# Patient Record
Sex: MALE | Race: BLACK OR AFRICAN AMERICAN
[De-identification: names, ages, dates, MRNs, and addresses within clinical notes are randomized per-mention and may not be internally consistent; named-entity substitution may affect disease eponyms.]

---

## 2018-10-15 ENCOUNTER — HOSPITAL ENCOUNTER (OUTPATIENT)
Dept: HOSPITAL 61 - CCL | Age: 53
Discharge: HOME | End: 2018-10-15
Attending: INTERNAL MEDICINE
Payer: MEDICARE

## 2018-10-15 VITALS — WEIGHT: 174 LBS | HEIGHT: 73 IN | BODY MASS INDEX: 23.06 KG/M2

## 2018-10-15 VITALS — DIASTOLIC BLOOD PRESSURE: 71 MMHG | SYSTOLIC BLOOD PRESSURE: 125 MMHG

## 2018-10-15 VITALS — DIASTOLIC BLOOD PRESSURE: 85 MMHG | SYSTOLIC BLOOD PRESSURE: 142 MMHG

## 2018-10-15 VITALS — SYSTOLIC BLOOD PRESSURE: 123 MMHG | DIASTOLIC BLOOD PRESSURE: 70 MMHG

## 2018-10-15 VITALS — DIASTOLIC BLOOD PRESSURE: 87 MMHG | SYSTOLIC BLOOD PRESSURE: 140 MMHG

## 2018-10-15 VITALS — DIASTOLIC BLOOD PRESSURE: 90 MMHG | SYSTOLIC BLOOD PRESSURE: 131 MMHG

## 2018-10-15 DIAGNOSIS — Z98.890: ICD-10-CM

## 2018-10-15 DIAGNOSIS — E78.5: ICD-10-CM

## 2018-10-15 DIAGNOSIS — Z79.82: ICD-10-CM

## 2018-10-15 DIAGNOSIS — Z87.891: ICD-10-CM

## 2018-10-15 DIAGNOSIS — N18.6: ICD-10-CM

## 2018-10-15 DIAGNOSIS — Z99.2: ICD-10-CM

## 2018-10-15 DIAGNOSIS — I50.22: ICD-10-CM

## 2018-10-15 DIAGNOSIS — E11.22: ICD-10-CM

## 2018-10-15 DIAGNOSIS — I42.9: ICD-10-CM

## 2018-10-15 DIAGNOSIS — Z79.84: ICD-10-CM

## 2018-10-15 DIAGNOSIS — I13.2: ICD-10-CM

## 2018-10-15 DIAGNOSIS — Z79.899: ICD-10-CM

## 2018-10-15 DIAGNOSIS — Z45.02: Primary | ICD-10-CM

## 2018-10-15 LAB
ANION GAP SERPL CALC-SCNC: 13 MMOL/L (ref 6–14)
BUN SERPL-MCNC: 54 MG/DL (ref 8–26)
CALCIUM SERPL-MCNC: 8.2 MG/DL (ref 8.5–10.1)
CHLORIDE SERPL-SCNC: 96 MMOL/L (ref 98–107)
CO2 SERPL-SCNC: 27 MMOL/L (ref 21–32)
CREAT SERPL-MCNC: 10.7 MG/DL (ref 0.7–1.3)
ERYTHROCYTE [DISTWIDTH] IN BLOOD BY AUTOMATED COUNT: 14.2 % (ref 11.5–14.5)
GFR SERPLBLD BASED ON 1.73 SQ M-ARVRAT: 6.2 ML/MIN
GLUCOSE SERPL-MCNC: 432 MG/DL (ref 70–99)
HCT VFR BLD CALC: 38.8 % (ref 39–53)
HGB BLD-MCNC: 12.9 G/DL (ref 13–17.5)
MCH RBC QN AUTO: 29 PG (ref 25–35)
MCHC RBC AUTO-ENTMCNC: 33 G/DL (ref 31–37)
MCV RBC AUTO: 87 FL (ref 79–100)
PLATELET # BLD AUTO: 348 X10^3/UL (ref 140–400)
POTASSIUM SERPL-SCNC: 3.2 MMOL/L (ref 3.5–5.1)
PROTHROMBIN TIME: 12.8 SEC (ref 11.7–14)
RBC # BLD AUTO: 4.47 X10^6/UL (ref 4.3–5.7)
SODIUM SERPL-SCNC: 136 MMOL/L (ref 136–145)
WBC # BLD AUTO: 12.2 X10^3/UL (ref 4–11)

## 2018-10-15 PROCEDURE — 80048 BASIC METABOLIC PNL TOTAL CA: CPT

## 2018-10-15 PROCEDURE — 33263 RMVL & RPLCMT DFB GEN 2 LEAD: CPT

## 2018-10-15 PROCEDURE — 33264 RMVL & RPLCMT DFB GEN MLT LD: CPT

## 2018-10-15 PROCEDURE — C1721 AICD, DUAL CHAMBER: HCPCS

## 2018-10-15 PROCEDURE — 99153 MOD SED SAME PHYS/QHP EA: CPT

## 2018-10-15 PROCEDURE — 85610 PROTHROMBIN TIME: CPT

## 2018-10-15 PROCEDURE — 85027 COMPLETE CBC AUTOMATED: CPT

## 2018-10-15 PROCEDURE — 99152 MOD SED SAME PHYS/QHP 5/>YRS: CPT

## 2018-10-15 PROCEDURE — 36415 COLL VENOUS BLD VENIPUNCTURE: CPT

## 2018-10-15 NOTE — CARD
MR#: [id number]

Account#: [id number]

Accession#: [id number]

Date of Study: 10/15/2018

Ordering Physician: ALL COLLINS, 

Referring Physician: ALL COLLINS, 

Tech: 





--------------- APPROVED REPORT --------------





HISTORY

The Patient is a 53 year-old male with a history of cardiomyopathy s/p ICD. 



PROCEDURES

moderate sedation: 66 minutes



INDICATIONS

End of life of ICD battery



CONSCIOUS SEDATION AGENTS

See medication admin record



IMPLANTED DEVICES

30 mL of 2% lidocaine was infiltrated into the skin and subcutaneous tissues for local anesthesia.  A
n incision was made over the left infraclavicular fossa and using blunt dissection and cautery the pr
evious pocket was opened. Due to significant scar tissue, the previous incision was not reopened and 
a new subclavicular incision was made.  



Subsequently, due to calcification/fibrosis, the previous ICD site/wires were difficult to free and t
herefore the procedure was more complex. After the previous leads were adequately mobilized, they wer
e checked for integrity. 



Finally, the previous ICD battery was removed and a new DF1 ICD generator ([employer] - [place]  2357,
 SN [id number]) was connected to the previous leads: Med 4076 - [id number] (atrial)

Med 6947 - [id number] (ventricular)



Thresholds/impedances and sensitivities were acceptable and unchanged from prior. 



The device was then placed in the pocket and the incision was closed in 3 layers. 



No acute complications were noted. 



CONCLUSION

Successful pacemaker ICD generator change for end of life of device. 



Signed by : All Collins, 

Electronically Approved : 10/15/2018 12:32:07

## 2018-10-19 ENCOUNTER — HOSPITAL ENCOUNTER (INPATIENT)
Dept: HOSPITAL 61 - ER | Age: 53
LOS: 6 days | Discharge: NURSING, REHAB OR LONG-TERM CARE | DRG: 871 | End: 2018-10-25
Attending: INTERNAL MEDICINE | Admitting: INTERNAL MEDICINE
Payer: MEDICARE

## 2018-10-19 VITALS — DIASTOLIC BLOOD PRESSURE: 72 MMHG | SYSTOLIC BLOOD PRESSURE: 103 MMHG

## 2018-10-19 VITALS — DIASTOLIC BLOOD PRESSURE: 65 MMHG | SYSTOLIC BLOOD PRESSURE: 93 MMHG

## 2018-10-19 VITALS — WEIGHT: 187 LBS | HEIGHT: 74 IN | BODY MASS INDEX: 24 KG/M2

## 2018-10-19 VITALS — DIASTOLIC BLOOD PRESSURE: 70 MMHG | SYSTOLIC BLOOD PRESSURE: 96 MMHG

## 2018-10-19 VITALS — DIASTOLIC BLOOD PRESSURE: 64 MMHG | SYSTOLIC BLOOD PRESSURE: 94 MMHG

## 2018-10-19 VITALS — SYSTOLIC BLOOD PRESSURE: 86 MMHG | DIASTOLIC BLOOD PRESSURE: 60 MMHG

## 2018-10-19 DIAGNOSIS — D63.8: ICD-10-CM

## 2018-10-19 DIAGNOSIS — M19.90: ICD-10-CM

## 2018-10-19 DIAGNOSIS — A41.9: Primary | ICD-10-CM

## 2018-10-19 DIAGNOSIS — E11.65: ICD-10-CM

## 2018-10-19 DIAGNOSIS — E11.43: ICD-10-CM

## 2018-10-19 DIAGNOSIS — R65.21: ICD-10-CM

## 2018-10-19 DIAGNOSIS — E11.69: ICD-10-CM

## 2018-10-19 DIAGNOSIS — K59.00: ICD-10-CM

## 2018-10-19 DIAGNOSIS — N18.6: ICD-10-CM

## 2018-10-19 DIAGNOSIS — E87.1: ICD-10-CM

## 2018-10-19 DIAGNOSIS — Z79.4: ICD-10-CM

## 2018-10-19 DIAGNOSIS — E11.649: ICD-10-CM

## 2018-10-19 DIAGNOSIS — E78.5: ICD-10-CM

## 2018-10-19 DIAGNOSIS — M86.9: ICD-10-CM

## 2018-10-19 DIAGNOSIS — N25.81: ICD-10-CM

## 2018-10-19 DIAGNOSIS — I44.7: ICD-10-CM

## 2018-10-19 DIAGNOSIS — K65.9: ICD-10-CM

## 2018-10-19 DIAGNOSIS — K21.9: ICD-10-CM

## 2018-10-19 DIAGNOSIS — E78.00: ICD-10-CM

## 2018-10-19 DIAGNOSIS — Z95.810: ICD-10-CM

## 2018-10-19 DIAGNOSIS — E11.22: ICD-10-CM

## 2018-10-19 DIAGNOSIS — Z79.899: ICD-10-CM

## 2018-10-19 DIAGNOSIS — L97.529: ICD-10-CM

## 2018-10-19 DIAGNOSIS — Z99.2: ICD-10-CM

## 2018-10-19 DIAGNOSIS — I13.2: ICD-10-CM

## 2018-10-19 DIAGNOSIS — Z87.891: ICD-10-CM

## 2018-10-19 DIAGNOSIS — E87.6: ICD-10-CM

## 2018-10-19 DIAGNOSIS — Z83.3: ICD-10-CM

## 2018-10-19 DIAGNOSIS — Z82.49: ICD-10-CM

## 2018-10-19 DIAGNOSIS — K31.84: ICD-10-CM

## 2018-10-19 DIAGNOSIS — E11.621: ICD-10-CM

## 2018-10-19 DIAGNOSIS — K52.9: ICD-10-CM

## 2018-10-19 DIAGNOSIS — E43: ICD-10-CM

## 2018-10-19 DIAGNOSIS — J18.9: ICD-10-CM

## 2018-10-19 DIAGNOSIS — I50.22: ICD-10-CM

## 2018-10-19 LAB
% LYMPHS: 6 % (ref 24–48)
% MONOS: 4 % (ref 0–10)
% SEGS: 90 % (ref 35–66)
ALBUMIN SERPL-MCNC: 1.7 G/DL (ref 3.4–5)
ALP SERPL-CCNC: 102 U/L (ref 46–116)
ALT SERPL-CCNC: 11 U/L (ref 16–63)
ANION GAP SERPL CALC-SCNC: 14 MMOL/L (ref 6–14)
AST SERPL-CCNC: 33 U/L (ref 15–37)
BASOPHILS # BLD AUTO: 0 X10^3/UL (ref 0–0.2)
BASOPHILS NFR BLD: 0 % (ref 0–3)
BILIRUB DIRECT SERPL-MCNC: 0.2 MG/DL (ref 0–0.2)
BILIRUB SERPL-MCNC: 0.5 MG/DL (ref 0.2–1)
BUN SERPL-MCNC: 71 MG/DL (ref 8–26)
CALCIUM SERPL-MCNC: 7.9 MG/DL (ref 8.5–10.1)
CHLORIDE SERPL-SCNC: 88 MMOL/L (ref 98–107)
CO2 SERPL-SCNC: 27 MMOL/L (ref 21–32)
CREAT SERPL-MCNC: 13.3 MG/DL (ref 0.7–1.3)
EOSINOPHIL NFR BLD: 0 % (ref 0–3)
EOSINOPHIL NFR BLD: 0 X10^3/UL (ref 0–0.7)
ERYTHROCYTE [DISTWIDTH] IN BLOOD BY AUTOMATED COUNT: 13.4 % (ref 11.5–14.5)
GFR SERPLBLD BASED ON 1.73 SQ M-ARVRAT: 4.8 ML/MIN
GLUCOSE SERPL-MCNC: 241 MG/DL (ref 70–99)
HCT VFR BLD CALC: 34.2 % (ref 39–53)
HGB BLD-MCNC: 11.6 G/DL (ref 13–17.5)
LIPASE: 77 U/L (ref 73–393)
LYMPHOCYTES # BLD: 0.4 X10^3/UL (ref 1–4.8)
LYMPHOCYTES NFR BLD AUTO: 2 % (ref 24–48)
MCH RBC QN AUTO: 29 PG (ref 25–35)
MCHC RBC AUTO-ENTMCNC: 34 G/DL (ref 31–37)
MCV RBC AUTO: 86 FL (ref 79–100)
MONO #: 1.3 X10^3/UL (ref 0–1.1)
MONOCYTES NFR BLD: 7 % (ref 0–9)
NEUT #: 17.2 X10^3UL (ref 1.8–7.7)
NEUTROPHILS NFR BLD AUTO: 91 % (ref 31–73)
PLATELET # BLD AUTO: 325 X10^3/UL (ref 140–400)
PLATELET # BLD EST: ADEQUATE 10*3/UL
POTASSIUM SERPL-SCNC: 3.6 MMOL/L (ref 3.5–5.1)
PROT SERPL-MCNC: 6 G/DL (ref 6.4–8.2)
RBC # BLD AUTO: 3.97 X10^6/UL (ref 4.3–5.7)
SODIUM SERPL-SCNC: 129 MMOL/L (ref 136–145)
WBC # BLD AUTO: 18.9 X10^3/UL (ref 4–11)

## 2018-10-19 PROCEDURE — 93306 TTE W/DOPPLER COMPLETE: CPT

## 2018-10-19 PROCEDURE — 87075 CULTR BACTERIA EXCEPT BLOOD: CPT

## 2018-10-19 PROCEDURE — 83735 ASSAY OF MAGNESIUM: CPT

## 2018-10-19 PROCEDURE — 71045 X-RAY EXAM CHEST 1 VIEW: CPT

## 2018-10-19 PROCEDURE — 87641 MR-STAPH DNA AMP PROBE: CPT

## 2018-10-19 PROCEDURE — 36415 COLL VENOUS BLD VENIPUNCTURE: CPT

## 2018-10-19 PROCEDURE — 36569 INSJ PICC 5 YR+ W/O IMAGING: CPT

## 2018-10-19 PROCEDURE — 82533 TOTAL CORTISOL: CPT

## 2018-10-19 PROCEDURE — 96367 TX/PROPH/DG ADDL SEQ IV INF: CPT

## 2018-10-19 PROCEDURE — A9503 TC99M MEDRONATE: HCPCS

## 2018-10-19 PROCEDURE — 85007 BL SMEAR W/DIFF WBC COUNT: CPT

## 2018-10-19 PROCEDURE — 78315 BONE IMAGING 3 PHASE: CPT

## 2018-10-19 PROCEDURE — 83690 ASSAY OF LIPASE: CPT

## 2018-10-19 PROCEDURE — 82962 GLUCOSE BLOOD TEST: CPT

## 2018-10-19 PROCEDURE — 93926 LOWER EXTREMITY STUDY: CPT

## 2018-10-19 PROCEDURE — 96374 THER/PROPH/DIAG INJ IV PUSH: CPT

## 2018-10-19 PROCEDURE — 84484 ASSAY OF TROPONIN QUANT: CPT

## 2018-10-19 PROCEDURE — 87040 BLOOD CULTURE FOR BACTERIA: CPT

## 2018-10-19 PROCEDURE — 96375 TX/PRO/DX INJ NEW DRUG ADDON: CPT

## 2018-10-19 PROCEDURE — 87071 CULTURE AEROBIC QUANT OTHER: CPT

## 2018-10-19 PROCEDURE — P9041 ALBUMIN (HUMAN),5%, 50ML: HCPCS

## 2018-10-19 PROCEDURE — 73630 X-RAY EXAM OF FOOT: CPT

## 2018-10-19 PROCEDURE — 83605 ASSAY OF LACTIC ACID: CPT

## 2018-10-19 PROCEDURE — 80069 RENAL FUNCTION PANEL: CPT

## 2018-10-19 PROCEDURE — C9113 INJ PANTOPRAZOLE SODIUM, VIA: HCPCS

## 2018-10-19 PROCEDURE — 80076 HEPATIC FUNCTION PANEL: CPT

## 2018-10-19 PROCEDURE — 80048 BASIC METABOLIC PNL TOTAL CA: CPT

## 2018-10-19 PROCEDURE — 83880 ASSAY OF NATRIURETIC PEPTIDE: CPT

## 2018-10-19 PROCEDURE — 85025 COMPLETE CBC W/AUTO DIFF WBC: CPT

## 2018-10-19 PROCEDURE — 87324 CLOSTRIDIUM AG IA: CPT

## 2018-10-19 PROCEDURE — 84443 ASSAY THYROID STIM HORMONE: CPT

## 2018-10-19 PROCEDURE — 74176 CT ABD & PELVIS W/O CONTRAST: CPT

## 2018-10-19 PROCEDURE — 80202 ASSAY OF VANCOMYCIN: CPT

## 2018-10-19 PROCEDURE — 89050 BODY FLUID CELL COUNT: CPT

## 2018-10-19 PROCEDURE — 96365 THER/PROPH/DIAG IV INF INIT: CPT

## 2018-10-19 PROCEDURE — 93005 ELECTROCARDIOGRAM TRACING: CPT

## 2018-10-19 PROCEDURE — 83036 HEMOGLOBIN GLYCOSYLATED A1C: CPT

## 2018-10-19 RX ADMIN — HYDROMORPHONE HYDROCHLORIDE PRN MG: 2 INJECTION INTRAMUSCULAR; INTRAVENOUS; SUBCUTANEOUS at 16:13

## 2018-10-19 RX ADMIN — ONDANSETRON PRN MG: 2 INJECTION INTRAMUSCULAR; INTRAVENOUS at 20:34

## 2018-10-19 RX ADMIN — DOCUSATE SODIUM SCH MG: 100 CAPSULE, LIQUID FILLED ORAL at 21:00

## 2018-10-19 RX ADMIN — PIPERACILLIN SODIUM AND TAZOBACTAM SODIUM SCH MLS/HR: 2; .25 INJECTION, POWDER, LYOPHILIZED, FOR SOLUTION INTRAVENOUS at 21:41

## 2018-10-19 RX ADMIN — VANCOMYCIN HYDROCHLORIDE PRN EACH: 1 INJECTION, POWDER, LYOPHILIZED, FOR SOLUTION INTRAVENOUS at 18:06

## 2018-10-19 RX ADMIN — HYDROMORPHONE HYDROCHLORIDE PRN MG: 2 INJECTION INTRAMUSCULAR; INTRAVENOUS; SUBCUTANEOUS at 16:50

## 2018-10-19 RX ADMIN — HEPARIN SODIUM SCH UNIT: 5000 INJECTION, SOLUTION INTRAVENOUS; SUBCUTANEOUS at 21:42

## 2018-10-19 RX ADMIN — VANCOMYCIN HYDROCHLORIDE PRN EACH: 1 INJECTION, POWDER, LYOPHILIZED, FOR SOLUTION INTRAVENOUS at 18:13

## 2018-10-19 RX ADMIN — SENNOSIDES AND DOCUSATE SODIUM SCH TAB: 8.6; 5 TABLET ORAL at 21:00

## 2018-10-19 NOTE — RAD
Examination: CT of the abdomen pelvis with contrast

 

HISTORY: History of nausea, vomiting, abdominal pain

 

COMPARISON: 11/4/2016

 

TECHNIQUE: Axial CT images of the abdomen pelvis were performed without 

contrast. Coronal and sagittal reformats are performed

 

Exposure: One or more of the following individualized dose reduction 

techniques were utilized for this examination:  1. Automated exposure 

control  2. Adjustment of the mA and/or kV according to patient size  3. 

Use of iterative reconstruction technique

 

FINDINGS:

 

 

Partially visualized moderate size consolidation identified in the left 

lingula of the lung probably pneumonia.

 

Partially visualized moderate cardiomegaly.

 

No evidence of free air identified in the abdomen.

 

The evaluation of the solid organs is limited due to lack of IV contrast. 

The evaluation of bowel is limited due to lack of oral contrast.

 

The visualized noncontrasted liver, spleen, adrenals grossly appears 

unremarkable. The gallbladder is mildly distended.

 

Stomach is mildly distended. The evaluation of the pancreas is limited due

to mild motion artifact.

 

The small bowel is nondilated.

 

Feces and gas noted in the colon.

 

Minimal perihepatic fluid identified.

 

Few sigmoid colon diverticulosis. Urinary bladder is mildly distended.

 

Small amount of free fluid identified in the pelvis.

 

Atrophic appearing bilateral kidneys.

 

Mild degenerative changes lumbar spine. A tubing is identified in the 

lower pelvis region likely peritoneal dialysis catheter tubing.

 

 

 

 

 

IMPRESSION:

 

 

1. Partially visualized moderate consolidation left lingula of the lung 

probably pneumonia. Recommend CT chest for further evaluation.

 

2. Minimal perihepatic fluid.

 

3. Atrophic appearing bilateral kidneys.

 

 

 

Electronically signed by: Rohan Strickland MD (10/19/2018 4:40 PM) [id number]

## 2018-10-19 NOTE — PDOC2
JOSE PEREZ APRN 10/19/18 1651:


CARDIAC CONSULT


DATE OF CONSULT


Date of Consult


DATE: 10/19/18 


TIME: 16:27





REASON FOR CONSULT


Reason for Consult:


fever after generator change





REFERRING PHYSICIAN


Referring Physician:


Lilian





SOURCE


Source:  Chart review, Patient





HISTORY OF PRESENT ILLNESS


HISTORY OF PRESENT ILLNESS


This is a 54 yo male admitted for complains of persistent nausea and vomiting.  

Pt had his AICD generator change on 10/15/2018.  Since then his incision has 

been intact without erythema or significant swelling.  The incision does not 

have any oozing and the steristrips are dry without and discolored residual 

drain.  Denies any pain around the device. No irritation and is not hot to 

touch.  His n/v started Monday night.  He has not had any BM since Sunday.  He 

has been vomiting about 20 times a day but despite that he has been drinking 

enough and eating solid food till Wednesday when he switch to broth.  He has 

been taking his medications as well and doing his peritoneal dialysis.  He has 

some abd pain but no significant.  Denies any redness or oozing around the PD 

cath site and his last dialysis was last night and denies any discoloration or 

changes in the consistency of his post dialysis peritoneal fluid. He denies any 

fever or chills but upon admission he has been noted with low grade fever and 

tachycardic with low BP but at the same time he is currently still nauseated 

and retching and awaiting zofran.





PAST MEDICAL HISTORY


Cardiovascular:  CHF (cardiomyopathy), HTN, Hyperlipidemia


Heme/Onc:  Anemia NOS


Musculoskeletal:  Osteoarthritis


ENT:  Allergic Rhinitis


Renal/:  Chronic renal failure (ESRD with PD)


Endocrine:  Diabetes (2)


Dermatology:  Other (acquired perforating leg dermatosis)





PAST SURGICAL HISTORY


Past Surgical History:  Pacemaker (AICD with recent generator change 10/15/2018)

, Other (PD cath placement to abd. )





FAMILY HISTORY


Family History


noncontributory





SOCIAL HISTORY


Smoke:  No


ALCOHOL:  none


Drugs:  None


Lives:  with Family





CURRENT MEDICATIONS


CURRENT MEDICATIONS





Current Medications








 Medications


  (Trade)  Dose


 Ordered  Sig/Ivonne


 Route


 PRN Reason  Start Time


 Stop Time Status Last Admin


Dose Admin


 


 Ondansetron HCl


  (Zofran)  4 mg  1X  ONCE


 IV


   10/19/18 16:00


 10/19/18 16:01 DC 10/19/18 16:12





 


 Hydromorphone HCl


  (Dilaudid)  0.5 mg  PRN Q30MIN  PRN


 IV


 SEVERE PAIN  10/19/18 16:15


    10/19/18 16:13














ALLERGIES


ALLERGIES:  


Coded Allergies:  


     No Known Drug Allergies (Unverified , 6/22/15)





ROS


Review of System


14 point ROS evaluated with pertinent positives noted per HPI





PHYSICAL EXAM


General:  Alert, Oriented X3, Cooperative, mild distress (retching currently)


HEENT:  Atraumatic, Mucous membr. moist/pink


Lungs:  Clear to auscultation, Normal air movement


Heart:  Regular rate (tachy), Other (3/6 systolic murmur to LLS border)


Abdomen:  Soft, Other (with mild diffuse tenderness, PD cath in place with 

dressing)


Extremities:  No cyanosis, No edema


Skin:  Other (chornic leg closed lesions; left chest incision site is open to 

air, no swelling or erythema or tenderness with palpation. No drain and 

incision is well approximated with dry steri strips. No discoloration. )


Neuro:  Normal speech, Sensation intact


Psych/Mental Status:  Mental status NL, Other


MUSCULOSKELETAL:  Osteoarthritic changes both hands





VITALS


VITALS





Vital Signs








  Date Time  Temp Pulse Resp B/P (MAP) Pulse Ox O2 Delivery O2 Flow Rate FiO2


 


10/19/18 16:13   20   Room Air  


 


10/19/18 15:20 100.1 113  98/73 (81) 100   





 100.1       











LABS


Lab:





Laboratory Tests








Test


 10/19/18


15:45


 


White Blood Count


 18.9 x10^3/uL


(4.0-11.0)


 


Red Blood Count


 3.97 x10^6/uL


(4.30-5.70)


 


Hemoglobin


 11.6 g/dL


(13.0-17.5)


 


Hematocrit


 34.2 %


(39.0-53.0)


 


Mean Corpuscular Volume 86 fL () 


 


Mean Corpuscular Hemoglobin 29 pg (25-35) 


 


Mean Corpuscular Hemoglobin


Concent 34 g/dL


(31-37)


 


Red Cell Distribution Width


 13.4 %


(11.5-14.5)


 


Platelet Count


 325 x10^3/uL


(140-400)


 


Neutrophils (%) (Auto) 91 % (31-73) 


 


Lymphocytes (%) (Auto) 2 % (24-48) 


 


Monocytes (%) (Auto) 7 % (0-9) 


 


Eosinophils (%) (Auto) 0 % (0-3) 


 


Basophils (%) (Auto) 0 % (0-3) 


 


Neutrophils # (Auto)


 17.2 x10^3uL


(1.8-7.7)


 


Lymphocytes # (Auto)


 0.4 x10^3/uL


(1.0-4.8)


 


Monocytes # (Auto)


 1.3 x10^3/uL


(0.0-1.1)


 


Eosinophils # (Auto)


 0.0 x10^3/uL


(0.0-0.7)


 


Basophils # (Auto)


 0.0 x10^3/uL


(0.0-0.2)


 


Sodium Level


 129 mmol/L


(136-145)


 


Potassium Level


 3.6 mmol/L


(3.5-5.1)


 


Chloride Level


 88 mmol/L


()


 


Carbon Dioxide Level


 27 mmol/L


(21-32)


 


Anion Gap 14 (6-14) 


 


Blood Urea Nitrogen


 71 mg/dL


(8-26)


 


Creatinine


 13.3 mg/dL


(0.7-1.3)


 


Estimated GFR


(Cockcroft-Gault) 4.8 





 


Glucose Level


 241 mg/dL


(70-99)


 


Lactic Acid Level


 1.3 mmol/L


(0.4-2.0)


 


Calcium Level


 7.9 mg/dL


(8.5-10.1)


 


Total Bilirubin


 0.5 mg/dL


(0.2-1.0)


 


Direct Bilirubin


 0.2 mg/dL


(0.0-0.2)


 


Aspartate Amino Transf


(AST/SGOT) 33 U/L (15-37) 





 


Alanine Aminotransferase


(ALT/SGPT) 11 U/L (16-63) 





 


Alkaline Phosphatase


 102 U/L


()


 


Troponin I Quantitative


 0.340 ng/mL


(0.000-0.055)


 


Total Protein


 6.0 g/dL


(6.4-8.2)


 


Albumin


 1.7 g/dL


(3.4-5.0)


 


Lipase


 77 U/L


()











ECHOCARDIOGRAM


ECHOCARDIOGRAM





<Conclusion>


Left ventricle systolic function is severely impaired.


The Ejection Fraction is 10-15%.


There is mild concentric left ventricular hypertrophy.


There is global hypokinesis of the left ventricle.


There is a pacemaker lead in the right ventricle.


The left atrium is mildly dilated.


Doppler and Color Flow revealed trace aortic regurgitation.


Doppler and Color Flow revealed mild mitral regurgitation.


Doppler and Color Flow revealed mild tricuspid regurgitation.


The PA pressure was estimated at 50 mmHg.


The IVC is dilated and collapses <50% with inspiration.


There is no evidence of significant pericardial effusion.





DATE:     01/26/15 1607





ASSESSMENT/PLAN


ASSESSMENT/PLAN


1. Abd pain/nausea/vomiting/fever: Last BM Sunday. ?peritonitis, gastroparesis?


2. S/P AICD generator change: done  on 10/15/2018 ([employer]) stable. 


3. Chronic systolic CHF: compensated


4. ESRD: utilizes PD with last dialysis last night. 


5. HTN


6. DM2/HLP


7. Reactive sinus tach with chronic LBBB





Recommendations


1. CXR and note AICD region.  CT abd pending. Repeat EKG


2. Consult nephrology


3. Restart home cardiac meds once abdominal symptoms are better.





ALL COLLINS MD 10/19/18 1806:


CARDIAC CONSULT


ASSESSMENT/PLAN


ASSESSMENT/PLAN


Pt. seen and examined. Agree with above NP note. 


No clear cardiac source of issues. 


Supportive care. 


Will follow along peripherally.











JOSE PEREZ APRN Oct 19, 2018 16:51


ALL COLLINS MD Oct 19, 2018 18:06

## 2018-10-19 NOTE — EKG
[facility]

              [address] [place] [address]

Test Date:    2018-10-19               Test Time:    16:50:09

Pat Name:     BLAISE DOWNING            Department:   

Patient ID:   [id number]           Room:         106 1

Gender:       M                        Technician:   

:          [id number]               Requested By: JOSE PEREZ

Order Number: [id number]           Reading MD:   Ramirez Mcgraw MD

                                 Measurements

Intervals                              Axis          

Rate:         103                      P:            56

UT:           160                      QRS:          -37

QRSD:         140                      T:            91

QT:           374                                    

QTc:          492                                    

                           Interpretive Statements

SINUS TACHYCARDIA

LEFT ATRIAL ABNORMALITY

ABNORMAL LEFT AXIS DEVIATION

LEFT ANTERIOR FASCICULAR BLOCK

NON SPECIFIC INTRAVENTRICULAR BLOCK

QRS(T) CONTOUR ABNORMALITY

CONSISTENT WITH SEPTAL INFARCT

PROBABLY OLD

LVH

Electronically Signed On 10- 13:49:27 CDT by Ramirez Mcgraw MD

## 2018-10-19 NOTE — PHYS DOC
Past Medical History


Past Medical History:  CHF, Diabetes-Type II, High Cholesterol, Heart Disease, 

Renal Failure


Past Surgical History:  Pacemaker, Other


Additional Past Surgical Histo:  PERITONEAL DIALYSIS CATH PLACE LLQ


Alcohol Use:  None


Drug Use:  None





Adult General


Chief Complaint


Chief Complaint:  ABDOMINAL PAIN





HPI


HPI


53-year-old male presenting to the emergency department today with vomiting and 

unable to keep anything down since Monday. The patient had his battery for his 

pacemaker replaced on Monday by Dr. Mcgraw at our facility. The patient 

denies any fevers but does have abdominal pain is primary complaint is nausea 

and vomiting. He denies diaphoresis or chest pain.





Review of systems is negative for chest pain shortness of breath. He denies any 

new rashes. Positive for vomiting. All other review of systems is negative 

unless otherwise noted in history of present illness.





ED course: 53-year-old male presenting to the emergency department today. He 

has abdominal pain with low-grade temperature. Broad-spectrum IV antibiotics 

initiated early on. IV fluids initiated as well however the patient is a 

dialysis patient thus we used judicious management of IV fluids. I spoke with 

Liv the [facility] for cardiology and given the patient's recent battery 

replacement. EKG reviewed by myself shows paced rhythm with repolarization. 

Negative [place]. Repeat EKG shows similar findings without any acute 

evolving changes. Troponin is positive. White blood cell count is elevated. 

Concern for possible sepsis. Broad-spectrum antibiotics initiated. Chest x-ray 

shows pneumonia. We tried to obtain peritoneal fluid however unfortunately were 

unable to obtain any. They might have to obtain this later when he does 

perineal dialysis. Nurse practitioner Liv evaluated the patient in the 

emergency department. Dr. SAMUELS saw the patient in the emergency department as 

well. We will admit the patient to Dr. SAMUELS for further evaluation treatment and 

care. We will place the patient in the intensive care unit for now for close 

monitoring.








Impression: Concern for sepsis, elevated troponin, elevated white blood cell 

count, vomiting, pneumonia





Review of Systems


Review of Systems


SEE ABOVE.





Current Medications


Current Medications





Current Medications








 Medications


  (Trade)  Dose


 Ordered  Sig/Ivonne  Start Time


 Stop Time Status Last Admin


Dose Admin


 


 Acetaminophen


  (Tylenol)  650 mg  PRN Q6HRS  PRN  10/19/18 17:30


   UNV  





 


 Aspirin


  (Children'S


 Aspirin)  324 mg  1X  ONCE  10/19/18 17:15


 10/19/18 17:16 DC  





 


 Dextrose


  (Dextrose


 50%-Water Syringe)  12.5 gm  PRN Q15MIN  PRN  10/19/18 17:30


   UNV  





 


 Docusate Sodium


  (Colace)  100 mg  BID  10/19/18 21:00


   UNV  





 


 Heparin Sodium


  (Porcine)


  (Heparin Sodium)  5,000 unit  Q8HRS  10/19/18 22:00


   UNV  





 


 Hydromorphone HCl


  (Dilaudid)  2 mg  STK-MED ONCE  10/19/18 16:10


 10/19/18 16:11 DC  





 


 Insulin Human


 Lispro


  (HumaLOG)  0-9 UNITS  TIDWMEALS  10/20/18 08:00


   UNV  





 


 Magnesium


 Hydroxide


  (Milk Of


 Magnesia)  2,400 mg  PRN Q12HR  PRN  10/19/18 17:45


   UNV  





 


 Morphine Sulfate


  (Morphine


 Sulfate)  2 mg  PRN Q2HR  PRN  10/19/18 17:30


   UNV  





 


 Ondansetron HCl


  (Zofran)  4 mg  PRN Q6HRS  PRN  10/19/18 17:30


   UNV  





 


 Piperacillin Sod/


 Tazobactam Sod


  (Zosyn Per


 Pharmacy)  1 each  PRN DAILY  PRN  10/19/18 17:30


   UNV  





 


 Piperacillin Sod/


 Tazobactam Sod


 2.25 gm/Sodium


 Chloride  50 ml @ 


 100 mls/hr  1X  ONCE  10/19/18 16:00


 10/19/18 16:29 DC 10/19/18 16:49


100 MLS/HR


 


 Piperacillin Sod/


 Tazobactam Sod


 3.375 gm/Sodium


 Chloride  50 ml @ 


 100 mls/hr  Q6HRS  10/19/18 18:00


   UNV  





 


 Senna/Docusate


 Sodium


  (Senna Plus)  1 tab  BID  10/19/18 21:00


   UNV  





 


 Sodium Chloride  500 ml @ 


 500 mls/hr  1X  ONCE  10/19/18 17:15


 10/19/18 18:14   





 


 Tramadol HCl


  (Ultram)  50 mg  PRN Q6HRS  PRN  10/19/18 17:30


   UNV  





 


 Vancomycin HCl


  (Vanco Per


 Pharmacy)  1 each  PRN DAILY  PRN  10/19/18 17:30


   UNV  





 


 Vancomycin HCl


 1.75 gm/Sodium


 Chloride  500 ml @ 


 250 mls/hr  1X  ONCE  10/19/18 17:30


 10/19/18 19:29  10/19/18 17:25


250 MLS/HR











Allergies


Allergies





Allergies








Coded Allergies Type Severity Reaction Last Updated Verified


 


  No Known Drug Allergies    6/22/15 No











Physical Exam


Physical Exam


SEE ABOVE





Constitutional: Well developed, well nourished, 


HENT: Normocephalic, atraumatic, bilateral external ears normal, oropharynx 

moist, no oral exudates, nose normal. []


Eyes: PERRLA, EOMI, conjunctiva normal, no discharge. [] 


Neck: Normal range of motion, no tenderness, supple, no stridor.  


Cardiovascular:Heart rate regular rhythm, no murmur. On examination of the 

chest wall the patient has a surgical scar in place that is clean dry and 

intact. No erythema or drainage. Not warm to touch. 


Lungs & Thorax:  Bilateral breath sounds clear to auscultation 


Abdomen: Abdomen is soft and nontender palpation without rebound tenderness. No 

palpable masses. Nondistended.


Skin: Warm, dry, no erythema, no rash. [] 


Back: No tenderness, no CVA tenderness.  


Extremities: No tenderness, no cyanosis, no clubbing, ROM intact, no edema.  


Neurologic: Alert and oriented X 3, normal motor function, normal sensory 

function, no focal deficits noted. 


Psychologic: Affect normal, judgement normal, mood normal. []





Current Patient Data


Vital Signs





 Vital Signs








  Date Time  Temp Pulse Resp B/P (MAP) Pulse Ox O2 Delivery O2 Flow Rate FiO2


 


10/19/18 17:00  100 15 86/61 (69) 94 Room Air  


 


10/19/18 15:20 100.1       





 100.1       








Lab Values





 Laboratory Tests








Test


 10/19/18


15:45


 


White Blood Count


 18.9 x10^3/uL


(4.0-11.0)  H


 


Red Blood Count


 3.97 x10^6/uL


(4.30-5.70)  L


 


Hemoglobin


 11.6 g/dL


(13.0-17.5)  L


 


Hematocrit


 34.2 %


(39.0-53.0)  L


 


Mean Corpuscular Volume


 86 fL ()





 


Mean Corpuscular Hemoglobin 29 pg (25-35)  


 


Mean Corpuscular Hemoglobin


Concent 34 g/dL


(31-37)


 


Red Cell Distribution Width


 13.4 %


(11.5-14.5)


 


Platelet Count


 325 x10^3/uL


(140-400)


 


Neutrophils (%) (Auto) 91 % (31-73)  H


 


Lymphocytes (%) (Auto) 2 % (24-48)  L


 


Monocytes (%) (Auto) 7 % (0-9)  


 


Eosinophils (%) (Auto) 0 % (0-3)  


 


Basophils (%) (Auto) 0 % (0-3)  


 


Neutrophils # (Auto)


 17.2 x10^3uL


(1.8-7.7)  H


 


Lymphocytes # (Auto)


 0.4 x10^3/uL


(1.0-4.8)  L


 


Monocytes # (Auto)


 1.3 x10^3/uL


(0.0-1.1)  H


 


Eosinophils # (Auto)


 0.0 x10^3/uL


(0.0-0.7)


 


Basophils # (Auto)


 0.0 x10^3/uL


(0.0-0.2)


 


Segmented Neutrophils % 90 % (35-66)  H


 


Lymphocytes % 6 % (24-48)  L


 


Monocytes % 4 % (0-10)  


 


Platelet Estimate


 Adequate


(ADEQUATE)


 


Sodium Level


 129 mmol/L


(136-145)  L


 


Potassium Level


 3.6 mmol/L


(3.5-5.1)


 


Chloride Level


 88 mmol/L


()  L


 


Carbon Dioxide Level


 27 mmol/L


(21-32)


 


Anion Gap 14 (6-14)  


 


Blood Urea Nitrogen


 71 mg/dL


(8-26)  H


 


Creatinine


 13.3 mg/dL


(0.7-1.3)  H


 


Estimated GFR


(Cockcroft-Gault) 4.8  





 


Glucose Level


 241 mg/dL


(70-99)  H


 


Lactic Acid Level


 1.3 mmol/L


(0.4-2.0)


 


Calcium Level


 7.9 mg/dL


(8.5-10.1)  L


 


Total Bilirubin


 0.5 mg/dL


(0.2-1.0)


 


Direct Bilirubin


 0.2 mg/dL


(0.0-0.2)


 


Aspartate Amino Transferase


(AST) 33 U/L (15-37)





 


Alanine Aminotransferase (ALT)


 11 U/L (16-63)


L


 


Alkaline Phosphatase


 102 U/L


()


 


Troponin I Quantitative


 0.340 ng/mL


(0.000-0.055)


 


Total Protein


 6.0 g/dL


(6.4-8.2)  L


 


Albumin


 1.7 g/dL


(3.4-5.0)  L


 


Lipase


 77 U/L


()





 Laboratory Tests


10/19/18 15:45








 Laboratory Tests


10/19/18 15:45











EKG


EKG


[]





Radiology/Procedures


Radiology/Procedures


[]





Course & Med Decision Making


Course & Med Decision Making


Pertinent Labs and Imaging studies reviewed. (See chart for details)





[]





Dragon Disclaimer


Dragon Disclaimer


This electronic medical record was generated, in whole or in part, using a 

voice recognition dictation system.





Departure


Departure


Impression:  


 Primary Impression:  


 Abdominal pain


Disposition:  09 ADMITTED AS INPATIENT


Admitting Physician:  Angela Hodges


Condition:  STABLE


Referrals:  


ANKUR CRUZ MD (PCP)











MARCELO PALACIOS MD Oct 19, 2018 15:59

## 2018-10-19 NOTE — RAD
CHEST AP ONLY dated 10/19/2018 4:40 PM.

 

Comparison: 11/4/2016

 

Clinical Indication: recent pacer battery change.

 

Findings:

 

Single upright portable exam performed. Heart size mildly enlarged, 

stable. Dual lead left subclavian pacer/AICD in place, leads stable in 

position. There is been interval revision of the pacer box.

 

There is patchy airspace disease in the perihilar regions, left greater 

than right. No significant pleural effusion or pneumothorax.

 

Impression:

1. Patchy perihilar airspace disease, left greater than right. This could 

be related to asymmetric edema or pneumonia. Correlate clinically.

2. Interval revision of left pacer box. No evidence of pneumothorax.

 

Electronically signed by: Todd Norris MD (10/19/2018 4:56 PM) 

[id number]

## 2018-10-19 NOTE — PDOC1
History and Physical


Date of Admission


Date of Admission


10/19/18





Identification/Chief Complaint


Chief Complaint


abd pain, Nausea





Source


Source:  Chart review, Patient





History of Present Illness


History of Present Illness





HPI


HPI


53-year-old male presenting to the emergency department today with abd pain for 

5ds. Poor historian with nausea seen in ER. 





pt has known h/o CHF with EF 10%, has PPM/ICD, battery changed on MOnday with 

dr. Mcgraw.


He started to have diffuse abd pain from Monday, with nausea and mild vomiting 

with spitting out some saliva. also has mild cough wo sputum.


Has PD for 3years, works fine. 


no fever at home ,T 100 in ER. BP low as 80s in ER, but said BP usually is 

normal. 





no chest pain, or sob.


usually BM daily, last BM was sunday. still has flatus. 


ERP got little abd fluid from PD Cath for cx. 





CT abd : IMPRESSION: 


1. Partially visualized moderate consolidation left lingula of the lung 

probably pneumonia. Recommend CT chest for further evaluation.


 


2. Minimal perihepatic fluid.


 


3. Atrophic appearing bilateral kidneys.





Past Medical History


Cardiovascular:  CHF (cardiomyopathy), HTN, Hyperlipidemia


Pulmonary:  No pertinent hx


CENTRAL NERVOUS SYSTEM:  Other


GI:  GERD


Heme/Onc:  Anemia NOS


Hepatobiliary:  No pertinent hx


Psych:  No pertinent hx


Rheumatologic:  No pertinent hx


Infectious disease:  No pertinent hx


ENT:  Allergic Rhinitis


Renal/:  Chronic renal failure (ESRD with PD)


Endocrine:  Diabetes (2)


Dermatology:  Other (acquired perforating leg dermatosis)





Past Surgical History


Past Surgical History:  Pacemaker (AICD with recent generator change 10/15/2018)

, Other (PD cath placement to abd. )





Family History


Family History:  Diabetes, Hypertension





Social History


Smoke:  No


ALCOHOL:  none


Drugs:  None





Current Medications


Current Medications





Current Medications








 Medications


  (Trade)  Dose


 Ordered  Sig/Ivonne  Start Time


 Stop Time Status Last Admin


Dose Admin


 


 Aspirin


  ([facility]


 Aspirin)  324 mg  1X  ONCE  10/19/18 17:15


 10/19/18 17:16 DC  





 


 Hydromorphone HCl


  (Dilaudid)  2 mg  STK-MED ONCE  10/19/18 16:10


 10/19/18 16:11 DC  





 


 Ondansetron HCl


  (Zofran)  4 mg  1X  ONCE  10/19/18 16:00


 10/19/18 16:01 DC 10/19/18 16:12


4 MG


 


 Piperacillin Sod/


 Tazobactam Sod


  (Zosyn Per


 Pharmacy)  1 each  PRN DAILY  PRN  10/19/18 15:45


   UNV  





 


 Piperacillin Sod/


 Tazobactam Sod


 2.25 gm/Sodium


 Chloride  50 ml @ 


 100 mls/hr  1X  ONCE  10/19/18 16:00


 10/19/18 16:29 DC 10/19/18 16:49


100 MLS/HR


 


 Sodium Chloride  500 ml @ 


 500 mls/hr  1X  ONCE  10/19/18 17:15


 10/19/18 18:14   





 


 Vancomycin HCl


  (Vanco Per


 Pharmacy)  1 each  PRN DAILY  PRN  10/19/18 17:15


   UNV  





 


 Vancomycin HCl


 1.75 gm/Sodium


 Chloride  500 ml @ 


 250 mls/hr  1X  ONCE  10/19/18 17:30


 10/19/18 19:29  10/19/18 17:25


250 MLS/HR











Allergies


Allergies





Allergies








Coded Allergies Type Severity Reaction Last Updated Verified


 


  No Known Drug Allergies    6/22/15 No











ROS


Review of System


CONSTITUTIONAL:        No fever or chills


EYES:                          No recent changes


SKIN:               No rash or itching


CARDIOVASCULAR:     No chest pain, syncope, palpitations, or edema


RESPIRATORY:            No SOB or cough


GASTROINTESTINAL:    No nausea, vomiting or abdominal pain


NEUROLOGICAL:          No headaches or weakness


ENDOCRINE:               No cold or heat intolerance


GENITOURINARY:         No urgency or frequency of urination


MUSCULOSKELETAL:   No back pain or joint pain


LYMPHATICS:               No enlarged lymph nodes


PSYCHIATRIC:              No anxiety or depression





Physical Exam


Physical Exam


GEN.:    nausea. Alert and oriented.


HEENT:    Head is normocephalic, atraumatic


NECK:    Supple.  


LUNGS:    left lung mild crackles. 


HEART:    RRR, S1, S2 present.  Peripheral pulses intact


ABDOMEN:    Soft, nontender.  Positive bowel sounds.


EXTREMITIES:    Without any cyanosis.    


NEUROLOGIC:     Normal speech, normal tone


PSYCHIATRIC:    Normal affect, normal mood.


SKIN:   No ulcerations





Vitals


Vitals





Vital Signs








  Date Time  Temp Pulse Resp B/P (MAP) Pulse Ox O2 Delivery O2 Flow Rate FiO2


 


10/19/18 17:00  100 15 86/61 (69) 94 Room Air  


 


10/19/18 15:20 100.1       





 100.1       











Labs


Labs





Laboratory Tests








Test


 10/19/18


15:45


 


White Blood Count


 18.9 x10^3/uL


(4.0-11.0)


 


Red Blood Count


 3.97 x10^6/uL


(4.30-5.70)


 


Hemoglobin


 11.6 g/dL


(13.0-17.5)


 


Hematocrit


 34.2 %


(39.0-53.0)


 


Mean Corpuscular Volume 86 fL () 


 


Mean Corpuscular Hemoglobin 29 pg (25-35) 


 


Mean Corpuscular Hemoglobin


Concent 34 g/dL


(31-37)


 


Red Cell Distribution Width


 13.4 %


(11.5-14.5)


 


Platelet Count


 325 x10^3/uL


(140-400)


 


Neutrophils (%) (Auto) 91 % (31-73) 


 


Lymphocytes (%) (Auto) 2 % (24-48) 


 


Monocytes (%) (Auto) 7 % (0-9) 


 


Eosinophils (%) (Auto) 0 % (0-3) 


 


Basophils (%) (Auto) 0 % (0-3) 


 


Neutrophils # (Auto)


 17.2 x10^3uL


(1.8-7.7)


 


Lymphocytes # (Auto)


 0.4 x10^3/uL


(1.0-4.8)


 


Monocytes # (Auto)


 1.3 x10^3/uL


(0.0-1.1)


 


Eosinophils # (Auto)


 0.0 x10^3/uL


(0.0-0.7)


 


Basophils # (Auto)


 0.0 x10^3/uL


(0.0-0.2)


 


Segmented Neutrophils % 90 % (35-66) 


 


Lymphocytes % 6 % (24-48) 


 


Monocytes % 4 % (0-10) 


 


Platelet Estimate


 Adequate


(ADEQUATE)


 


Sodium Level


 129 mmol/L


(136-145)


 


Potassium Level


 3.6 mmol/L


(3.5-5.1)


 


Chloride Level


 88 mmol/L


()


 


Carbon Dioxide Level


 27 mmol/L


(21-32)


 


Anion Gap 14 (6-14) 


 


Blood Urea Nitrogen


 71 mg/dL


(8-26)


 


Creatinine


 13.3 mg/dL


(0.7-1.3)


 


Estimated GFR


(Cockcroft-Gault) 4.8 





 


Glucose Level


 241 mg/dL


(70-99)


 


Lactic Acid Level


 1.3 mmol/L


(0.4-2.0)


 


Calcium Level


 7.9 mg/dL


(8.5-10.1)


 


Total Bilirubin


 0.5 mg/dL


(0.2-1.0)


 


Direct Bilirubin


 0.2 mg/dL


(0.0-0.2)


 


Aspartate Amino Transf


(AST/SGOT) 33 U/L (15-37) 





 


Alanine Aminotransferase


(ALT/SGPT) 11 U/L (16-63) 





 


Alkaline Phosphatase


 102 U/L


()


 


Troponin I Quantitative


 0.340 ng/mL


(0.000-0.055)


 


Total Protein


 6.0 g/dL


(6.4-8.2)


 


Albumin


 1.7 g/dL


(3.4-5.0)


 


Lipase


 77 U/L


()








Laboratory Tests








Test


 10/19/18


15:45


 


White Blood Count


 18.9 x10^3/uL


(4.0-11.0)


 


Red Blood Count


 3.97 x10^6/uL


(4.30-5.70)


 


Hemoglobin


 11.6 g/dL


(13.0-17.5)


 


Hematocrit


 34.2 %


(39.0-53.0)


 


Mean Corpuscular Volume 86 fL () 


 


Mean Corpuscular Hemoglobin 29 pg (25-35) 


 


Mean Corpuscular Hemoglobin


Concent 34 g/dL


(31-37)


 


Red Cell Distribution Width


 13.4 %


(11.5-14.5)


 


Platelet Count


 325 x10^3/uL


(140-400)


 


Neutrophils (%) (Auto) 91 % (31-73) 


 


Lymphocytes (%) (Auto) 2 % (24-48) 


 


Monocytes (%) (Auto) 7 % (0-9) 


 


Eosinophils (%) (Auto) 0 % (0-3) 


 


Basophils (%) (Auto) 0 % (0-3) 


 


Neutrophils # (Auto)


 17.2 x10^3uL


(1.8-7.7)


 


Lymphocytes # (Auto)


 0.4 x10^3/uL


(1.0-4.8)


 


Monocytes # (Auto)


 1.3 x10^3/uL


(0.0-1.1)


 


Eosinophils # (Auto)


 0.0 x10^3/uL


(0.0-0.7)


 


Basophils # (Auto)


 0.0 x10^3/uL


(0.0-0.2)


 


Segmented Neutrophils % 90 % (35-66) 


 


Lymphocytes % 6 % (24-48) 


 


Monocytes % 4 % (0-10) 


 


Platelet Estimate


 Adequate


(ADEQUATE)


 


Sodium Level


 129 mmol/L


(136-145)


 


Potassium Level


 3.6 mmol/L


(3.5-5.1)


 


Chloride Level


 88 mmol/L


()


 


Carbon Dioxide Level


 27 mmol/L


(21-32)


 


Anion Gap 14 (6-14) 


 


Blood Urea Nitrogen


 71 mg/dL


(8-26)


 


Creatinine


 13.3 mg/dL


(0.7-1.3)


 


Estimated GFR


(Cockcroft-Gault) 4.8 





 


Glucose Level


 241 mg/dL


(70-99)


 


Lactic Acid Level


 1.3 mmol/L


(0.4-2.0)


 


Calcium Level


 7.9 mg/dL


(8.5-10.1)


 


Total Bilirubin


 0.5 mg/dL


(0.2-1.0)


 


Direct Bilirubin


 0.2 mg/dL


(0.0-0.2)


 


Aspartate Amino Transf


(AST/SGOT) 33 U/L (15-37) 





 


Alanine Aminotransferase


(ALT/SGPT) 11 U/L (16-63) 





 


Alkaline Phosphatase


 102 U/L


()


 


Troponin I Quantitative


 0.340 ng/mL


(0.000-0.055)


 


Total Protein


 6.0 g/dL


(6.4-8.2)


 


Albumin


 1.7 g/dL


(3.4-5.0)


 


Lipase


 77 U/L


()











VTE Prophylaxis Ordered


VTE Prophylaxis Devices:  Yes


VTE Pharmacological Prophylaxi:  Yes





Assessment/Plan


Assessment/Plan














  Abd pain/nausea/vomiting: peritonitis with PD? gastroparesis


fever, possible sepsis


chronic systolic CHF EF 10%


PPM/ICD generator changed on 10/15/18


ESRD on PD


htn


dm2


HLD


htn, NOW hypotension with sepsis


chronic LBBB, tachycardia sinus


hyponatremia


elevated trop, 2/2 sepsis likely


severe malnutrition





plan:


card, renal, ID, gi consult


little PD liquid sent for cx, fu bcx


add vanco, zosyn for now


NS 75cc/h, got 1l NS in ER


hold home meds


npo


ssi


if not better, consider GES


dvt, gi ppx


labs tmr











STEPH PERRY MD Oct 19, 2018 17:38

## 2018-10-20 VITALS — SYSTOLIC BLOOD PRESSURE: 114 MMHG | DIASTOLIC BLOOD PRESSURE: 74 MMHG

## 2018-10-20 VITALS — SYSTOLIC BLOOD PRESSURE: 76 MMHG | DIASTOLIC BLOOD PRESSURE: 57 MMHG

## 2018-10-20 VITALS — DIASTOLIC BLOOD PRESSURE: 67 MMHG | SYSTOLIC BLOOD PRESSURE: 90 MMHG

## 2018-10-20 VITALS — SYSTOLIC BLOOD PRESSURE: 107 MMHG | DIASTOLIC BLOOD PRESSURE: 73 MMHG

## 2018-10-20 VITALS — DIASTOLIC BLOOD PRESSURE: 68 MMHG | SYSTOLIC BLOOD PRESSURE: 95 MMHG

## 2018-10-20 VITALS — DIASTOLIC BLOOD PRESSURE: 53 MMHG | SYSTOLIC BLOOD PRESSURE: 79 MMHG

## 2018-10-20 VITALS — SYSTOLIC BLOOD PRESSURE: 94 MMHG | DIASTOLIC BLOOD PRESSURE: 64 MMHG

## 2018-10-20 VITALS — DIASTOLIC BLOOD PRESSURE: 80 MMHG | SYSTOLIC BLOOD PRESSURE: 101 MMHG

## 2018-10-20 VITALS — SYSTOLIC BLOOD PRESSURE: 110 MMHG | DIASTOLIC BLOOD PRESSURE: 74 MMHG

## 2018-10-20 VITALS — DIASTOLIC BLOOD PRESSURE: 63 MMHG | SYSTOLIC BLOOD PRESSURE: 101 MMHG

## 2018-10-20 VITALS — DIASTOLIC BLOOD PRESSURE: 75 MMHG | SYSTOLIC BLOOD PRESSURE: 92 MMHG

## 2018-10-20 VITALS — DIASTOLIC BLOOD PRESSURE: 63 MMHG | SYSTOLIC BLOOD PRESSURE: 104 MMHG

## 2018-10-20 VITALS — SYSTOLIC BLOOD PRESSURE: 103 MMHG | DIASTOLIC BLOOD PRESSURE: 69 MMHG

## 2018-10-20 VITALS — DIASTOLIC BLOOD PRESSURE: 65 MMHG | SYSTOLIC BLOOD PRESSURE: 86 MMHG

## 2018-10-20 VITALS — SYSTOLIC BLOOD PRESSURE: 86 MMHG | DIASTOLIC BLOOD PRESSURE: 63 MMHG

## 2018-10-20 VITALS — DIASTOLIC BLOOD PRESSURE: 75 MMHG | SYSTOLIC BLOOD PRESSURE: 102 MMHG

## 2018-10-20 VITALS — SYSTOLIC BLOOD PRESSURE: 85 MMHG | DIASTOLIC BLOOD PRESSURE: 58 MMHG

## 2018-10-20 VITALS — SYSTOLIC BLOOD PRESSURE: 88 MMHG | DIASTOLIC BLOOD PRESSURE: 74 MMHG

## 2018-10-20 VITALS — DIASTOLIC BLOOD PRESSURE: 52 MMHG | SYSTOLIC BLOOD PRESSURE: 80 MMHG

## 2018-10-20 VITALS — SYSTOLIC BLOOD PRESSURE: 109 MMHG | DIASTOLIC BLOOD PRESSURE: 68 MMHG

## 2018-10-20 VITALS — SYSTOLIC BLOOD PRESSURE: 85 MMHG | DIASTOLIC BLOOD PRESSURE: 71 MMHG

## 2018-10-20 VITALS — DIASTOLIC BLOOD PRESSURE: 70 MMHG | SYSTOLIC BLOOD PRESSURE: 100 MMHG

## 2018-10-20 VITALS — DIASTOLIC BLOOD PRESSURE: 68 MMHG | SYSTOLIC BLOOD PRESSURE: 109 MMHG

## 2018-10-20 VITALS — SYSTOLIC BLOOD PRESSURE: 104 MMHG | DIASTOLIC BLOOD PRESSURE: 79 MMHG

## 2018-10-20 LAB
ANION GAP SERPL CALC-SCNC: 15 MMOL/L (ref 6–14)
BASOPHILS # BLD AUTO: 0.1 X10^3/UL (ref 0–0.2)
BASOPHILS NFR BLD: 1 % (ref 0–3)
BF MON %: 96 %
BF PMN %: 4 %
BF RBC COUNT: 100 /CMM
BF SOURCE: (no result)
BF WBC COUNT: 133 /CMM
BUN SERPL-MCNC: 72 MG/DL (ref 8–26)
CALCIUM SERPL-MCNC: 7.8 MG/DL (ref 8.5–10.1)
CHLORIDE SERPL-SCNC: 94 MMOL/L (ref 98–107)
CLARITY SPEC: CLEAR
CO2 SERPL-SCNC: 25 MMOL/L (ref 21–32)
COLOR FLD: YELLOW
CREAT SERPL-MCNC: 12.7 MG/DL (ref 0.7–1.3)
EOSINOPHIL NFR BLD: 0 % (ref 0–3)
EOSINOPHIL NFR BLD: 0 X10^3/UL (ref 0–0.7)
ERYTHROCYTE [DISTWIDTH] IN BLOOD BY AUTOMATED COUNT: 13.3 % (ref 11.5–14.5)
GFR SERPLBLD BASED ON 1.73 SQ M-ARVRAT: 5.1 ML/MIN
GLUCOSE SERPL-MCNC: 39 MG/DL (ref 70–99)
HCT VFR BLD CALC: 34.7 % (ref 39–53)
HGB BLD-MCNC: 11.5 G/DL (ref 13–17.5)
LYMPHOCYTES # BLD: 0.3 X10^3/UL (ref 1–4.8)
LYMPHOCYTES NFR BLD AUTO: 1 % (ref 24–48)
MCH RBC QN AUTO: 29 PG (ref 25–35)
MCHC RBC AUTO-ENTMCNC: 33 G/DL (ref 31–37)
MCV RBC AUTO: 86 FL (ref 79–100)
MONO #: 0.9 X10^3/UL (ref 0–1.1)
MONOCYTES NFR BLD: 4 % (ref 0–9)
NEUT #: 20.4 X10^3UL (ref 1.8–7.7)
NEUTROPHILS NFR BLD AUTO: 94 % (ref 31–73)
PLATELET # BLD AUTO: 322 X10^3/UL (ref 140–400)
POTASSIUM SERPL-SCNC: 2.9 MMOL/L (ref 3.5–5.1)
RBC # BLD AUTO: 4.02 X10^6/UL (ref 4.3–5.7)
SODIUM SERPL-SCNC: 134 MMOL/L (ref 136–145)
WBC # BLD AUTO: 21.6 X10^3/UL (ref 4–11)

## 2018-10-20 RX ADMIN — PIPERACILLIN SODIUM AND TAZOBACTAM SODIUM SCH MLS/HR: 2; .25 INJECTION, POWDER, LYOPHILIZED, FOR SOLUTION INTRAVENOUS at 06:00

## 2018-10-20 RX ADMIN — Medication PRN MLS/HR: at 23:07

## 2018-10-20 RX ADMIN — PROMETHAZINE HYDROCHLORIDE PRN MG: 12.5 TABLET ORAL at 21:43

## 2018-10-20 RX ADMIN — BACLOFEN PRN MG: 10 TABLET ORAL at 23:40

## 2018-10-20 RX ADMIN — SENNOSIDES AND DOCUSATE SODIUM SCH TAB: 8.6; 5 TABLET ORAL at 08:17

## 2018-10-20 RX ADMIN — ASPIRIN 81 MG SCH MG: 81 TABLET ORAL at 09:13

## 2018-10-20 RX ADMIN — CARVEDILOL SCH MG: 12.5 TABLET, FILM COATED ORAL at 17:00

## 2018-10-20 RX ADMIN — HEPARIN SODIUM SCH UNIT: 5000 INJECTION, SOLUTION INTRAVENOUS; SUBCUTANEOUS at 14:27

## 2018-10-20 RX ADMIN — DOCUSATE SODIUM SCH MG: 100 CAPSULE, LIQUID FILLED ORAL at 08:17

## 2018-10-20 RX ADMIN — BACLOFEN PRN MG: 10 TABLET ORAL at 11:39

## 2018-10-20 RX ADMIN — INSULIN LISPRO SCH UNITS: 100 INJECTION, SOLUTION INTRAVENOUS; SUBCUTANEOUS at 07:42

## 2018-10-20 RX ADMIN — Medication SCH TAB: at 09:12

## 2018-10-20 RX ADMIN — CALCIUM ACETATE SCH MG: 667 CAPSULE ORAL at 17:32

## 2018-10-20 RX ADMIN — ONDANSETRON PRN MG: 2 INJECTION INTRAMUSCULAR; INTRAVENOUS at 12:55

## 2018-10-20 RX ADMIN — PANTOPRAZOLE SODIUM SCH MG: 40 INJECTION, POWDER, FOR SOLUTION INTRAVENOUS at 08:17

## 2018-10-20 RX ADMIN — INSULIN LISPRO SCH UNITS: 100 INJECTION, SOLUTION INTRAVENOUS; SUBCUTANEOUS at 12:01

## 2018-10-20 RX ADMIN — CHOLECALCIFEROL CAP 125 MCG (5000 UNIT) SCH UNIT: 125 CAP at 09:12

## 2018-10-20 RX ADMIN — CALCIUM ACETATE SCH MG: 667 CAPSULE ORAL at 11:47

## 2018-10-20 RX ADMIN — PIPERACILLIN SODIUM AND TAZOBACTAM SODIUM SCH MLS/HR: 2; .25 INJECTION, POWDER, LYOPHILIZED, FOR SOLUTION INTRAVENOUS at 21:53

## 2018-10-20 RX ADMIN — HEPARIN SODIUM SCH UNIT: 5000 INJECTION, SOLUTION INTRAVENOUS; SUBCUTANEOUS at 21:53

## 2018-10-20 RX ADMIN — DOCUSATE SODIUM SCH MG: 100 CAPSULE, LIQUID FILLED ORAL at 21:24

## 2018-10-20 RX ADMIN — HEPARIN SODIUM SCH UNIT: 5000 INJECTION, SOLUTION INTRAVENOUS; SUBCUTANEOUS at 06:34

## 2018-10-20 RX ADMIN — VANCOMYCIN HYDROCHLORIDE PRN EACH: 1 INJECTION, POWDER, LYOPHILIZED, FOR SOLUTION INTRAVENOUS at 09:56

## 2018-10-20 RX ADMIN — INSULIN LISPRO SCH UNITS: 100 INJECTION, SOLUTION INTRAVENOUS; SUBCUTANEOUS at 17:00

## 2018-10-20 RX ADMIN — CALCIUM ACETATE SCH MG: 667 CAPSULE ORAL at 09:12

## 2018-10-20 RX ADMIN — PROMETHAZINE HYDROCHLORIDE PRN MG: 12.5 TABLET ORAL at 01:29

## 2018-10-20 RX ADMIN — POTASSIUM CHLORIDE SCH MEQ: 1500 TABLET, EXTENDED RELEASE ORAL at 08:17

## 2018-10-20 RX ADMIN — ONDANSETRON PRN MG: 2 INJECTION INTRAMUSCULAR; INTRAVENOUS at 19:28

## 2018-10-20 RX ADMIN — INSULIN GLARGINE SCH UNITS: 100 INJECTION, SOLUTION SUBCUTANEOUS at 21:43

## 2018-10-20 RX ADMIN — POTASSIUM CHLORIDE SCH MEQ: 1500 TABLET, EXTENDED RELEASE ORAL at 09:13

## 2018-10-20 RX ADMIN — DEXTROSE MONOHYDRATE PRN GM: 25 INJECTION, SOLUTION INTRAVENOUS at 05:51

## 2018-10-20 RX ADMIN — DEXTROSE MONOHYDRATE PRN GM: 25 INJECTION, SOLUTION INTRAVENOUS at 17:32

## 2018-10-20 RX ADMIN — PIPERACILLIN SODIUM AND TAZOBACTAM SODIUM SCH MLS/HR: 2; .25 INJECTION, POWDER, LYOPHILIZED, FOR SOLUTION INTRAVENOUS at 14:19

## 2018-10-20 RX ADMIN — SENNOSIDES AND DOCUSATE SODIUM SCH TAB: 8.6; 5 TABLET ORAL at 21:24

## 2018-10-20 RX ADMIN — CARVEDILOL SCH MG: 12.5 TABLET, FILM COATED ORAL at 09:00

## 2018-10-20 NOTE — RAD
FOOT LEFT 3V (AP, oblique, lateral)

 

INDICATION:  foot wound,, concern for osteomyelitis COMPARISON:  None.

 

FINDINGS:  

 

No acute fracture or malalignment. Erosive changes of the first distal 

phalanx. Calcaneal enthesophyte. Mild multifocal arthrosis. Vascular 

calcifications. No radiopaque foreign body.

 

IMPRESSION:  

Erosive changes of the first distal phalanx concerning for osteomyelitis.

 

Electronically signed by: Angel Jones MD (10/20/2018 12:13 PM) 

[facility]

## 2018-10-20 NOTE — PDOC2
GI CONSULT


Reason For Consult:


Nausea





HPI:


HPI:


Calderon Rebolledo is a 53 years old male patient with past medical history of 

hypertension,hyperlipidemia,diabetes mellitus complicated by left foot ulcer, 

congestive heart failure with low ejection fraction s/p AICD placement and end 

stage renal disease on peritoneal dialysis. He is currently admitted to the 

hospital after presented with fever and abdominal pain. He had imaging with 

computed tomography that showed consolidation of the left lingula concerning 

for pneumonia. GI consulted for nausea. Patient denies associated reflux 

symptoms, dysphagia or odynophagia. He denies overt GI bleeding. he denies any 

other acute symptoms.





FH:


Family History:  No pertinent hx





Social History:


Smoke:  No


ALCOHOL:  none


Drugs:  None





ROS:





GEN: Denies fevers, chills, sweats


HEENT: Denies blurred vision, sore throat


CV: Denies chest pain


RESP: Denies shortness of air, cough


GI: Per HPI


: Denies hematuria, dysuria


ENDO: Denies weight changes


NEURO: Denies confusion, dizziness


MSK: Denies weakness, joint pain/swelling


SKIN: Denies jaundice, pruritus





Vitals:


Vitals:





 Vital Signs








  Date Time  Temp Pulse Resp B/P (MAP) Pulse Ox O2 Delivery O2 Flow Rate FiO2


 


10/20/18 18:02  115  86/65 (72) 96 Room Air  


 


10/20/18 16:06 100.2       





 100.2       


 


10/20/18 08:00   25     











Labs:


Labs:





Laboratory Tests








Test


 10/20/18


04:00 10/20/18


05:49 10/20/18


05:58 10/20/18


07:04


 


White Blood Count


 21.6 x10^3/uL


(4.0-11.0) 


 


 





 


Red Blood Count


 4.02 x10^6/uL


(4.30-5.70) 


 


 





 


Hemoglobin


 11.5 g/dL


(13.0-17.5) 


 


 





 


Hematocrit


 34.7 %


(39.0-53.0) 


 


 





 


Mean Corpuscular Volume 86 fL ()    


 


Mean Corpuscular Hemoglobin 29 pg (25-35)    


 


Mean Corpuscular Hemoglobin


Concent 33 g/dL


(31-37) 


 


 





 


Red Cell Distribution Width


 13.3 %


(11.5-14.5) 


 


 





 


Platelet Count


 322 x10^3/uL


(140-400) 


 


 





 


Neutrophils (%) (Auto) 94 % (31-73)    


 


Lymphocytes (%) (Auto) 1 % (24-48)    


 


Monocytes (%) (Auto) 4 % (0-9)    


 


Eosinophils (%) (Auto) 0 % (0-3)    


 


Basophils (%) (Auto) 1 % (0-3)    


 


Neutrophils # (Auto)


 20.4 x10^3uL


(1.8-7.7) 


 


 





 


Lymphocytes # (Auto)


 0.3 x10^3/uL


(1.0-4.8) 


 


 





 


Monocytes # (Auto)


 0.9 x10^3/uL


(0.0-1.1) 


 


 





 


Eosinophils # (Auto)


 0.0 x10^3/uL


(0.0-0.7) 


 


 





 


Basophils # (Auto)


 0.1 x10^3/uL


(0.0-0.2) 


 


 





 


Sodium Level


 134 mmol/L


(136-145) 


 


 





 


Potassium Level


 2.9 mmol/L


(3.5-5.1) 


 


 





 


Chloride Level


 94 mmol/L


() 


 


 





 


Carbon Dioxide Level


 25 mmol/L


(21-32) 


 


 





 


Anion Gap 15 (6-14)    


 


Blood Urea Nitrogen


 72 mg/dL


(8-26) 


 


 





 


Creatinine


 12.7 mg/dL


(0.7-1.3) 


 


 





 


Estimated GFR


(Cockcroft-Gault) 5.1 


 


 


 





 


Glucose Level


 39 mg/dL


(70-99) 


 


 





 


Calcium Level


 7.8 mg/dL


(8.5-10.1) 


 


 





 


Glucose (Fingerstick)


 


 27 mg/dL


(70-99) 76 mg/dL


(70-99) 68 mg/dL


(70-99)


 


Test


 10/20/18


07:55 10/20/18


11:34 10/20/18


17:25 10/20/18


18:23


 


Body Fluid Source


 Perit


dialysate 


 


 





 


Body Fluid Color Yellow    


 


Body Fluid Clarity Clear    


 


Body Fluid Nucleated Cells 133 /cmm    


 


Body Fluid Mononuclear WBCs


(%) 96 % 


 


 


 





 


Body Fluid Polymorphonuclear


Cells 4 % 


 


 


 





 


Body Fluid Total RBCs Counted 100 /cmm    


 


Glucose (Fingerstick)


 


 176 mg/dL


(70-99) 56 mg/dL


(70-99) 129 mg/dL


(70-99)











Allergies:


Coded Allergies:  


     No Known Drug Allergies (Unverified , 6/22/15)





Medications:





Current Medications








 Medications


  (Trade)  Dose


 Ordered  Sig/Ivonne


 Route


 PRN Reason  Start Time


 Stop Time Status Last Admin


Dose Admin


 


 Insulin Human


 Lispro


  (HumaLOG)  0-9 UNITS  TIDWMEALS


 SQ


   10/20/18 08:00


    10/20/18 12:01





 


 Heparin Sodium


  (Porcine)


  (Heparin Sodium)  5,000 unit  Q8HRS


 SQ


   10/19/18 22:00


    10/20/18 14:27





 


 Senna/Docusate


 Sodium


  (Senna Plus)  1 tab  BID


 PO


   10/19/18 21:00


    10/20/18 08:17





 


 Docusate Sodium


  (Colace)  100 mg  BID


 PO


   10/19/18 21:00


    10/20/18 08:17





 


 Pantoprazole


 Sodium


  (PROTONIX VIAL


 for IV PUSH)  40 mg  DAILYAC


 IVP


   10/20/18 07:30


    10/20/18 08:17





 


 Piperacillin Sod/


 Tazobactam Sod


 2.25 gm/Sodium


 Chloride  50 ml @ 


 100 mls/hr  Q8HRS


 IV


   10/19/18 22:00


    10/20/18 14:19





 


 Promethazine HCl


  (Phenergan)  12.5 mg  PRN Q4HRS  PRN


 PO


 NAUSEA/VOMITING  10/20/18 01:15


    10/20/18 01:29





 


 Potassium Chloride


  (Klor-Con)  40 meq  Q2H


 PO


   10/20/18 07:00


 10/20/18 09:01 DC 10/20/18 09:13





 


 Aspirin


  (Children'S


 Aspirin)  81 mg  DAILYWBKFT


 PO


   10/20/18 09:00


    10/20/18 09:13





 


 Calcium Acetate


  (Phoslo)  667 mg  TIDWMEALS


 PO


   10/20/18 09:00


    10/20/18 17:32





 


 Vitamin D


  (Vitamin D3)  5,000 unit  DAILY


 PO


   10/20/18 09:00


    10/20/18 09:12





 


 Vitamin B Complex/


 Vitamin C


  (Alison-Aime)  1 tab  DAILY


 PO


   10/20/18 09:00


    10/20/18 09:12





 


 Baclofen


  (Lioresal)  10 mg  PRN TID  PRN


 PO


 for Hiccups  10/20/18 11:30


    10/20/18 11:39














PE:





GEN: NAD


HEENT: Atraumatic, PERRLA


LUNGS: CTAB


HEART: RRR, no murmurs


ABD: NABS, S/ND/NT, no masses. PD catheter in situ. No evidence of infection 

around the PD catheter site.


EXTREMITY: No edema


SKIN: No rashes, no jaundice


NEURO/PSYCH: A & O 3





A/P:


A/P:


Calderon Rebolledo is a 53 years old male patient with past medical history of 

hypertension, hyperlipidemia, diabetes mellitus complicated by left foot ulcer, 

congestive heart failure with low ejection fraction s/p AICD placement and end 

stage renal disease on peritoneal dialysis. He is currently admitted to the 

hospital after presented with fever and abdominal pain. He also reports to have 

diarrhea. He is also complaining of nausea. Abdominal exam was benign with no 

area of tenderness. Labs notable for leucocytosis. Imaging with computed 

tomography that showed consolidation of the left lingula concerning for 

pneumonia.  





1- Pneumonia.


2- ? Sepsis


2- ESRD on PD.


2- Nausea.


4- Normocytic anemia.








Recommendations


- F/u with blood cultures.


- Stool studies for C.diff.


- Antiemetics: Phenergan and zofran. 


- Continue with iv antibiotics as per primary team.


- Protonix 40mg IV BID.


- EGD as out patient to rule out luminal process.


- GI available for any Q's.





Thank you for involving us in the care of this interesting patient.











MAR ORTEZ MD Oct 20, 2018 19:53

## 2018-10-20 NOTE — PDOC
Infectious Disease Note


Vital Sign


Vital Signs





Vital Signs








  Date Time  Temp Pulse Resp B/P (MAP) Pulse Ox O2 Delivery O2 Flow Rate FiO2


 


10/20/18 13:04  108  95/68 (77) 96 Room Air  


 


10/20/18 08:00 97.5  25     





 97.5       











Labs


Lab





Laboratory Tests








Test


 10/19/18


15:45 10/20/18


04:00 10/20/18


05:49 10/20/18


05:58


 


White Blood Count


 18.9 x10^3/uL


(4.0-11.0) 21.6 x10^3/uL


(4.0-11.0) 


 





 


Red Blood Count


 3.97 x10^6/uL


(4.30-5.70) 4.02 x10^6/uL


(4.30-5.70) 


 





 


Hemoglobin


 11.6 g/dL


(13.0-17.5) 11.5 g/dL


(13.0-17.5) 


 





 


Hematocrit


 34.2 %


(39.0-53.0) 34.7 %


(39.0-53.0) 


 





 


Mean Corpuscular Volume 86 fL ()  86 fL ()   


 


Mean Corpuscular Hemoglobin 29 pg (25-35)  29 pg (25-35)   


 


Mean Corpuscular Hemoglobin


Concent 34 g/dL


(31-37) 33 g/dL


(31-37) 


 





 


Red Cell Distribution Width


 13.4 %


(11.5-14.5) 13.3 %


(11.5-14.5) 


 





 


Platelet Count


 325 x10^3/uL


(140-400) 322 x10^3/uL


(140-400) 


 





 


Neutrophils (%) (Auto) 91 % (31-73)  94 % (31-73)   


 


Lymphocytes (%) (Auto) 2 % (24-48)  1 % (24-48)   


 


Monocytes (%) (Auto) 7 % (0-9)  4 % (0-9)   


 


Eosinophils (%) (Auto) 0 % (0-3)  0 % (0-3)   


 


Basophils (%) (Auto) 0 % (0-3)  1 % (0-3)   


 


Neutrophils # (Auto)


 17.2 x10^3uL


(1.8-7.7) 20.4 x10^3uL


(1.8-7.7) 


 





 


Lymphocytes # (Auto)


 0.4 x10^3/uL


(1.0-4.8) 0.3 x10^3/uL


(1.0-4.8) 


 





 


Monocytes # (Auto)


 1.3 x10^3/uL


(0.0-1.1) 0.9 x10^3/uL


(0.0-1.1) 


 





 


Eosinophils # (Auto)


 0.0 x10^3/uL


(0.0-0.7) 0.0 x10^3/uL


(0.0-0.7) 


 





 


Basophils # (Auto)


 0.0 x10^3/uL


(0.0-0.2) 0.1 x10^3/uL


(0.0-0.2) 


 





 


Segmented Neutrophils % 90 % (35-66)    


 


Lymphocytes % 6 % (24-48)    


 


Monocytes % 4 % (0-10)    


 


Platelet Estimate


 Adequate


(ADEQUATE) 


 


 





 


Sodium Level


 129 mmol/L


(136-145) 134 mmol/L


(136-145) 


 





 


Potassium Level


 3.6 mmol/L


(3.5-5.1) 2.9 mmol/L


(3.5-5.1) 


 





 


Chloride Level


 88 mmol/L


() 94 mmol/L


() 


 





 


Carbon Dioxide Level


 27 mmol/L


(21-32) 25 mmol/L


(21-32) 


 





 


Anion Gap 14 (6-14)  15 (6-14)   


 


Blood Urea Nitrogen


 71 mg/dL


(8-26) 72 mg/dL


(8-26) 


 





 


Creatinine


 13.3 mg/dL


(0.7-1.3) 12.7 mg/dL


(0.7-1.3) 


 





 


Estimated GFR


(Cockcroft-Gault) 4.8 


 5.1 


 


 





 


Glucose Level


 241 mg/dL


(70-99) 39 mg/dL


(70-99) 


 





 


Lactic Acid Level


 1.3 mmol/L


(0.4-2.0) 


 


 





 


Calcium Level


 7.9 mg/dL


(8.5-10.1) 7.8 mg/dL


(8.5-10.1) 


 





 


Total Bilirubin


 0.5 mg/dL


(0.2-1.0) 


 


 





 


Direct Bilirubin


 0.2 mg/dL


(0.0-0.2) 


 


 





 


Aspartate Amino Transf


(AST/SGOT) 33 U/L (15-37) 


 


 


 





 


Alanine Aminotransferase


(ALT/SGPT) 11 U/L (16-63) 


 


 


 





 


Alkaline Phosphatase


 102 U/L


() 


 


 





 


Troponin I Quantitative


 0.340 ng/mL


(0.000-0.055) 


 


 





 


Total Protein


 6.0 g/dL


(6.4-8.2) 


 


 





 


Albumin


 1.7 g/dL


(3.4-5.0) 


 


 





 


Lipase


 77 U/L


() 


 


 





 


Glucose (Fingerstick)


 


 


 27 mg/dL


(70-99) 76 mg/dL


(70-99)


 


Test


 10/20/18


07:04 10/20/18


07:55 10/20/18


11:34 





 


Glucose (Fingerstick)


 68 mg/dL


(70-99) 


 176 mg/dL


(70-99) 





 


Body Fluid Source


 


 Perit


dialysate 


 





 


Body Fluid Color  Yellow   


 


Body Fluid Clarity  Clear   


 


Body Fluid Nucleated Cells  133 /cmm   


 


Body Fluid Mononuclear WBCs


(%) 


 96 % 


 


 





 


Body Fluid Polymorphonuclear


Cells 


 4 % 


 


 





 


Body Fluid Total RBCs Counted  100 /cmm   








x-ray left foot


  


Erosive changes of the first distal phalanx concerning for osteomyelitis.





Objective


Assessment


Sepsis POA


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 


Fever


Leukocytosis


Abdominal pain


   -Dialysate clear, . culture in process 


Left lingula consolidation 


Diarrhea 


ESRD on peritoneal dialysis


   - catheter changed 2015  


s/p AICD generator change on 10/15/18.


Chronic systolic CHF





Plan


Plan of Care


Culture of left foot ulcer


Continue vanc and Zosyn, renal dosing


Local wound 


Monitor labs/temp


Check stool for c. diff 


Consult vacular





Thank you 





Patient seen, examined, I agree with above.  Assessment and plan was formulated 

with ARNP.











BAN VERONICA Oct 20, 2018 14:17


LEONIDES HARDWICK MD Oct 20, 2018 14:58

## 2018-10-20 NOTE — PDOC2
CONSULT


Date of Consult


Date of Consult


DATE: 10/20/18 


TIME: 11:13





Reason for Consult


Reason for Consult:


ESRD on peritoneal dialysis





Referring Physician


Referring Physician:


Dr. Noble





Identification/Chief Complaint


Chief Complaint


Nausea vomiting abdominal pain





Source


Source:  Chart review, Patient





History of Present Illness


Reason for Visit:


ED course: 53-year-old -American male who is followed by Dr. Mcghee. He 

does patient on dialysis at home. He Presented to the emergency department 

yesterday with complaints of abdominal pain with low-grade temperature. He has 

had significant nausea vomiting and now has had frequent stools with ongoing 

diarrhea. He was noted to be hypotensive and admitted to the hospital. Broad-

spectrum IV antibiotics were initiated early on in the ER before we could 

obtain dialysis fluid culture. IV fluids initiated as well due to his 

hypotension and suspicion for possible sepsis.  Chest x-ray done in the ER was 

felt to show pneumonia. Patient was admitted to the ICU due to hypotension. She 

currently complains of hiccups. He was noted to be hyponatremic and IV fluids 

were given and the seem to have corrected his sodium currently.





PD fluid was attempted to be obtained yesterday's however PD catheter did not 

work. Dialysis nurse was however able to obtain a morning. Gen. surgery will 

need to be consulted if this continues to be the problem. This morning is 

complaining of hiccups





Past Medical History


Cardiovascular:  CHF (cardiomyopathy), HTN, Hyperlipidemia


CENTRAL NERVOUS SYSTEM:  Other


GI:  GERD


Heme/Onc:  Anemia NOS


Musculoskeletal:  Osteoarthritis


ENT:  Allergic Rhinitis


Renal/:  Chronic renal failure (ESRD with PD)


Endocrine:  Diabetes (2)


Dermatology:  Other (acquired perforating leg dermatosis)





Past Surgical History


Past Surgical History:  Pacemaker (AICD with recent generator change 10/15/2018)

, Other (PD cath placement to abd. )





Family History


Family History:  Diabetes, Hypertension





Social History


No


ALCOHOL:  none


Drugs:  None


Lives:  with Family


Domestic Violence:  Neg





Current Problem List


Problem List


Problems


Medical Problems:


(1) Abdominal pain


Status: Acute  











Current Medications


Current Medications





Current Medications


Sodium Chloride 250 ml @  250 mls/hr 1X  ONCE IV ;  Start 10/19/18 at 15:45;  

Stop 10/19/18 at 16:44;  Status Cancel


Piperacillin Sod/ Tazobactam Sod (Zosyn Per Pharmacy) 1 each PRN DAILY  PRN MC 

SEE COMMENTS;  Start 10/19/18 at 15:45;  Status UNV


Piperacillin Sod/ Tazobactam Sod 2.25 gm/Sodium Chloride 50 ml @  100 mls/hr 1X

  ONCE IV  Last administered on 10/19/18at 16:49;  Start 10/19/18 at 16:00;  

Stop 10/19/18 at 16:29;  Status DC


Ondansetron HCl (Zofran) 4 mg 1X  ONCE IV  Last administered on 10/19/18at 16:12

;  Start 10/19/18 at 16:00;  Stop 10/19/18 at 16:01;  Status DC


Hydromorphone HCl (Dilaudid) 0.5 mg PRN Q30MIN  PRN IV SEVERE PAIN Last 

administered on 10/19/18at 16:50;  Start 10/19/18 at 16:15


Hydromorphone HCl (Dilaudid) 2 mg STK-MED ONCE .ROUTE ;  Start 10/19/18 at 16:10

;  Stop 10/19/18 at 16:11;  Status DC


Sodium Chloride 500 ml @  500 mls/hr 1X  ONCE IV  Last administered on 10/19/

18at 16:49;  Start 10/19/18 at 16:30;  Stop 10/19/18 at 17:29;  Status DC


Aspirin (Children'S Aspirin) 324 mg 1X  ONCE PO  Last administered on 10/19/

18at 17:52;  Start 10/19/18 at 17:15;  Stop 10/19/18 at 17:16;  Status DC


Vancomycin HCl (Vanco Per Pharmacy) 1 each PRN DAILY  PRN MC SEE COMMENTS;  

Start 10/19/18 at 17:15;  Status UNV


Sodium Chloride 500 ml @  500 mls/hr 1X  ONCE IV  Last administered on 10/19/

18at 17:51;  Start 10/19/18 at 17:15;  Stop 10/19/18 at 18:14;  Status DC


Vancomycin HCl 1.75 gm/Sodium Chloride 500 ml @  250 mls/hr 1X  ONCE IV  Last 

administered on 10/19/18at 17:25;  Start 10/19/18 at 17:30;  Stop 10/19/18 at 19

:29;  Status DC


Acetaminophen (Tylenol) 650 mg PRN Q6HRS  PRN PO FEVER;  Start 10/19/18 at 17:30


Ondansetron HCl (Zofran) 4 mg PRN Q6HRS  PRN IV NAUSEA/VOMITING Last 

administered on 10/19/18at 20:34;  Start 10/19/18 at 17:30


Morphine Sulfate (Morphine Sulfate) 2 mg PRN Q2HR  PRN IV MILD-MODERATE PAIN;  

Start 10/19/18 at 17:30


Tramadol HCl (Ultram) 50 mg PRN Q6HRS  PRN PO MILD TO MODERATE PAIN;  Start 10/

19/18 at 17:30


Docusate Sodium (Colace) 100 mg PRN DAILY  PRN PO CONSTIPATION 1ST CHOICE;  

Start 10/19/18 at 17:30


Piperacillin Sod/ Tazobactam Sod 3.375 gm/Sodium Chloride 50 ml @  100 mls/hr 

Q6HRS IV ;  Start 10/19/18 at 18:00;  Status UNV


Piperacillin Sod/ Tazobactam Sod (Zosyn Per Pharmacy) 1 each PRN DAILY  PRN MC 

SEE COMMENTS;  Start 10/19/18 at 17:30;  Status UNV


Vancomycin HCl (Vanco Per Pharmacy) 1 each PRN DAILY  PRN MC SEE COMMENTS Last 

administered on 10/20/18at 09:56;  Start 10/19/18 at 17:30


Insulin Human Lispro (HumaLOG) 0-9 UNITS TIDWMEALS SQ ;  Start 10/20/18 at 08:00


Dextrose (Dextrose 50%-Water Syringe) 12.5 gm PRN Q15MIN  PRN IV SEE COMMENTS 

Last administered on 10/20/18at 05:51;  Start 10/19/18 at 17:30


Heparin Sodium (Porcine) (Heparin Sodium) 5,000 unit Q8HRS SQ  Last 

administered on 10/20/18at 06:34;  Start 10/19/18 at 22:00


Senna/Docusate Sodium (Senna Plus) 1 tab BID PO  Last administered on 10/20/

18at 08:17;  Start 10/19/18 at 21:00


Docusate Sodium (Colace) 100 mg BID PO  Last administered on 10/20/18at 08:17;  

Start 10/19/18 at 21:00


Magnesium Hydroxide (Milk Of Magnesia) 2,400 mg PRN Q12HR  PRN PO CONSTIPATION 

2ND CHOICE;  Start 10/19/18 at 17:45


Sodium Chloride 1,000 ml @  75 mls/hr 1X  ONCE IV ;  Start 10/19/18 at 17:45;  

Stop 10/19/18 at 19:47;  Status DC


Pantoprazole Sodium (PROTONIX VIAL for IV PUSH) 40 mg DAILYAC IVP  Last 

administered on 10/20/18at 08:17;  Start 10/20/18 at 07:30


Piperacillin Sod/ Tazobactam Sod 2.25 gm/Sodium Chloride 50 ml @  100 mls/hr 

Q6HRS IV ;  Start 10/19/18 at 18:00;  Stop 10/19/18 at 18:07;  Status DC


Vancomycin HCl (Vancomycin Random Level) 1 each 1X  ONCE MC ;  Start 10/21/18 

at 17:00;  Stop 10/21/18 at 17:01


Piperacillin Sod/ Tazobactam Sod 2.25 gm/Sodium Chloride 50 ml @  100 mls/hr 

Q8HRS IV  Last administered on 10/20/18at 06:00;  Start 10/19/18 at 22:00


Promethazine HCl (Phenergan) 12.5 mg PRN Q4HRS  PRN PO NAUSEA/VOMITING Last 

administered on 10/20/18at 01:29;  Start 10/20/18 at 01:15


Potassium Chloride (Klor-Con) 40 meq Q2H PO  Last administered on 10/20/18at 09:

13;  Start 10/20/18 at 07:00;  Stop 10/20/18 at 09:01;  Status DC


Aspirin (Children'S Aspirin) 81 mg DAILYWBKFT PO  Last administered on 10/20/

18at 09:13;  Start 10/20/18 at 09:00


Carvedilol (Coreg) 12.5 mg BIDWMEALS PO ;  Start 10/20/18 at 09:00


Calcium Acetate (Phoslo) 667 mg TIDWMEALS PO  Last administered on 10/20/18at 09

:12;  Start 10/20/18 at 09:00


Vitamin D (Vitamin D3) 5,000 unit DAILY PO  Last administered on 10/20/18at 09:

12;  Start 10/20/18 at 09:00


Vitamin B Complex/ Vitamin C (Alison-Aime) 1 tab DAILY PO  Last administered on 10

/20/18at 09:12;  Start 10/20/18 at 09:00


Insulin Glargine (Lantus) 15 units QHS SQ ;  Start 10/20/18 at 21:00





Active Scripts


Active


Reported


Metolazone 5 Mg Tablet 5 Mg PO DAILY


Vitamin D3 (Cholecalciferol (Vitamin D3)) 5,000 Unit Tablet 1 Tab PO DAILY


Calcium Acetate 667 Mg Tablet 667 Mg PO TIDWMEALS


Renal Caps Softgel (Folic Acid/Vitamin B Comp W-C) 1 Mg Capsule 1 Cap PO DAILY


Tresiba Flextouch U-100 (Insulin Degludec) 100 Unit/1 Ml Insuln.pen 100 Unit SQ 


Carvedilol 12.5 Mg Tablet 1 Tab PO BIDWMEALS


Furosemide 40 Mg Tablet 1 Tab PO DAILY


Aspirin 81 Mg Tab.chew 81 Mg PO DAILYWBKFT





Allergies


Allergies:  


Coded Allergies:  


     No Known Drug Allergies (Unverified , 6/22/15)





ROS


Review of System


   GEN:      + Fevers      no Chills   + generalized weakness and fatigue


   EYES:      no Visual Complaints


   ENT:      no EN Drainage      no Hearing deficiets


   CVS:      no Orthopnea      no CP


   RESP:      no SOB      no CHAU


   GI:      +  Nausea      +  Vomiting


   :      no Dysuria      no Urgency


   HEME:      no easy bruising      no Palp Ly Nodes


   NEURO      no Focal Weakness   no Sz


   PSYCH:   no Suicidal Ideation   no Depression


   SKIN:      no Rashes


   ENDO:      no Polyuria or Polydipsia   no Hot/Cold Intolerance


   MU SK:      occ  Arthraigia      min Myalgia





Physical Exam


Physical Exam





General Appearance:       Awake      Alert Oriented x  3   In  no  Distress


Eyes:       VIsion Unchanged Conjunctiva Normal


EN:       No EN Drainage  Mucous Memb.   dryish 


Neck:         no  JVD   min  JVP  Supple   no  Thyromegaly


CVS:       S1 S2   soft   Murmur  No Gallop  No Rub   no Edema


Resp:        no  Rales   no  Rhonchi   no  Acc. Muscle use


GI:       BAS +ve    NO Bruit   Non Tender   Non Distended


:        no  CVA tenderness;    no  Suprapubic Tenderness


SKIN:        no visible  Rashes  Breast Exam deferred. Left foot is in a 

bandage and this was not opened


Mu.Sk:       Adequate ROM    no  Muscle Atrophy


Heme:       Unable to palpate Obvious LAD  no palp   Splenomegaly


NEURO:       Good Strength and Tone   Cranial Nerves II - XII grossly intact


Psych:        not overtly   Depressed    no  Active hallucination


Vital Signs





Vital Signs








  Date Time  Temp Pulse Resp B/P (MAP) Pulse Ox O2 Delivery O2 Flow Rate FiO2


 


10/20/18 11:06  102  110/74 (86) 100 Room Air  


 


10/20/18 08:00 97.5  25     





 97.5       








Assessment & Plan


ESRD: PD catheter did not work last night. This appears to be working now. PD 

fluid Cultures have been sent to the lab. Once blood pressures are better we 

may resume paratoneal dialysis. based on current fluids and electrolytes he 

does not appear to require emergent dialysis.





Possible peritonitis: PD fluid have been sent for assessment. We will await 

assessment of the same.





Severe hypoalbuminemia. Presumably associated with peritoneal dialysis.





hyponatremia: Appears to be asymptomatic currently and partially corrected.





Hypokalemia: Replaced per protocol





hiccups : Baclofen as ordered. He may also need Thorazine





Anemia of CK D: Suspect hemoglobin is slightly on the high side due to severe 

volume depletion. This is expected to drop with volume repletion, At which time 

Epogen   may be started. Transfuse  with next HD as needed.





HypoTN:   Hold Current BP meds gentle IV fluids may be required to volume 

replete. May need pressors if felt to be septic





Bone & Mineral: Follow phosphorus levels and alter binder regimen as needed.





Discussed Plan of Care and prognosis etc. at length with family.





Labs


Labs





Laboratory Tests








Test


 10/19/18


15:45 10/20/18


04:00 10/20/18


05:49 10/20/18


05:58


 


White Blood Count


 18.9 x10^3/uL


(4.0-11.0) 21.6 x10^3/uL


(4.0-11.0) 


 





 


Red Blood Count


 3.97 x10^6/uL


(4.30-5.70) 4.02 x10^6/uL


(4.30-5.70) 


 





 


Hemoglobin


 11.6 g/dL


(13.0-17.5) 11.5 g/dL


(13.0-17.5) 


 





 


Hematocrit


 34.2 %


(39.0-53.0) 34.7 %


(39.0-53.0) 


 





 


Mean Corpuscular Volume 86 fL ()  86 fL ()   


 


Mean Corpuscular Hemoglobin 29 pg (25-35)  29 pg (25-35)   


 


Mean Corpuscular Hemoglobin


Concent 34 g/dL


(31-37) 33 g/dL


(31-37) 


 





 


Red Cell Distribution Width


 13.4 %


(11.5-14.5) 13.3 %


(11.5-14.5) 


 





 


Platelet Count


 325 x10^3/uL


(140-400) 322 x10^3/uL


(140-400) 


 





 


Neutrophils (%) (Auto) 91 % (31-73)  94 % (31-73)   


 


Lymphocytes (%) (Auto) 2 % (24-48)  1 % (24-48)   


 


Monocytes (%) (Auto) 7 % (0-9)  4 % (0-9)   


 


Eosinophils (%) (Auto) 0 % (0-3)  0 % (0-3)   


 


Basophils (%) (Auto) 0 % (0-3)  1 % (0-3)   


 


Neutrophils # (Auto)


 17.2 x10^3uL


(1.8-7.7) 20.4 x10^3uL


(1.8-7.7) 


 





 


Lymphocytes # (Auto)


 0.4 x10^3/uL


(1.0-4.8) 0.3 x10^3/uL


(1.0-4.8) 


 





 


Monocytes # (Auto)


 1.3 x10^3/uL


(0.0-1.1) 0.9 x10^3/uL


(0.0-1.1) 


 





 


Eosinophils # (Auto)


 0.0 x10^3/uL


(0.0-0.7) 0.0 x10^3/uL


(0.0-0.7) 


 





 


Basophils # (Auto)


 0.0 x10^3/uL


(0.0-0.2) 0.1 x10^3/uL


(0.0-0.2) 


 





 


Segmented Neutrophils % 90 % (35-66)    


 


Lymphocytes % 6 % (24-48)    


 


Monocytes % 4 % (0-10)    


 


Platelet Estimate


 Adequate


(ADEQUATE) 


 


 





 


Sodium Level


 129 mmol/L


(136-145) 134 mmol/L


(136-145) 


 





 


Potassium Level


 3.6 mmol/L


(3.5-5.1) 2.9 mmol/L


(3.5-5.1) 


 





 


Chloride Level


 88 mmol/L


() 94 mmol/L


() 


 





 


Carbon Dioxide Level


 27 mmol/L


(21-32) 25 mmol/L


(21-32) 


 





 


Anion Gap 14 (6-14)  15 (6-14)   


 


Blood Urea Nitrogen


 71 mg/dL


(8-26) 72 mg/dL


(8-26) 


 





 


Creatinine


 13.3 mg/dL


(0.7-1.3) 12.7 mg/dL


(0.7-1.3) 


 





 


Estimated GFR


(Cockcroft-Gault) 4.8 


 5.1 


 


 





 


Glucose Level


 241 mg/dL


(70-99) 39 mg/dL


(70-99) 


 





 


Lactic Acid Level


 1.3 mmol/L


(0.4-2.0) 


 


 





 


Calcium Level


 7.9 mg/dL


(8.5-10.1) 7.8 mg/dL


(8.5-10.1) 


 





 


Total Bilirubin


 0.5 mg/dL


(0.2-1.0) 


 


 





 


Direct Bilirubin


 0.2 mg/dL


(0.0-0.2) 


 


 





 


Aspartate Amino Transf


(AST/SGOT) 33 U/L (15-37) 


 


 


 





 


Alanine Aminotransferase


(ALT/SGPT) 11 U/L (16-63) 


 


 


 





 


Alkaline Phosphatase


 102 U/L


() 


 


 





 


Troponin I Quantitative


 0.340 ng/mL


(0.000-0.055) 


 


 





 


Total Protein


 6.0 g/dL


(6.4-8.2) 


 


 





 


Albumin


 1.7 g/dL


(3.4-5.0) 


 


 





 


Lipase


 77 U/L


() 


 


 





 


Glucose (Fingerstick)


 


 


 27 mg/dL


(70-99) 76 mg/dL


(70-99)


 


Test


 10/20/18


07:04 


 


 





 


Glucose (Fingerstick)


 68 mg/dL


(70-99) 


 


 











Laboratory Tests








Test


 10/19/18


15:45 10/20/18


04:00 10/20/18


05:49 10/20/18


05:58


 


White Blood Count


 18.9 x10^3/uL


(4.0-11.0) 21.6 x10^3/uL


(4.0-11.0) 


 





 


Red Blood Count


 3.97 x10^6/uL


(4.30-5.70) 4.02 x10^6/uL


(4.30-5.70) 


 





 


Hemoglobin


 11.6 g/dL


(13.0-17.5) 11.5 g/dL


(13.0-17.5) 


 





 


Hematocrit


 34.2 %


(39.0-53.0) 34.7 %


(39.0-53.0) 


 





 


Mean Corpuscular Volume 86 fL ()  86 fL ()   


 


Mean Corpuscular Hemoglobin 29 pg (25-35)  29 pg (25-35)   


 


Mean Corpuscular Hemoglobin


Concent 34 g/dL


(31-37) 33 g/dL


(31-37) 


 





 


Red Cell Distribution Width


 13.4 %


(11.5-14.5) 13.3 %


(11.5-14.5) 


 





 


Platelet Count


 325 x10^3/uL


(140-400) 322 x10^3/uL


(140-400) 


 





 


Neutrophils (%) (Auto) 91 % (31-73)  94 % (31-73)   


 


Lymphocytes (%) (Auto) 2 % (24-48)  1 % (24-48)   


 


Monocytes (%) (Auto) 7 % (0-9)  4 % (0-9)   


 


Eosinophils (%) (Auto) 0 % (0-3)  0 % (0-3)   


 


Basophils (%) (Auto) 0 % (0-3)  1 % (0-3)   


 


Neutrophils # (Auto)


 17.2 x10^3uL


(1.8-7.7) 20.4 x10^3uL


(1.8-7.7) 


 





 


Lymphocytes # (Auto)


 0.4 x10^3/uL


(1.0-4.8) 0.3 x10^3/uL


(1.0-4.8) 


 





 


Monocytes # (Auto)


 1.3 x10^3/uL


(0.0-1.1) 0.9 x10^3/uL


(0.0-1.1) 


 





 


Eosinophils # (Auto)


 0.0 x10^3/uL


(0.0-0.7) 0.0 x10^3/uL


(0.0-0.7) 


 





 


Basophils # (Auto)


 0.0 x10^3/uL


(0.0-0.2) 0.1 x10^3/uL


(0.0-0.2) 


 





 


Segmented Neutrophils % 90 % (35-66)    


 


Lymphocytes % 6 % (24-48)    


 


Monocytes % 4 % (0-10)    


 


Platelet Estimate


 Adequate


(ADEQUATE) 


 


 





 


Sodium Level


 129 mmol/L


(136-145) 134 mmol/L


(136-145) 


 





 


Potassium Level


 3.6 mmol/L


(3.5-5.1) 2.9 mmol/L


(3.5-5.1) 


 





 


Chloride Level


 88 mmol/L


() 94 mmol/L


() 


 





 


Carbon Dioxide Level


 27 mmol/L


(21-32) 25 mmol/L


(21-32) 


 





 


Anion Gap 14 (6-14)  15 (6-14)   


 


Blood Urea Nitrogen


 71 mg/dL


(8-26) 72 mg/dL


(8-26) 


 





 


Creatinine


 13.3 mg/dL


(0.7-1.3) 12.7 mg/dL


(0.7-1.3) 


 





 


Estimated GFR


(Cockcroft-Gault) 4.8 


 5.1 


 


 





 


Glucose Level


 241 mg/dL


(70-99) 39 mg/dL


(70-99) 


 





 


Lactic Acid Level


 1.3 mmol/L


(0.4-2.0) 


 


 





 


Calcium Level


 7.9 mg/dL


(8.5-10.1) 7.8 mg/dL


(8.5-10.1) 


 





 


Total Bilirubin


 0.5 mg/dL


(0.2-1.0) 


 


 





 


Direct Bilirubin


 0.2 mg/dL


(0.0-0.2) 


 


 





 


Aspartate Amino Transf


(AST/SGOT) 33 U/L (15-37) 


 


 


 





 


Alanine Aminotransferase


(ALT/SGPT) 11 U/L (16-63) 


 


 


 





 


Alkaline Phosphatase


 102 U/L


() 


 


 





 


Troponin I Quantitative


 0.340 ng/mL


(0.000-0.055) 


 


 





 


Total Protein


 6.0 g/dL


(6.4-8.2) 


 


 





 


Albumin


 1.7 g/dL


(3.4-5.0) 


 


 





 


Lipase


 77 U/L


() 


 


 





 


Glucose (Fingerstick)


 


 


 27 mg/dL


(70-99) 76 mg/dL


(70-99)


 


Test


 10/20/18


07:04 


 


 





 


Glucose (Fingerstick)


 68 mg/dL


(70-99) 


 


 











Review


All relevant outside records, renal labs, imaging studies, telemetry/EKG's were 

reviewed.





Images


Images


1. Partially visualized moderate consolidation left lingula of the lung 


probably pneumonia. Recommend CT chest for further evaluation.


 


2. Minimal perihepatic fluid.


 


3. Atrophic appearing bilateral kidneys.








CXR:  Impression:


1. Patchy perihilar airspace disease, left greater than right. This could 


be related to asymmetric edema or pneumonia. Correlate clinically.


2. Interval revision of left pacer box. No evidence of pneumothorax.











AME HARDWICK MD Oct 20, 2018 11:18

## 2018-10-20 NOTE — PDOC
PROGRESS NOTES


Chief Complaint


Chief Complaint


Abdominal Pain


ESRD on PD


Hypoglycemia


Sepsis





History of Present Illness


History of Present Illness


53-year-old male presenting to the emergency department today with abd pain for 

5d. H/o CHF with EF 10%, has PPM/ICD, battery changed on 10/15/18 with dr. Mcgraw.


He started to have diffuse abd pain from Monday, with nausea and mild vomiting 

with spitting out some saliva. also has mild cough wo sputum.


Has [employer] for 3years, works fine. 


no fever at home ,T 100 in ER. BP low as 80s in ER, but said BP usually is 

normal. 





no chest pain, or sob.


usually BM daily, last BM was sunday. still has flatus. 


Abd fluid from PD Cath for culture.





Feeling ok today, hypoglycemic overnight. No longer with abdominal pain, wants 

to eat and is eating. C/o multiple hypoglycemic episodes of 49 regularly 

followed by 175 blood sugars, normally takes 30u Tresiba.


Has slight cough and still c/o severe constipation.





CT abd : IMPRESSION: 


1. Partially visualized moderate consolidation left lingula of the lung 

probably pneumonia. Recommend CT chest for further evaluation.


2. Minimal perihepatic fluid.


3. Atrophic appearing bilateral kidneys.





A/P:


Abd pain/nausea/vomiting - constipation, concern for gastroenteritis, 

peritonitis


Sepsis - with multiple possible sources including abdomen, lung - Last BM 

Sunday. ?peritonitis, gastroparesis?


S/P AICD generator change: done  on 10/15/2018 ([facility]) stable. - cardiology 

consulted


Chronic systolic CHF - compensated currently


ESRD - utilizes PD with last dialysis last night. Nephrology consulted


HTN - will monitor in ICU


DM2 - sliding scale, add back 15 u glargine


Sinus tach with chronic LBBB - likely 2/2 sepsis - cardiology consulted





Diet - renal


PPX - heparin


FUll Code


Cont ICU, possibly to downgrade later today





Vitals


Vitals





Vital Signs








  Date Time  Temp Pulse Resp B/P (MAP) Pulse Ox O2 Delivery O2 Flow Rate FiO2


 


10/20/18 06:00  89 18 107/73 (84) 97 Room Air  


 


10/20/18 04:00 99.3       





 99.3       











Physical Exam


General:  Alert, Oriented X3, Cooperative, No acute distress


Heart:  Regular rate (tachy), Other (3/6 systolic murmur to LLS border)


Abdomen:  Soft, Other (with mild diffuse tenderness, PD cath in place with 

dressing)


Extremities:  No cyanosis, No edema


Skin:  Other (chornic leg closed lesions; left chest incision site is open to 

air, no swelling or erythema or tenderness with palpation. No drain and 

incision is well approximated with dry steri strips. No discoloration. )





Labs


LABS





Laboratory Tests








Test


 10/19/18


15:45 10/20/18


04:00 10/20/18


05:49 10/20/18


05:58


 


White Blood Count


 18.9 x10^3/uL


(4.0-11.0) 21.6 x10^3/uL


(4.0-11.0) 


 





 


Red Blood Count


 3.97 x10^6/uL


(4.30-5.70) 4.02 x10^6/uL


(4.30-5.70) 


 





 


Hemoglobin


 11.6 g/dL


(13.0-17.5) 11.5 g/dL


(13.0-17.5) 


 





 


Hematocrit


 34.2 %


(39.0-53.0) 34.7 %


(39.0-53.0) 


 





 


Mean Corpuscular Volume 86 fL ()  86 fL ()   


 


Mean Corpuscular Hemoglobin 29 pg (25-35)  29 pg (25-35)   


 


Mean Corpuscular Hemoglobin


Concent 34 g/dL


(31-37) 33 g/dL


(31-37) 


 





 


Red Cell Distribution Width


 13.4 %


(11.5-14.5) 13.3 %


(11.5-14.5) 


 





 


Platelet Count


 325 x10^3/uL


(140-400) 322 x10^3/uL


(140-400) 


 





 


Neutrophils (%) (Auto) 91 % (31-73)  94 % (31-73)   


 


Lymphocytes (%) (Auto) 2 % (24-48)  1 % (24-48)   


 


Monocytes (%) (Auto) 7 % (0-9)  4 % (0-9)   


 


Eosinophils (%) (Auto) 0 % (0-3)  0 % (0-3)   


 


Basophils (%) (Auto) 0 % (0-3)  1 % (0-3)   


 


Neutrophils # (Auto)


 17.2 x10^3uL


(1.8-7.7) 20.4 x10^3uL


(1.8-7.7) 


 





 


Lymphocytes # (Auto)


 0.4 x10^3/uL


(1.0-4.8) 0.3 x10^3/uL


(1.0-4.8) 


 





 


Monocytes # (Auto)


 1.3 x10^3/uL


(0.0-1.1) 0.9 x10^3/uL


(0.0-1.1) 


 





 


Eosinophils # (Auto)


 0.0 x10^3/uL


(0.0-0.7) 0.0 x10^3/uL


(0.0-0.7) 


 





 


Basophils # (Auto)


 0.0 x10^3/uL


(0.0-0.2) 0.1 x10^3/uL


(0.0-0.2) 


 





 


Segmented Neutrophils % 90 % (35-66)    


 


Lymphocytes % 6 % (24-48)    


 


Monocytes % 4 % (0-10)    


 


Platelet Estimate


 Adequate


(ADEQUATE) 


 


 





 


Sodium Level


 129 mmol/L


(136-145) 134 mmol/L


(136-145) 


 





 


Potassium Level


 3.6 mmol/L


(3.5-5.1) 2.9 mmol/L


(3.5-5.1) 


 





 


Chloride Level


 88 mmol/L


() 94 mmol/L


() 


 





 


Carbon Dioxide Level


 27 mmol/L


(21-32) 25 mmol/L


(21-32) 


 





 


Anion Gap 14 (6-14)  15 (6-14)   


 


Blood Urea Nitrogen


 71 mg/dL


(8-26) 72 mg/dL


(8-26) 


 





 


Creatinine


 13.3 mg/dL


(0.7-1.3) 12.7 mg/dL


(0.7-1.3) 


 





 


Estimated GFR


(Cockcroft-Gault) 4.8 


 5.1 


 


 





 


Glucose Level


 241 mg/dL


(70-99) 39 mg/dL


(70-99) 


 





 


Lactic Acid Level


 1.3 mmol/L


(0.4-2.0) 


 


 





 


Calcium Level


 7.9 mg/dL


(8.5-10.1) 7.8 mg/dL


(8.5-10.1) 


 





 


Total Bilirubin


 0.5 mg/dL


(0.2-1.0) 


 


 





 


Direct Bilirubin


 0.2 mg/dL


(0.0-0.2) 


 


 





 


Aspartate Amino Transf


(AST/SGOT) 33 U/L (15-37) 


 


 


 





 


Alanine Aminotransferase


(ALT/SGPT) 11 U/L (16-63) 


 


 


 





 


Alkaline Phosphatase


 102 U/L


() 


 


 





 


Troponin I Quantitative


 0.340 ng/mL


(0.000-0.055) 


 


 





 


Total Protein


 6.0 g/dL


(6.4-8.2) 


 


 





 


Albumin


 1.7 g/dL


(3.4-5.0) 


 


 





 


Lipase


 77 U/L


() 


 


 





 


Glucose (Fingerstick)


 


 


 27 mg/dL


(70-99) 76 mg/dL


(70-99)











Assessment and Plan


Assessmemt and Plan


Problems


Medical Problems:


(1) Abdominal pain


Status: Acute  











Comment


Review of Relevant


I have reviewed the following items sean (where applicable) has been applied.


Labs





Laboratory Tests








Test


 10/19/18


15:45 10/20/18


04:00 10/20/18


05:49 10/20/18


05:58


 


White Blood Count


 18.9 x10^3/uL


(4.0-11.0) 21.6 x10^3/uL


(4.0-11.0) 


 





 


Red Blood Count


 3.97 x10^6/uL


(4.30-5.70) 4.02 x10^6/uL


(4.30-5.70) 


 





 


Hemoglobin


 11.6 g/dL


(13.0-17.5) 11.5 g/dL


(13.0-17.5) 


 





 


Hematocrit


 34.2 %


(39.0-53.0) 34.7 %


(39.0-53.0) 


 





 


Mean Corpuscular Volume 86 fL ()  86 fL ()   


 


Mean Corpuscular Hemoglobin 29 pg (25-35)  29 pg (25-35)   


 


Mean Corpuscular Hemoglobin


Concent 34 g/dL


(31-37) 33 g/dL


(31-37) 


 





 


Red Cell Distribution Width


 13.4 %


(11.5-14.5) 13.3 %


(11.5-14.5) 


 





 


Platelet Count


 325 x10^3/uL


(140-400) 322 x10^3/uL


(140-400) 


 





 


Neutrophils (%) (Auto) 91 % (31-73)  94 % (31-73)   


 


Lymphocytes (%) (Auto) 2 % (24-48)  1 % (24-48)   


 


Monocytes (%) (Auto) 7 % (0-9)  4 % (0-9)   


 


Eosinophils (%) (Auto) 0 % (0-3)  0 % (0-3)   


 


Basophils (%) (Auto) 0 % (0-3)  1 % (0-3)   


 


Neutrophils # (Auto)


 17.2 x10^3uL


(1.8-7.7) 20.4 x10^3uL


(1.8-7.7) 


 





 


Lymphocytes # (Auto)


 0.4 x10^3/uL


(1.0-4.8) 0.3 x10^3/uL


(1.0-4.8) 


 





 


Monocytes # (Auto)


 1.3 x10^3/uL


(0.0-1.1) 0.9 x10^3/uL


(0.0-1.1) 


 





 


Eosinophils # (Auto)


 0.0 x10^3/uL


(0.0-0.7) 0.0 x10^3/uL


(0.0-0.7) 


 





 


Basophils # (Auto)


 0.0 x10^3/uL


(0.0-0.2) 0.1 x10^3/uL


(0.0-0.2) 


 





 


Segmented Neutrophils % 90 % (35-66)    


 


Lymphocytes % 6 % (24-48)    


 


Monocytes % 4 % (0-10)    


 


Platelet Estimate


 Adequate


(ADEQUATE) 


 


 





 


Sodium Level


 129 mmol/L


(136-145) 134 mmol/L


(136-145) 


 





 


Potassium Level


 3.6 mmol/L


(3.5-5.1) 2.9 mmol/L


(3.5-5.1) 


 





 


Chloride Level


 88 mmol/L


() 94 mmol/L


() 


 





 


Carbon Dioxide Level


 27 mmol/L


(21-32) 25 mmol/L


(21-32) 


 





 


Anion Gap 14 (6-14)  15 (6-14)   


 


Blood Urea Nitrogen


 71 mg/dL


(8-26) 72 mg/dL


(8-26) 


 





 


Creatinine


 13.3 mg/dL


(0.7-1.3) 12.7 mg/dL


(0.7-1.3) 


 





 


Estimated GFR


(Cockcroft-Gault) 4.8 


 5.1 


 


 





 


Glucose Level


 241 mg/dL


(70-99) 39 mg/dL


(70-99) 


 





 


Lactic Acid Level


 1.3 mmol/L


(0.4-2.0) 


 


 





 


Calcium Level


 7.9 mg/dL


(8.5-10.1) 7.8 mg/dL


(8.5-10.1) 


 





 


Total Bilirubin


 0.5 mg/dL


(0.2-1.0) 


 


 





 


Direct Bilirubin


 0.2 mg/dL


(0.0-0.2) 


 


 





 


Aspartate Amino Transf


(AST/SGOT) 33 U/L (15-37) 


 


 


 





 


Alanine Aminotransferase


(ALT/SGPT) 11 U/L (16-63) 


 


 


 





 


Alkaline Phosphatase


 102 U/L


() 


 


 





 


Troponin I Quantitative


 0.340 ng/mL


(0.000-0.055) 


 


 





 


Total Protein


 6.0 g/dL


(6.4-8.2) 


 


 





 


Albumin


 1.7 g/dL


(3.4-5.0) 


 


 





 


Lipase


 77 U/L


() 


 


 





 


Glucose (Fingerstick)


 


 


 27 mg/dL


(70-99) 76 mg/dL


(70-99)








Laboratory Tests








Test


 10/19/18


15:45 10/20/18


04:00 10/20/18


05:49 10/20/18


05:58


 


White Blood Count


 18.9 x10^3/uL


(4.0-11.0) 21.6 x10^3/uL


(4.0-11.0) 


 





 


Red Blood Count


 3.97 x10^6/uL


(4.30-5.70) 4.02 x10^6/uL


(4.30-5.70) 


 





 


Hemoglobin


 11.6 g/dL


(13.0-17.5) 11.5 g/dL


(13.0-17.5) 


 





 


Hematocrit


 34.2 %


(39.0-53.0) 34.7 %


(39.0-53.0) 


 





 


Mean Corpuscular Volume 86 fL ()  86 fL ()   


 


Mean Corpuscular Hemoglobin 29 pg (25-35)  29 pg (25-35)   


 


Mean Corpuscular Hemoglobin


Concent 34 g/dL


(31-37) 33 g/dL


(31-37) 


 





 


Red Cell Distribution Width


 13.4 %


(11.5-14.5) 13.3 %


(11.5-14.5) 


 





 


Platelet Count


 325 x10^3/uL


(140-400) 322 x10^3/uL


(140-400) 


 





 


Neutrophils (%) (Auto) 91 % (31-73)  94 % (31-73)   


 


Lymphocytes (%) (Auto) 2 % (24-48)  1 % (24-48)   


 


Monocytes (%) (Auto) 7 % (0-9)  4 % (0-9)   


 


Eosinophils (%) (Auto) 0 % (0-3)  0 % (0-3)   


 


Basophils (%) (Auto) 0 % (0-3)  1 % (0-3)   


 


Neutrophils # (Auto)


 17.2 x10^3uL


(1.8-7.7) 20.4 x10^3uL


(1.8-7.7) 


 





 


Lymphocytes # (Auto)


 0.4 x10^3/uL


(1.0-4.8) 0.3 x10^3/uL


(1.0-4.8) 


 





 


Monocytes # (Auto)


 1.3 x10^3/uL


(0.0-1.1) 0.9 x10^3/uL


(0.0-1.1) 


 





 


Eosinophils # (Auto)


 0.0 x10^3/uL


(0.0-0.7) 0.0 x10^3/uL


(0.0-0.7) 


 





 


Basophils # (Auto)


 0.0 x10^3/uL


(0.0-0.2) 0.1 x10^3/uL


(0.0-0.2) 


 





 


Segmented Neutrophils % 90 % (35-66)    


 


Lymphocytes % 6 % (24-48)    


 


Monocytes % 4 % (0-10)    


 


Platelet Estimate


 Adequate


(ADEQUATE) 


 


 





 


Sodium Level


 129 mmol/L


(136-145) 134 mmol/L


(136-145) 


 





 


Potassium Level


 3.6 mmol/L


(3.5-5.1) 2.9 mmol/L


(3.5-5.1) 


 





 


Chloride Level


 88 mmol/L


() 94 mmol/L


() 


 





 


Carbon Dioxide Level


 27 mmol/L


(21-32) 25 mmol/L


(21-32) 


 





 


Anion Gap 14 (6-14)  15 (6-14)   


 


Blood Urea Nitrogen


 71 mg/dL


(8-26) 72 mg/dL


(8-26) 


 





 


Creatinine


 13.3 mg/dL


(0.7-1.3) 12.7 mg/dL


(0.7-1.3) 


 





 


Estimated GFR


(Cockcroft-Gault) 4.8 


 5.1 


 


 





 


Glucose Level


 241 mg/dL


(70-99) 39 mg/dL


(70-99) 


 





 


Lactic Acid Level


 1.3 mmol/L


(0.4-2.0) 


 


 





 


Calcium Level


 7.9 mg/dL


(8.5-10.1) 7.8 mg/dL


(8.5-10.1) 


 





 


Total Bilirubin


 0.5 mg/dL


(0.2-1.0) 


 


 





 


Direct Bilirubin


 0.2 mg/dL


(0.0-0.2) 


 


 





 


Aspartate Amino Transf


(AST/SGOT) 33 U/L (15-37) 


 


 


 





 


Alanine Aminotransferase


(ALT/SGPT) 11 U/L (16-63) 


 


 


 





 


Alkaline Phosphatase


 102 U/L


() 


 


 





 


Troponin I Quantitative


 0.340 ng/mL


(0.000-0.055) 


 


 





 


Total Protein


 6.0 g/dL


(6.4-8.2) 


 


 





 


Albumin


 1.7 g/dL


(3.4-5.0) 


 


 





 


Lipase


 77 U/L


() 


 


 





 


Glucose (Fingerstick)


 


 


 27 mg/dL


(70-99) 76 mg/dL


(70-99)








Medications





Current Medications


Sodium Chloride 250 ml @  250 mls/hr 1X  ONCE IV ;  Start 10/19/18 at 15:45;  

Stop 10/19/18 at 16:44;  Status Cancel


Piperacillin Sod/ Tazobactam Sod (Zosyn Per Pharmacy) 1 each PRN DAILY  PRN MC 

SEE COMMENTS;  Start 10/19/18 at 15:45;  Status UNV


Piperacillin Sod/ Tazobactam Sod 2.25 gm/Sodium Chloride 50 ml @  100 mls/hr 1X

  ONCE IV  Last administered on 10/19/18at 16:49;  Start 10/19/18 at 16:00;  

Stop 10/19/18 at 16:29;  Status DC


Ondansetron HCl (Zofran) 4 mg 1X  ONCE IV  Last administered on 10/19/18at 16:12

;  Start 10/19/18 at 16:00;  Stop 10/19/18 at 16:01;  Status DC


Hydromorphone HCl (Dilaudid) 0.5 mg PRN Q30MIN  PRN IV SEVERE PAIN Last 

administered on 10/19/18at 16:50;  Start 10/19/18 at 16:15


Hydromorphone HCl (Dilaudid) 2 mg STK-MED ONCE .ROUTE ;  Start 10/19/18 at 16:10

;  Stop 10/19/18 at 16:11;  Status DC


Sodium Chloride 500 ml @  500 mls/hr 1X  ONCE IV  Last administered on 10/19/

18at 16:49;  Start 10/19/18 at 16:30;  Stop 10/19/18 at 17:29;  Status DC


Aspirin (Children'S Aspirin) 324 mg 1X  ONCE PO  Last administered on 10/19/

18at 17:52;  Start 10/19/18 at 17:15;  Stop 10/19/18 at 17:16;  Status DC


Vancomycin HCl (Vanco Per Pharmacy) 1 each PRN DAILY  PRN MC SEE COMMENTS;  

Start 10/19/18 at 17:15;  Status UNV


Sodium Chloride 500 ml @  500 mls/hr 1X  ONCE IV  Last administered on 10/19/

18at 17:51;  Start 10/19/18 at 17:15;  Stop 10/19/18 at 18:14;  Status DC


Vancomycin HCl 1.75 gm/Sodium Chloride 500 ml @  250 mls/hr 1X  ONCE IV  Last 

administered on 10/19/18at 17:25;  Start 10/19/18 at 17:30;  Stop 10/19/18 at 19

:29;  Status DC


Acetaminophen (Tylenol) 650 mg PRN Q6HRS  PRN PO FEVER;  Start 10/19/18 at 17:30


Ondansetron HCl (Zofran) 4 mg PRN Q6HRS  PRN IV NAUSEA/VOMITING Last 

administered on 10/19/18at 20:34;  Start 10/19/18 at 17:30


Morphine Sulfate (Morphine Sulfate) 2 mg PRN Q2HR  PRN IV MODERATE TO SEVERE 

PAIN;  Start 10/19/18 at 17:30


Tramadol HCl (Ultram) 50 mg PRN Q6HRS  PRN PO MILD TO MODERATE PAIN;  Start 10/

19/18 at 17:30


Docusate Sodium (Colace) 100 mg PRN DAILY  PRN PO CONSTIPATION;  Start 10/19/18 

at 17:30


Piperacillin Sod/ Tazobactam Sod 3.375 gm/Sodium Chloride 50 ml @  100 mls/hr 

Q6HRS IV ;  Start 10/19/18 at 18:00;  Status UNV


Piperacillin Sod/ Tazobactam Sod (Zosyn Per Pharmacy) 1 each PRN DAILY  PRN MC 

SEE COMMENTS;  Start 10/19/18 at 17:30;  Status UNV


Vancomycin HCl (Vanco Per Pharmacy) 1 each PRN DAILY  PRN MC SEE COMMENTS Last 

administered on 10/19/18at 18:13;  Start 10/19/18 at 17:30


Insulin Human Lispro (HumaLOG) 0-9 UNITS TIDWMEALS SQ ;  Start 10/20/18 at 08:00


Dextrose (Dextrose 50%-Water Syringe) 12.5 gm PRN Q15MIN  PRN IV SEE COMMENTS 

Last administered on 10/20/18at 05:51;  Start 10/19/18 at 17:30


Heparin Sodium (Porcine) (Heparin Sodium) 5,000 unit Q8HRS SQ  Last 

administered on 10/20/18at 06:34;  Start 10/19/18 at 22:00


Senna/Docusate Sodium (Senna Plus) 1 tab BID PO ;  Start 10/19/18 at 21:00


Docusate Sodium (Colace) 100 mg BID PO ;  Start 10/19/18 at 21:00


Magnesium Hydroxide (Milk Of Magnesia) 2,400 mg PRN Q12HR  PRN PO CONSTIPATION;

  Start 10/19/18 at 17:45


Sodium Chloride 1,000 ml @  75 mls/hr 1X  ONCE IV ;  Start 10/19/18 at 17:45;  

Stop 10/19/18 at 19:47;  Status DC


Pantoprazole Sodium (PROTONIX VIAL for IV PUSH) 40 mg DAILYAC IVP ;  Start 10/20

/18 at 07:30


Piperacillin Sod/ Tazobactam Sod 2.25 gm/Sodium Chloride 50 ml @  100 mls/hr 

Q6HRS IV ;  Start 10/19/18 at 18:00;  Stop 10/19/18 at 18:07;  Status DC


Vancomycin HCl (Vancomycin Random Level) 1 each 1X  ONCE MC ;  Start 10/21/18 

at 17:00;  Stop 10/21/18 at 17:01


Piperacillin Sod/ Tazobactam Sod 2.25 gm/Sodium Chloride 50 ml @  100 mls/hr 

Q8HRS IV  Last administered on 10/20/18at 06:00;  Start 10/19/18 at 22:00


Promethazine HCl (Phenergan) 12.5 mg PRN Q4HRS  PRN PO NAUSEA/VOMITING Last 

administered on 10/20/18at 01:29;  Start 10/20/18 at 01:15


Potassium Chloride (Klor-Con) 40 meq Q2H PO ;  Start 10/20/18 at 07:00;  Stop 10

/20/18 at 09:01





Active Scripts


Active


Reported


Metolazone 5 Mg Tablet 5 Mg PO DAILY


Vitamin D3 (Cholecalciferol (Vitamin D3)) 5,000 Unit Tablet 1 Tab PO DAILY


Calcium Acetate 667 Mg Tablet 667 Mg PO TIDWMEALS


Renal Caps Softgel (Folic Acid/Vitamin B Comp W-C) 1 Mg Capsule 1 Cap PO DAILY


Tresiba Flextouch U-100 (Insulin Degludec) 100 Unit/1 Ml Insuln.pen 100 Unit SQ 


Carvedilol 12.5 Mg Tablet 1 Tab PO DAILY


Furosemide 40 Mg Tablet 1 Tab PO DAILY


Aspirin 81 Mg Tab.chew 81 Mg PO DAILYWBKFT


Vitals/I & O





Vital Sign - Last 24 Hours








 10/19/18 10/19/18 10/19/18 10/19/18





 15:20 16:00 16:13 16:30


 


Temp 100.1   





 100.1   


 


Pulse 113 108  105


 


Resp 24 28 20 28


 


B/P (MAP) 98/73 (81)   92/66 (75)


 


Pulse Ox 100 95  94


 


O2 Delivery Room Air Room Air Room Air Room Air


 


    





    





 10/19/18 10/19/18 10/19/18 10/19/18





 16:43 16:50 17:00 17:20


 


Pulse   100 


 


Resp 18 21 15 


 


B/P (MAP)   86/61 (69) 


 


Pulse Ox   94 


 


O2 Delivery   Room Air Room Air





 10/19/18 10/19/18 10/19/18 10/19/18





 18:00 19:00 20:00 20:00


 


Temp 99.1  98.0 





 99.1  98.0 


 


Pulse 99 86 94 


 


Resp 24 19 20 


 


B/P (MAP) 86/60 (69)  94/64 (74) 


 


Pulse Ox 97 98 99 


 


O2 Delivery Room Air Room Air Room Air Room Air


 


    





    





 10/19/18 10/19/18 10/19/18 10/19/18





 21:00 22:00 23:00 23:59


 


Pulse 94 98 108 


 


Resp 20 21 25 


 


B/P (MAP) 93/65 (74) 96/70 (79) 103/72 (82) 


 


Pulse Ox 100 95 99 


 


O2 Delivery Room Air Room Air Room Air Room Air





 10/20/18 10/20/18 10/20/18 10/20/18





 00:00 01:00 02:00 03:00


 


Temp 99.2   





 99.2   


 


Pulse 122 110 105 97


 


Resp 19 23 14 18


 


B/P (MAP) 109/68 (82) 103/69 (80) 101/63 (76) 85/58 (67)


 


Pulse Ox 96 97 96 97


 


O2 Delivery Room Air Room Air Room Air Room Air


 


    





    





 10/20/18 10/20/18 10/20/18 10/20/18





 04:00 04:00 05:00 06:00


 


Temp 99.3   





 99.3   


 


Pulse 90  85 89


 


Resp 18  21 18


 


B/P (MAP) 104/63 (77)  109/68 (82) 107/73 (84)


 


Pulse Ox 93  98 97


 


O2 Delivery Room Air Room Air Room Air Room Air














Intake and Output   


 


 10/19/18 10/19/18 10/20/18





 15:00 23:00 07:00


 


Intake Total  550 ml 0 ml


 


Output Total   100 ml


 


Balance  550 ml -100 ml

















SHERI HANNAH MD Oct 20, 2018 07:13

## 2018-10-21 VITALS — DIASTOLIC BLOOD PRESSURE: 55 MMHG | SYSTOLIC BLOOD PRESSURE: 83 MMHG

## 2018-10-21 VITALS — SYSTOLIC BLOOD PRESSURE: 81 MMHG | DIASTOLIC BLOOD PRESSURE: 57 MMHG

## 2018-10-21 VITALS — DIASTOLIC BLOOD PRESSURE: 68 MMHG | SYSTOLIC BLOOD PRESSURE: 96 MMHG

## 2018-10-21 VITALS — DIASTOLIC BLOOD PRESSURE: 51 MMHG | SYSTOLIC BLOOD PRESSURE: 72 MMHG

## 2018-10-21 VITALS — SYSTOLIC BLOOD PRESSURE: 101 MMHG | DIASTOLIC BLOOD PRESSURE: 77 MMHG

## 2018-10-21 VITALS — DIASTOLIC BLOOD PRESSURE: 61 MMHG | SYSTOLIC BLOOD PRESSURE: 77 MMHG

## 2018-10-21 VITALS — DIASTOLIC BLOOD PRESSURE: 55 MMHG | SYSTOLIC BLOOD PRESSURE: 81 MMHG

## 2018-10-21 VITALS — SYSTOLIC BLOOD PRESSURE: 111 MMHG | DIASTOLIC BLOOD PRESSURE: 73 MMHG

## 2018-10-21 VITALS — SYSTOLIC BLOOD PRESSURE: 98 MMHG | DIASTOLIC BLOOD PRESSURE: 69 MMHG

## 2018-10-21 VITALS — SYSTOLIC BLOOD PRESSURE: 100 MMHG | DIASTOLIC BLOOD PRESSURE: 64 MMHG

## 2018-10-21 VITALS — DIASTOLIC BLOOD PRESSURE: 54 MMHG | SYSTOLIC BLOOD PRESSURE: 83 MMHG

## 2018-10-21 VITALS — DIASTOLIC BLOOD PRESSURE: 73 MMHG | SYSTOLIC BLOOD PRESSURE: 92 MMHG

## 2018-10-21 VITALS — DIASTOLIC BLOOD PRESSURE: 61 MMHG | SYSTOLIC BLOOD PRESSURE: 87 MMHG

## 2018-10-21 VITALS — DIASTOLIC BLOOD PRESSURE: 67 MMHG | SYSTOLIC BLOOD PRESSURE: 97 MMHG

## 2018-10-21 VITALS — DIASTOLIC BLOOD PRESSURE: 60 MMHG | SYSTOLIC BLOOD PRESSURE: 96 MMHG

## 2018-10-21 VITALS — DIASTOLIC BLOOD PRESSURE: 62 MMHG | SYSTOLIC BLOOD PRESSURE: 86 MMHG

## 2018-10-21 VITALS — SYSTOLIC BLOOD PRESSURE: 112 MMHG | DIASTOLIC BLOOD PRESSURE: 63 MMHG

## 2018-10-21 VITALS — DIASTOLIC BLOOD PRESSURE: 62 MMHG | SYSTOLIC BLOOD PRESSURE: 80 MMHG

## 2018-10-21 VITALS — DIASTOLIC BLOOD PRESSURE: 66 MMHG | SYSTOLIC BLOOD PRESSURE: 98 MMHG

## 2018-10-21 VITALS — SYSTOLIC BLOOD PRESSURE: 94 MMHG | DIASTOLIC BLOOD PRESSURE: 65 MMHG

## 2018-10-21 VITALS — DIASTOLIC BLOOD PRESSURE: 78 MMHG | SYSTOLIC BLOOD PRESSURE: 112 MMHG

## 2018-10-21 VITALS — DIASTOLIC BLOOD PRESSURE: 57 MMHG | SYSTOLIC BLOOD PRESSURE: 74 MMHG

## 2018-10-21 VITALS — SYSTOLIC BLOOD PRESSURE: 97 MMHG | DIASTOLIC BLOOD PRESSURE: 61 MMHG

## 2018-10-21 VITALS — SYSTOLIC BLOOD PRESSURE: 92 MMHG | DIASTOLIC BLOOD PRESSURE: 67 MMHG

## 2018-10-21 VITALS — DIASTOLIC BLOOD PRESSURE: 67 MMHG | SYSTOLIC BLOOD PRESSURE: 99 MMHG

## 2018-10-21 LAB
ANION GAP SERPL CALC-SCNC: 14 MMOL/L (ref 6–14)
BASOPHILS # BLD AUTO: 0.1 X10^3/UL (ref 0–0.2)
BASOPHILS NFR BLD: 0 % (ref 0–3)
BUN SERPL-MCNC: 68 MG/DL (ref 8–26)
CALCIUM SERPL-MCNC: 7.8 MG/DL (ref 8.5–10.1)
CHLORIDE SERPL-SCNC: 91 MMOL/L (ref 98–107)
CO2 SERPL-SCNC: 25 MMOL/L (ref 21–32)
CREAT SERPL-MCNC: 12.2 MG/DL (ref 0.7–1.3)
EOSINOPHIL NFR BLD: 0.1 X10^3/UL (ref 0–0.7)
EOSINOPHIL NFR BLD: 1 % (ref 0–3)
ERYTHROCYTE [DISTWIDTH] IN BLOOD BY AUTOMATED COUNT: 13.8 % (ref 11.5–14.5)
GFR SERPLBLD BASED ON 1.73 SQ M-ARVRAT: 5.3 ML/MIN
GLUCOSE SERPL-MCNC: 89 MG/DL (ref 70–99)
HCT VFR BLD CALC: 37.3 % (ref 39–53)
HGB BLD-MCNC: 12.4 G/DL (ref 13–17.5)
LYMPHOCYTES # BLD: 0.4 X10^3/UL (ref 1–4.8)
LYMPHOCYTES NFR BLD AUTO: 2 % (ref 24–48)
MCH RBC QN AUTO: 29 PG (ref 25–35)
MCHC RBC AUTO-ENTMCNC: 33 G/DL (ref 31–37)
MCV RBC AUTO: 86 FL (ref 79–100)
MONO #: 1 X10^3/UL (ref 0–1.1)
MONOCYTES NFR BLD: 5 % (ref 0–9)
NEUT #: 19.5 X10^3UL (ref 1.8–7.7)
NEUTROPHILS NFR BLD AUTO: 93 % (ref 31–73)
PLATELET # BLD AUTO: 362 X10^3/UL (ref 140–400)
POTASSIUM SERPL-SCNC: 3.4 MMOL/L (ref 3.5–5.1)
RBC # BLD AUTO: 4.33 X10^6/UL (ref 4.3–5.7)
SODIUM SERPL-SCNC: 130 MMOL/L (ref 136–145)
WBC # BLD AUTO: 21 X10^3/UL (ref 4–11)

## 2018-10-21 RX ADMIN — Medication SCH TAB: at 08:54

## 2018-10-21 RX ADMIN — BACITRACIN SCH MLS/HR: 5000 INJECTION, POWDER, FOR SOLUTION INTRAMUSCULAR at 12:13

## 2018-10-21 RX ADMIN — CALCIUM ACETATE SCH MG: 667 CAPSULE ORAL at 17:06

## 2018-10-21 RX ADMIN — ACETAMINOPHEN PRN MG: 325 TABLET, FILM COATED ORAL at 17:14

## 2018-10-21 RX ADMIN — PIPERACILLIN SODIUM AND TAZOBACTAM SODIUM SCH MLS/HR: 2; .25 INJECTION, POWDER, LYOPHILIZED, FOR SOLUTION INTRAVENOUS at 06:08

## 2018-10-21 RX ADMIN — METOCLOPRAMIDE PRN MG: 5 INJECTION, SOLUTION INTRAMUSCULAR; INTRAVENOUS at 19:25

## 2018-10-21 RX ADMIN — CHOLECALCIFEROL CAP 125 MCG (5000 UNIT) SCH UNIT: 125 CAP at 08:54

## 2018-10-21 RX ADMIN — VANCOMYCIN HYDROCHLORIDE SCH MG: 500 INJECTION, POWDER, LYOPHILIZED, FOR SOLUTION INTRAVENOUS at 14:32

## 2018-10-21 RX ADMIN — CARVEDILOL SCH MG: 12.5 TABLET, FILM COATED ORAL at 14:32

## 2018-10-21 RX ADMIN — BACLOFEN PRN MG: 10 TABLET ORAL at 08:54

## 2018-10-21 RX ADMIN — SENNOSIDES AND DOCUSATE SODIUM SCH TAB: 8.6; 5 TABLET ORAL at 21:00

## 2018-10-21 RX ADMIN — INSULIN LISPRO SCH UNITS: 100 INJECTION, SOLUTION INTRAVENOUS; SUBCUTANEOUS at 09:00

## 2018-10-21 RX ADMIN — NYSTATIN SCH ML: 100000 SUSPENSION ORAL at 17:06

## 2018-10-21 RX ADMIN — ONDANSETRON PRN MG: 2 INJECTION INTRAMUSCULAR; INTRAVENOUS at 17:17

## 2018-10-21 RX ADMIN — SILVER SULFADIAZINE SCH APP: 10 CREAM TOPICAL at 11:30

## 2018-10-21 RX ADMIN — PANTOPRAZOLE SODIUM SCH MG: 40 INJECTION, POWDER, FOR SOLUTION INTRAVENOUS at 08:54

## 2018-10-21 RX ADMIN — DOCUSATE SODIUM SCH MG: 100 CAPSULE, LIQUID FILLED ORAL at 21:00

## 2018-10-21 RX ADMIN — INSULIN LISPRO SCH UNITS: 100 INJECTION, SOLUTION INTRAVENOUS; SUBCUTANEOUS at 12:00

## 2018-10-21 RX ADMIN — CALCIUM ACETATE SCH MG: 667 CAPSULE ORAL at 12:00

## 2018-10-21 RX ADMIN — INSULIN GLARGINE SCH UNITS: 100 INJECTION, SOLUTION SUBCUTANEOUS at 21:00

## 2018-10-21 RX ADMIN — PIPERACILLIN SODIUM AND TAZOBACTAM SODIUM SCH MLS/HR: 2; .25 INJECTION, POWDER, LYOPHILIZED, FOR SOLUTION INTRAVENOUS at 13:48

## 2018-10-21 RX ADMIN — VANCOMYCIN HYDROCHLORIDE PRN EACH: 1 INJECTION, POWDER, LYOPHILIZED, FOR SOLUTION INTRAVENOUS at 18:49

## 2018-10-21 RX ADMIN — HEPARIN SODIUM SCH UNIT: 5000 INJECTION, SOLUTION INTRAVENOUS; SUBCUTANEOUS at 13:44

## 2018-10-21 RX ADMIN — VANCOMYCIN HYDROCHLORIDE PRN EACH: 1 INJECTION, POWDER, LYOPHILIZED, FOR SOLUTION INTRAVENOUS at 09:03

## 2018-10-21 RX ADMIN — DOCUSATE SODIUM SCH MG: 100 CAPSULE, LIQUID FILLED ORAL at 08:55

## 2018-10-21 RX ADMIN — HEPARIN SODIUM SCH UNIT: 5000 INJECTION, SOLUTION INTRAVENOUS; SUBCUTANEOUS at 06:09

## 2018-10-21 RX ADMIN — HEPARIN SODIUM SCH UNIT: 5000 INJECTION, SOLUTION INTRAVENOUS; SUBCUTANEOUS at 21:39

## 2018-10-21 RX ADMIN — NYSTATIN SCH ML: 100000 SUSPENSION ORAL at 13:41

## 2018-10-21 RX ADMIN — Medication PRN MLS/HR: at 18:37

## 2018-10-21 RX ADMIN — PIPERACILLIN SODIUM AND TAZOBACTAM SODIUM SCH MLS/HR: 2; .25 INJECTION, POWDER, LYOPHILIZED, FOR SOLUTION INTRAVENOUS at 21:35

## 2018-10-21 RX ADMIN — ACETAMINOPHEN PRN MG: 325 TABLET, FILM COATED ORAL at 09:11

## 2018-10-21 RX ADMIN — CARVEDILOL SCH MG: 12.5 TABLET, FILM COATED ORAL at 08:00

## 2018-10-21 RX ADMIN — NYSTATIN SCH ML: 100000 SUSPENSION ORAL at 21:00

## 2018-10-21 RX ADMIN — HYDROMORPHONE HYDROCHLORIDE PRN MG: 2 INJECTION INTRAMUSCULAR; INTRAVENOUS; SUBCUTANEOUS at 18:00

## 2018-10-21 RX ADMIN — VANCOMYCIN HYDROCHLORIDE SCH MG: 500 INJECTION, POWDER, LYOPHILIZED, FOR SOLUTION INTRAVENOUS at 21:00

## 2018-10-21 RX ADMIN — CALCIUM ACETATE SCH MG: 667 CAPSULE ORAL at 08:54

## 2018-10-21 RX ADMIN — ASPIRIN 81 MG SCH MG: 81 TABLET ORAL at 08:54

## 2018-10-21 RX ADMIN — INSULIN LISPRO SCH UNITS: 100 INJECTION, SOLUTION INTRAVENOUS; SUBCUTANEOUS at 17:08

## 2018-10-21 RX ADMIN — SENNOSIDES AND DOCUSATE SODIUM SCH TAB: 8.6; 5 TABLET ORAL at 08:54

## 2018-10-21 RX ADMIN — VANCOMYCIN HYDROCHLORIDE SCH MG: 500 INJECTION, POWDER, LYOPHILIZED, FOR SOLUTION INTRAVENOUS at 17:06

## 2018-10-21 NOTE — PDOC
PROGRESS NOTES


Chief Complaint


Chief Complaint


Abdominal Pain


ESRD on PD


Hypoglycemia


Sepsis





History of Present Illness


History of Present Illness


53-year-old male presenting to the emergency department today with abd pain for 

5d. H/o CHF with EF 10%, has PPM/ICD, battery changed on 10/15/18 with dr. Mcgraw.


He started to have diffuse abd pain from Monday, with nausea and mild vomiting 

with spitting out some saliva. also has mild cough wo sputum.


Has PD for 3years, works fine. no fever at home ,T 100 in ER. BP low as 80s in 

ER, but said BP usually is normal. 


C/o multiple hypoglycemic episodes of 49 regularly followed by 175 blood sugars

, normally takes 30u Tresiba.





no chest pain, or sob.


usually BM daily, last BM was yesterday. still has flatus. 


Abd fluid from PD Cath for culture. Blood culture NGTD





Feeling ok today, hypoglycemic overnight to 63. Abdominal pain is stable. He 

still c/o nausea with some slight vomiting and states his hiccups improved with 

baclofen


Last temp was 100.2F last night.


Has slight cough and still c/o severe constipation. On PD now





CT abd : IMPRESSION: 


1. Partially visualized moderate consolidation left lingula of the lung 

probably pneumonia. Recommend CT chest for further evaluation.


2. Minimal perihepatic fluid.


3. Atrophic appearing bilateral kidneys.





A/P:


Abd pain/nausea/vomiting - constipation, concern for gastroenteritis, 

peritonitis. Await cultures


Hiccups/vomiting - will add reglan 5mg prn with baclofen prior to meals


Sepsis - with multiple possible sources including abdomen, lung - Last BM 

Sunday. ?peritonitis, gastroenteritis


S/P AICD generator change: done  on 10/15/2018 ([facility]) stable. - cardiology 

consulted


Chronic systolic CHF - compensated currently


ESRD - utilizes PD with last dialysis last night. Nephrology consulted


HTN - will monitor in ICU


DM2 - sliding scale, added back 15 u glargine


Sinus tach with chronic LBBB - likely 2/2 sepsis - cardiology consulted





Diet - renal


PPX - heparin


FUll Code


Cont ICU, possibly to downgrade later today





Vitals


Vitals





Vital Signs








  Date Time  Temp Pulse Resp B/P (MAP) Pulse Ox O2 Delivery O2 Flow Rate FiO2


 


10/21/18 06:00  114  99/67 (78) 97 Room Air  


 


10/21/18 04:00 98.7       





 98.7       


 


10/21/18 00:41   12     











Physical Exam


General:  Alert, Oriented X3, Cooperative, No acute distress


Heart:  Regular rate (tachy), Other (3/6 systolic murmur to LLS border)


Abdomen:  Soft, Other (with mild diffuse tenderness, PD cath in place with 

dressing)


Extremities:  No cyanosis, No edema


Skin:  Other (chornic leg closed lesions; left chest incision site is open to 

air, no swelling or erythema or tenderness with palpation. No drain and 

incision is well approximated with dry steri strips. No discoloration. )





Labs


LABS





Laboratory Tests








Test


 10/20/18


07:55 10/20/18


11:34 10/20/18


17:25 10/20/18


18:23


 


Body Fluid Source


 Perit


dialysate 


 


 





 


Body Fluid Color Yellow    


 


Body Fluid Clarity Clear    


 


Body Fluid Nucleated Cells 133 /cmm    


 


Body Fluid Mononuclear WBCs


(%) 96 % 


 


 


 





 


Body Fluid Polymorphonuclear


Cells 4 % 


 


 


 





 


Body Fluid Total RBCs Counted 100 /cmm    


 


Glucose (Fingerstick)


 


 176 mg/dL


(70-99) 56 mg/dL


(70-99) 129 mg/dL


(70-99)


 


Test


 10/20/18


21:29 


 


 





 


Glucose (Fingerstick)


 104 mg/dL


(70-99) 


 


 














Assessment and Plan


Assessmemt and Plan


Problems


Medical Problems:


(1) Abdominal pain


Status: Acute  











Comment


Review of Relevant


I have reviewed the following items sean (where applicable) has been applied.


Labs





Laboratory Tests








Test


 10/19/18


15:45 10/19/18


18:10 10/20/18


04:00 10/20/18


05:49


 


White Blood Count


 18.9 x10^3/uL


(4.0-11.0) 


 21.6 x10^3/uL


(4.0-11.0) 





 


Red Blood Count


 3.97 x10^6/uL


(4.30-5.70) 


 4.02 x10^6/uL


(4.30-5.70) 





 


Hemoglobin


 11.6 g/dL


(13.0-17.5) 


 11.5 g/dL


(13.0-17.5) 





 


Hematocrit


 34.2 %


(39.0-53.0) 


 34.7 %


(39.0-53.0) 





 


Mean Corpuscular Volume 86 fL ()   86 fL ()  


 


Mean Corpuscular Hemoglobin 29 pg (25-35)   29 pg (25-35)  


 


Mean Corpuscular Hemoglobin


Concent 34 g/dL


(31-37) 


 33 g/dL


(31-37) 





 


Red Cell Distribution Width


 13.4 %


(11.5-14.5) 


 13.3 %


(11.5-14.5) 





 


Platelet Count


 325 x10^3/uL


(140-400) 


 322 x10^3/uL


(140-400) 





 


Neutrophils (%) (Auto) 91 % (31-73)   94 % (31-73)  


 


Lymphocytes (%) (Auto) 2 % (24-48)   1 % (24-48)  


 


Monocytes (%) (Auto) 7 % (0-9)   4 % (0-9)  


 


Eosinophils (%) (Auto) 0 % (0-3)   0 % (0-3)  


 


Basophils (%) (Auto) 0 % (0-3)   1 % (0-3)  


 


Neutrophils # (Auto)


 17.2 x10^3uL


(1.8-7.7) 


 20.4 x10^3uL


(1.8-7.7) 





 


Lymphocytes # (Auto)


 0.4 x10^3/uL


(1.0-4.8) 


 0.3 x10^3/uL


(1.0-4.8) 





 


Monocytes # (Auto)


 1.3 x10^3/uL


(0.0-1.1) 


 0.9 x10^3/uL


(0.0-1.1) 





 


Eosinophils # (Auto)


 0.0 x10^3/uL


(0.0-0.7) 


 0.0 x10^3/uL


(0.0-0.7) 





 


Basophils # (Auto)


 0.0 x10^3/uL


(0.0-0.2) 


 0.1 x10^3/uL


(0.0-0.2) 





 


Segmented Neutrophils % 90 % (35-66)    


 


Lymphocytes % 6 % (24-48)    


 


Monocytes % 4 % (0-10)    


 


Platelet Estimate


 Adequate


(ADEQUATE) 


 


 





 


Sodium Level


 129 mmol/L


(136-145) 


 134 mmol/L


(136-145) 





 


Potassium Level


 3.6 mmol/L


(3.5-5.1) 


 2.9 mmol/L


(3.5-5.1) 





 


Chloride Level


 88 mmol/L


() 


 94 mmol/L


() 





 


Carbon Dioxide Level


 27 mmol/L


(21-32) 


 25 mmol/L


(21-32) 





 


Anion Gap 14 (6-14)   15 (6-14)  


 


Blood Urea Nitrogen


 71 mg/dL


(8-26) 


 72 mg/dL


(8-26) 





 


Creatinine


 13.3 mg/dL


(0.7-1.3) 


 12.7 mg/dL


(0.7-1.3) 





 


Estimated GFR


(Cockcroft-Gault) 4.8 


 


 5.1 


 





 


Glucose Level


 241 mg/dL


(70-99) 


 39 mg/dL


(70-99) 





 


Lactic Acid Level


 1.3 mmol/L


(0.4-2.0) 


 


 





 


Calcium Level


 7.9 mg/dL


(8.5-10.1) 


 7.8 mg/dL


(8.5-10.1) 





 


Total Bilirubin


 0.5 mg/dL


(0.2-1.0) 


 


 





 


Direct Bilirubin


 0.2 mg/dL


(0.0-0.2) 


 


 





 


Aspartate Amino Transf


(AST/SGOT) 33 U/L (15-37) 


 


 


 





 


Alanine Aminotransferase


(ALT/SGPT) 11 U/L (16-63) 


 


 


 





 


Alkaline Phosphatase


 102 U/L


() 


 


 





 


Troponin I Quantitative


 0.340 ng/mL


(0.000-0.055) 


 


 





 


Total Protein


 6.0 g/dL


(6.4-8.2) 


 


 





 


Albumin


 1.7 g/dL


(3.4-5.0) 


 


 





 


Lipase


 77 U/L


() 


 


 





 


Nasal Screen MRSA (PCR)


 


 Negative


(Negative) 


 





 


Glucose (Fingerstick)


 


 


 


 27 mg/dL


(70-99)


 


Test


 10/20/18


05:58 10/20/18


07:04 10/20/18


07:55 10/20/18


11:34


 


Glucose (Fingerstick)


 76 mg/dL


(70-99) 68 mg/dL


(70-99) 


 176 mg/dL


(70-99)


 


Body Fluid Source


 


 


 Perit


dialysate 





 


Body Fluid Color   Yellow  


 


Body Fluid Clarity   Clear  


 


Body Fluid Nucleated Cells   133 /cmm  


 


Body Fluid Mononuclear WBCs


(%) 


 


 96 % 


 





 


Body Fluid Polymorphonuclear


Cells 


 


 4 % 


 





 


Body Fluid Total RBCs Counted   100 /cmm  


 


Test


 10/20/18


17:25 10/20/18


18:23 10/20/18


21:29 





 


Glucose (Fingerstick)


 56 mg/dL


(70-99) 129 mg/dL


(70-99) 104 mg/dL


(70-99) 











Laboratory Tests








Test


 10/20/18


07:55 10/20/18


11:34 10/20/18


17:25 10/20/18


18:23


 


Body Fluid Source


 Perit


dialysate 


 


 





 


Body Fluid Color Yellow    


 


Body Fluid Clarity Clear    


 


Body Fluid Nucleated Cells 133 /cmm    


 


Body Fluid Mononuclear WBCs


(%) 96 % 


 


 


 





 


Body Fluid Polymorphonuclear


Cells 4 % 


 


 


 





 


Body Fluid Total RBCs Counted 100 /cmm    


 


Glucose (Fingerstick)


 


 176 mg/dL


(70-99) 56 mg/dL


(70-99) 129 mg/dL


(70-99)


 


Test


 10/20/18


21:29 


 


 





 


Glucose (Fingerstick)


 104 mg/dL


(70-99) 


 


 











Microbiology


10/19/18 Blood Culture - Preliminary, Resulted


           NO GROWTH AFTER 1 DAY


Medications





Current Medications


Sodium Chloride 250 ml @  250 mls/hr 1X  ONCE IV ;  Start 10/19/18 at 15:45;  

Stop 10/19/18 at 16:44;  Status Cancel


Piperacillin Sod/ Tazobactam Sod (Zosyn Per Pharmacy) 1 each PRN DAILY  PRN MC 

SEE COMMENTS;  Start 10/19/18 at 15:45;  Status UNV


Piperacillin Sod/ Tazobactam Sod 2.25 gm/Sodium Chloride 50 ml @  100 mls/hr 1X

  ONCE IV  Last administered on 10/19/18at 16:49;  Start 10/19/18 at 16:00;  

Stop 10/19/18 at 16:29;  Status DC


Ondansetron HCl (Zofran) 4 mg 1X  ONCE IV  Last administered on 10/19/18at 16:12

;  Start 10/19/18 at 16:00;  Stop 10/19/18 at 16:01;  Status DC


Hydromorphone HCl (Dilaudid) 0.5 mg PRN Q30MIN  PRN IV SEVERE PAIN Last 

administered on 10/19/18at 16:50;  Start 10/19/18 at 16:15


Hydromorphone HCl (Dilaudid) 2 mg STK-MED ONCE .ROUTE ;  Start 10/19/18 at 16:10

;  Stop 10/19/18 at 16:11;  Status DC


Sodium Chloride 500 ml @  500 mls/hr 1X  ONCE IV  Last administered on 10/19/

18at 16:49;  Start 10/19/18 at 16:30;  Stop 10/19/18 at 17:29;  Status DC


Aspirin (Children'S Aspirin) 324 mg 1X  ONCE PO  Last administered on 10/19/

18at 17:52;  Start 10/19/18 at 17:15;  Stop 10/19/18 at 17:16;  Status DC


Vancomycin HCl (Vanco Per Pharmacy) 1 each PRN DAILY  PRN MC SEE COMMENTS;  

Start 10/19/18 at 17:15;  Status UNV


Sodium Chloride 500 ml @  500 mls/hr 1X  ONCE IV  Last administered on 10/19/

18at 17:51;  Start 10/19/18 at 17:15;  Stop 10/19/18 at 18:14;  Status DC


Vancomycin HCl 1.75 gm/Sodium Chloride 500 ml @  250 mls/hr 1X  ONCE IV  Last 

administered on 10/19/18at 17:25;  Start 10/19/18 at 17:30;  Stop 10/19/18 at 19

:29;  Status DC


Acetaminophen (Tylenol) 650 mg PRN Q6HRS  PRN PO FEVER;  Start 10/19/18 at 17:30


Ondansetron HCl (Zofran) 4 mg PRN Q6HRS  PRN IV NAUSEA/VOMITING Last 

administered on 10/20/18at 19:28;  Start 10/19/18 at 17:30


Morphine Sulfate (Morphine Sulfate) 2 mg PRN Q2HR  PRN IV MILD-MODERATE PAIN;  

Start 10/19/18 at 17:30


Tramadol HCl (Ultram) 50 mg PRN Q6HRS  PRN PO MILD TO MODERATE PAIN Last 

administered on 10/20/18at 23:41;  Start 10/19/18 at 17:30


Docusate Sodium (Colace) 100 mg PRN DAILY  PRN PO CONSTIPATION 1ST CHOICE;  

Start 10/19/18 at 17:30


Piperacillin Sod/ Tazobactam Sod 3.375 gm/Sodium Chloride 50 ml @  100 mls/hr 

Q6HRS IV ;  Start 10/19/18 at 18:00;  Status UNV


Piperacillin Sod/ Tazobactam Sod (Zosyn Per Pharmacy) 1 each PRN DAILY  PRN MC 

SEE COMMENTS;  Start 10/19/18 at 17:30;  Status UNV


Vancomycin HCl (Vanco Per Pharmacy) 1 each PRN DAILY  PRN MC SEE COMMENTS Last 

administered on 10/20/18at 09:56;  Start 10/19/18 at 17:30


Insulin Human Lispro (HumaLOG) 0-9 UNITS TIDWMEALS SQ  Last administered on 10/

20/18at 12:01;  Start 10/20/18 at 08:00


Dextrose (Dextrose 50%-Water Syringe) 12.5 gm PRN Q15MIN  PRN IV SEE COMMENTS 

Last administered on 10/20/18at 17:32;  Start 10/19/18 at 17:30


Heparin Sodium (Porcine) (Heparin Sodium) 5,000 unit Q8HRS SQ  Last 

administered on 10/21/18at 06:09;  Start 10/19/18 at 22:00


Senna/Docusate Sodium (Senna Plus) 1 tab BID PO  Last administered on 10/20/

18at 21:24;  Start 10/19/18 at 21:00


Docusate Sodium (Colace) 100 mg BID PO  Last administered on 10/20/18at 21:24;  

Start 10/19/18 at 21:00


Magnesium Hydroxide (Milk Of Magnesia) 2,400 mg PRN Q12HR  PRN PO CONSTIPATION 

2ND CHOICE;  Start 10/19/18 at 17:45


Sodium Chloride 1,000 ml @  75 mls/hr 1X  ONCE IV ;  Start 10/19/18 at 17:45;  

Stop 10/19/18 at 19:47;  Status DC


Pantoprazole Sodium (PROTONIX VIAL for IV PUSH) 40 mg DAILYAC IVP  Last 

administered on 10/20/18at 08:17;  Start 10/20/18 at 07:30


Piperacillin Sod/ Tazobactam Sod 2.25 gm/Sodium Chloride 50 ml @  100 mls/hr 

Q6HRS IV ;  Start 10/19/18 at 18:00;  Stop 10/19/18 at 18:07;  Status DC


Vancomycin HCl (Vancomycin Random Level) 1 each 1X  ONCE MC ;  Start 10/21/18 

at 17:00;  Stop 10/21/18 at 17:01


Piperacillin Sod/ Tazobactam Sod 2.25 gm/Sodium Chloride 50 ml @  100 mls/hr 

Q8HRS IV  Last administered on 10/21/18at 06:08;  Start 10/19/18 at 22:00


Promethazine HCl (Phenergan) 12.5 mg PRN Q4HRS  PRN PO NAUSEA/VOMITING Last 

administered on 10/20/18at 21:43;  Start 10/20/18 at 01:15


Potassium Chloride (Klor-Con) 40 meq Q2H PO  Last administered on 10/20/18at 09:

13;  Start 10/20/18 at 07:00;  Stop 10/20/18 at 09:01;  Status DC


Aspirin (Children'S Aspirin) 81 mg DAILYWBKFT PO  Last administered on 10/20/

18at 09:13;  Start 10/20/18 at 09:00


Carvedilol (Coreg) 12.5 mg BIDWMEALS PO ;  Start 10/20/18 at 09:00


Calcium Acetate (Phoslo) 667 mg TIDWMEALS PO  Last administered on 10/20/18at 17

:32;  Start 10/20/18 at 09:00


Vitamin D (Vitamin D3) 5,000 unit DAILY PO  Last administered on 10/20/18at 09:

12;  Start 10/20/18 at 09:00


Vitamin B Complex/ Vitamin C (Alison-Aime) 1 tab DAILY PO  Last administered on 10

/20/18at 09:12;  Start 10/20/18 at 09:00


Insulin Glargine (Lantus) 15 units QHS SQ  Last administered on 10/20/18at 21:43

;  Start 10/20/18 at 21:00


Baclofen (Lioresal) 10 mg PRN TID  PRN PO for Hiccups Last administered on 10/20

/18at 23:40;  Start 10/20/18 at 11:30


Metoclopramide HCl (Reglan) 5 mg PRN BFRMEALHC  PRN PO ;  Start 10/20/18 at 12:

00;  Stop 10/20/18 at 12:00;  Status DC


Norepinephrine Bitartrate 250 ml @  1.875 mls/ hr CONT  PRN IV SEE I/O RECORD 

Last administered on 10/20/18at 23:07;  Start 10/20/18 at 21:00





Active Scripts


Active


Reported


Metolazone 5 Mg Tablet 5 Mg PO DAILY


Vitamin D3 (Cholecalciferol (Vitamin D3)) 5,000 Unit Tablet 1 Tab PO DAILY


Calcium Acetate 667 Mg Tablet 667 Mg PO TIDWMEALS


Renal Caps Softgel (Folic Acid/Vitamin B Comp W-C) 1 Mg Capsule 1 Cap PO DAILY


Tresiba Flextouch U-100 (Insulin Degludec) 100 Unit/1 Ml Insuln.pen 100 Unit SQ 


Carvedilol 12.5 Mg Tablet 1 Tab PO BIDWMEALS


Furosemide 40 Mg Tablet 1 Tab PO DAILY


Aspirin 81 Mg Tab.chew 81 Mg PO DAILYWBKFT


Vitals/I & O





Vital Sign - Last 24 Hours








 10/20/18 10/20/18 10/20/18 10/20/18





 07:21 08:00 08:00 09:00


 


Temp   97.5 





   97.5 


 


Pulse 94  103 101


 


Resp   25 


 


B/P (MAP) 90/67 (75)  104/79 (87) 88/74


 


Pulse Ox 92  100 


 


O2 Delivery Room Air Room Air Room Air 


 


    





    





 10/20/18 10/20/18 10/20/18 10/20/18





 09:23 10:04 11:06 11:47


 


Pulse 101 99 102 


 


B/P (MAP) 88/74 (79) 102/75 (84) 110/74 (86) 


 


Pulse Ox 95 100 100 100


 


O2 Delivery Room Air Room Air Room Air Room Air





 10/20/18 10/20/18 10/20/18 10/20/18





 12:34 12:37 13:04 14:00


 


Pulse  108 108 108


 


B/P (MAP)  101/80 (87) 95/68 (77) 85/71 (76)


 


Pulse Ox   96 96


 


O2 Delivery Room Air Room Air Room Air Room Air





 10/20/18 10/20/18 10/20/18 10/20/18





 15:00 16:02 16:06 17:00


 


Temp   100.2 





   100.2 


 


Pulse 106  101 111


 


B/P (MAP) 92/75 (81)  76/57 (63) 80/52


 


Pulse Ox 96  100 


 


O2 Delivery Room Air Room Air Room Air 


 


    





    





 10/20/18 10/20/18 10/20/18 10/20/18





 17:00 18:02 19:00 20:00


 


Temp    99.9





    99.9


 


Pulse 118 115 106 100


 


B/P (MAP) 80/52 (61) 86/65 (72) 100/70 (80) 79/53 (62)


 


Pulse Ox 100 96 95 95


 


O2 Delivery Room Air Room Air Room Air Room Air


 


    





    





 10/20/18 10/20/18 10/20/18 10/20/18





 20:00 21:00 22:00 23:00


 


Pulse  112 106 106


 


B/P (MAP)  86/63 (71) 94/64 (74) 114/74 (87)


 


Pulse Ox  94 94 93


 


O2 Delivery Room Air Room Air Room Air Room Air





 10/20/18 10/20/18 10/21/18 10/21/18





 23:41 23:59 00:00 00:41


 


Temp   99.8 





   99.8 


 


Pulse   105 


 


Resp 25   12


 


B/P (MAP)   86/62 (70) 


 


Pulse Ox 95  90 93


 


O2 Delivery Room Air Room Air Room Air Room Air


 


    





    





 10/21/18 10/21/18 10/21/18 10/21/18





 01:00 02:00 03:00 04:00


 


Pulse 106 98 114 


 


B/P (MAP) 97/61 (73) 96/68 (77) 83/54 (64) 


 


Pulse Ox 93 93 93 


 


O2 Delivery Room Air Room Air Room Air Room Air





 10/21/18 10/21/18 10/21/18 





 04:00 05:00 06:00 


 


Temp 98.7   





 98.7   


 


Pulse 111 109 114 


 


B/P (MAP) 87/61 (70) 101/77 (85) 99/67 (78) 


 


Pulse Ox 98 98 97 


 


O2 Delivery Room Air Room Air Room Air 














Intake and Output   


 


 10/20/18 10/20/18 10/21/18





 15:00 23:00 07:00


 


Intake Total  1650 ml 


 


Output Total   0 ml


 


Balance  1650 ml 0 ml

















SHERI HANNAH MD Oct 21, 2018 07:05

## 2018-10-21 NOTE — PDOC
Dialysis Progress Note


Dialysis Note


Dialysis Note


Patient was run on CCPD overnight. He tolerated it Well





He Improved. He Feels like He Could Use More IV Fluids. By Mouth Intake Is 

Improving.





 





 


General Appearance:          


Awake:          


Alert Oriented  x      3


Neck:    No JVD or JVP


Chest:    CTA Kelvin


Heart:    S1 S2, Sys Mumur


Abdomen -    Soft NTND


Extremities -    No Edema








ESRD TO CONTINUE CCPD TONIGHT. 





Left lower extremity osteomyelitis: Defer management to Dr. Osman MOSQUEDA and Dr. Arenas. I will start empiric nystatin for fungal prophylaxis. I do not expect 

this to be peritonitis in the setting of only 96 WBCs. We will however await 

culture. Current antibiotic regimen should cover for the same





Vitals


Vital Signs





Vital Signs








  Date Time  Temp Pulse Resp B/P (MAP) Pulse Ox O2 Delivery O2 Flow Rate FiO2


 


10/21/18 11:00  106 20 98/66 (77) 95 Room Air  


 


10/21/18 08:00 98.4       





 98.4       











Labs


Last Labs





Laboratory Tests








Test


 10/19/18


15:45 10/19/18


18:10 10/20/18


04:00 10/20/18


05:49


 


White Blood Count


 18.9 x10^3/uL


(4.0-11.0) 


 21.6 x10^3/uL


(4.0-11.0) 





 


Red Blood Count


 3.97 x10^6/uL


(4.30-5.70) 


 4.02 x10^6/uL


(4.30-5.70) 





 


Hemoglobin


 11.6 g/dL


(13.0-17.5) 


 11.5 g/dL


(13.0-17.5) 





 


Hematocrit


 34.2 %


(39.0-53.0) 


 34.7 %


(39.0-53.0) 





 


Mean Corpuscular Volume 86 fL ()   86 fL ()  


 


Mean Corpuscular Hemoglobin 29 pg (25-35)   29 pg (25-35)  


 


Mean Corpuscular Hemoglobin


Concent 34 g/dL


(31-37) 


 33 g/dL


(31-37) 





 


Red Cell Distribution Width


 13.4 %


(11.5-14.5) 


 13.3 %


(11.5-14.5) 





 


Platelet Count


 325 x10^3/uL


(140-400) 


 322 x10^3/uL


(140-400) 





 


Neutrophils (%) (Auto) 91 % (31-73)   94 % (31-73)  


 


Lymphocytes (%) (Auto) 2 % (24-48)   1 % (24-48)  


 


Monocytes (%) (Auto) 7 % (0-9)   4 % (0-9)  


 


Eosinophils (%) (Auto) 0 % (0-3)   0 % (0-3)  


 


Basophils (%) (Auto) 0 % (0-3)   1 % (0-3)  


 


Neutrophils # (Auto)


 17.2 x10^3uL


(1.8-7.7) 


 20.4 x10^3uL


(1.8-7.7) 





 


Lymphocytes # (Auto)


 0.4 x10^3/uL


(1.0-4.8) 


 0.3 x10^3/uL


(1.0-4.8) 





 


Monocytes # (Auto)


 1.3 x10^3/uL


(0.0-1.1) 


 0.9 x10^3/uL


(0.0-1.1) 





 


Eosinophils # (Auto)


 0.0 x10^3/uL


(0.0-0.7) 


 0.0 x10^3/uL


(0.0-0.7) 





 


Basophils # (Auto)


 0.0 x10^3/uL


(0.0-0.2) 


 0.1 x10^3/uL


(0.0-0.2) 





 


Segmented Neutrophils % 90 % (35-66)    


 


Lymphocytes % 6 % (24-48)    


 


Monocytes % 4 % (0-10)    


 


Platelet Estimate


 Adequate


(ADEQUATE) 


 


 





 


Sodium Level


 129 mmol/L


(136-145) 


 134 mmol/L


(136-145) 





 


Potassium Level


 3.6 mmol/L


(3.5-5.1) 


 2.9 mmol/L


(3.5-5.1) 





 


Chloride Level


 88 mmol/L


() 


 94 mmol/L


() 





 


Carbon Dioxide Level


 27 mmol/L


(21-32) 


 25 mmol/L


(21-32) 





 


Anion Gap 14 (6-14)   15 (6-14)  


 


Blood Urea Nitrogen


 71 mg/dL


(8-26) 


 72 mg/dL


(8-26) 





 


Creatinine


 13.3 mg/dL


(0.7-1.3) 


 12.7 mg/dL


(0.7-1.3) 





 


Estimated GFR


(Cockcroft-Gault) 4.8 


 


 5.1 


 





 


Glucose Level


 241 mg/dL


(70-99) 


 39 mg/dL


(70-99) 





 


Lactic Acid Level


 1.3 mmol/L


(0.4-2.0) 


 


 





 


Calcium Level


 7.9 mg/dL


(8.5-10.1) 


 7.8 mg/dL


(8.5-10.1) 





 


Total Bilirubin


 0.5 mg/dL


(0.2-1.0) 


 


 





 


Direct Bilirubin


 0.2 mg/dL


(0.0-0.2) 


 


 





 


Aspartate Amino Transf


(AST/SGOT) 33 U/L (15-37) 


 


 


 





 


Alanine Aminotransferase


(ALT/SGPT) 11 U/L (16-63) 


 


 


 





 


Alkaline Phosphatase


 102 U/L


() 


 


 





 


Troponin I Quantitative


 0.340 ng/mL


(0.000-0.055) 


 


 





 


Total Protein


 6.0 g/dL


(6.4-8.2) 


 


 





 


Albumin


 1.7 g/dL


(3.4-5.0) 


 


 





 


Lipase


 77 U/L


() 


 


 





 


Nasal Screen MRSA (PCR)


 


 Negative


(Negative) 


 





 


Glucose (Fingerstick)


 


 


 


 27 mg/dL


(70-99)


 


Test


 10/20/18


05:58 10/20/18


07:04 10/20/18


07:55 10/20/18


11:34


 


Glucose (Fingerstick)


 76 mg/dL


(70-99) 68 mg/dL


(70-99) 


 176 mg/dL


(70-99)


 


Body Fluid Source


 


 


 Perit


dialysate 





 


Body Fluid Color   Yellow  


 


Body Fluid Clarity   Clear  


 


Body Fluid Nucleated Cells   133 /cmm  


 


Body Fluid Mononuclear WBCs


(%) 


 


 96 % 


 





 


Body Fluid Polymorphonuclear


Cells 


 


 4 % 


 





 


Body Fluid Total RBCs Counted   100 /cmm  


 


Test


 10/20/18


17:25 10/20/18


18:23 10/20/18


21:29 10/21/18


08:50


 


Glucose (Fingerstick)


 56 mg/dL


(70-99) 129 mg/dL


(70-99) 104 mg/dL


(70-99) 





 


White Blood Count


 


 


 


 21.0 x10^3/uL


(4.0-11.0)


 


Red Blood Count


 


 


 


 4.33 x10^6/uL


(4.30-5.70)


 


Hemoglobin


 


 


 


 12.4 g/dL


(13.0-17.5)


 


Hematocrit


 


 


 


 37.3 %


(39.0-53.0)


 


Mean Corpuscular Volume    86 fL () 


 


Mean Corpuscular Hemoglobin    29 pg (25-35) 


 


Mean Corpuscular Hemoglobin


Concent 


 


 


 33 g/dL


(31-37)


 


Red Cell Distribution Width


 


 


 


 13.8 %


(11.5-14.5)


 


Platelet Count


 


 


 


 362 x10^3/uL


(140-400)


 


Neutrophils (%) (Auto)    93 % (31-73) 


 


Lymphocytes (%) (Auto)    2 % (24-48) 


 


Monocytes (%) (Auto)    5 % (0-9) 


 


Eosinophils (%) (Auto)    1 % (0-3) 


 


Basophils (%) (Auto)    0 % (0-3) 


 


Neutrophils # (Auto)


 


 


 


 19.5 x10^3uL


(1.8-7.7)


 


Lymphocytes # (Auto)


 


 


 


 0.4 x10^3/uL


(1.0-4.8)


 


Monocytes # (Auto)


 


 


 


 1.0 x10^3/uL


(0.0-1.1)


 


Eosinophils # (Auto)


 


 


 


 0.1 x10^3/uL


(0.0-0.7)


 


Basophils # (Auto)


 


 


 


 0.1 x10^3/uL


(0.0-0.2)


 


Sodium Level


 


 


 


 130 mmol/L


(136-145)


 


Potassium Level


 


 


 


 3.4 mmol/L


(3.5-5.1)


 


Chloride Level


 


 


 


 91 mmol/L


()


 


Carbon Dioxide Level


 


 


 


 25 mmol/L


(21-32)


 


Anion Gap    14 (6-14) 


 


Blood Urea Nitrogen


 


 


 


 68 mg/dL


(8-26)


 


Creatinine


 


 


 


 12.2 mg/dL


(0.7-1.3)


 


Estimated GFR


(Cockcroft-Gault) 


 


 


 5.3 





 


Glucose Level


 


 


 


 89 mg/dL


(70-99)


 


Calcium Level


 


 


 


 7.8 mg/dL


(8.5-10.1)








Laboratory Tests








Test


 10/20/18


17:25 10/20/18


18:23 10/20/18


21:29 10/21/18


08:50


 


Glucose (Fingerstick)


 56 mg/dL


(70-99) 129 mg/dL


(70-99) 104 mg/dL


(70-99) 





 


White Blood Count


 


 


 


 21.0 x10^3/uL


(4.0-11.0)


 


Red Blood Count


 


 


 


 4.33 x10^6/uL


(4.30-5.70)


 


Hemoglobin


 


 


 


 12.4 g/dL


(13.0-17.5)


 


Hematocrit


 


 


 


 37.3 %


(39.0-53.0)


 


Mean Corpuscular Volume    86 fL () 


 


Mean Corpuscular Hemoglobin    29 pg (25-35) 


 


Mean Corpuscular Hemoglobin


Concent 


 


 


 33 g/dL


(31-37)


 


Red Cell Distribution Width


 


 


 


 13.8 %


(11.5-14.5)


 


Platelet Count


 


 


 


 362 x10^3/uL


(140-400)


 


Neutrophils (%) (Auto)    93 % (31-73) 


 


Lymphocytes (%) (Auto)    2 % (24-48) 


 


Monocytes (%) (Auto)    5 % (0-9) 


 


Eosinophils (%) (Auto)    1 % (0-3) 


 


Basophils (%) (Auto)    0 % (0-3) 


 


Neutrophils # (Auto)


 


 


 


 19.5 x10^3uL


(1.8-7.7)


 


Lymphocytes # (Auto)


 


 


 


 0.4 x10^3/uL


(1.0-4.8)


 


Monocytes # (Auto)


 


 


 


 1.0 x10^3/uL


(0.0-1.1)


 


Eosinophils # (Auto)


 


 


 


 0.1 x10^3/uL


(0.0-0.7)


 


Basophils # (Auto)


 


 


 


 0.1 x10^3/uL


(0.0-0.2)


 


Sodium Level


 


 


 


 130 mmol/L


(136-145)


 


Potassium Level


 


 


 


 3.4 mmol/L


(3.5-5.1)


 


Chloride Level


 


 


 


 91 mmol/L


()


 


Carbon Dioxide Level


 


 


 


 25 mmol/L


(21-32)


 


Anion Gap    14 (6-14) 


 


Blood Urea Nitrogen


 


 


 


 68 mg/dL


(8-26)


 


Creatinine


 


 


 


 12.2 mg/dL


(0.7-1.3)


 


Estimated GFR


(Cockcroft-Gault) 


 


 


 5.3 





 


Glucose Level


 


 


 


 89 mg/dL


(70-99)


 


Calcium Level


 


 


 


 7.8 mg/dL


(8.5-10.1)











Assessment


Assessment


Problems


Medical Problems:


(1) Abdominal pain


Status: Acute  











Plan


Plan of Care


Problems


Medical Problems:


(1) Abdominal pain


Status: Acute  

















AME HARDWICK MD Oct 21, 2018 11:52

## 2018-10-21 NOTE — PDOC
Infectious Disease Note


Subjective


Subjective


Fever Tmax 100.2


cont to have loose bm





Vital Sign


Vital Signs





Vital Signs








  Date Time  Temp Pulse Resp B/P (MAP) Pulse Ox O2 Delivery O2 Flow Rate FiO2


 


10/21/18 07:00  101 23 97/67 (77) 91 Room Air  


 


10/21/18 04:00 98.7       





 98.7       











Physical Exam


PHYSICAL EXAM


GENERAL:  Propped up in bed, resting quietly 


HEENT:  Normal conjunctivae.  Oral cavity:  Pharynx pink and moist.


NECK:  Supple.


LUNGS:  Clear to auscultation.


HEART:  S1 and S2.  Left-sided chest AICD site unremarkable for infection.


ABDOMEN:  Mildly distended.  Bowel sounds active, soft, nontender.  Peritoneal


catheter without redness or drainage.


EXTREMITIES:  No gross edema or cyanosis.  Ulcer left foot. Distal pulses weak.


SKIN:  Warm without rash.


PIV





Labs


Lab





Laboratory Tests








Test


 10/20/18


11:34 10/20/18


17:25 10/20/18


18:23 10/20/18


21:29


 


Glucose (Fingerstick)


 176 mg/dL


(70-99) 56 mg/dL


(70-99) 129 mg/dL


(70-99) 104 mg/dL


(70-99)








Micro





Microbiology


10/19/18 Blood Culture - Preliminary, Resulted


           NO GROWTH AFTER 1 DAY





Objective


Assessment


Sepsis POA


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 


Fever


Leukocytosis


Abdominal pain


   -Dialysate clear, . culture in process 


Left lingula consolidation 


Diarrhea 


ESRD on peritoneal dialysis


   - catheter changed 2015  


s/p AICD generator change on 10/15/18.


Chronic systolic CHF





Plan


Plan of Care


F/U C diff pcr


Culture of left foot ulcer


Continue vanc and Zosyn, renal dosing


start pt on po vanco 125mg po bid empirically


Local wound 


Monitor labs/temp


f/u c/s


Consult vacular











BAN VERONICA Oct 21, 2018 08:36


LEONIDES HARDWICK MD Oct 21, 2018 13:09

## 2018-10-21 NOTE — RAD
DUPLEX LOWER EXT ARTERIAL LEFT

 

INDICATION:  LT FOOT ULCER

 

COMPARISON:  None.

 

TECHNIQUE: Real-time grayscale and color duplex Doppler Dopplex ultrasound

of the left lower extremity was obtained in order to evaluate the arterial

system.

 

FINDINGS:  

 

The left common femoral artery peak systolic velocity measures 67 cm/s 

with triphasic waveforms. The left profunda femoris artery peak systolic 

velocity measures 51 cm/s with triphasic waveforms. The left proximal 

superficial femoral artery peak systolic velocity measures 58 cm/s with 

triphasic waveforms. The left mid superficial femoral artery peak systolic

velocity measures 61 cm/s with triphasic waveforms. The distal left 

superficial femoral artery peak systolic velocity measures 64 cm/s with 

triphasic waveforms. The left popliteal artery peak systolic velocity 

measures 47 cm/s with triphasic waveforms. The proximal left posterior 

tibial artery peak systolic velocity measures 50 cm/s with triphasic 

waveforms. The distal left posterior tibial artery peak systolic velocity 

measures 44 cm/s with triphasic waveforms. The left peroneal artery could 

not be clearly identified. The left anterior tibial artery peak systolic 

velocity measures 39 cm/s with triphasic waveforms. The left dorsalis 

pedis artery peak systolic velocity measures 39 cm/s with triphasic 

waveforms. 

 

IMPRESSION:  

No evidence of focal high-grade stenosis or occlusion within the left 

lower extremity arterial system.

 

Electronically signed by: Angel Jones MD (10/21/2018 5:41 PM) 

[facility]

## 2018-10-21 NOTE — CONS
DATE OF CONSULTATION:  10/20/2018



REFERRING PHYSICIAN:  Ronny Noble MD



REASON FOR CONSULTATION:  Sepsis.



HISTORY OF PRESENT ILLNESS:  This patient is a 53-year-old -American male

with a past medical history of end-stage renal disease who has been on

peritoneal dialysis for about 3 years now.  The catheter was replaced in 2015

and since then, he has not had any issues.  On 10/15/2018, he underwent AICD

generator change.  Post-procedure, he says he was feeling fine.  However, later

that evening, he developed abdominal pain that gradually worsened over the next

few days associated with nausea, vomiting, constipation, and hiccups.  On

arrival to the ER, he had a low grade fever of 100.1.  Systolic blood pressure

in the 80s with elevated white blood cell count of 18,900.  An abdominal/pelvis

CT showed minimal perihepatic fluid; lingula consolidation of left lung; and no

evidence of free air.  The peritoneal dialysate fluid was clear yellow with WBCs

130.  Cultures are pending.  He was started on vancomycin and Zosyn.



On further exam, he was found to have an ulcer of left foot that started out as

a callus.  He had not noticed it open up until the nurse had removed his sock

earlier.  An x-ray revealed erosive changes of the first distal phalanx

concerning for osteomyelitis.  The patient denies numbness, tingling or pain. 

He says he is usually cold by nature.  He denies fevers prior to admission. 

Denies shortness of air, cough, chest discomfort, sinus/nasal congestion or sore

throat.



PAST MEDICAL HISTORY:  End-stage renal disease, on peritoneal dialysis.  Chronic

systolic congestive heart failure, hypertension, hyperlipidemia, diabetes

mellitus type 2, osteoarthritis, cardiomyopathy, acquired perforating leg

dermatosis.



PAST SURGICAL HISTORY:  PPM/AICD generator change on 10/15/2018.  Peritoneal

dialysis catheter revision in 06/2015.



SOCIAL HISTORY:  The patient is  and lives at home.



FAMILY HISTORY:  Positive for diabetes and hypertension.



ALLERGIES:  No known drug allergies.



MEDICATIONS:  Vancomycin, Zosyn.  Other medications are available and have been

reviewed on the MAR.



REVIEW OF SYSTEMS:  Per HPI, otherwise all other review of systems are negative.



PHYSICAL EXAMINATION:

GENERAL:  The patient is propped up in bed, alert, talking on his cell phone.

VITAL SIGNS:  Temperature is 97.5, T-max 100.1, blood pressure 95/68, heart rate

108, respiratory rate 25, pulse oximetry is 96% on room air.  BMI 21.

HEENT:  Normal conjunctivae.  Oral cavity:  Pharynx pink and moist.

NECK:  Supple.

LUNGS:  Clear to auscultation.

HEART:  S1 and S2.  Left-sided chest AICD site unremarkable for infection.

ABDOMEN:  Mildly distended.  Bowel sounds active, soft, nontender.  Peritoneal

catheter without redness or drainage.

EXTREMITIES:  No gross edema or cyanosis.  He has a piece of loose skin with

ulceration underneath about the left great toe.  Distal pulses weak.

SKIN:  Warm without rash.

NEUROLOGIC:  Alert and oriented times 3.



LABORATORY DATA:  Today's WBC 21.6, hemoglobin 11.5, platelet count 322,000. 

Sodium 134, potassium 2.9, creatinine 12.7, BUN 72, glucose 176.  Lactic acid

1.3, total bilirubin 0.5, AST 33, ALT 11.  Troponin 0.340, albumin 1.7, lipase

77.



Peritoneal dialysate and fluid analysis per HPI.



Blood cultures negative to date.



IMAGING DATA:  X-ray, left foot, per HPI.  Abdominal/pelvis CT per HPI.  Chest

x-ray shows patchy perihilar airspace disease, left greater than right.  No

evidence of pneumothorax.



IMPRESSION:

1.  Sepsis present on admission.

2.  Diabetic ulcer of left foot with osteomyelitis.

3.  Fever.

4.  Leukocytosis.

5.  Abdominal pain.

6.  Left lingula consolidation.

7.  Diarrhea.

8.  End-stage renal disease, on peritoneal dialysis.

9.  Status post automatic implantable cardioverter-defibrillator generator

change on 10/15/2018.

10.  Chronic systolic congestive heart failure.



PLAN:  Obtain a culture of left foot ulcer.  Continue the vancomycin and Zosyn

renal dosing.  We will consult vascular.  Continue local wound care.  Monitor

laboratory values and temperature.  We will also check a stool for C. diff.



Thank you, Dr. Noble, for asking us to participate in this patient's care.  Should

you have further questions or concerns, please call.  The patient seen and

examined and plan of care implemented by Dr. Heidi Hardwick.

 



______________________________

HEIDI HARDWICK MD



DR:  RAMIRO/leonor  JOB#:  [id number] / [id number]

DD:  10/20/2018 18:23  DT:  10/21/2018 02:55

## 2018-10-21 NOTE — CONS
DATE OF CONSULTATION:  10/21/2018



REASON FOR CONSULTATION:  Ulceration, left foot.



HISTORY OF PRESENT ILLNESS:  This is a 53-year-old male with longstanding

underlying diabetes, insulin-dependent.  He also has end-stage renal disease,

has been on peritoneal dialysis for 3 years.  He is admitted to the hospital

with sepsis.



He has been restarted on his peritoneal dialysis.



He has a significant underlying cardiac disease, has an AICD in place. 

Recently, he has noted some drainage from the dorsum of his left foot.  X-rays

were obtained and were concerning for possible underlying osteomyelitis. 

Vascular Surgery was consulted.



The patient does have a history of remote tobacco use, which he discontinued 20

years ago.  He has no history of ischemic rest pain, denies any claudication.



PHYSICAL EXAMINATION:  He is sitting in bed.  He is on peritoneal dialysis.  He

is in no acute distress.



He has absent radial pulses bilaterally.  I could appreciate only weakly

palpable brachial pulses.  He has excellent femoral pulses bilaterally.  Absent

popliteal pulses, absent pedal pulses.  There is some blister formation on the

dorsum of the left foot at the metatarsophalangeal joint.  There is a

superficial ulceration.  There is no exposed bone or joint space by visual

inspection or palpation.



DIAGNOSTIC DATA:  X-rays were reviewed.  There are some cortical irregularities

in the distal aspect of the proximal phalanx of the first toe.  This does not

appear to involve the metatarsophalangeal joint and it is not adjacent to where

the ulceration is.



IMPRESSION:

A 53-year-old diabetic male with end-stage renal disease and by physical

examination significant infrapopliteal arterial occlusive disease.  He has at

least some cellulitis and ulceration of the left foot.  There is a concern

whether there may be some underlying osteomyelitis, but there is no exposed bone

and no exposed joint space.



I have recommended arterial duplex imaging studies to more clearly characterize

his infrainguinal arterial occlusive disease.  Agree with current plans for

antibiotic therapy.  Topical therapy should consist of Silvadene to the wound,

cover with Xeroform gauze and a Kerlix wrap.



We will follow up after the imaging studies have been performed.



Thank you for this consultation.

 



______________________________

FREDY FALCON MD



DR:  LO/leonor  JOB#:  [id number] / [id number]

DD:  10/21/2018 11:02  DT:  10/21/2018 13:06

## 2018-10-21 NOTE — PDOC
SUBJECTIVE


Subjective


left foot ulcer





OBJECTIVE


Objective


left foot ulcer


Vital Signs





Vital Signs








  Date Time  Temp Pulse Resp B/P (MAP) Pulse Ox O2 Delivery O2 Flow Rate FiO2


 


10/21/18 10:00  114 31 100/64 (76) 95 Room Air  


 


10/21/18 09:00  108 26 92/67 (75) 94 Room Air  


 


10/21/18 08:00 98.4 102 18 94/65 (75) 94 Room Air  





 98.4       


 


10/21/18 08:00      Room Air  


 


10/21/18 07:00  101 23 97/67 (77) 91 Room Air  


 


10/21/18 06:00  114  99/67 (78) 97 Room Air  


 


10/21/18 05:00  109  101/77 (85) 98 Room Air  


 


10/21/18 04:00 98.7 111  87/61 (70) 98 Room Air  





 98.7       


 


10/21/18 04:00      Room Air  


 


10/21/18 03:00  114  83/54 (64) 93 Room Air  


 


10/21/18 02:00  98  96/68 (77) 93 Room Air  


 


10/21/18 01:00  106  97/61 (73) 93 Room Air  


 


10/21/18 00:41   12  93 Room Air  


 


10/21/18 00:00 99.8 105  86/62 (70) 90 Room Air  





 99.8       


 


10/20/18 23:59      Room Air  


 


10/20/18 23:41   25  95 Room Air  


 


10/20/18 23:00  106  114/74 (87) 93 Room Air  


 


10/20/18 22:00  106  94/64 (74) 94 Room Air  


 


10/20/18 21:00  112  86/63 (71) 94 Room Air  


 


10/20/18 20:00      Room Air  


 


10/20/18 20:00 99.9 100  79/53 (62) 95 Room Air  





 99.9       


 


10/20/18 19:00  106  100/70 (80) 95 Room Air  


 


10/20/18 18:02  115  86/65 (72) 96 Room Air  


 


10/20/18 17:00  118  80/52 (61) 100 Room Air  


 


10/20/18 17:00  111  80/52    


 


10/20/18 16:06 100.2 101  76/57 (63) 100 Room Air  





 100.2       


 


10/20/18 16:02      Room Air  


 


10/20/18 15:00  106  92/75 (81) 96 Room Air  


 


10/20/18 14:00  108  85/71 (76) 96 Room Air  


 


10/20/18 13:04  108  95/68 (77) 96 Room Air  


 


10/20/18 12:37  108  101/80 (87)  Room Air  


 


10/20/18 12:34      Room Air  


 


10/20/18 11:47     100 Room Air  


 


10/20/18 11:06  102  110/74 (86) 100 Room Air  








I & O











Intake and Output 


 


 10/21/18





 07:00


 


Intake Total 1650 ml


 


Output Total 0 ml


 


Balance 1650 ml


 


 


 


Intake Oral 1600 ml


 


IV Total 50 ml


 


Output Urine Total 0 ml


 


# Voids 3


 


# Bowel Movements 3











PHYSICAL EXAM


Physical Exam


2+ femoral pulses bilaterally


absent popliteal and pedal pulses bilaterally





ulcer, dorsum left foot





ASSESSMENT/PLAN


Assessment/Plan


diabetic foot ulcer


agree with ABX


will order art duplex





COMMENT


Lab





Laboratory Tests








Test


 10/20/18


11:34 10/20/18


17:25 10/20/18


18:23 10/20/18


21:29


 


Glucose (Fingerstick)


 176 mg/dL


(70-99) 56 mg/dL


(70-99) 129 mg/dL


(70-99) 104 mg/dL


(70-99)


 


Test


 10/21/18


08:50 


 


 





 


White Blood Count


 21.0 x10^3/uL


(4.0-11.0) 


 


 





 


Red Blood Count


 4.33 x10^6/uL


(4.30-5.70) 


 


 





 


Hemoglobin


 12.4 g/dL


(13.0-17.5) 


 


 





 


Hematocrit


 37.3 %


(39.0-53.0) 


 


 





 


Mean Corpuscular Volume 86 fL ()    


 


Mean Corpuscular Hemoglobin 29 pg (25-35)    


 


Mean Corpuscular Hemoglobin


Concent 33 g/dL


(31-37) 


 


 





 


Red Cell Distribution Width


 13.8 %


(11.5-14.5) 


 


 





 


Platelet Count


 362 x10^3/uL


(140-400) 


 


 





 


Neutrophils (%) (Auto) 93 % (31-73)    


 


Lymphocytes (%) (Auto) 2 % (24-48)    


 


Monocytes (%) (Auto) 5 % (0-9)    


 


Eosinophils (%) (Auto) 1 % (0-3)    


 


Basophils (%) (Auto) 0 % (0-3)    


 


Neutrophils # (Auto)


 19.5 x10^3uL


(1.8-7.7) 


 


 





 


Lymphocytes # (Auto)


 0.4 x10^3/uL


(1.0-4.8) 


 


 





 


Monocytes # (Auto)


 1.0 x10^3/uL


(0.0-1.1) 


 


 





 


Eosinophils # (Auto)


 0.1 x10^3/uL


(0.0-0.7) 


 


 





 


Basophils # (Auto)


 0.1 x10^3/uL


(0.0-0.2) 


 


 





 


Sodium Level


 130 mmol/L


(136-145) 


 


 





 


Potassium Level


 3.4 mmol/L


(3.5-5.1) 


 


 





 


Chloride Level


 91 mmol/L


() 


 


 





 


Carbon Dioxide Level


 25 mmol/L


(21-32) 


 


 





 


Anion Gap 14 (6-14)    


 


Blood Urea Nitrogen


 68 mg/dL


(8-26) 


 


 





 


Creatinine


 12.2 mg/dL


(0.7-1.3) 


 


 





 


Estimated GFR


(Cockcroft-Gault) 5.3 


 


 


 





 


Glucose Level


 89 mg/dL


(70-99) 


 


 





 


Calcium Level


 7.8 mg/dL


(8.5-10.1) 


 


 




















FREDY FALCON MD Oct 21, 2018 11:06

## 2018-10-22 VITALS — SYSTOLIC BLOOD PRESSURE: 100 MMHG | DIASTOLIC BLOOD PRESSURE: 73 MMHG

## 2018-10-22 VITALS — DIASTOLIC BLOOD PRESSURE: 66 MMHG | SYSTOLIC BLOOD PRESSURE: 102 MMHG

## 2018-10-22 VITALS — SYSTOLIC BLOOD PRESSURE: 106 MMHG | DIASTOLIC BLOOD PRESSURE: 69 MMHG

## 2018-10-22 VITALS — SYSTOLIC BLOOD PRESSURE: 100 MMHG | DIASTOLIC BLOOD PRESSURE: 66 MMHG

## 2018-10-22 VITALS — DIASTOLIC BLOOD PRESSURE: 71 MMHG | SYSTOLIC BLOOD PRESSURE: 101 MMHG

## 2018-10-22 VITALS — DIASTOLIC BLOOD PRESSURE: 73 MMHG | SYSTOLIC BLOOD PRESSURE: 98 MMHG

## 2018-10-22 VITALS — SYSTOLIC BLOOD PRESSURE: 85 MMHG | DIASTOLIC BLOOD PRESSURE: 67 MMHG

## 2018-10-22 VITALS — SYSTOLIC BLOOD PRESSURE: 106 MMHG | DIASTOLIC BLOOD PRESSURE: 66 MMHG

## 2018-10-22 VITALS — SYSTOLIC BLOOD PRESSURE: 88 MMHG | DIASTOLIC BLOOD PRESSURE: 61 MMHG

## 2018-10-22 VITALS — SYSTOLIC BLOOD PRESSURE: 87 MMHG | DIASTOLIC BLOOD PRESSURE: 66 MMHG

## 2018-10-22 VITALS — DIASTOLIC BLOOD PRESSURE: 57 MMHG | SYSTOLIC BLOOD PRESSURE: 75 MMHG

## 2018-10-22 VITALS — DIASTOLIC BLOOD PRESSURE: 67 MMHG | SYSTOLIC BLOOD PRESSURE: 97 MMHG

## 2018-10-22 VITALS — SYSTOLIC BLOOD PRESSURE: 95 MMHG | DIASTOLIC BLOOD PRESSURE: 68 MMHG

## 2018-10-22 VITALS — DIASTOLIC BLOOD PRESSURE: 65 MMHG | SYSTOLIC BLOOD PRESSURE: 102 MMHG

## 2018-10-22 VITALS — SYSTOLIC BLOOD PRESSURE: 108 MMHG | DIASTOLIC BLOOD PRESSURE: 69 MMHG

## 2018-10-22 VITALS — DIASTOLIC BLOOD PRESSURE: 64 MMHG | SYSTOLIC BLOOD PRESSURE: 101 MMHG

## 2018-10-22 VITALS — DIASTOLIC BLOOD PRESSURE: 73 MMHG | SYSTOLIC BLOOD PRESSURE: 111 MMHG

## 2018-10-22 VITALS — SYSTOLIC BLOOD PRESSURE: 100 MMHG | DIASTOLIC BLOOD PRESSURE: 70 MMHG

## 2018-10-22 VITALS — DIASTOLIC BLOOD PRESSURE: 69 MMHG | SYSTOLIC BLOOD PRESSURE: 95 MMHG

## 2018-10-22 VITALS — DIASTOLIC BLOOD PRESSURE: 60 MMHG | SYSTOLIC BLOOD PRESSURE: 84 MMHG

## 2018-10-22 VITALS — SYSTOLIC BLOOD PRESSURE: 92 MMHG | DIASTOLIC BLOOD PRESSURE: 60 MMHG

## 2018-10-22 LAB
ALBUMIN SERPL-MCNC: 1.1 G/DL (ref 3.4–5)
ANION GAP SERPL CALC-SCNC: 12 MMOL/L (ref 6–14)
BASOPHILS # BLD AUTO: 0 X10^3/UL (ref 0–0.2)
BASOPHILS NFR BLD: 0 % (ref 0–3)
BUN SERPL-MCNC: 63 MG/DL (ref 8–26)
CALCIUM SERPL-MCNC: 7.8 MG/DL (ref 8.5–10.1)
CHLORIDE SERPL-SCNC: 91 MMOL/L (ref 98–107)
CO2 SERPL-SCNC: 26 MMOL/L (ref 21–32)
CREAT SERPL-MCNC: 11.9 MG/DL (ref 0.7–1.3)
EOSINOPHIL NFR BLD: 0 % (ref 0–3)
EOSINOPHIL NFR BLD: 0 X10^3/UL (ref 0–0.7)
ERYTHROCYTE [DISTWIDTH] IN BLOOD BY AUTOMATED COUNT: 14.1 % (ref 11.5–14.5)
GFR SERPLBLD BASED ON 1.73 SQ M-ARVRAT: 5.5 ML/MIN
GLUCOSE SERPL-MCNC: 363 MG/DL (ref 70–99)
HCT VFR BLD CALC: 38.8 % (ref 39–53)
HGB BLD-MCNC: 12.7 G/DL (ref 13–17.5)
LYMPHOCYTES # BLD: 0.3 X10^3/UL (ref 1–4.8)
LYMPHOCYTES NFR BLD AUTO: 1 % (ref 24–48)
MAGNESIUM SERPL-MCNC: 2 MG/DL (ref 1.8–2.4)
MCH RBC QN AUTO: 28 PG (ref 25–35)
MCHC RBC AUTO-ENTMCNC: 33 G/DL (ref 31–37)
MCV RBC AUTO: 87 FL (ref 79–100)
MONO #: 0.9 X10^3/UL (ref 0–1.1)
MONOCYTES NFR BLD: 5 % (ref 0–9)
NEUT #: 17.9 X10^3UL (ref 1.8–7.7)
NEUTROPHILS NFR BLD AUTO: 94 % (ref 31–73)
PHOSPHATE SERPL-MCNC: 5.9 MG/DL (ref 2.6–4.7)
PLATELET # BLD AUTO: 404 X10^3/UL (ref 140–400)
POTASSIUM SERPL-SCNC: 3.3 MMOL/L (ref 3.5–5.1)
RBC # BLD AUTO: 4.48 X10^6/UL (ref 4.3–5.7)
SODIUM SERPL-SCNC: 129 MMOL/L (ref 136–145)
WBC # BLD AUTO: 19.1 X10^3/UL (ref 4–11)

## 2018-10-22 RX ADMIN — PIPERACILLIN SODIUM AND TAZOBACTAM SODIUM SCH MLS/HR: 2; .25 INJECTION, POWDER, LYOPHILIZED, FOR SOLUTION INTRAVENOUS at 12:58

## 2018-10-22 RX ADMIN — Medication SCH TAB: at 08:41

## 2018-10-22 RX ADMIN — INSULIN LISPRO SCH UNITS: 100 INJECTION, SOLUTION INTRAVENOUS; SUBCUTANEOUS at 08:00

## 2018-10-22 RX ADMIN — VANCOMYCIN HYDROCHLORIDE PRN EACH: 1 INJECTION, POWDER, LYOPHILIZED, FOR SOLUTION INTRAVENOUS at 10:28

## 2018-10-22 RX ADMIN — CARVEDILOL SCH MG: 12.5 TABLET, FILM COATED ORAL at 08:00

## 2018-10-22 RX ADMIN — HEPARIN SODIUM SCH UNIT: 5000 INJECTION, SOLUTION INTRAVENOUS; SUBCUTANEOUS at 12:48

## 2018-10-22 RX ADMIN — PIPERACILLIN SODIUM AND TAZOBACTAM SODIUM SCH MLS/HR: 2; .25 INJECTION, POWDER, LYOPHILIZED, FOR SOLUTION INTRAVENOUS at 06:06

## 2018-10-22 RX ADMIN — NYSTATIN SCH ML: 100000 SUSPENSION ORAL at 12:49

## 2018-10-22 RX ADMIN — VANCOMYCIN HYDROCHLORIDE SCH MG: 500 INJECTION, POWDER, LYOPHILIZED, FOR SOLUTION INTRAVENOUS at 12:49

## 2018-10-22 RX ADMIN — INSULIN LISPRO SCH UNITS: 100 INJECTION, SOLUTION INTRAVENOUS; SUBCUTANEOUS at 12:47

## 2018-10-22 RX ADMIN — BACITRACIN SCH MLS/HR: 5000 INJECTION, POWDER, FOR SOLUTION INTRAMUSCULAR at 01:46

## 2018-10-22 RX ADMIN — HEPARIN SODIUM SCH UNIT: 5000 INJECTION, SOLUTION INTRAVENOUS; SUBCUTANEOUS at 21:13

## 2018-10-22 RX ADMIN — DOCUSATE SODIUM SCH MG: 100 CAPSULE, LIQUID FILLED ORAL at 08:42

## 2018-10-22 RX ADMIN — CHOLECALCIFEROL CAP 125 MCG (5000 UNIT) SCH UNIT: 125 CAP at 08:41

## 2018-10-22 RX ADMIN — PANTOPRAZOLE SODIUM SCH MG: 40 INJECTION, POWDER, FOR SOLUTION INTRAVENOUS at 08:41

## 2018-10-22 RX ADMIN — NYSTATIN SCH ML: 100000 SUSPENSION ORAL at 08:41

## 2018-10-22 RX ADMIN — PIPERACILLIN SODIUM AND TAZOBACTAM SODIUM SCH MLS/HR: 2; .25 INJECTION, POWDER, LYOPHILIZED, FOR SOLUTION INTRAVENOUS at 21:12

## 2018-10-22 RX ADMIN — LOPERAMIDE HYDROCHLORIDE PRN MG: 2 CAPSULE ORAL at 16:57

## 2018-10-22 RX ADMIN — METOCLOPRAMIDE PRN MG: 5 INJECTION, SOLUTION INTRAMUSCULAR; INTRAVENOUS at 01:46

## 2018-10-22 RX ADMIN — INSULIN LISPRO SCH UNITS: 100 INJECTION, SOLUTION INTRAVENOUS; SUBCUTANEOUS at 17:00

## 2018-10-22 RX ADMIN — NYSTATIN SCH ML: 100000 SUSPENSION ORAL at 17:13

## 2018-10-22 RX ADMIN — HEPARIN SODIUM SCH UNIT: 5000 INJECTION, SOLUTION INTRAVENOUS; SUBCUTANEOUS at 06:08

## 2018-10-22 RX ADMIN — SENNOSIDES AND DOCUSATE SODIUM SCH TAB: 8.6; 5 TABLET ORAL at 08:41

## 2018-10-22 RX ADMIN — CARVEDILOL SCH MG: 12.5 TABLET, FILM COATED ORAL at 17:00

## 2018-10-22 RX ADMIN — DOCUSATE SODIUM SCH MG: 100 CAPSULE, LIQUID FILLED ORAL at 20:36

## 2018-10-22 RX ADMIN — CALCIUM ACETATE SCH MG: 667 CAPSULE ORAL at 17:13

## 2018-10-22 RX ADMIN — BACITRACIN SCH MLS/HR: 5000 INJECTION, POWDER, FOR SOLUTION INTRAMUSCULAR at 14:25

## 2018-10-22 RX ADMIN — Medication SCH CAP: at 21:11

## 2018-10-22 RX ADMIN — SILVER SULFADIAZINE SCH APP: 10 CREAM TOPICAL at 08:41

## 2018-10-22 RX ADMIN — NYSTATIN SCH ML: 100000 SUSPENSION ORAL at 21:11

## 2018-10-22 RX ADMIN — VANCOMYCIN HYDROCHLORIDE SCH MG: 500 INJECTION, POWDER, LYOPHILIZED, FOR SOLUTION INTRAVENOUS at 17:13

## 2018-10-22 RX ADMIN — SENNOSIDES AND DOCUSATE SODIUM SCH TAB: 8.6; 5 TABLET ORAL at 20:36

## 2018-10-22 RX ADMIN — ASPIRIN 81 MG SCH MG: 81 TABLET ORAL at 08:41

## 2018-10-22 RX ADMIN — CALCIUM ACETATE SCH MG: 667 CAPSULE ORAL at 08:41

## 2018-10-22 RX ADMIN — CALCIUM ACETATE SCH MG: 667 CAPSULE ORAL at 12:49

## 2018-10-22 RX ADMIN — VANCOMYCIN HYDROCHLORIDE SCH MG: 500 INJECTION, POWDER, LYOPHILIZED, FOR SOLUTION INTRAVENOUS at 21:11

## 2018-10-22 RX ADMIN — VANCOMYCIN HYDROCHLORIDE SCH MG: 500 INJECTION, POWDER, LYOPHILIZED, FOR SOLUTION INTRAVENOUS at 08:41

## 2018-10-22 RX ADMIN — INSULIN GLARGINE SCH UNITS: 100 INJECTION, SOLUTION SUBCUTANEOUS at 21:12

## 2018-10-22 NOTE — PDOC
PROGRESS NOTES


Subjective


Subjective


Diabetic pt admitted with sepsis and active left foot wound. We are unsure how 

long the wound has been present because it was previously covered with 

calloused calcium deposit so it did not appear to be a wound to the pt. He 

reports pain worse on plantar surface of foot. Worse with walking. Pt is a  

and on his feet for long periods of times. Xray showing signs of osteomyelitis. 

Arterial duplex on left leg shows no significant arterial disease. He has 

elevated WBC, he is currently being treated with IV abx. 





He currently getting HD through peritoneal dialysis, he has a pacemeker and 

HTN. He is currently on subQ heparin daily.





Denies n/v


Denies fever, chills


+ for left foot pain


+ for wound


Denies numbness, tingling


+ bowel changes, soft


+ leg swelling





Objective


Objective





Vital Signs








  Date Time  Temp Pulse Resp B/P (MAP) Pulse Ox O2 Delivery O2 Flow Rate FiO2


 


10/22/18 15:00  91 16 92/60 (71)  Room Air  


 


10/22/18 13:00     96   


 


10/22/18 11:00 97.6       





 97.6       














Intake and Output 


 


 10/22/18





 07:00


 


Intake Total 2751.7 ml


 


Output Total 605 ml


 


Balance 2146.7 ml


 


 


 


Intake Oral 1100 ml


 


IV Total 1651.7 ml


 


Output Urine Total 0 ml


 


Stool Total 1 ml


 


Urine/Stool Mix 1 ml


 


Other 603 ml


 


# Bowel Movements 1











Physical Exam


Physical Exam


Awake, alert and oriented. In no acute distress.


Absent radial pulses bilaterally, weak brachial pulses.


Absent popliteal pulses, unable to palpate pedal pulses. Left leg with moderate 

swelling. 


Left foot with ulcer to medial MTP area. Ulcer with white sloughed tissue at 

base, no undermining. Unable to remove tissue with qtip. There is are two small 

blisters just distal and proximal to ulcer. There is no exposed bone or joint 

space by visual inspection or palpation.


Tenderness to palpation over left head of 1st MTP, plantar surface worse.





Assessment


Assessment


Problems


Medical Problems:


(1) Abdominal pain


Status: Acute  











Plan


Plan of Care


Pt with diabetic left foot ulcer, present for unknown period of time. Suspicion 

for underlying osteomyelitis. Unable to appreciate good arterial circulation 

via physical exam, however he has no significant arterial disease to his left 

leg via arterial duplex US. There is No exposed bone or no exposed joint space. 

The wound would likely benefit from debridement, however with sufficient 

circulation to wound and IV antibiotics it may improve without. There appears 

to be improvement in appearance of wound with antibiotics thus far. I Will 

discuss with supervising physician for further recommendations. In the meantime 

continue wound care with Silvadene to the wound, cover with Xeroform gauze and 

a Kerlix wrap. Continue antibiotics per ID.





Comment


Review of Relevant


I have reviewed the following items sean (where applicable) has been applied.


Labs





Laboratory Tests








Test


 10/20/18


17:25 10/20/18


18:23 10/20/18


21:29 10/21/18


08:50


 


Glucose (Fingerstick)


 56 mg/dL


(70-99) 129 mg/dL


(70-99) 104 mg/dL


(70-99) 





 


White Blood Count


 


 


 


 21.0 x10^3/uL


(4.0-11.0)


 


Red Blood Count


 


 


 


 4.33 x10^6/uL


(4.30-5.70)


 


Hemoglobin


 


 


 


 12.4 g/dL


(13.0-17.5)


 


Hematocrit


 


 


 


 37.3 %


(39.0-53.0)


 


Mean Corpuscular Volume    86 fL () 


 


Mean Corpuscular Hemoglobin    29 pg (25-35) 


 


Mean Corpuscular Hemoglobin


Concent 


 


 


 33 g/dL


(31-37)


 


Red Cell Distribution Width


 


 


 


 13.8 %


(11.5-14.5)


 


Platelet Count


 


 


 


 362 x10^3/uL


(140-400)


 


Neutrophils (%) (Auto)    93 % (31-73) 


 


Lymphocytes (%) (Auto)    2 % (24-48) 


 


Monocytes (%) (Auto)    5 % (0-9) 


 


Eosinophils (%) (Auto)    1 % (0-3) 


 


Basophils (%) (Auto)    0 % (0-3) 


 


Neutrophils # (Auto)


 


 


 


 19.5 x10^3uL


(1.8-7.7)


 


Lymphocytes # (Auto)


 


 


 


 0.4 x10^3/uL


(1.0-4.8)


 


Monocytes # (Auto)


 


 


 


 1.0 x10^3/uL


(0.0-1.1)


 


Eosinophils # (Auto)


 


 


 


 0.1 x10^3/uL


(0.0-0.7)


 


Basophils # (Auto)


 


 


 


 0.1 x10^3/uL


(0.0-0.2)


 


Sodium Level


 


 


 


 130 mmol/L


(136-145)


 


Potassium Level


 


 


 


 3.4 mmol/L


(3.5-5.1)


 


Chloride Level


 


 


 


 91 mmol/L


()


 


Carbon Dioxide Level


 


 


 


 25 mmol/L


(21-32)


 


Anion Gap    14 (6-14) 


 


Blood Urea Nitrogen


 


 


 


 68 mg/dL


(8-26)


 


Creatinine


 


 


 


 12.2 mg/dL


(0.7-1.3)


 


Estimated GFR


(Cockcroft-Gault) 


 


 


 5.3 





 


Glucose Level


 


 


 


 89 mg/dL


(70-99)


 


Calcium Level


 


 


 


 7.8 mg/dL


(8.5-10.1)


 


Test


 10/21/18


12:02 10/21/18


16:50 10/21/18


16:52 10/22/18


02:55


 


Glucose (Fingerstick)


 136 mg/dL


(70-99) 


 155 mg/dL


(70-99) 





 


Random Vancomycin Level  20.2 mcg/mL   


 


White Blood Count


 


 


 


 19.1 x10^3/uL


(4.0-11.0)


 


Red Blood Count


 


 


 


 4.48 x10^6/uL


(4.30-5.70)


 


Hemoglobin


 


 


 


 12.7 g/dL


(13.0-17.5)


 


Hematocrit


 


 


 


 38.8 %


(39.0-53.0)


 


Mean Corpuscular Volume    87 fL () 


 


Mean Corpuscular Hemoglobin    28 pg (25-35) 


 


Mean Corpuscular Hemoglobin


Concent 


 


 


 33 g/dL


(31-37)


 


Red Cell Distribution Width


 


 


 


 14.1 %


(11.5-14.5)


 


Platelet Count


 


 


 


 404 x10^3/uL


(140-400)


 


Neutrophils (%) (Auto)    94 % (31-73) 


 


Lymphocytes (%) (Auto)    1 % (24-48) 


 


Monocytes (%) (Auto)    5 % (0-9) 


 


Eosinophils (%) (Auto)    0 % (0-3) 


 


Basophils (%) (Auto)    0 % (0-3) 


 


Neutrophils # (Auto)


 


 


 


 17.9 x10^3uL


(1.8-7.7)


 


Lymphocytes # (Auto)


 


 


 


 0.3 x10^3/uL


(1.0-4.8)


 


Monocytes # (Auto)


 


 


 


 0.9 x10^3/uL


(0.0-1.1)


 


Eosinophils # (Auto)


 


 


 


 0.0 x10^3/uL


(0.0-0.7)


 


Basophils # (Auto)


 


 


 


 0.0 x10^3/uL


(0.0-0.2)


 


Sodium Level


 


 


 


 129 mmol/L


(136-145)


 


Potassium Level


 


 


 


 3.3 mmol/L


(3.5-5.1)


 


Chloride Level


 


 


 


 91 mmol/L


()


 


Carbon Dioxide Level


 


 


 


 26 mmol/L


(21-32)


 


Anion Gap    12 (6-14) 


 


Blood Urea Nitrogen


 


 


 


 63 mg/dL


(8-26)


 


Creatinine


 


 


 


 11.9 mg/dL


(0.7-1.3)


 


Estimated GFR


(Cockcroft-Gault) 


 


 


 5.5 





 


Glucose Level


 


 


 


 363 mg/dL


(70-99)


 


Calcium Level


 


 


 


 7.8 mg/dL


(8.5-10.1)


 


Phosphorus Level


 


 


 


 5.9 mg/dL


(2.6-4.7)


 


Magnesium Level


 


 


 


 2.0 mg/dL


(1.8-2.4)


 


Albumin


 


 


 


 1.1 g/dL


(3.4-5.0)


 


Test


 10/22/18


12:33 


 


 





 


Glucose (Fingerstick)


 261 mg/dL


(70-99) 


 


 











Laboratory Tests








Test


 10/21/18


16:50 10/21/18


16:52 10/22/18


02:55 10/22/18


12:33


 


Random Vancomycin Level 20.2 mcg/mL    


 


Glucose (Fingerstick)


 


 155 mg/dL


(70-99) 


 261 mg/dL


(70-99)


 


White Blood Count


 


 


 19.1 x10^3/uL


(4.0-11.0) 





 


Red Blood Count


 


 


 4.48 x10^6/uL


(4.30-5.70) 





 


Hemoglobin


 


 


 12.7 g/dL


(13.0-17.5) 





 


Hematocrit


 


 


 38.8 %


(39.0-53.0) 





 


Mean Corpuscular Volume   87 fL ()  


 


Mean Corpuscular Hemoglobin   28 pg (25-35)  


 


Mean Corpuscular Hemoglobin


Concent 


 


 33 g/dL


(31-37) 





 


Red Cell Distribution Width


 


 


 14.1 %


(11.5-14.5) 





 


Platelet Count


 


 


 404 x10^3/uL


(140-400) 





 


Neutrophils (%) (Auto)   94 % (31-73)  


 


Lymphocytes (%) (Auto)   1 % (24-48)  


 


Monocytes (%) (Auto)   5 % (0-9)  


 


Eosinophils (%) (Auto)   0 % (0-3)  


 


Basophils (%) (Auto)   0 % (0-3)  


 


Neutrophils # (Auto)


 


 


 17.9 x10^3uL


(1.8-7.7) 





 


Lymphocytes # (Auto)


 


 


 0.3 x10^3/uL


(1.0-4.8) 





 


Monocytes # (Auto)


 


 


 0.9 x10^3/uL


(0.0-1.1) 





 


Eosinophils # (Auto)


 


 


 0.0 x10^3/uL


(0.0-0.7) 





 


Basophils # (Auto)


 


 


 0.0 x10^3/uL


(0.0-0.2) 





 


Sodium Level


 


 


 129 mmol/L


(136-145) 





 


Potassium Level


 


 


 3.3 mmol/L


(3.5-5.1) 





 


Chloride Level


 


 


 91 mmol/L


() 





 


Carbon Dioxide Level


 


 


 26 mmol/L


(21-32) 





 


Anion Gap   12 (6-14)  


 


Blood Urea Nitrogen


 


 


 63 mg/dL


(8-26) 





 


Creatinine


 


 


 11.9 mg/dL


(0.7-1.3) 





 


Estimated GFR


(Cockcroft-Gault) 


 


 5.5 


 





 


Glucose Level


 


 


 363 mg/dL


(70-99) 





 


Calcium Level


 


 


 7.8 mg/dL


(8.5-10.1) 





 


Phosphorus Level


 


 


 5.9 mg/dL


(2.6-4.7) 





 


Magnesium Level


 


 


 2.0 mg/dL


(1.8-2.4) 





 


Albumin


 


 


 1.1 g/dL


(3.4-5.0) 











Microbiology


10/19/18 Blood Culture - Preliminary, Resulted


           NO GROWTH AFTER 3 DAYS


10/20/18 Anaerobic/Aerobic Culture, Resulted


           Pending


10/20/18 Anaerobic Culture Result 1 (ANTHONY), Resulted


           Pending


10/20/18 Aerobic Culture, Resulted


           Pending


10/20/18 Aerobic Culture Result 1 (ANTHONY), Resulted


           Pending


10/20/18 Gram Stain - Final, Resulted


           


10/20/18 Gram Stain Result 1 (ANTHONY) - Final, Resulted


           


10/20/18 Gram Stain Result 2 (ANTHONY) - Final, Resulted


           


10/21/18 Anaerobic/Aerobic Culture, Resulted


           Pending


10/21/18 Anaerobic Culture Result 1 (ANTHONY), Resulted


           Pending


10/21/18 Aerobic Culture, Resulted


           Pending


10/21/18 Aerobic Culture Result 1 (ANTHONY), Resulted


           Pending


10/21/18 Gram Stain - Final, Resulted


           


10/21/18 Gram Stain Result 1 (ANTHONY) - Final, Resulted


           


10/21/18 Gram Stain Result 2 (ANTHONY) - Final, Resulted


Medications





Current Medications


Sodium Chloride 250 ml @  250 mls/hr 1X  ONCE IV ;  Start 10/19/18 at 15:45;  

Stop 10/19/18 at 16:44;  Status Cancel


Piperacillin Sod/ Tazobactam Sod (Zosyn Per Pharmacy) 1 each PRN DAILY  PRN MC 

SEE COMMENTS;  Start 10/19/18 at 15:45;  Status UNV


Piperacillin Sod/ Tazobactam Sod 2.25 gm/Sodium Chloride 50 ml @  100 mls/hr 1X

  ONCE IV  Last administered on 10/19/18at 16:49;  Start 10/19/18 at 16:00;  

Stop 10/19/18 at 16:29;  Status DC


Ondansetron HCl (Zofran) 4 mg 1X  ONCE IV  Last administered on 10/19/18at 16:12

;  Start 10/19/18 at 16:00;  Stop 10/19/18 at 16:01;  Status DC


Hydromorphone HCl (Dilaudid) 0.5 mg PRN Q30MIN  PRN IV SEVERE PAIN Last 

administered on 10/21/18at 18:00;  Start 10/19/18 at 16:15;  Stop 10/21/18 at 18

:00;  Status DC


Hydromorphone HCl (Dilaudid) 2 mg STK-MED ONCE .ROUTE ;  Start 10/19/18 at 16:10

;  Stop 10/19/18 at 16:11;  Status DC


Sodium Chloride 500 ml @  500 mls/hr 1X  ONCE IV  Last administered on 10/19/

18at 16:49;  Start 10/19/18 at 16:30;  Stop 10/19/18 at 17:29;  Status DC


Aspirin (Children'S Aspirin) 324 mg 1X  ONCE PO  Last administered on 10/19/

18at 17:52;  Start 10/19/18 at 17:15;  Stop 10/19/18 at 17:16;  Status DC


Vancomycin HCl (Vanco Per Pharmacy) 1 each PRN DAILY  PRN MC SEE COMMENTS;  

Start 10/19/18 at 17:15;  Status UNV


Sodium Chloride 500 ml @  500 mls/hr 1X  ONCE IV  Last administered on 10/19/

18at 17:51;  Start 10/19/18 at 17:15;  Stop 10/19/18 at 18:14;  Status DC


Vancomycin HCl 1.75 gm/Sodium Chloride 500 ml @  250 mls/hr 1X  ONCE IV  Last 

administered on 10/19/18at 17:25;  Start 10/19/18 at 17:30;  Stop 10/19/18 at 19

:29;  Status DC


Acetaminophen (Tylenol) 650 mg PRN Q6HRS  PRN PO FEVER Last administered on 10/

21/18at 17:14;  Start 10/19/18 at 17:30


Ondansetron HCl (Zofran) 4 mg PRN Q6HRS  PRN IV NAUSEA/VOMITING, 1st IV CHOICE 

Last administered on 10/21/18at 17:17;  Start 10/19/18 at 17:30


Morphine Sulfate (Morphine Sulfate) 2 mg PRN Q2HR  PRN IV MILD-MODERATE PAIN;  

Start 10/19/18 at 17:30


Tramadol HCl (Ultram) 50 mg PRN Q6HRS  PRN PO MILD TO MODERATE PAIN Last 

administered on 10/21/18at 18:13;  Start 10/19/18 at 17:30


Docusate Sodium (Colace) 100 mg PRN DAILY  PRN PO CONSTIPATION 1ST CHOICE;  

Start 10/19/18 at 17:30


Piperacillin Sod/ Tazobactam Sod 3.375 gm/Sodium Chloride 50 ml @  100 mls/hr 

Q6HRS IV ;  Start 10/19/18 at 18:00;  Status UNV


Piperacillin Sod/ Tazobactam Sod (Zosyn Per Pharmacy) 1 each PRN DAILY  PRN MC 

SEE COMMENTS;  Start 10/19/18 at 17:30;  Status UNV


Vancomycin HCl (Vanco Per Pharmacy) 1 each PRN DAILY  PRN MC SEE COMMENTS Last 

administered on 10/22/18at 10:28;  Start 10/19/18 at 17:30


Insulin Human Lispro (HumaLOG) 0-9 UNITS TIDWMEALS SQ  Last administered on 10/

22/18at 12:47;  Start 10/20/18 at 08:00


Dextrose (Dextrose 50%-Water Syringe) 12.5 gm PRN Q15MIN  PRN IV SEE COMMENTS 

Last administered on 10/20/18at 17:32;  Start 10/19/18 at 17:30


Heparin Sodium (Porcine) (Heparin Sodium) 5,000 unit Q8HRS SQ  Last 

administered on 10/22/18at 12:48;  Start 10/19/18 at 22:00


Senna/Docusate Sodium (Senna Plus) 1 tab BID PO  Last administered on 10/21/

18at 08:54;  Start 10/19/18 at 21:00


Docusate Sodium (Colace) 100 mg BID PO  Last administered on 10/21/18at 08:55;  

Start 10/19/18 at 21:00


Magnesium Hydroxide (Milk Of Magnesia) 2,400 mg PRN Q12HR  PRN PO CONSTIPATION 

2ND CHOICE;  Start 10/19/18 at 17:45


Sodium Chloride 1,000 ml @  75 mls/hr 1X  ONCE IV ;  Start 10/19/18 at 17:45;  

Stop 10/19/18 at 19:47;  Status DC


Pantoprazole Sodium (PROTONIX VIAL for IV PUSH) 40 mg DAILYAC IVP  Last 

administered on 10/22/18at 08:41;  Start 10/20/18 at 07:30


Piperacillin Sod/ Tazobactam Sod 2.25 gm/Sodium Chloride 50 ml @  100 mls/hr 

Q6HRS IV ;  Start 10/19/18 at 18:00;  Stop 10/19/18 at 18:07;  Status DC


Vancomycin HCl (Vancomycin Random Level) 1 each 1X  ONCE MC  Last administered 

on 10/21/18at 17:00;  Start 10/21/18 at 17:00;  Stop 10/21/18 at 17:01;  Status 

DC


Piperacillin Sod/ Tazobactam Sod 2.25 gm/Sodium Chloride 50 ml @  100 mls/hr 

Q8HRS IV  Last administered on 10/22/18at 12:58;  Start 10/19/18 at 22:00


Promethazine HCl (Phenergan) 12.5 mg PRN Q4HRS  PRN PO NAUSEA/VOMITING Last 

administered on 10/20/18at 21:43;  Start 10/20/18 at 01:15;  Stop 10/22/18 at 11

:59;  Status DC


Potassium Chloride (Klor-Con) 40 meq Q2H PO  Last administered on 10/20/18at 09:

13;  Start 10/20/18 at 07:00;  Stop 10/20/18 at 09:01;  Status DC


Aspirin (Children'S Aspirin) 81 mg DAILYWBKFT PO  Last administered on 10/22/

18at 08:41;  Start 10/20/18 at 09:00


Carvedilol (Coreg) 12.5 mg BIDWMEALS PO ;  Start 10/20/18 at 09:00


Calcium Acetate (Phoslo) 667 mg TIDWMEALS PO  Last administered on 10/22/18at 12

:49;  Start 10/20/18 at 09:00


Vitamin D (Vitamin D3) 5,000 unit DAILY PO  Last administered on 10/22/18at 08:

41;  Start 10/20/18 at 09:00


Vitamin B Complex/ Vitamin C (Alison-Aime) 1 tab DAILY PO  Last administered on 10

/22/18at 08:41;  Start 10/20/18 at 09:00


Insulin Glargine (Lantus) 15 units QHS SQ  Last administered on 10/20/18at 21:43

;  Start 10/20/18 at 21:00


Baclofen (Lioresal) 10 mg PRN TID  PRN PO for Hiccups Last administered on 10/21

/18at 08:54;  Start 10/20/18 at 11:30


Metoclopramide HCl (Reglan) 5 mg PRN BFRMEALHC  PRN PO ;  Start 10/20/18 at 12:

00;  Stop 10/20/18 at 12:00;  Status DC


Norepinephrine Bitartrate 250 ml @  1.875 mls/ hr CONT  PRN IV SEE I/O RECORD 

Last administered on 10/21/18at 18:37;  Start 10/20/18 at 21:00


Metoclopramide HCl (Reglan) 5 mg PRN BFRMEALHC  PRN PO NAUSEA/VOMITING;  Start 

10/21/18 at 08:45


Silver Sulfadiazine (Silvadene) 1 justo DAILY TP  Last administered on 10/22/18at 

08:41;  Start 10/21/18 at 11:30


Magnesium Sulfate 50 ml @ 25 mls/hr PRN DAILY  PRN IV for Mag < 1.7 on am labs;

  Start 10/21/18 at 11:45


Sodium Chloride 1,000 ml @  75 mls/hr [id number] IV  Last administered on 10/22/

18at 01:46;  Start 10/21/18 at 11:45


Nystatin (Nystatin Oral Susp) 5 ml [id number]  Last administered on 10/22/

18at 12:49;  Start 10/21/18 at 13:00


Vancomycin HCl (Vancomycin Oral Solution) 125 mg [id number] PO  Last administered 

on 10/22/18at 12:49;  Start 10/21/18 at 14:00


Potassium Chloride (Klor-Con) 40 meq 1X  ONCE PO  Last administered on 10/21/

18at 13:42;  Start 10/21/18 at 14:00;  Stop 10/21/18 at 14:01;  Status DC


Potassium Chloride (Klor-Con) 40 meq 1X  ONCE PO ;  Start 10/21/18 at 21:00;  

Stop 10/21/18 at 21:01;  Status DC


Vancomycin HCl 500 mg/Sodium Chloride 100 ml @  100 mls/hr 1X  ONCE IV  Last 

administered on 10/21/18at 21:35;  Start 10/21/18 at 21:00;  Stop 10/21/18 at 21

:59;  Status DC


Metoclopramide HCl (Reglan Vial) 5 mg PRN Q6HRS  PRN IV NAUSEA/VOMITING, 2nd IV 

CHOICE Last administered on 10/22/18at 01:46;  Start 10/21/18 at 19:15


Prochlorperazine Edisylate (Compazine) 10 mg PRN Q6HRS  PRN IV NAUSEA/VOMITING, 

3rd IV CHOICE;  Start 10/21/18 at 19:15


Lactobacillus Rhamnosus (Culturelle) 1 cap BID PO ;  Start 10/22/18 at 21:00


Insulin Human Lispro (HumaLOG) 15 units 1X  ONCE SQ  Last administered on 10/22/

18at 10:30;  Start 10/22/18 at 10:15;  Stop 10/22/18 at 10:16;  Status DC


Vancomycin HCl (Vancomycin Random Level) 1 each 1X  ONCE MC ;  Start 10/23/18 

at 06:00;  Stop 10/23/18 at 06:01


Potassium Chloride (Klor-Con) 40 meq 1X  ONCE PO  Last administered on 10/22/

18at 12:49;  Start 10/22/18 at 12:30;  Stop 10/22/18 at 12:31;  Status DC


Promethazine HCl (Phenergan) 12.5 mg PRN Q6HRS  PRN PO NAUSEA/VOMITING;  Start 

10/22/18 at 11:45





Active Scripts


Active


Reported


Metolazone 5 Mg Tablet 5 Mg PO DAILY


Vitamin D3 (Cholecalciferol (Vitamin D3)) 5,000 Unit Tablet 1 Tab PO DAILY


Calcium Acetate 667 Mg Tablet 667 Mg PO TIDWMEALS


Renal Caps Softgel (Folic Acid/Vitamin B Comp W-C) 1 Mg Capsule 1 Cap PO DAILY


Tresiba Flextouch U-100 (Insulin Degludec) 100 Unit/1 Ml Insuln.pen 100 Unit SQ 


Carvedilol 12.5 Mg Tablet 1 Tab PO BIDWMEALS


Furosemide 40 Mg Tablet 1 Tab PO DAILY


Aspirin 81 Mg Tab.chew 81 Mg PO DAILYWBKFT


Vitals/I & O





Vital Sign - Last 24 Hours








 10/21/18 10/21/18 10/21/18 10/21/18





 17:00 18:00 18:00 18:13


 


Pulse 97  98 


 


Resp 22 22 27 24


 


B/P (MAP) 98/69 (79)  77/61 (66) 


 


O2 Delivery Room Air Room Air Room Air Room Air





 10/21/18 10/21/18 10/21/18 10/21/18





 18:30 18:47 19:23 20:00


 


Temp    98.0





    98.0


 


Pulse 112 118  90


 


Resp 27  28 17


 


B/P (MAP) 80/62 (68) 92/73 (79)  112/63 (79)


 


Pulse Ox    98


 


O2 Delivery Room Air  Room Air Room Air


 


    





    





 10/21/18 10/21/18 10/21/18 10/22/18





 21:00 22:00 23:00 00:00


 


Temp    97.9





    97.9


 


Pulse 74 75 74 79


 


Resp 13 22 18 17


 


B/P (MAP) 111/73 (86) 74/57 (63) 112/78 (89) 106/69 (81)


 


Pulse Ox    99


 


O2 Delivery Room Air Room Air Room Air Room Air


 


    





    





 10/22/18 10/22/18 10/22/18 10/22/18





 01:00 02:00 03:00 04:00


 


Temp    98.1





    98.1


 


Pulse 78 96 85 98


 


Resp 12 16 13 15


 


B/P (MAP) 111/73 (86) 100/70 (80) 108/69 (82) 95/69 (78)


 


Pulse Ox    99


 


O2 Delivery Room Air Room Air Room Air Room Air


 


    





    





 10/22/18 10/22/18 10/22/18 10/22/18





 05:00 06:00 07:00 08:00


 


Temp    98.0





    98.0


 


Pulse 97 85 87 101


 


Resp 18 13 15 18


 


B/P (MAP) 97/67 (77) 102/66 (78) 106/66 (79) 100/73 (82)


 


O2 Delivery Room Air Room Air Room Air Room Air


 


    





    





 10/22/18 10/22/18 10/22/18 10/22/18





 08:00 09:00 10:00 11:00


 


Temp    97.6





    97.6


 


Pulse  90 90 100


 


Resp  18 18 18


 


B/P (MAP) 95/67 100/66 (77) 95/68 (77) 101/71 (81)


 


Pulse Ox    96


 


O2 Delivery  Room Air Room Air Room Air


 


    





    





 10/22/18 10/22/18 10/22/18 10/22/18





 12:00 13:00 14:00 15:00


 


Pulse 100 100 87 91


 


Resp 16 16 16 16


 


B/P (MAP) 102/65 (77) 98/73 (81) 88/61 (70) 92/60 (71)


 


Pulse Ox  96  


 


O2 Delivery Room Air Room Air Room Air Room Air














Intake and Output   


 


 10/21/18 10/21/18 10/22/18





 15:00 23:00 07:00


 


Intake Total 480 ml 120 ml 2151.7 ml


 


Output Total 604 ml 1 ml 


 


Balance -124 ml 119 ml 2151.7 ml

















ELIU CORONA Oct 22, 2018 16:44

## 2018-10-22 NOTE — PDOC
Subjective:


Subjective:


Pt states he is feeling much better today. He denies abdominal pain, N/V today. 

He reports that his bowels are moving normally. He denies GI complaints at this 

time.





Objective:


Vital Signs:





 Vital Signs








  Date Time  Temp Pulse Resp B/P (MAP) Pulse Ox O2 Delivery O2 Flow Rate FiO2


 


10/22/18 13:00  100 16 98/73 (81) 96 Room Air  


 


10/22/18 11:00 97.6       





 97.6       








Labs:





Laboratory Tests








Test


 10/21/18


16:50 10/21/18


16:52 10/22/18


02:55 10/22/18


12:33


 


Random Vancomycin Level 20.2 mcg/mL    


 


Glucose (Fingerstick)  155 mg/dL   261 mg/dL 


 


White Blood Count   19.1 x10^3/uL  


 


Red Blood Count   4.48 x10^6/uL  


 


Hemoglobin   12.7 g/dL  


 


Hematocrit   38.8 %  


 


Mean Corpuscular Volume   87 fL  


 


Mean Corpuscular Hemoglobin   28 pg  


 


Mean Corpuscular Hemoglobin


Concent 


 


 33 g/dL 


 





 


Red Cell Distribution Width   14.1 %  


 


Platelet Count   404 x10^3/uL  


 


Neutrophils (%) (Auto)   94 %  


 


Lymphocytes (%) (Auto)   1 %  


 


Monocytes (%) (Auto)   5 %  


 


Eosinophils (%) (Auto)   0 %  


 


Basophils (%) (Auto)   0 %  


 


Neutrophils # (Auto)   17.9 x10^3uL  


 


Lymphocytes # (Auto)   0.3 x10^3/uL  


 


Monocytes # (Auto)   0.9 x10^3/uL  


 


Eosinophils # (Auto)   0.0 x10^3/uL  


 


Basophils # (Auto)   0.0 x10^3/uL  


 


Sodium Level   129 mmol/L  


 


Potassium Level   3.3 mmol/L  


 


Chloride Level   91 mmol/L  


 


Carbon Dioxide Level   26 mmol/L  


 


Anion Gap   12  


 


Blood Urea Nitrogen   63 mg/dL  


 


Creatinine   11.9 mg/dL  


 


Estimated GFR


(Cockcroft-Gault) 


 


 5.5 


 





 


Glucose Level   363 mg/dL  


 


Calcium Level   7.8 mg/dL  


 


Phosphorus Level   5.9 mg/dL  


 


Magnesium Level   2.0 mg/dL  


 


Albumin   1.1 g/dL  








Current Medications








 Medications


  (Trade)  Dose


 Ordered  Sig/Ivonne


 Route


 PRN Reason  Start Time


 Stop Time Status Last Admin


Dose Admin


 


 Sodium Chloride  250 ml @ 


 250 mls/hr  1X  ONCE


 IV


   10/19/18 15:45


 10/19/18 16:44 Cancel  





 


 Piperacillin Sod/


 Tazobactam Sod


  (Zosyn Per


 Pharmacy)  1 each  PRN DAILY  PRN


 [facility]


 SEE COMMENTS  10/19/18 15:45


   UNV  





 


 Piperacillin Sod/


 Tazobactam Sod


 2.25 gm/Sodium


 Chloride  50 ml @ 


 100 mls/hr  1X  ONCE


 IV


   10/19/18 16:00


 10/19/18 16:29 DC 10/19/18 16:49





 


 Ondansetron HCl


  (Zofran)  4 mg  1X  ONCE


 IV


   10/19/18 16:00


 10/19/18 16:01 DC 10/19/18 16:12





 


 Hydromorphone HCl


  (Dilaudid)  0.5 mg  PRN Q30MIN  PRN


 IV


 SEVERE PAIN  10/19/18 16:15


 10/21/18 18:00 DC 10/21/18 18:00





 


 Hydromorphone HCl


  (Dilaudid)  2 mg  STK-MED ONCE


 .ROUTE


   10/19/18 16:10


 10/19/18 16:11 DC  





 


 Sodium Chloride  500 ml @ 


 500 mls/hr  1X  ONCE


 IV


   10/19/18 16:30


 10/19/18 17:29 DC 10/19/18 16:49





 


 Aspirin


  ([facility]


 Aspirin)  324 mg  1X  ONCE


 PO


   10/19/18 17:15


 10/19/18 17:16 DC 10/19/18 17:52





 


 Vancomycin HCl


  (Vanco Per


 Pharmacy)  1 each  PRN DAILY  PRN


 [facility]


 SEE COMMENTS  10/19/18 17:15


   UNV  





 


 Sodium Chloride  500 ml @ 


 500 mls/hr  1X  ONCE


 IV


   10/19/18 17:15


 10/19/18 18:14 DC 10/19/18 17:51





 


 Vancomycin HCl


 1.75 gm/Sodium


 Chloride  500 ml @ 


 250 mls/hr  1X  ONCE


 IV


   10/19/18 17:30


 10/19/18 19:29 DC 10/19/18 17:25





 


 Acetaminophen


  (Tylenol)  650 mg  PRN Q6HRS  PRN


 PO


 FEVER  10/19/18 17:30


    10/21/18 17:14





 


 Ondansetron HCl


  (Zofran)  4 mg  PRN Q6HRS  PRN


 IV


 NAUSEA/VOMITING, 1st IV CHOICE  10/19/18 17:30


    10/21/18 17:17





 


 Morphine Sulfate


  (Morphine


 Sulfate)  2 mg  PRN Q2HR  PRN


 IV


 MILD-MODERATE PAIN  10/19/18 17:30


     





 


 Tramadol HCl


  (Ultram)  50 mg  PRN Q6HRS  PRN


 PO


 MILD TO MODERATE PAIN  10/19/18 17:30


    10/21/18 18:13





 


 Docusate Sodium


  (Colace)  100 mg  PRN DAILY  PRN


 PO


 CONSTIPATION 1ST CHOICE  10/19/18 17:30


     





 


 Piperacillin Sod/


 Tazobactam Sod


 3.375 gm/Sodium


 Chloride  50 ml @ 


 100 mls/hr  Q6HRS


 IV


   10/19/18 18:00


   UNV  





 


 Piperacillin Sod/


 Tazobactam Sod


  (Zosyn Per


 Pharmacy)  1 each  PRN DAILY  PRN


 [facility]


 SEE COMMENTS  10/19/18 17:30


   UNV  





 


 Vancomycin HCl


  (Vanco Per


 Pharmacy)  1 each  PRN DAILY  PRN


 [facility]


 SEE COMMENTS  10/19/18 17:30


    10/22/18 10:28





 


 Insulin Human


 Lispro


  (HumaLOG)  0-9 UNITS  TIDWMEALS


 SQ


   10/20/18 08:00


    10/22/18 12:47





 


 Dextrose


  (Dextrose


 50%-Water Syringe)  12.5 gm  PRN Q15MIN  PRN


 IV


 SEE COMMENTS  10/19/18 17:30


    10/20/18 17:32





 


 Heparin Sodium


  (Porcine)


  (Heparin Sodium)  5,000 unit  Q8HRS


 SQ


   10/19/18 22:00


    10/22/18 12:48





 


 Senna/Docusate


 Sodium


  (Senna Plus)  1 tab  BID


 PO


   10/19/18 21:00


    10/21/18 08:54





 


 Docusate Sodium


  (Colace)  100 mg  BID


 PO


   10/19/18 21:00


    10/21/18 08:55





 


 Magnesium


 Hydroxide


  (Milk Of


 Magnesia)  2,400 mg  PRN Q12HR  PRN


 PO


 CONSTIPATION 2ND CHOICE  10/19/18 17:45


     





 


 Sodium Chloride  1,000 ml @ 


 75 mls/hr  1X  ONCE


 IV


   10/19/18 17:45


 10/19/18 19:47 DC  





 


 Pantoprazole


 Sodium


  (PROTONIX VIAL


 for IV PUSH)  40 mg  DAILYAC


 IVP


   10/20/18 07:30


    10/22/18 08:41





 


 Piperacillin Sod/


 Tazobactam Sod


 2.25 gm/Sodium


 Chloride  50 ml @ 


 100 mls/hr  Q6HRS


 IV


   10/19/18 18:00


 10/19/18 18:07 DC  





 


 Vancomycin HCl


  (Vancomycin


 Random Level)  1 each  1X  ONCE


 MC


   10/21/18 17:00


 10/21/18 17:01 DC 10/21/18 17:00





 


 Piperacillin Sod/


 Tazobactam Sod


 2.25 gm/Sodium


 Chloride  50 ml @ 


 100 mls/hr  Q8HRS


 IV


   10/19/18 22:00


    10/22/18 12:58





 


 Promethazine HCl


  (Phenergan)  12.5 mg  PRN Q4HRS  PRN


 PO


 NAUSEA/VOMITING  10/20/18 01:15


 10/22/18 11:59 DC 10/20/18 21:43





 


 Potassium Chloride


  (Klor-Con)  40 meq  Q2H


 PO


   10/20/18 07:00


 10/20/18 09:01 DC 10/20/18 09:13





 


 Aspirin


  (Children[facility]S


 Aspirin)  81 mg  [id number]


 PO


   10/20/18 09:00


    10/22/18 08:41





 


 Carvedilol


  (Coreg)  12.5 mg  BIDWMEALS


 PO


   10/20/18 09:00


     





 


 Calcium Acetate


  (Phoslo)  667 mg  TIDWMEALS


 PO


   10/20/18 09:00


    10/22/18 12:49





 


 Vitamin D


  (Vitamin D3)  5,000 unit  DAILY


 PO


   10/20/18 09:00


    10/22/18 08:41





 


 Vitamin B Complex/


 Vitamin C


  (Alison-Aime)  1 tab  DAILY


 PO


   10/20/18 09:00


    10/22/18 08:41





 


 Insulin Glargine


  (Lantus)  15 units  QHS


 SQ


   10/20/18 21:00


    10/20/18 21:43





 


 Baclofen


  (Lioresal)  10 mg  PRN TID  PRN


 PO


 for Hiccups  10/20/18 11:30


    10/21/18 08:54





 


 Metoclopramide HCl


  (Reglan)  5 mg  PRN [id number]  PRN


 PO


   10/20/18 12:00


 10/20/18 12:00 DC  





 


 Norepinephrine


 Bitartrate  250 ml @ 


 1.875 mls/


 hr  CONT  PRN


 IV


 SEE I/O RECORD  10/20/18 21:00


    10/21/18 18:37





 


 Metoclopramide HCl


  (Reglan)  5 mg  PRN [id number]  PRN


 PO


 NAUSEA/VOMITING  10/21/18 08:45


     





 


 Silver


 Sulfadiazine


  (Silvadene)  1 justo  DAILY


 TP


   10/21/18 11:30


    10/22/18 08:41





 


 Magnesium Sulfate  50 ml @ 25


 mls/hr  PRN DAILY  PRN


 IV


 for Mag < 1.7 on am labs  10/21/18 11:45


     





 


 Sodium Chloride  1,000 ml @ 


 75 mls/hr  [id number]


 IV


   10/21/18 11:45


    10/22/18 01:46





 


 Nystatin


  (Nystatin Oral


 Susp)  5 ml  [id number]


 SWSW


   10/21/18 13:00


    10/22/18 12:49





 


 Vancomycin HCl


  (Vancomycin Oral


 Solution)  125 mg  [id number]


 PO


   10/21/18 14:00


    10/22/18 12:49





 


 Potassium Chloride


  (Klor-Con)  40 meq  1X  ONCE


 PO


   10/21/18 14:00


 10/21/18 14:01 DC 10/21/18 13:42





 


 Potassium Chloride


  (Klor-Con)  40 meq  1X  ONCE


 PO


   10/21/18 21:00


 10/21/18 21:01 DC  





 


 Vancomycin HCl


 500 mg/Sodium


 Chloride  100 ml @ 


 100 mls/hr  1X  ONCE


 IV


   10/21/18 21:00


 10/21/18 21:59 DC 10/21/18 21:35





 


 Metoclopramide HCl


  (Reglan Vial)  5 mg  PRN Q6HRS  PRN


 IV


 NAUSEA/VOMITING, 2nd IV CHOICE  10/21/18 19:15


    10/22/18 01:46





 


 Prochlorperazine


 Edisylate


  (Compazine)  10 mg  PRN Q6HRS  PRN


 IV


 NAUSEA/VOMITING, 3rd IV CHOICE  10/21/18 19:15


     





 


 Lactobacillus


 Rhamnosus


  (Culturelle)  1 cap  BID


 PO


   10/22/18 21:00


     





 


 Insulin Human


 Lispro


  (HumaLOG)  15 units  1X  ONCE


 SQ


   10/22/18 10:15


 10/22/18 10:16 DC 10/22/18 10:30





 


 Vancomycin HCl


  (Vancomycin


 Random Level)  1 each  1X  ONCE


 MC


   10/23/18 06:00


 10/23/18 06:01   





 


 Potassium Chloride


  (Klor-Con)  40 meq  1X  ONCE


 PO


   10/22/18 12:30


 10/22/18 12:31 DC 10/22/18 12:49





 


 Promethazine HCl


  (Phenergan)  12.5 mg  PRN Q6HRS  PRN


 PO


 NAUSEA/VOMITING  10/22/18 11:45


     











Imaging:


Lower extremity doppler  10/21/18


IMPRESSION:  


No evidence of focal high-grade stenosis or occlusion within the left 


lower extremity arterial system.





PE:





GEN: NAD


HEENT: Atraumatic, PERRLA


LUNGS: CTAB


HEART: RRR, no murmurs


ABD: NABS, S/ND/NT, no masses


EXTREMITY: No edema


SKIN: No rashes, no jaundice


NEURO/PSYCH: A & O 3





A/P:


1- Pneumonia.


2- ? Sepsis- negative blood cultures


2- ESRD on PD.


2- Nausea.


4- Normocytic anemia.








Recommendations


- Continue antiemetics: Phenergan and zofran. 


- Continue with iv antibiotics as per primary team.


- Protonix 40mg IV BID.


- EGD as out patient to rule out luminal process.


- GI available for any Q's.











ZA HUA Oct 22, 2018 13:52

## 2018-10-22 NOTE — PDOC
Renal-Progress Notes


Subjective Notes


Notes


NO COMPLAINTS





History of Present Illness


Hx of present illness


BETTER





Vitals


Vitals





Vital Signs








  Date Time  Temp Pulse Resp B/P (MAP) Pulse Ox O2 Delivery O2 Flow Rate FiO2


 


10/22/18 11:00 97.6 100 18 101/71 (81) 96 Room Air  





 97.6       








Weight


Weight [ ]





I.O.


Intake and Output











Intake and Output 


 


 10/22/18





 07:00


 


Intake Total 2751.7 ml


 


Output Total 605 ml


 


Balance 2146.7 ml


 


 


 


Intake Oral 1100 ml


 


IV Total 1651.7 ml


 


Output Urine Total 0 ml


 


Stool Total 1 ml


 


Urine/Stool Mix 1 ml


 


Other 603 ml


 


# Bowel Movements 1











Labs


Labs





Laboratory Tests








Test


 10/21/18


12:02 10/21/18


16:50 10/21/18


16:52 10/22/18


02:55


 


Glucose (Fingerstick)


 136 mg/dL


(70-99) 


 155 mg/dL


(70-99) 





 


Random Vancomycin Level  20.2 mcg/mL   


 


White Blood Count


 


 


 


 19.1 x10^3/uL


(4.0-11.0)


 


Red Blood Count


 


 


 


 4.48 x10^6/uL


(4.30-5.70)


 


Hemoglobin


 


 


 


 12.7 g/dL


(13.0-17.5)


 


Hematocrit


 


 


 


 38.8 %


(39.0-53.0)


 


Mean Corpuscular Volume    87 fL () 


 


Mean Corpuscular Hemoglobin    28 pg (25-35) 


 


Mean Corpuscular Hemoglobin


Concent 


 


 


 33 g/dL


(31-37)


 


Red Cell Distribution Width


 


 


 


 14.1 %


(11.5-14.5)


 


Platelet Count


 


 


 


 404 x10^3/uL


(140-400)


 


Neutrophils (%) (Auto)    94 % (31-73) 


 


Lymphocytes (%) (Auto)    1 % (24-48) 


 


Monocytes (%) (Auto)    5 % (0-9) 


 


Eosinophils (%) (Auto)    0 % (0-3) 


 


Basophils (%) (Auto)    0 % (0-3) 


 


Neutrophils # (Auto)


 


 


 


 17.9 x10^3uL


(1.8-7.7)


 


Lymphocytes # (Auto)


 


 


 


 0.3 x10^3/uL


(1.0-4.8)


 


Monocytes # (Auto)


 


 


 


 0.9 x10^3/uL


(0.0-1.1)


 


Eosinophils # (Auto)


 


 


 


 0.0 x10^3/uL


(0.0-0.7)


 


Basophils # (Auto)


 


 


 


 0.0 x10^3/uL


(0.0-0.2)


 


Sodium Level


 


 


 


 129 mmol/L


(136-145)


 


Potassium Level


 


 


 


 3.3 mmol/L


(3.5-5.1)


 


Chloride Level


 


 


 


 91 mmol/L


()


 


Carbon Dioxide Level


 


 


 


 26 mmol/L


(21-32)


 


Anion Gap    12 (6-14) 


 


Blood Urea Nitrogen


 


 


 


 63 mg/dL


(8-26)


 


Creatinine


 


 


 


 11.9 mg/dL


(0.7-1.3)


 


Estimated GFR


(Cockcroft-Gault) 


 


 


 5.5 





 


Glucose Level


 


 


 


 363 mg/dL


(70-99)


 


Calcium Level


 


 


 


 7.8 mg/dL


(8.5-10.1)


 


Phosphorus Level


 


 


 


 5.9 mg/dL


(2.6-4.7)


 


Magnesium Level


 


 


 


 2.0 mg/dL


(1.8-2.4)


 


Albumin


 


 


 


 1.1 g/dL


(3.4-5.0)











Micro


Micro





Microbiology


10/19/18 Blood Culture - Preliminary, Resulted


           NO GROWTH AFTER 2 DAYS


10/20/18 Anaerobic/Aerobic Culture, Resulted


           Pending


10/20/18 Anaerobic Culture Result 1 (ANTHONY), Resulted


           Pending


10/20/18 Aerobic Culture, Resulted


           Pending


10/20/18 Aerobic Culture Result 1 (ANTHONY), Resulted


           Pending


10/20/18 Gram Stain - Final, Resulted


           


10/20/18 Gram Stain Result 1 (ANTHONY) - Final, Resulted


           


10/20/18 Gram Stain Result 2 (ANTHONY) - Final, Resulted


           


10/21/18 Anaerobic/Aerobic Culture, Resulted


           Pending


10/21/18 Anaerobic Culture Result 1 (ANTHONY), Resulted


           Pending


10/21/18 Aerobic Culture, Resulted


           Pending


10/21/18 Aerobic Culture Result 1 (ANTHONY), Resulted


           Pending


10/21/18 Gram Stain - Final, Resulted


           


10/21/18 Gram Stain Result 1 (ANTHONY) - Final, Resulted


           


10/21/18 Gram Stain Result 2 (ANTHONY) - Final, Resulted





Review of Systems


Constitutional:  yes: weakness, alert, oriented


Ears/Nose/Throat:  Yes: no symptom reported


Eyes:  Yes: no symptom reported


Pulmonary:  Yes no symptom reported


Cardiovascular:  Yes no symptom reported


Gastrointestional:  Yes: no symptom reported


Genitourinary:  Yes: no symptom reported


Musculoskeletal:  Yes: no symptom reported


Skin:  Yes no symptom reported


Psychiatric/Neurological:  Yes: no symptom reported


Endocrine:  Yes: no symptom reported





Physical Exam


General Appearance:  no apparent distress


Respiratory:  bilateral CTA


Heart:  S1S2


Abdomen:  soft, bowel sounds present


Genitourinary:  bladder flat


Extremities:  pulses present


Neurology:  alert, oriented


Musculoskeletal:  Osteoarthritis





Assessment


Assessment


IMP





ESRD


LEUCOCYTOSIS


ABD PAIN RESOLVED


PROB SEPSIS


LEFT FOOT ULCER


SECONDARY HYPERPARATHYROIDISM


HYPONATREMIA


HYPOKALEMIA


S/P AICD GENERATION CHANGE





PLAN





UNLIKELY PERITONITIS


REPLACE K


CONT CCPD


SUPPORTIVE CARE











CONSTANTIN GOLDSMITH MD Oct 22, 2018 11:43

## 2018-10-22 NOTE — PDOC
PROGRESS NOTES


Chief Complaint


Chief Complaint


Abdominal Pain


ESRD on PD


Hypoglycemia


Sepsis





History of Present Illness


History of Present Illness


53-year-old male presenting to the emergency department today with abd pain for 

5d. H/o CHF with EF 10%, has PPM/ICD, battery changed on 10/15/18 with dr. Mcgraw.


He started to have diffuse abd pain from Monday, with nausea and mild vomiting 

with spitting out some saliva. also has mild cough wo sputum.


Has PD for 3years, works fine. no fever at home ,T 100 in ER. BP low as 80s in 

ER, but said BP usually is normal. 


C/o multiple hypoglycemic episodes of 49 regularly followed by 175 blood sugars

, normally takes 30u Tresiba.





Overnight no events.


no chest pain, or sob.


usually BM daily, last BM was yesterday. still has flatus. 


Abd fluid from PD Cath for culture. Blood culture NGTD





Feeling ok today, blood sugar up today after 2 days of hypoglycemia. Abdominal 

pain is stable. He still c/o nausea with some slight vomiting and states his 

hiccups improved with baclofen and reglan. He states the only thing that helped 

his nausea was phenergan


Has slight cough and still c/o severe constipation.





CT abd : IMPRESSION: 


1. Partially visualized moderate consolidation left lingula of the lung 

probably pneumonia. Recommend CT chest for further evaluation.


2. Minimal perihepatic fluid.


3. Atrophic appearing bilateral kidneys.





A/P:


Abd pain/nausea/vomiting - constipation, concern for gastroenteritis, 

peritonitis. Await cultures


Hiccups/vomiting - will add reglan 5mg prn with baclofen prior to meals


Septic shock - still on pressors today. WBC 19K - with multiple possible 

sources including abdomen, lung, left foot - Last BM Sunday. ?peritonitis, 

gastroenteritis


S/P AICD generator change: done  on 10/15/2018 ([facility]) stable. - cardiology 

consulted


Chronic systolic CHF - compensated currently


ESRD - utilizes PD with last dialysis last night. Nephrology consulted


HTN - will monitor in ICU


DM2 - sliding scale, added back 15 u glargine


Sinus tach with chronic LBBB - likely 2/2 sepsis - cardiology consulted


Left foot wound - culture pending. Wound care to see





Diet - renal


PPX - heparin


FUll Code


Cont ICU, possibly to downgrade when off pressors





Vitals


Vitals





Vital Signs








  Date Time  Temp Pulse Resp B/P (MAP) Pulse Ox O2 Delivery O2 Flow Rate FiO2


 


10/22/18 06:00  85 13 102/66 (78)  Room Air  


 


10/22/18 04:00 98.1    99   





 98.1       











Physical Exam


Physical Exam


GENERAL:  Propped up in bed, resting quietly 


HEENT:  Normal conjunctivae.  Oral cavity:  Pharynx pink and moist.


NECK:  Supple.


LUNGS:  Clear to auscultation.


HEART:  S1 and S2.  Left-sided chest AICD site unremarkable for infection.


ABDOMEN:  Mildly distended.  Bowel sounds active, soft, nontender.  Peritoneal


catheter without redness or drainage.


EXTREMITIES:  No gross edema or cyanosis.  Ulcer left foot. Distal pulses weak.


SKIN:  Warm without rash.


PIV


General:  Alert, Oriented X3, Cooperative, No acute distress


Heart:  Regular rate (tachy), Other (3/6 systolic murmur to LLS border)


Abdomen:  Soft, Other (with mild diffuse tenderness, PD cath in place with 

dressing)


Extremities:  No cyanosis, No edema


Skin:  Other (chornic leg closed lesions; left chest incision site is open to 

air, no swelling or erythema or tenderness with palpation. No drain and 

incision is well approximated with dry steri strips. No discoloration. )





Labs


LABS





Laboratory Tests








Test


 10/21/18


08:50 10/21/18


12:02 10/21/18


16:50 10/21/18


16:52


 


White Blood Count


 21.0 x10^3/uL


(4.0-11.0) 


 


 





 


Red Blood Count


 4.33 x10^6/uL


(4.30-5.70) 


 


 





 


Hemoglobin


 12.4 g/dL


(13.0-17.5) 


 


 





 


Hematocrit


 37.3 %


(39.0-53.0) 


 


 





 


Mean Corpuscular Volume 86 fL ()    


 


Mean Corpuscular Hemoglobin 29 pg (25-35)    


 


Mean Corpuscular Hemoglobin


Concent 33 g/dL


(31-37) 


 


 





 


Red Cell Distribution Width


 13.8 %


(11.5-14.5) 


 


 





 


Platelet Count


 362 x10^3/uL


(140-400) 


 


 





 


Neutrophils (%) (Auto) 93 % (31-73)    


 


Lymphocytes (%) (Auto) 2 % (24-48)    


 


Monocytes (%) (Auto) 5 % (0-9)    


 


Eosinophils (%) (Auto) 1 % (0-3)    


 


Basophils (%) (Auto) 0 % (0-3)    


 


Neutrophils # (Auto)


 19.5 x10^3uL


(1.8-7.7) 


 


 





 


Lymphocytes # (Auto)


 0.4 x10^3/uL


(1.0-4.8) 


 


 





 


Monocytes # (Auto)


 1.0 x10^3/uL


(0.0-1.1) 


 


 





 


Eosinophils # (Auto)


 0.1 x10^3/uL


(0.0-0.7) 


 


 





 


Basophils # (Auto)


 0.1 x10^3/uL


(0.0-0.2) 


 


 





 


Sodium Level


 130 mmol/L


(136-145) 


 


 





 


Potassium Level


 3.4 mmol/L


(3.5-5.1) 


 


 





 


Chloride Level


 91 mmol/L


() 


 


 





 


Carbon Dioxide Level


 25 mmol/L


(21-32) 


 


 





 


Anion Gap 14 (6-14)    


 


Blood Urea Nitrogen


 68 mg/dL


(8-26) 


 


 





 


Creatinine


 12.2 mg/dL


(0.7-1.3) 


 


 





 


Estimated GFR


(Cockcroft-Gault) 5.3 


 


 


 





 


Glucose Level


 89 mg/dL


(70-99) 


 


 





 


Calcium Level


 7.8 mg/dL


(8.5-10.1) 


 


 





 


Glucose (Fingerstick)


 


 136 mg/dL


(70-99) 


 155 mg/dL


(70-99)


 


Random Vancomycin Level   20.2 mcg/mL  


 


Test


 10/22/18


02:55 


 


 





 


White Blood Count


 19.1 x10^3/uL


(4.0-11.0) 


 


 





 


Red Blood Count


 4.48 x10^6/uL


(4.30-5.70) 


 


 





 


Hemoglobin


 12.7 g/dL


(13.0-17.5) 


 


 





 


Hematocrit


 38.8 %


(39.0-53.0) 


 


 





 


Mean Corpuscular Volume 87 fL ()    


 


Mean Corpuscular Hemoglobin 28 pg (25-35)    


 


Mean Corpuscular Hemoglobin


Concent 33 g/dL


(31-37) 


 


 





 


Red Cell Distribution Width


 14.1 %


(11.5-14.5) 


 


 





 


Platelet Count


 404 x10^3/uL


(140-400) 


 


 





 


Neutrophils (%) (Auto) 94 % (31-73)    


 


Lymphocytes (%) (Auto) 1 % (24-48)    


 


Monocytes (%) (Auto) 5 % (0-9)    


 


Eosinophils (%) (Auto) 0 % (0-3)    


 


Basophils (%) (Auto) 0 % (0-3)    


 


Neutrophils # (Auto)


 17.9 x10^3uL


(1.8-7.7) 


 


 





 


Lymphocytes # (Auto)


 0.3 x10^3/uL


(1.0-4.8) 


 


 





 


Monocytes # (Auto)


 0.9 x10^3/uL


(0.0-1.1) 


 


 





 


Eosinophils # (Auto)


 0.0 x10^3/uL


(0.0-0.7) 


 


 





 


Basophils # (Auto)


 0.0 x10^3/uL


(0.0-0.2) 


 


 





 


Sodium Level


 129 mmol/L


(136-145) 


 


 





 


Potassium Level


 3.3 mmol/L


(3.5-5.1) 


 


 





 


Chloride Level


 91 mmol/L


() 


 


 





 


Carbon Dioxide Level


 26 mmol/L


(21-32) 


 


 





 


Anion Gap 12 (6-14)    


 


Blood Urea Nitrogen


 63 mg/dL


(8-26) 


 


 





 


Creatinine


 11.9 mg/dL


(0.7-1.3) 


 


 





 


Estimated GFR


(Cockcroft-Gault) 5.5 


 


 


 





 


Glucose Level


 363 mg/dL


(70-99) 


 


 





 


Calcium Level


 7.8 mg/dL


(8.5-10.1) 


 


 





 


Phosphorus Level


 5.9 mg/dL


(2.6-4.7) 


 


 





 


Magnesium Level


 2.0 mg/dL


(1.8-2.4) 


 


 





 


Albumin


 1.1 g/dL


(3.4-5.0) 


 


 














Assessment and Plan


Assessmemt and Plan


Problems


Medical Problems:


(1) Abdominal pain


Status: Acute  











Comment


Review of Relevant


I have reviewed the following items sean (where applicable) has been applied.


Labs





Laboratory Tests








Test


 10/20/18


07:55 10/20/18


11:34 10/20/18


17:25 10/20/18


18:23


 


Body Fluid Source


 Perit


dialysate 


 


 





 


Body Fluid Color Yellow    


 


Body Fluid Clarity Clear    


 


Body Fluid Nucleated Cells 133 /cmm    


 


Body Fluid Mononuclear WBCs


(%) 96 % 


 


 


 





 


Body Fluid Polymorphonuclear


Cells 4 % 


 


 


 





 


Body Fluid Total RBCs Counted 100 /cmm    


 


Glucose (Fingerstick)


 


 176 mg/dL


(70-99) 56 mg/dL


(70-99) 129 mg/dL


(70-99)


 


Test


 10/20/18


21:29 10/21/18


08:50 10/21/18


12:02 10/21/18


16:50


 


Glucose (Fingerstick)


 104 mg/dL


(70-99) 


 136 mg/dL


(70-99) 





 


White Blood Count


 


 21.0 x10^3/uL


(4.0-11.0) 


 





 


Red Blood Count


 


 4.33 x10^6/uL


(4.30-5.70) 


 





 


Hemoglobin


 


 12.4 g/dL


(13.0-17.5) 


 





 


Hematocrit


 


 37.3 %


(39.0-53.0) 


 





 


Mean Corpuscular Volume  86 fL ()   


 


Mean Corpuscular Hemoglobin  29 pg (25-35)   


 


Mean Corpuscular Hemoglobin


Concent 


 33 g/dL


(31-37) 


 





 


Red Cell Distribution Width


 


 13.8 %


(11.5-14.5) 


 





 


Platelet Count


 


 362 x10^3/uL


(140-400) 


 





 


Neutrophils (%) (Auto)  93 % (31-73)   


 


Lymphocytes (%) (Auto)  2 % (24-48)   


 


Monocytes (%) (Auto)  5 % (0-9)   


 


Eosinophils (%) (Auto)  1 % (0-3)   


 


Basophils (%) (Auto)  0 % (0-3)   


 


Neutrophils # (Auto)


 


 19.5 x10^3uL


(1.8-7.7) 


 





 


Lymphocytes # (Auto)


 


 0.4 x10^3/uL


(1.0-4.8) 


 





 


Monocytes # (Auto)


 


 1.0 x10^3/uL


(0.0-1.1) 


 





 


Eosinophils # (Auto)


 


 0.1 x10^3/uL


(0.0-0.7) 


 





 


Basophils # (Auto)


 


 0.1 x10^3/uL


(0.0-0.2) 


 





 


Sodium Level


 


 130 mmol/L


(136-145) 


 





 


Potassium Level


 


 3.4 mmol/L


(3.5-5.1) 


 





 


Chloride Level


 


 91 mmol/L


() 


 





 


Carbon Dioxide Level


 


 25 mmol/L


(21-32) 


 





 


Anion Gap  14 (6-14)   


 


Blood Urea Nitrogen


 


 68 mg/dL


(8-26) 


 





 


Creatinine


 


 12.2 mg/dL


(0.7-1.3) 


 





 


Estimated GFR


(Cockcroft-Gault) 


 5.3 


 


 





 


Glucose Level


 


 89 mg/dL


(70-99) 


 





 


Calcium Level


 


 7.8 mg/dL


(8.5-10.1) 


 





 


Random Vancomycin Level    20.2 mcg/mL 


 


Test


 10/21/18


16:52 10/22/18


02:55 


 





 


Glucose (Fingerstick)


 155 mg/dL


(70-99) 


 


 





 


White Blood Count


 


 19.1 x10^3/uL


(4.0-11.0) 


 





 


Red Blood Count


 


 4.48 x10^6/uL


(4.30-5.70) 


 





 


Hemoglobin


 


 12.7 g/dL


(13.0-17.5) 


 





 


Hematocrit


 


 38.8 %


(39.0-53.0) 


 





 


Mean Corpuscular Volume  87 fL ()   


 


Mean Corpuscular Hemoglobin  28 pg (25-35)   


 


Mean Corpuscular Hemoglobin


Concent 


 33 g/dL


(31-37) 


 





 


Red Cell Distribution Width


 


 14.1 %


(11.5-14.5) 


 





 


Platelet Count


 


 404 x10^3/uL


(140-400) 


 





 


Neutrophils (%) (Auto)  94 % (31-73)   


 


Lymphocytes (%) (Auto)  1 % (24-48)   


 


Monocytes (%) (Auto)  5 % (0-9)   


 


Eosinophils (%) (Auto)  0 % (0-3)   


 


Basophils (%) (Auto)  0 % (0-3)   


 


Neutrophils # (Auto)


 


 17.9 x10^3uL


(1.8-7.7) 


 





 


Lymphocytes # (Auto)


 


 0.3 x10^3/uL


(1.0-4.8) 


 





 


Monocytes # (Auto)


 


 0.9 x10^3/uL


(0.0-1.1) 


 





 


Eosinophils # (Auto)


 


 0.0 x10^3/uL


(0.0-0.7) 


 





 


Basophils # (Auto)


 


 0.0 x10^3/uL


(0.0-0.2) 


 





 


Sodium Level


 


 129 mmol/L


(136-145) 


 





 


Potassium Level


 


 3.3 mmol/L


(3.5-5.1) 


 





 


Chloride Level


 


 91 mmol/L


() 


 





 


Carbon Dioxide Level


 


 26 mmol/L


(21-32) 


 





 


Anion Gap  12 (6-14)   


 


Blood Urea Nitrogen


 


 63 mg/dL


(8-26) 


 





 


Creatinine


 


 11.9 mg/dL


(0.7-1.3) 


 





 


Estimated GFR


(Cockcroft-Gault) 


 5.5 


 


 





 


Glucose Level


 


 363 mg/dL


(70-99) 


 





 


Calcium Level


 


 7.8 mg/dL


(8.5-10.1) 


 





 


Phosphorus Level


 


 5.9 mg/dL


(2.6-4.7) 


 





 


Magnesium Level


 


 2.0 mg/dL


(1.8-2.4) 


 





 


Albumin


 


 1.1 g/dL


(3.4-5.0) 


 











Laboratory Tests








Test


 10/21/18


08:50 10/21/18


12:02 10/21/18


16:50 10/21/18


16:52


 


White Blood Count


 21.0 x10^3/uL


(4.0-11.0) 


 


 





 


Red Blood Count


 4.33 x10^6/uL


(4.30-5.70) 


 


 





 


Hemoglobin


 12.4 g/dL


(13.0-17.5) 


 


 





 


Hematocrit


 37.3 %


(39.0-53.0) 


 


 





 


Mean Corpuscular Volume 86 fL ()    


 


Mean Corpuscular Hemoglobin 29 pg (25-35)    


 


Mean Corpuscular Hemoglobin


Concent 33 g/dL


(31-37) 


 


 





 


Red Cell Distribution Width


 13.8 %


(11.5-14.5) 


 


 





 


Platelet Count


 362 x10^3/uL


(140-400) 


 


 





 


Neutrophils (%) (Auto) 93 % (31-73)    


 


Lymphocytes (%) (Auto) 2 % (24-48)    


 


Monocytes (%) (Auto) 5 % (0-9)    


 


Eosinophils (%) (Auto) 1 % (0-3)    


 


Basophils (%) (Auto) 0 % (0-3)    


 


Neutrophils # (Auto)


 19.5 x10^3uL


(1.8-7.7) 


 


 





 


Lymphocytes # (Auto)


 0.4 x10^3/uL


(1.0-4.8) 


 


 





 


Monocytes # (Auto)


 1.0 x10^3/uL


(0.0-1.1) 


 


 





 


Eosinophils # (Auto)


 0.1 x10^3/uL


(0.0-0.7) 


 


 





 


Basophils # (Auto)


 0.1 x10^3/uL


(0.0-0.2) 


 


 





 


Sodium Level


 130 mmol/L


(136-145) 


 


 





 


Potassium Level


 3.4 mmol/L


(3.5-5.1) 


 


 





 


Chloride Level


 91 mmol/L


() 


 


 





 


Carbon Dioxide Level


 25 mmol/L


(21-32) 


 


 





 


Anion Gap 14 (6-14)    


 


Blood Urea Nitrogen


 68 mg/dL


(8-26) 


 


 





 


Creatinine


 12.2 mg/dL


(0.7-1.3) 


 


 





 


Estimated GFR


(Cockcroft-Gault) 5.3 


 


 


 





 


Glucose Level


 89 mg/dL


(70-99) 


 


 





 


Calcium Level


 7.8 mg/dL


(8.5-10.1) 


 


 





 


Glucose (Fingerstick)


 


 136 mg/dL


(70-99) 


 155 mg/dL


(70-99)


 


Random Vancomycin Level   20.2 mcg/mL  


 


Test


 10/22/18


02:55 


 


 





 


White Blood Count


 19.1 x10^3/uL


(4.0-11.0) 


 


 





 


Red Blood Count


 4.48 x10^6/uL


(4.30-5.70) 


 


 





 


Hemoglobin


 12.7 g/dL


(13.0-17.5) 


 


 





 


Hematocrit


 38.8 %


(39.0-53.0) 


 


 





 


Mean Corpuscular Volume 87 fL ()    


 


Mean Corpuscular Hemoglobin 28 pg (25-35)    


 


Mean Corpuscular Hemoglobin


Concent 33 g/dL


(31-37) 


 


 





 


Red Cell Distribution Width


 14.1 %


(11.5-14.5) 


 


 





 


Platelet Count


 404 x10^3/uL


(140-400) 


 


 





 


Neutrophils (%) (Auto) 94 % (31-73)    


 


Lymphocytes (%) (Auto) 1 % (24-48)    


 


Monocytes (%) (Auto) 5 % (0-9)    


 


Eosinophils (%) (Auto) 0 % (0-3)    


 


Basophils (%) (Auto) 0 % (0-3)    


 


Neutrophils # (Auto)


 17.9 x10^3uL


(1.8-7.7) 


 


 





 


Lymphocytes # (Auto)


 0.3 x10^3/uL


(1.0-4.8) 


 


 





 


Monocytes # (Auto)


 0.9 x10^3/uL


(0.0-1.1) 


 


 





 


Eosinophils # (Auto)


 0.0 x10^3/uL


(0.0-0.7) 


 


 





 


Basophils # (Auto)


 0.0 x10^3/uL


(0.0-0.2) 


 


 





 


Sodium Level


 129 mmol/L


(136-145) 


 


 





 


Potassium Level


 3.3 mmol/L


(3.5-5.1) 


 


 





 


Chloride Level


 91 mmol/L


() 


 


 





 


Carbon Dioxide Level


 26 mmol/L


(21-32) 


 


 





 


Anion Gap 12 (6-14)    


 


Blood Urea Nitrogen


 63 mg/dL


(8-26) 


 


 





 


Creatinine


 11.9 mg/dL


(0.7-1.3) 


 


 





 


Estimated GFR


(Cockcroft-Gault) 5.5 


 


 


 





 


Glucose Level


 363 mg/dL


(70-99) 


 


 





 


Calcium Level


 7.8 mg/dL


(8.5-10.1) 


 


 





 


Phosphorus Level


 5.9 mg/dL


(2.6-4.7) 


 


 





 


Magnesium Level


 2.0 mg/dL


(1.8-2.4) 


 


 





 


Albumin


 1.1 g/dL


(3.4-5.0) 


 


 











Microbiology


10/19/18 Blood Culture - Preliminary, Resulted


           NO GROWTH AFTER 2 DAYS


10/20/18 Anaerobic/Aerobic Culture, Resulted


           Pending


10/20/18 Anaerobic Culture Result 1 (ANTHONY), Resulted


           Pending


10/20/18 Aerobic Culture, Resulted


           Pending


10/20/18 Aerobic Culture Result 1 (ANTHONY), Resulted


           Pending


10/20/18 Gram Stain - Final, Resulted


           


10/20/18 Gram Stain Result 1 (ANTHONY) - Final, Resulted


           


10/20/18 Gram Stain Result 2 (ANTHONY) - Final, Resulted


           


10/21/18 Anaerobic/Aerobic Culture, Resulted


           Pending


10/21/18 Anaerobic Culture Result 1 (ANTHONY), Resulted


           Pending


10/21/18 Aerobic Culture, Resulted


           Pending


10/21/18 Aerobic Culture Result 1 (ANTHONY), Resulted


           Pending


10/21/18 Gram Stain - Final, Resulted


           


10/21/18 Gram Stain Result 1 (ANTHONY) - Final, Resulted


           


10/21/18 Gram Stain Result 2 (ANTHONY) - Final, Resulted


Medications





Current Medications


Sodium Chloride 250 ml @  250 mls/hr 1X  ONCE IV ;  Start 10/19/18 at 15:45;  

Stop 10/19/18 at 16:44;  Status Cancel


Piperacillin Sod/ Tazobactam Sod (Zosyn Per Pharmacy) 1 each PRN DAILY  PRN MC 

SEE COMMENTS;  Start 10/19/18 at 15:45;  Status UNV


Piperacillin Sod/ Tazobactam Sod 2.25 gm/Sodium Chloride 50 ml @  100 mls/hr 1X

  ONCE IV  Last administered on 10/19/18at 16:49;  Start 10/19/18 at 16:00;  

Stop 10/19/18 at 16:29;  Status DC


Ondansetron HCl (Zofran) 4 mg 1X  ONCE IV  Last administered on 10/19/18at 16:12

;  Start 10/19/18 at 16:00;  Stop 10/19/18 at 16:01;  Status DC


Hydromorphone HCl (Dilaudid) 0.5 mg PRN Q30MIN  PRN IV SEVERE PAIN Last 

administered on 10/21/18at 18:00;  Start 10/19/18 at 16:15;  Stop 10/21/18 at 18

:00;  Status DC


Hydromorphone HCl (Dilaudid) 2 mg STK-MED ONCE .ROUTE ;  Start 10/19/18 at 16:10

;  Stop 10/19/18 at 16:11;  Status DC


Sodium Chloride 500 ml @  500 mls/hr 1X  ONCE IV  Last administered on 10/19/

18at 16:49;  Start 10/19/18 at 16:30;  Stop 10/19/18 at 17:29;  Status DC


Aspirin (Children'S Aspirin) 324 mg 1X  ONCE PO  Last administered on 10/19/

18at 17:52;  Start 10/19/18 at 17:15;  Stop 10/19/18 at 17:16;  Status DC


Vancomycin HCl (Vanco Per Pharmacy) 1 each PRN DAILY  PRN MC SEE COMMENTS;  

Start 10/19/18 at 17:15;  Status UNV


Sodium Chloride 500 ml @  500 mls/hr 1X  ONCE IV  Last administered on 10/19/

18at 17:51;  Start 10/19/18 at 17:15;  Stop 10/19/18 at 18:14;  Status DC


Vancomycin HCl 1.75 gm/Sodium Chloride 500 ml @  250 mls/hr 1X  ONCE IV  Last 

administered on 10/19/18at 17:25;  Start 10/19/18 at 17:30;  Stop 10/19/18 at 19

:29;  Status DC


Acetaminophen (Tylenol) 650 mg PRN Q6HRS  PRN PO FEVER Last administered on 10/

21/18at 17:14;  Start 10/19/18 at 17:30


Ondansetron HCl (Zofran) 4 mg PRN Q6HRS  PRN IV NAUSEA/VOMITING, 1st IV CHOICE 

Last administered on 10/21/18at 17:17;  Start 10/19/18 at 17:30


Morphine Sulfate (Morphine Sulfate) 2 mg PRN Q2HR  PRN IV MILD-MODERATE PAIN;  

Start 10/19/18 at 17:30


Tramadol HCl (Ultram) 50 mg PRN Q6HRS  PRN PO MILD TO MODERATE PAIN Last 

administered on 10/21/18at 18:13;  Start 10/19/18 at 17:30


Docusate Sodium (Colace) 100 mg PRN DAILY  PRN PO CONSTIPATION 1ST CHOICE;  

Start 10/19/18 at 17:30


Piperacillin Sod/ Tazobactam Sod 3.375 gm/Sodium Chloride 50 ml @  100 mls/hr 

Q6HRS IV ;  Start 10/19/18 at 18:00;  Status UNV


Piperacillin Sod/ Tazobactam Sod (Zosyn Per Pharmacy) 1 each PRN DAILY  PRN MC 

SEE COMMENTS;  Start 10/19/18 at 17:30;  Status UNV


Vancomycin HCl (Vanco Per Pharmacy) 1 each PRN DAILY  PRN MC SEE COMMENTS Last 

administered on 10/21/18at 18:49;  Start 10/19/18 at 17:30


Insulin Human Lispro (HumaLOG) 0-9 UNITS TIDWMEALS SQ  Last administered on 10/

21/18at 17:08;  Start 10/20/18 at 08:00


Dextrose (Dextrose 50%-Water Syringe) 12.5 gm PRN Q15MIN  PRN IV SEE COMMENTS 

Last administered on 10/20/18at 17:32;  Start 10/19/18 at 17:30


Heparin Sodium (Porcine) (Heparin Sodium) 5,000 unit Q8HRS SQ  Last 

administered on 10/22/18at 06:08;  Start 10/19/18 at 22:00


Senna/Docusate Sodium (Senna Plus) 1 tab BID PO  Last administered on 10/21/

18at 08:54;  Start 10/19/18 at 21:00


Docusate Sodium (Colace) 100 mg BID PO  Last administered on 10/21/18at 08:55;  

Start 10/19/18 at 21:00


Magnesium Hydroxide (Milk Of Magnesia) 2,400 mg PRN Q12HR  PRN PO CONSTIPATION 

2ND CHOICE;  Start 10/19/18 at 17:45


Sodium Chloride 1,000 ml @  75 mls/hr 1X  ONCE IV ;  Start 10/19/18 at 17:45;  

Stop 10/19/18 at 19:47;  Status DC


Pantoprazole Sodium (PROTONIX VIAL for IV PUSH) 40 mg DAILYAC IVP  Last 

administered on 10/21/18at 08:54;  Start 10/20/18 at 07:30


Piperacillin Sod/ Tazobactam Sod 2.25 gm/Sodium Chloride 50 ml @  100 mls/hr 

Q6HRS IV ;  Start 10/19/18 at 18:00;  Stop 10/19/18 at 18:07;  Status DC


Vancomycin HCl (Vancomycin Random Level) 1 each 1X  ONCE MC  Last administered 

on 10/21/18at 17:00;  Start 10/21/18 at 17:00;  Stop 10/21/18 at 17:01;  Status 

DC


Piperacillin Sod/ Tazobactam Sod 2.25 gm/Sodium Chloride 50 ml @  100 mls/hr 

Q8HRS IV  Last administered on 10/22/18at 06:06;  Start 10/19/18 at 22:00


Promethazine HCl (Phenergan) 12.5 mg PRN Q4HRS  PRN PO NAUSEA/VOMITING Last 

administered on 10/20/18at 21:43;  Start 10/20/18 at 01:15


Potassium Chloride (Klor-Con) 40 meq Q2H PO  Last administered on 10/20/18at 09:

13;  Start 10/20/18 at 07:00;  Stop 10/20/18 at 09:01;  Status DC


Aspirin (Children'S Aspirin) 81 mg DAILYWBKFT PO  Last administered on 10/21/

18at 08:54;  Start 10/20/18 at 09:00


Carvedilol (Coreg) 12.5 mg BIDWMEALS PO ;  Start 10/20/18 at 09:00


Calcium Acetate (Phoslo) 667 mg TIDWMEALS PO  Last administered on 10/21/18at 17

:06;  Start 10/20/18 at 09:00


Vitamin D (Vitamin D3) 5,000 unit DAILY PO  Last administered on 10/21/18at 08:

54;  Start 10/20/18 at 09:00


Vitamin B Complex/ Vitamin C (Alison-Aime) 1 tab DAILY PO  Last administered on 10

/21/18at 08:54;  Start 10/20/18 at 09:00


Insulin Glargine (Lantus) 15 units QHS SQ  Last administered on 10/20/18at 21:43

;  Start 10/20/18 at 21:00


Baclofen (Lioresal) 10 mg PRN TID  PRN PO for Hiccups Last administered on 10/21

/18at 08:54;  Start 10/20/18 at 11:30


Metoclopramide HCl (Reglan) 5 mg PRN BFRMEALHC  PRN PO ;  Start 10/20/18 at 12:

00;  Stop 10/20/18 at 12:00;  Status DC


Norepinephrine Bitartrate 250 ml @  1.875 mls/ hr CONT  PRN IV SEE I/O RECORD 

Last administered on 10/21/18at 18:37;  Start 10/20/18 at 21:00


Metoclopramide HCl (Reglan) 5 mg PRN BFRMEALHC  PRN PO NAUSEA/VOMITING;  Start 

10/21/18 at 08:45


Silver Sulfadiazine (Silvadene) 1 justo DAILY TP  Last administered on 10/21/18at 

11:30;  Start 10/21/18 at 11:30


Magnesium Sulfate 50 ml @ 25 mls/hr PRN DAILY  PRN IV for Mag < 1.7 on am labs;

  Start 10/21/18 at 11:45


Sodium Chloride 1,000 ml @  75 mls/hr [id number] IV  Last administered on 10/22/

18at 01:46;  Start 10/21/18 at 11:45


Nystatin (Nystatin Oral Susp) 5 ml [id number]  Last administered on 10/21/

18at 17:06;  Start 10/21/18 at 13:00


Vancomycin HCl (Vancomycin Oral Solution) 125 mg [id number] PO  Last administered 

on 10/21/18at 17:06;  Start 10/21/18 at 14:00


Potassium Chloride (Klor-Con) 40 meq 1X  ONCE PO  Last administered on 10/21/

18at 13:42;  Start 10/21/18 at 14:00;  Stop 10/21/18 at 14:01;  Status DC


Potassium Chloride (Klor-Con) 40 meq 1X  ONCE PO ;  Start 10/21/18 at 21:00;  

Stop 10/21/18 at 21:01;  Status DC


Vancomycin HCl 500 mg/Sodium Chloride 100 ml @  100 mls/hr 1X  ONCE IV  Last 

administered on 10/21/18at 21:35;  Start 10/21/18 at 21:00;  Stop 10/21/18 at 21

:59;  Status DC


Metoclopramide HCl (Reglan Vial) 5 mg PRN Q6HRS  PRN IV NAUSEA/VOMITING, 2nd IV 

CHOICE Last administered on 10/22/18at 01:46;  Start 10/21/18 at 19:15


Prochlorperazine Edisylate (Compazine) 10 mg PRN Q6HRS  PRN IV NAUSEA/VOMITING, 

3rd IV CHOICE;  Start 10/21/18 at 19:15





Active Scripts


Active


Reported


Metolazone 5 Mg Tablet 5 Mg PO DAILY


Vitamin D3 (Cholecalciferol (Vitamin D3)) 5,000 Unit Tablet 1 Tab PO DAILY


Calcium Acetate 667 Mg Tablet 667 Mg PO TIDWMEALS


Renal Caps Softgel (Folic Acid/Vitamin B Comp W-C) 1 Mg Capsule 1 Cap PO DAILY


Tresiba Flextouch U-100 (Insulin Degludec) 100 Unit/1 Ml Insuln.pen 100 Unit SQ 


Carvedilol 12.5 Mg Tablet 1 Tab PO BIDWMEALS


Furosemide 40 Mg Tablet 1 Tab PO DAILY


Aspirin 81 Mg Tab.chew 81 Mg PO DAILYWBKFT


Vitals/I & O





Vital Sign - Last 24 Hours








 10/21/18 10/21/18 10/21/18 10/21/18





 08:00 08:00 09:00 10:00


 


Temp  98.4  





  98.4  


 


Pulse  102 108 114


 


Resp  18 26 31


 


B/P (MAP)  94/65 (75) 92/67 (75) 100/64 (76)


 


Pulse Ox  94 94 95


 


O2 Delivery Room Air Room Air Room Air Room Air


 


    





    





 10/21/18 10/21/18 10/21/18 10/21/18





 11:00 12:00 12:00 13:00


 


Temp   98.4 





   98.4 


 


Pulse 106  113 110


 


Resp 20  24 18


 


B/P (MAP) 98/66 (77)  96/60 (72) 72/51 (58)


 


Pulse Ox 95  100 92


 


O2 Delivery Room Air Room Air Room Air Room Air


 


    





    





 10/21/18 10/21/18 10/21/18 10/21/18





 14:00 14:32 15:00 16:00


 


Temp    97.5





    97.5


 


Pulse 87 87 84 89


 


Resp 16  23 13


 


B/P (MAP) 81/55 (64) 81/55 81/57 (65) 83/55 (64)


 


O2 Delivery Room Air  Room Air Room Air


 


    





    





 10/21/18 10/21/18 10/21/18 10/21/18





 16:00 17:00 18:00 18:00


 


Pulse  97  98


 


Resp  22 22 27


 


B/P (MAP)  98/69 (79)  77/61 (66)


 


O2 Delivery Room Air Room Air Room Air Room Air





 10/21/18 10/21/18 10/21/18 10/21/18





 18:13 18:30 18:47 19:23


 


Pulse  112 118 


 


Resp 24 27  28


 


B/P (MAP)  80/62 (68) 92/73 (79) 


 


O2 Delivery Room Air Room Air  Room Air





 10/21/18 10/21/18 10/21/18 10/21/18





 20:00 21:00 22:00 23:00


 


Temp 98.0   





 98.0   


 


Pulse 90 74 75 74


 


Resp 17 13 22 18


 


B/P (MAP) 112/63 (79) 111/73 (86) 74/57 (63) 112/78 (89)


 


Pulse Ox 98   


 


O2 Delivery Room Air Room Air Room Air Room Air


 


    





    





 10/22/18 10/22/18 10/22/18 10/22/18





 00:00 01:00 02:00 03:00


 


Temp 97.9   





 97.9   


 


Pulse 79 78 96 85


 


Resp 17 12 16 13


 


B/P (MAP) 106/69 (81) 111/73 (86) 100/70 (80) 108/69 (82)


 


Pulse Ox 99   


 


O2 Delivery Room Air Room Air Room Air Room Air


 


    





    





 10/22/18 10/22/18 10/22/18 





 04:00 05:00 06:00 


 


Temp 98.1   





 98.1   


 


Pulse 98 97 85 


 


Resp 15 18 13 


 


B/P (MAP) 95/69 (78) 97/67 (77) 102/66 (78) 


 


Pulse Ox 99   


 


O2 Delivery Room Air Room Air Room Air 














Intake and Output   


 


 10/21/18 10/21/18 10/22/18





 15:00 23:00 07:00


 


Intake Total 480 ml 120 ml 2101.7 ml


 


Output Total 604 ml 1 ml 


 


Balance -124 ml 119 ml 2101.7 ml

















SHERI HANNAH MD Oct 22, 2018 07:31

## 2018-10-22 NOTE — CARD
MR#: [id number]

Account#: [id number]

Accession#: [id number]

Date of Study: 10/22/2018

Ordering Physician: AME HARDWICK, 

Referring Physician: STEPH PERRY 

Tech: Sonia Severino RDCS





--------------- APPROVED REPORT --------------





EXAM: Two-dimensional and M-mode echocardiogram with Doppler and color Doppler.



Other Information 

Quality : Good



INDICATION

Congestive Heart Failure 



Surgery/Intervention

ICD/Pacemaker: Date: 2011



2D DIMENSIONS 

RVDd2.7 (2.9-3.5cm)Left Atrium(2D)4.6 (1.6-4.0cm)

IVSd1.3 (0.7-1.1cm)Aortic Root(2D)3.2 (2.0-3.7cm)

LVDd7.3 (3.9-5.9cm)LVOT Diameter2.2 (1.8-2.4cm)

PWd1.3 (0.7-1.1cm)LVDs6.8 (2.5-4.0cm)

FS (%) 7.0 %SV42.2 ml

LVEF(%)15.1 (>50%)



M-Mode DIMENSIONS 

IVSd1.43 (0.7-1.1cm)LVDd5.93 (4.0-5.6cm)

MV EPSS2.3 (<0.5cm)



Aortic Valve

AoV Peak Kirk.95.8cm/sAoV VTI13.9cm

AO Peak GR.3.7mmHgLVOT  VTI 9.22cm

AO Mean GR.2mmHgAVA   (VTI)2.60cm2

AI P 1/2 [id number]



Mitral Valve

MV E Ncxsnvju24.5cm/sMV DECEL [id number]

MV A Vkczrhtt17.4cm/sE/A  Ratio2.4



TDI

Lateral E' P. V3.52cm/sMedial E' P. V1.72cm/s

E/Lateral E'19.2E/Medial E'39.2



Tricuspid Valve

TR P. Hkmnoztt816ai/sRAP WAPCDCQI1diTc

TR Peak Gr.06xeBpQHNB17ztPu



 LEFT VENTRICLE 

The Left Ventricle is moderately dilated. There is mild concentric left ventricular hypertrophy. Left
 ventricle systolic function is severely impaired. The Ejection Fraction is 10-15%. There is severe g
lobal hypokinesis of the left ventricle. Transmitral Doppler flow pattern is Grade II-pseudonormal fi
lling dynamics.



 RIGHT VENTRICLE 

The right ventricle is normal size. The right ventricular systolic function is normal. There is a pac
emaker lead in the right ventricle.



 ATRIA 

The left atrium is mildly dilated. The right atrium is mildly dilated. A pacemaker is seen in the rig
ht atrium consistent with history. The interatrial septum is intact with no evidence for an atrial se
ptal defect or patent foramen ovale as noted on 2-D or Doppler imaging.



 AORTIC VALVE 

The aortic valve is calcified but opens well. Doppler and Color Flow revealed mild aortic regurgitati
on. There is no significant aortic valvular stenosis.



 MITRAL VALVE 

The mitral valve is normal in structure and function. There is no evidence of mitral valve prolapse. 
There is no mitral valve stenosis. Doppler and Color-flow revealed trace to mild mitral regurgitation
.



 TRICUSPID VALVE 

The tricuspid valve is normal in structure and function. Doppler and Color Flow revealed moderate tri
cuspid regurgitation. There is mild to moderate pulmonary hypertension. The PA pressure was estimated
 at 41 mmHg. There is no tricuspid valve stenosis.



 PULMONIC VALVE 

The pulmonary valve is normal in structure and function. Doppler and Color Flow revealed mild pulmoni
c valvular regurgitation. There is no pulmonic valvular stenosis.



 GREAT VESSELS 

The aortic root is normal in size. The ascending aorta is normal in size. The IVC is normal in size a
nd collapses >50% with inspiration.



 PERICARDIAL EFFUSION 

There is no evidence of significant pericardial effusion.



Critical Notification

Critical Value: No



<Conclusion>

Left ventricle systolic function is severely impaired.

The Ejection Fraction is 10-15%.

Pacemaker/ICD lead noted in the right atrium and ventricle.

Mild aortic regurgitation.

Trace to mild mitral regurgitation.

Moderate tricuspid regurgitation.

The PA pressure was estimated at 41 mmHg.

There is no evidence of significant pericardial effusion.



Signed by : Espinoza Malone, 

Electronically Approved : 10/22/2018 10:38:55

## 2018-10-22 NOTE — PDOC
Infectious Disease Note


Subjective


Subjective


pt is feeling better





ROS


ROS


cont to have n/v and diarrhea


no fever


no pain





Vital Sign


Vital Signs





Vital Signs








  Date Time  Temp Pulse Resp B/P (MAP) Pulse Ox O2 Delivery O2 Flow Rate FiO2


 


10/22/18 07:00  87 15 106/66 (79)  Room Air  


 


10/22/18 04:00 98.1    99   





 98.1       











Physical Exam


PHYSICAL EXAM


GENERAL:  Propped up in bed, resting quietly 


HEENT:  Normal conjunctivae.  Oral cavity:  Pharynx pink and moist.


NECK:  Supple.


LUNGS:  Clear to auscultation.


HEART:  S1 and S2.  Left-sided chest AICD site unremarkable for infection.


ABDOMEN:  Mildly distended.  Bowel sounds active, soft, nontender.  Peritoneal


catheter without redness or drainage.


EXTREMITIES:  No gross edema or cyanosis.  Ulcer left foot. Distal pulses weak.


SKIN:  Warm without rash.


PIV





Labs


Lab





Laboratory Tests








Test


 10/21/18


08:50 10/21/18


12:02 10/21/18


16:50 10/21/18


16:52


 


White Blood Count


 21.0 x10^3/uL


(4.0-11.0) 


 


 





 


Red Blood Count


 4.33 x10^6/uL


(4.30-5.70) 


 


 





 


Hemoglobin


 12.4 g/dL


(13.0-17.5) 


 


 





 


Hematocrit


 37.3 %


(39.0-53.0) 


 


 





 


Mean Corpuscular Volume 86 fL ()    


 


Mean Corpuscular Hemoglobin 29 pg (25-35)    


 


Mean Corpuscular Hemoglobin


Concent 33 g/dL


(31-37) 


 


 





 


Red Cell Distribution Width


 13.8 %


(11.5-14.5) 


 


 





 


Platelet Count


 362 x10^3/uL


(140-400) 


 


 





 


Neutrophils (%) (Auto) 93 % (31-73)    


 


Lymphocytes (%) (Auto) 2 % (24-48)    


 


Monocytes (%) (Auto) 5 % (0-9)    


 


Eosinophils (%) (Auto) 1 % (0-3)    


 


Basophils (%) (Auto) 0 % (0-3)    


 


Neutrophils # (Auto)


 19.5 x10^3uL


(1.8-7.7) 


 


 





 


Lymphocytes # (Auto)


 0.4 x10^3/uL


(1.0-4.8) 


 


 





 


Monocytes # (Auto)


 1.0 x10^3/uL


(0.0-1.1) 


 


 





 


Eosinophils # (Auto)


 0.1 x10^3/uL


(0.0-0.7) 


 


 





 


Basophils # (Auto)


 0.1 x10^3/uL


(0.0-0.2) 


 


 





 


Sodium Level


 130 mmol/L


(136-145) 


 


 





 


Potassium Level


 3.4 mmol/L


(3.5-5.1) 


 


 





 


Chloride Level


 91 mmol/L


() 


 


 





 


Carbon Dioxide Level


 25 mmol/L


(21-32) 


 


 





 


Anion Gap 14 (6-14)    


 


Blood Urea Nitrogen


 68 mg/dL


(8-26) 


 


 





 


Creatinine


 12.2 mg/dL


(0.7-1.3) 


 


 





 


Estimated GFR


(Cockcroft-Gault) 5.3 


 


 


 





 


Glucose Level


 89 mg/dL


(70-99) 


 


 





 


Calcium Level


 7.8 mg/dL


(8.5-10.1) 


 


 





 


Glucose (Fingerstick)


 


 136 mg/dL


(70-99) 


 155 mg/dL


(70-99)


 


Random Vancomycin Level   20.2 mcg/mL  


 


Test


 10/22/18


02:55 


 


 





 


White Blood Count


 19.1 x10^3/uL


(4.0-11.0) 


 


 





 


Red Blood Count


 4.48 x10^6/uL


(4.30-5.70) 


 


 





 


Hemoglobin


 12.7 g/dL


(13.0-17.5) 


 


 





 


Hematocrit


 38.8 %


(39.0-53.0) 


 


 





 


Mean Corpuscular Volume 87 fL ()    


 


Mean Corpuscular Hemoglobin 28 pg (25-35)    


 


Mean Corpuscular Hemoglobin


Concent 33 g/dL


(31-37) 


 


 





 


Red Cell Distribution Width


 14.1 %


(11.5-14.5) 


 


 





 


Platelet Count


 404 x10^3/uL


(140-400) 


 


 





 


Neutrophils (%) (Auto) 94 % (31-73)    


 


Lymphocytes (%) (Auto) 1 % (24-48)    


 


Monocytes (%) (Auto) 5 % (0-9)    


 


Eosinophils (%) (Auto) 0 % (0-3)    


 


Basophils (%) (Auto) 0 % (0-3)    


 


Neutrophils # (Auto)


 17.9 x10^3uL


(1.8-7.7) 


 


 





 


Lymphocytes # (Auto)


 0.3 x10^3/uL


(1.0-4.8) 


 


 





 


Monocytes # (Auto)


 0.9 x10^3/uL


(0.0-1.1) 


 


 





 


Eosinophils # (Auto)


 0.0 x10^3/uL


(0.0-0.7) 


 


 





 


Basophils # (Auto)


 0.0 x10^3/uL


(0.0-0.2) 


 


 





 


Sodium Level


 129 mmol/L


(136-145) 


 


 





 


Potassium Level


 3.3 mmol/L


(3.5-5.1) 


 


 





 


Chloride Level


 91 mmol/L


() 


 


 





 


Carbon Dioxide Level


 26 mmol/L


(21-32) 


 


 





 


Anion Gap 12 (6-14)    


 


Blood Urea Nitrogen


 63 mg/dL


(8-26) 


 


 





 


Creatinine


 11.9 mg/dL


(0.7-1.3) 


 


 





 


Estimated GFR


(Cockcroft-Gault) 5.5 


 


 


 





 


Glucose Level


 363 mg/dL


(70-99) 


 


 





 


Calcium Level


 7.8 mg/dL


(8.5-10.1) 


 


 





 


Phosphorus Level


 5.9 mg/dL


(2.6-4.7) 


 


 





 


Magnesium Level


 2.0 mg/dL


(1.8-2.4) 


 


 





 


Albumin


 1.1 g/dL


(3.4-5.0) 


 


 











Micro





Microbiology


10/19/18 Blood Culture - Preliminary, Resulted


           NO GROWTH AFTER 2 DAYS


10/20/18 Anaerobic/Aerobic Culture, Resulted


           Pending


10/20/18 Anaerobic Culture Result 1 (ANTHONY), Resulted


           Pending


10/20/18 Aerobic Culture, Resulted


           Pending


10/20/18 Aerobic Culture Result 1 (ANTHONY), Resulted


           Pending


10/20/18 Gram Stain - Final, Resulted


           


10/20/18 Gram Stain Result 1 (ANTHONY) - Final, Resulted


           


10/20/18 Gram Stain Result 2 (ANTHONY) - Final, Resulted


           


10/21/18 Anaerobic/Aerobic Culture, Resulted


           Pending


10/21/18 Anaerobic Culture Result 1 (ANTHONY), Resulted


           Pending


10/21/18 Aerobic Culture, Resulted


           Pending


10/21/18 Aerobic Culture Result 1 (ANTHONY), Resulted


           Pending


10/21/18 Gram Stain - Final, Resulted


           


10/21/18 Gram Stain Result 1 (ANTHONY) - Final, Resulted


           


10/21/18 Gram Stain Result 2 (ANTHONY) - Final, Resulted





Objective


Assessment


Sepsis POA


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 


Fever


Leukocytosis


Abdominal pain


   -Dialysate clear, . culture in process 


Left lingula consolidation 


Diarrhea 


ESRD on peritoneal dialysis


   - catheter changed 2015  


s/p AICD generator change on 10/15/18.


Chronic systolic CHF





Plan


Plan of Care


F/U C diff pcr


Culture of left foot ulcer


Continue vanc and Zosyn, renal dosing


 on po vanco 125mg po bid empirically


Local wound 


Monitor labs/temp


f/u c/s











MACHO HARDWICK MD Oct 22, 2018 08:06

## 2018-10-23 VITALS — DIASTOLIC BLOOD PRESSURE: 71 MMHG | SYSTOLIC BLOOD PRESSURE: 94 MMHG

## 2018-10-23 VITALS — SYSTOLIC BLOOD PRESSURE: 96 MMHG | DIASTOLIC BLOOD PRESSURE: 72 MMHG

## 2018-10-23 VITALS — DIASTOLIC BLOOD PRESSURE: 64 MMHG | SYSTOLIC BLOOD PRESSURE: 95 MMHG

## 2018-10-23 VITALS — DIASTOLIC BLOOD PRESSURE: 64 MMHG | SYSTOLIC BLOOD PRESSURE: 89 MMHG

## 2018-10-23 VITALS — SYSTOLIC BLOOD PRESSURE: 101 MMHG | DIASTOLIC BLOOD PRESSURE: 64 MMHG

## 2018-10-23 VITALS — SYSTOLIC BLOOD PRESSURE: 94 MMHG | DIASTOLIC BLOOD PRESSURE: 64 MMHG

## 2018-10-23 VITALS — DIASTOLIC BLOOD PRESSURE: 62 MMHG | SYSTOLIC BLOOD PRESSURE: 84 MMHG

## 2018-10-23 VITALS — SYSTOLIC BLOOD PRESSURE: 99 MMHG | DIASTOLIC BLOOD PRESSURE: 71 MMHG

## 2018-10-23 VITALS — DIASTOLIC BLOOD PRESSURE: 64 MMHG | SYSTOLIC BLOOD PRESSURE: 86 MMHG

## 2018-10-23 VITALS — SYSTOLIC BLOOD PRESSURE: 75 MMHG | DIASTOLIC BLOOD PRESSURE: 51 MMHG

## 2018-10-23 VITALS — DIASTOLIC BLOOD PRESSURE: 66 MMHG | SYSTOLIC BLOOD PRESSURE: 98 MMHG

## 2018-10-23 LAB
ALBUMIN SERPL-MCNC: 1.1 G/DL (ref 3.4–5)
ANION GAP SERPL CALC-SCNC: 13 MMOL/L (ref 6–14)
BASOPHILS # BLD AUTO: 0 X10^3/UL (ref 0–0.2)
BASOPHILS NFR BLD: 0 % (ref 0–3)
BUN SERPL-MCNC: 54 MG/DL (ref 8–26)
CALCIUM SERPL-MCNC: 7 MG/DL (ref 8.5–10.1)
CHLORIDE SERPL-SCNC: 92 MMOL/L (ref 98–107)
CO2 SERPL-SCNC: 23 MMOL/L (ref 21–32)
CREAT SERPL-MCNC: 10.5 MG/DL (ref 0.7–1.3)
EOSINOPHIL NFR BLD: 0.4 X10^3/UL (ref 0–0.7)
EOSINOPHIL NFR BLD: 5 % (ref 0–3)
ERYTHROCYTE [DISTWIDTH] IN BLOOD BY AUTOMATED COUNT: 13.9 % (ref 11.5–14.5)
GFR SERPLBLD BASED ON 1.73 SQ M-ARVRAT: 6.3 ML/MIN
GLUCOSE SERPL-MCNC: 490 MG/DL (ref 70–99)
HBA1C MFR BLD: 10 % (ref 4.8–5.6)
HCT VFR BLD CALC: 33.2 % (ref 39–53)
HGB BLD-MCNC: 10.9 G/DL (ref 13–17.5)
LYMPHOCYTES # BLD: 0.5 X10^3/UL (ref 1–4.8)
LYMPHOCYTES NFR BLD AUTO: 6 % (ref 24–48)
MAGNESIUM SERPL-MCNC: 1.8 MG/DL (ref 1.8–2.4)
MCH RBC QN AUTO: 28 PG (ref 25–35)
MCHC RBC AUTO-ENTMCNC: 33 G/DL (ref 31–37)
MCV RBC AUTO: 87 FL (ref 79–100)
MONO #: 0.7 X10^3/UL (ref 0–1.1)
MONOCYTES NFR BLD: 9 % (ref 0–9)
NEUT #: 6.2 X10^3UL (ref 1.8–7.7)
NEUTROPHILS NFR BLD AUTO: 80 % (ref 31–73)
PHOSPHATE SERPL-MCNC: 5.2 MG/DL (ref 2.6–4.7)
PLATELET # BLD AUTO: 342 X10^3/UL (ref 140–400)
POTASSIUM SERPL-SCNC: 3.7 MMOL/L (ref 3.5–5.1)
RBC # BLD AUTO: 3.82 X10^6/UL (ref 4.3–5.7)
SODIUM SERPL-SCNC: 128 MMOL/L (ref 136–145)
WBC # BLD AUTO: 7.7 X10^3/UL (ref 4–11)

## 2018-10-23 RX ADMIN — INSULIN LISPRO SCH UNITS: 100 INJECTION, SOLUTION INTRAVENOUS; SUBCUTANEOUS at 13:21

## 2018-10-23 RX ADMIN — NYSTATIN SCH ML: 100000 SUSPENSION ORAL at 09:45

## 2018-10-23 RX ADMIN — INSULIN LISPRO SCH UNITS: 100 INJECTION, SOLUTION INTRAVENOUS; SUBCUTANEOUS at 13:20

## 2018-10-23 RX ADMIN — CARVEDILOL SCH MG: 12.5 TABLET, FILM COATED ORAL at 17:55

## 2018-10-23 RX ADMIN — INSULIN LISPRO SCH UNITS: 100 INJECTION, SOLUTION INTRAVENOUS; SUBCUTANEOUS at 17:00

## 2018-10-23 RX ADMIN — BACITRACIN SCH MLS/HR: 5000 INJECTION, POWDER, FOR SOLUTION INTRAMUSCULAR at 03:45

## 2018-10-23 RX ADMIN — VANCOMYCIN HYDROCHLORIDE SCH MG: 500 INJECTION, POWDER, LYOPHILIZED, FOR SOLUTION INTRAVENOUS at 21:01

## 2018-10-23 RX ADMIN — ASPIRIN 81 MG SCH MG: 81 TABLET ORAL at 09:41

## 2018-10-23 RX ADMIN — Medication SCH TAB: at 09:42

## 2018-10-23 RX ADMIN — Medication SCH CAP: at 21:01

## 2018-10-23 RX ADMIN — PIPERACILLIN SODIUM AND TAZOBACTAM SODIUM SCH MLS/HR: 2; .25 INJECTION, POWDER, LYOPHILIZED, FOR SOLUTION INTRAVENOUS at 21:01

## 2018-10-23 RX ADMIN — INSULIN LISPRO SCH UNITS: 100 INJECTION, SOLUTION INTRAVENOUS; SUBCUTANEOUS at 08:00

## 2018-10-23 RX ADMIN — SILVER SULFADIAZINE SCH APP: 10 CREAM TOPICAL at 09:00

## 2018-10-23 RX ADMIN — HEPARIN SODIUM SCH UNIT: 5000 INJECTION, SOLUTION INTRAVENOUS; SUBCUTANEOUS at 21:04

## 2018-10-23 RX ADMIN — CALCIUM ACETATE SCH MG: 667 CAPSULE ORAL at 09:40

## 2018-10-23 RX ADMIN — NYSTATIN SCH ML: 100000 SUSPENSION ORAL at 17:54

## 2018-10-23 RX ADMIN — MUPIROCIN SCH APP: 20 OINTMENT TOPICAL at 10:05

## 2018-10-23 RX ADMIN — PIPERACILLIN SODIUM AND TAZOBACTAM SODIUM SCH MLS/HR: 2; .25 INJECTION, POWDER, LYOPHILIZED, FOR SOLUTION INTRAVENOUS at 06:22

## 2018-10-23 RX ADMIN — MUPIROCIN SCH APP: 20 OINTMENT TOPICAL at 21:07

## 2018-10-23 RX ADMIN — CALCIUM ACETATE SCH MG: 667 CAPSULE ORAL at 17:54

## 2018-10-23 RX ADMIN — CARVEDILOL SCH MG: 12.5 TABLET, FILM COATED ORAL at 09:44

## 2018-10-23 RX ADMIN — HEPARIN SODIUM SCH UNIT: 5000 INJECTION, SOLUTION INTRAVENOUS; SUBCUTANEOUS at 14:50

## 2018-10-23 RX ADMIN — INSULIN GLARGINE SCH UNITS: 100 INJECTION, SOLUTION SUBCUTANEOUS at 21:03

## 2018-10-23 RX ADMIN — VANCOMYCIN HYDROCHLORIDE SCH MG: 500 INJECTION, POWDER, LYOPHILIZED, FOR SOLUTION INTRAVENOUS at 17:55

## 2018-10-23 RX ADMIN — HEPARIN SODIUM SCH UNIT: 5000 INJECTION, SOLUTION INTRAVENOUS; SUBCUTANEOUS at 06:00

## 2018-10-23 RX ADMIN — DOCUSATE SODIUM SCH MG: 100 CAPSULE, LIQUID FILLED ORAL at 20:59

## 2018-10-23 RX ADMIN — VANCOMYCIN HYDROCHLORIDE PRN EACH: 1 INJECTION, POWDER, LYOPHILIZED, FOR SOLUTION INTRAVENOUS at 14:44

## 2018-10-23 RX ADMIN — CHOLECALCIFEROL CAP 125 MCG (5000 UNIT) SCH UNIT: 125 CAP at 09:41

## 2018-10-23 RX ADMIN — VANCOMYCIN HYDROCHLORIDE SCH MG: 500 INJECTION, POWDER, LYOPHILIZED, FOR SOLUTION INTRAVENOUS at 09:44

## 2018-10-23 RX ADMIN — CALCIUM ACETATE SCH MG: 667 CAPSULE ORAL at 13:16

## 2018-10-23 RX ADMIN — NYSTATIN SCH ML: 100000 SUSPENSION ORAL at 17:00

## 2018-10-23 RX ADMIN — NYSTATIN SCH ML: 100000 SUSPENSION ORAL at 21:00

## 2018-10-23 RX ADMIN — PANTOPRAZOLE SODIUM SCH MG: 40 INJECTION, POWDER, FOR SOLUTION INTRAVENOUS at 10:04

## 2018-10-23 RX ADMIN — LOPERAMIDE HYDROCHLORIDE PRN MG: 2 CAPSULE ORAL at 21:01

## 2018-10-23 RX ADMIN — PIPERACILLIN SODIUM AND TAZOBACTAM SODIUM SCH MLS/HR: 2; .25 INJECTION, POWDER, LYOPHILIZED, FOR SOLUTION INTRAVENOUS at 14:48

## 2018-10-23 RX ADMIN — SENNOSIDES AND DOCUSATE SODIUM SCH TAB: 8.6; 5 TABLET ORAL at 09:00

## 2018-10-23 RX ADMIN — Medication SCH CAP: at 09:40

## 2018-10-23 RX ADMIN — DOCUSATE SODIUM SCH MG: 100 CAPSULE, LIQUID FILLED ORAL at 09:00

## 2018-10-23 RX ADMIN — VANCOMYCIN HYDROCHLORIDE SCH MG: 500 INJECTION, POWDER, LYOPHILIZED, FOR SOLUTION INTRAVENOUS at 14:48

## 2018-10-23 RX ADMIN — NYSTATIN SCH ML: 100000 SUSPENSION ORAL at 14:48

## 2018-10-23 RX ADMIN — DEXTROSE MONOHYDRATE PRN GM: 25 INJECTION, SOLUTION INTRAVENOUS at 16:24

## 2018-10-23 RX ADMIN — SENNOSIDES AND DOCUSATE SODIUM SCH TAB: 8.6; 5 TABLET ORAL at 20:59

## 2018-10-23 NOTE — PDOC
Subjective:


Subjective:


Denies n/v and abd pain.


Loose stools - RN says was on stool softeners but checking C Diff.





Objective:


Objective:


On IV PPI.


Vital Signs:





 Vital Signs








  Date Time  Temp Pulse Resp B/P (MAP) Pulse Ox O2 Delivery O2 Flow Rate FiO2


 


10/23/18 11:45 98.1 101 22 94/64 (74)  Room Air  





 98.1       


 


10/23/18 06:55     95   








Labs:





Laboratory Tests








Test


 10/22/18


12:33 10/22/18


17:16 10/22/18


20:56 10/23/18


05:00


 


Glucose (Fingerstick) 261 mg/dL  108 mg/dL  196 mg/dL  


 


White Blood Count    7.7 x10^3/uL 


 


Red Blood Count    3.82 x10^6/uL 


 


Hemoglobin    10.9 g/dL 


 


Hematocrit    33.2 % 


 


Mean Corpuscular Volume    87 fL 


 


Mean Corpuscular Hemoglobin    28 pg 


 


Mean Corpuscular Hemoglobin


Concent 


 


 


 33 g/dL 





 


Red Cell Distribution Width    13.9 % 


 


Platelet Count    342 x10^3/uL 


 


Neutrophils (%) (Auto)    80 % 


 


Lymphocytes (%) (Auto)    6 % 


 


Monocytes (%) (Auto)    9 % 


 


Eosinophils (%) (Auto)    5 % 


 


Basophils (%) (Auto)    0 % 


 


Neutrophils # (Auto)    6.2 x10^3uL 


 


Lymphocytes # (Auto)    0.5 x10^3/uL 


 


Monocytes # (Auto)    0.7 x10^3/uL 


 


Eosinophils # (Auto)    0.4 x10^3/uL 


 


Basophils # (Auto)    0.0 x10^3/uL 


 


Sodium Level    128 mmol/L 


 


Potassium Level    3.7 mmol/L 


 


Chloride Level    92 mmol/L 


 


Carbon Dioxide Level    23 mmol/L 


 


Anion Gap    13 


 


Blood Urea Nitrogen    54 mg/dL 


 


Creatinine    10.5 mg/dL 


 


Estimated GFR


(Cockcroft-Gault) 


 


 


 6.3 





 


Glucose Level    490 mg/dL 


 


Calcium Level    7.0 mg/dL 


 


Phosphorus Level    5.2 mg/dL 


 


Magnesium Level    1.8 mg/dL 


 


NT-Pro-B-Type Natriuretic


Peptide 


 


 


 > 99050 pg/mL 





 


Albumin    1.1 g/dL 


 


Random Vancomycin Level    20.4 mcg/mL 


 


Test


 10/23/18


07:48 10/23/18


11:35 


 





 


Glucose (Fingerstick) 485 mg/dL  216 mg/dL   











PE:





GEN: NAD


LUNGS: CTAB


HEART: RRR


ABD: soft, non-tender


NEURO/PSYCH: A & O 3





A/P:


Left foot ulcer, ESRD on PD


Nausea - resolved


?loose stools





--


Improved from GI standpoint - will change to PO PPI.


Monitor stools, consider outpt EGD.











SUSAN BRINK Oct 23, 2018 12:05

## 2018-10-23 NOTE — PDOC
Infectious Disease Note


Subjective


Subjective


pt is feeling better





ROS


ROS


no n/v/d/sob





Vital Sign


Vital Signs





Vital Signs








  Date Time  Temp Pulse Resp B/P (MAP) Pulse Ox O2 Delivery O2 Flow Rate FiO2


 


10/23/18 06:55 97.5 102 16 94/71 (79) 95 Room Air  





 97.5       











Physical Exam


PHYSICAL EXAM


GENERAL:  Propped up in bed, resting quietly 


HEENT:  Normal conjunctivae.  Oral cavity:  Pharynx pink and moist.


NECK:  Supple.


LUNGS:  Clear to auscultation.


HEART:  S1 and S2.  Left-sided chest AICD site unremarkable for infection.


ABDOMEN:  Mildly distended.  Bowel sounds active, soft, nontender.  Peritoneal


catheter without redness or drainage.


EXTREMITIES:  No gross edema or cyanosis.  Ulcer left foot. Distal pulses weak.


SKIN:  Warm without rash.


PIV





Labs


Lab





Laboratory Tests








Test


 10/22/18


12:33 10/22/18


17:16 10/22/18


20:56 10/23/18


05:00


 


Glucose (Fingerstick)


 261 mg/dL


(70-99) 108 mg/dL


(70-99) 196 mg/dL


(70-99) 





 


White Blood Count


 


 


 


 7.7 x10^3/uL


(4.0-11.0)


 


Red Blood Count


 


 


 


 3.82 x10^6/uL


(4.30-5.70)


 


Hemoglobin


 


 


 


 10.9 g/dL


(13.0-17.5)


 


Hematocrit


 


 


 


 33.2 %


(39.0-53.0)


 


Mean Corpuscular Volume    87 fL () 


 


Mean Corpuscular Hemoglobin    28 pg (25-35) 


 


Mean Corpuscular Hemoglobin


Concent 


 


 


 33 g/dL


(31-37)


 


Red Cell Distribution Width


 


 


 


 13.9 %


(11.5-14.5)


 


Platelet Count


 


 


 


 342 x10^3/uL


(140-400)


 


Neutrophils (%) (Auto)    80 % (31-73) 


 


Lymphocytes (%) (Auto)    6 % (24-48) 


 


Monocytes (%) (Auto)    9 % (0-9) 


 


Eosinophils (%) (Auto)    5 % (0-3) 


 


Basophils (%) (Auto)    0 % (0-3) 


 


Neutrophils # (Auto)


 


 


 


 6.2 x10^3uL


(1.8-7.7)


 


Lymphocytes # (Auto)


 


 


 


 0.5 x10^3/uL


(1.0-4.8)


 


Monocytes # (Auto)


 


 


 


 0.7 x10^3/uL


(0.0-1.1)


 


Eosinophils # (Auto)


 


 


 


 0.4 x10^3/uL


(0.0-0.7)


 


Basophils # (Auto)


 


 


 


 0.0 x10^3/uL


(0.0-0.2)


 


Sodium Level


 


 


 


 128 mmol/L


(136-145)


 


Potassium Level


 


 


 


 3.7 mmol/L


(3.5-5.1)


 


Chloride Level


 


 


 


 92 mmol/L


()


 


Carbon Dioxide Level


 


 


 


 23 mmol/L


(21-32)


 


Anion Gap    13 (6-14) 


 


Blood Urea Nitrogen


 


 


 


 54 mg/dL


(8-26)


 


Creatinine


 


 


 


 10.5 mg/dL


(0.7-1.3)


 


Estimated GFR


(Cockcroft-Gault) 


 


 


 6.3 





 


Glucose Level


 


 


 


 490 mg/dL


(70-99)


 


Calcium Level


 


 


 


 7.0 mg/dL


(8.5-10.1)


 


Phosphorus Level


 


 


 


 5.2 mg/dL


(2.6-4.7)


 


Magnesium Level


 


 


 


 1.8 mg/dL


(1.8-2.4)


 


NT-Pro-B-Type Natriuretic


Peptide 


 


 


 > 66282 pg/mL


(0-124)


 


Albumin


 


 


 


 1.1 g/dL


(3.4-5.0)


 


Random Vancomycin Level    20.4 mcg/mL 


 


Test


 10/23/18


07:48 


 


 





 


Glucose (Fingerstick)


 485 mg/dL


(70-99) 


 


 











Micro





Microbiology


10/19/18 Blood Culture - Preliminary, Resulted


           NO GROWTH AFTER 2 DAYS


10/20/18 Anaerobic/Aerobic Culture, Resulted


           Pending


10/20/18 Anaerobic Culture Result 1 (ANTHONY), Resulted


           Pending


10/20/18 Aerobic Culture, Resulted


           Pending


10/20/18 Aerobic Culture Result 1 (ANTHONY), Resulted


           Pending


10/20/18 Gram Stain - Final, Resulted


           


10/20/18 Gram Stain Result 1 (ANTHONY) - Final, Resulted


           


10/20/18 Gram Stain Result 2 (ANTHONY) - Final, Resulted


           


10/21/18 Anaerobic/Aerobic Culture, Resulted


           Pending


10/21/18 Anaerobic Culture Result 1 (ANTHONY), Resulted


           Pending


10/21/18 Aerobic Culture, Resulted


           Pending


10/21/18 Aerobic Culture Result 1 (ANTHONY), Resulted


           Pending


10/21/18 Gram Stain - Final, Resulted


           


10/21/18 Gram Stain Result 1 (ANTHONY) - Final, Resulted


           


10/21/18 Gram Stain Result 2 (ANTHONY) - Final, Resulted





Objective


Assessment


Sepsis POA


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 


Fever


Leukocytosis


Abdominal pain


   -Dialysate clear, . culture in process 


Left lingula consolidation 


Diarrhea 


ESRD on peritoneal dialysis


   - catheter changed 2015  


s/p AICD generator change on 10/15/18.


Chronic systolic CHF





Plan


Plan of Care





Culture of left foot ulcer


Continue vanc and Zosyn, renal dosing


 on po vanco 125mg po bid empirically


Local wound 


Monitor labs/temp


f/u c/s











MACHO HARDWICK MD Oct 23, 2018 08:45

## 2018-10-23 NOTE — PDOC
PROGRESS NOTES


Chief Complaint


Chief Complaint


Abdominal Pain


ESRD on PD


NO peritonitis


AOCD


Hypoglycemia, resolved


Sepsis POA with no organ dysfcn


Severe CM With EF 10-% Indwelling defibrillator


LEft foot wound


DM 2 uncontrolled on insulin





History of Present Illness


History of Present Illness


He has no complaints


But RN called me for a blood sugar 400s


Home meds was not reconciled appropriately - wrong dose


Small bowel antibiotics per ID


Wound to have inspected along with vascular surgeon-wound change etc.


Hemoglobin A1c 10.6 in 2015


No peritonitis as chart reviewed





Plan


Resume 30 units daily at bedtime long acting


Start NovoLog 10units  3 times a day with meals


20 units subcutaneous lispro x 1 now for the blood sugar 400


Check hemoglobin A1c


Continue local wound care and IV antibiotics


Will DC once shifted to by mouth antibiotics and wound care plans have been 

finalized





Vitals


Vitals





Vital Signs








  Date Time  Temp Pulse Resp B/P (MAP) Pulse Ox O2 Delivery O2 Flow Rate FiO2


 


10/23/18 06:55 97.5 102 16 94/71 (79) 95 Room Air  





 97.5       











Physical Exam


Physical Exam


GENERAL:  Propped up in bed, resting quietly 


HEENT:  Normal conjunctivae.  Oral cavity:  Pharynx pink and moist.


NECK:  Supple.


LUNGS:  Clear to auscultation.


HEART:  S1 and S2.  Left-sided chest AICD site unremarkable for infection.


ABDOMEN:  Mildly distended.  Bowel sounds active, soft, nontender.  Peritoneal


catheter without redness or drainage.


EXTREMITIES:  No gross edema or cyanosis.  Ulcer left foot. Distal pulses weak.


SKIN:  Warm without rash.


PIV


General:  Alert, No acute distress


Heart:  Regular rate, Normal S1, Normal S2


Lungs:  Clear


Abdomen:  Soft, No tenderness, Other (PD catheter)


Extremities:  Other (No skin breakdown on the right foot, Left medial foot 

ulcer over region of medial left 1st MP, No fluctuance, callus over distal left 

1st toe (removed) with no underlying skin breakdown)


Skin:  Other (chornic leg closed lesions; left chest incision site is open to 

air, no swelling or erythema or tenderness with palpation. No drain and 

incision is well approximated with dry steri strips. No discoloration. )





Labs


LABS





Laboratory Tests








Test


 10/22/18


12:33 10/22/18


17:16 10/22/18


20:56 10/23/18


05:00


 


Glucose (Fingerstick)


 261 mg/dL


(70-99) 108 mg/dL


(70-99) 196 mg/dL


(70-99) 





 


White Blood Count


 


 


 


 7.7 x10^3/uL


(4.0-11.0)


 


Red Blood Count


 


 


 


 3.82 x10^6/uL


(4.30-5.70)


 


Hemoglobin


 


 


 


 10.9 g/dL


(13.0-17.5)


 


Hematocrit


 


 


 


 33.2 %


(39.0-53.0)


 


Mean Corpuscular Volume    87 fL () 


 


Mean Corpuscular Hemoglobin    28 pg (25-35) 


 


Mean Corpuscular Hemoglobin


Concent 


 


 


 33 g/dL


(31-37)


 


Red Cell Distribution Width


 


 


 


 13.9 %


(11.5-14.5)


 


Platelet Count


 


 


 


 342 x10^3/uL


(140-400)


 


Neutrophils (%) (Auto)    80 % (31-73) 


 


Lymphocytes (%) (Auto)    6 % (24-48) 


 


Monocytes (%) (Auto)    9 % (0-9) 


 


Eosinophils (%) (Auto)    5 % (0-3) 


 


Basophils (%) (Auto)    0 % (0-3) 


 


Neutrophils # (Auto)


 


 


 


 6.2 x10^3uL


(1.8-7.7)


 


Lymphocytes # (Auto)


 


 


 


 0.5 x10^3/uL


(1.0-4.8)


 


Monocytes # (Auto)


 


 


 


 0.7 x10^3/uL


(0.0-1.1)


 


Eosinophils # (Auto)


 


 


 


 0.4 x10^3/uL


(0.0-0.7)


 


Basophils # (Auto)


 


 


 


 0.0 x10^3/uL


(0.0-0.2)


 


Sodium Level


 


 


 


 128 mmol/L


(136-145)


 


Potassium Level


 


 


 


 3.7 mmol/L


(3.5-5.1)


 


Chloride Level


 


 


 


 92 mmol/L


()


 


Carbon Dioxide Level


 


 


 


 23 mmol/L


(21-32)


 


Anion Gap    13 (6-14) 


 


Blood Urea Nitrogen


 


 


 


 54 mg/dL


(8-26)


 


Creatinine


 


 


 


 10.5 mg/dL


(0.7-1.3)


 


Estimated GFR


(Cockcroft-Gault) 


 


 


 6.3 





 


Glucose Level


 


 


 


 490 mg/dL


(70-99)


 


Calcium Level


 


 


 


 7.0 mg/dL


(8.5-10.1)


 


Phosphorus Level


 


 


 


 5.2 mg/dL


(2.6-4.7)


 


Magnesium Level


 


 


 


 1.8 mg/dL


(1.8-2.4)


 


NT-Pro-B-Type Natriuretic


Peptide 


 


 


 > 33572 pg/mL


(0-124)


 


Albumin


 


 


 


 1.1 g/dL


(3.4-5.0)


 


Random Vancomycin Level    20.4 mcg/mL 


 


Test


 10/23/18


07:48 


 


 





 


Glucose (Fingerstick)


 485 mg/dL


(70-99) 


 


 














Review of Systems


Review of Systems


A 14 point ROS was completed with the following noted as positive:





Other systems reviewed and negative.


\CONSTITUTIONAL:        No fever or chills


EYES:                          No recent changes


SKIN:               No rash or itching


CARDIOVASCULAR:     No chest pain, syncope, palpitations, or edema


RESPIRATORY:            No SOB or cough


GASTROINTESTINAL:    No nausea, vomiting or abdominal pain


NEUROLOGICAL:          No headaches or weakness


ENDOCRINE:               No cold or heat intolerance


GENITOURINARY:         No urgency or frequency of urination


MUSCULOSKELETAL:   No back pain or joint pain


LYMPHATICS:               No enlarged lymph nodes


PSYCHIATRIC:              No anxiety or depression





Assessment and Plan


Assessmemt and Plan


Problems


Medical Problems:


(1) Abdominal pain


Status: Acute  











Comment


Review of Relevant


I have reviewed the following items sean (where applicable) has been applied.


Labs





Laboratory Tests








Test


 10/21/18


12:02 10/21/18


16:50 10/21/18


16:52 10/22/18


02:55


 


Glucose (Fingerstick)


 136 mg/dL


(70-99) 


 155 mg/dL


(70-99) 





 


Random Vancomycin Level  20.2 mcg/mL   


 


White Blood Count


 


 


 


 19.1 x10^3/uL


(4.0-11.0)


 


Red Blood Count


 


 


 


 4.48 x10^6/uL


(4.30-5.70)


 


Hemoglobin


 


 


 


 12.7 g/dL


(13.0-17.5)


 


Hematocrit


 


 


 


 38.8 %


(39.0-53.0)


 


Mean Corpuscular Volume    87 fL () 


 


Mean Corpuscular Hemoglobin    28 pg (25-35) 


 


Mean Corpuscular Hemoglobin


Concent 


 


 


 33 g/dL


(31-37)


 


Red Cell Distribution Width


 


 


 


 14.1 %


(11.5-14.5)


 


Platelet Count


 


 


 


 404 x10^3/uL


(140-400)


 


Neutrophils (%) (Auto)    94 % (31-73) 


 


Lymphocytes (%) (Auto)    1 % (24-48) 


 


Monocytes (%) (Auto)    5 % (0-9) 


 


Eosinophils (%) (Auto)    0 % (0-3) 


 


Basophils (%) (Auto)    0 % (0-3) 


 


Neutrophils # (Auto)


 


 


 


 17.9 x10^3uL


(1.8-7.7)


 


Lymphocytes # (Auto)


 


 


 


 0.3 x10^3/uL


(1.0-4.8)


 


Monocytes # (Auto)


 


 


 


 0.9 x10^3/uL


(0.0-1.1)


 


Eosinophils # (Auto)


 


 


 


 0.0 x10^3/uL


(0.0-0.7)


 


Basophils # (Auto)


 


 


 


 0.0 x10^3/uL


(0.0-0.2)


 


Sodium Level


 


 


 


 129 mmol/L


(136-145)


 


Potassium Level


 


 


 


 3.3 mmol/L


(3.5-5.1)


 


Chloride Level


 


 


 


 91 mmol/L


()


 


Carbon Dioxide Level


 


 


 


 26 mmol/L


(21-32)


 


Anion Gap    12 (6-14) 


 


Blood Urea Nitrogen


 


 


 


 63 mg/dL


(8-26)


 


Creatinine


 


 


 


 11.9 mg/dL


(0.7-1.3)


 


Estimated GFR


(Cockcroft-Gault) 


 


 


 5.5 





 


Glucose Level


 


 


 


 363 mg/dL


(70-99)


 


Calcium Level


 


 


 


 7.8 mg/dL


(8.5-10.1)


 


Phosphorus Level


 


 


 


 5.9 mg/dL


(2.6-4.7)


 


Magnesium Level


 


 


 


 2.0 mg/dL


(1.8-2.4)


 


Albumin


 


 


 


 1.1 g/dL


(3.4-5.0)


 


Test


 10/22/18


12:33 10/22/18


17:16 10/22/18


20:56 10/23/18


05:00


 


Glucose (Fingerstick)


 261 mg/dL


(70-99) 108 mg/dL


(70-99) 196 mg/dL


(70-99) 





 


White Blood Count


 


 


 


 7.7 x10^3/uL


(4.0-11.0)


 


Red Blood Count


 


 


 


 3.82 x10^6/uL


(4.30-5.70)


 


Hemoglobin


 


 


 


 10.9 g/dL


(13.0-17.5)


 


Hematocrit


 


 


 


 33.2 %


(39.0-53.0)


 


Mean Corpuscular Volume    87 fL () 


 


Mean Corpuscular Hemoglobin    28 pg (25-35) 


 


Mean Corpuscular Hemoglobin


Concent 


 


 


 33 g/dL


(31-37)


 


Red Cell Distribution Width


 


 


 


 13.9 %


(11.5-14.5)


 


Platelet Count


 


 


 


 342 x10^3/uL


(140-400)


 


Neutrophils (%) (Auto)    80 % (31-73) 


 


Lymphocytes (%) (Auto)    6 % (24-48) 


 


Monocytes (%) (Auto)    9 % (0-9) 


 


Eosinophils (%) (Auto)    5 % (0-3) 


 


Basophils (%) (Auto)    0 % (0-3) 


 


Neutrophils # (Auto)


 


 


 


 6.2 x10^3uL


(1.8-7.7)


 


Lymphocytes # (Auto)


 


 


 


 0.5 x10^3/uL


(1.0-4.8)


 


Monocytes # (Auto)


 


 


 


 0.7 x10^3/uL


(0.0-1.1)


 


Eosinophils # (Auto)


 


 


 


 0.4 x10^3/uL


(0.0-0.7)


 


Basophils # (Auto)


 


 


 


 0.0 x10^3/uL


(0.0-0.2)


 


Sodium Level


 


 


 


 128 mmol/L


(136-145)


 


Potassium Level


 


 


 


 3.7 mmol/L


(3.5-5.1)


 


Chloride Level


 


 


 


 92 mmol/L


()


 


Carbon Dioxide Level


 


 


 


 23 mmol/L


(21-32)


 


Anion Gap    13 (6-14) 


 


Blood Urea Nitrogen


 


 


 


 54 mg/dL


(8-26)


 


Creatinine


 


 


 


 10.5 mg/dL


(0.7-1.3)


 


Estimated GFR


(Cockcroft-Gault) 


 


 


 6.3 





 


Glucose Level


 


 


 


 490 mg/dL


(70-99)


 


Calcium Level


 


 


 


 7.0 mg/dL


(8.5-10.1)


 


Phosphorus Level


 


 


 


 5.2 mg/dL


(2.6-4.7)


 


Magnesium Level


 


 


 


 1.8 mg/dL


(1.8-2.4)


 


NT-Pro-B-Type Natriuretic


Peptide 


 


 


 > 32288 pg/mL


(0-124)


 


Albumin


 


 


 


 1.1 g/dL


(3.4-5.0)


 


Random Vancomycin Level    20.4 mcg/mL 


 


Test


 10/23/18


07:48 


 


 





 


Glucose (Fingerstick)


 485 mg/dL


(70-99) 


 


 











Laboratory Tests








Test


 10/22/18


12:33 10/22/18


17:16 10/22/18


20:56 10/23/18


05:00


 


Glucose (Fingerstick)


 261 mg/dL


(70-99) 108 mg/dL


(70-99) 196 mg/dL


(70-99) 





 


White Blood Count


 


 


 


 7.7 x10^3/uL


(4.0-11.0)


 


Red Blood Count


 


 


 


 3.82 x10^6/uL


(4.30-5.70)


 


Hemoglobin


 


 


 


 10.9 g/dL


(13.0-17.5)


 


Hematocrit


 


 


 


 33.2 %


(39.0-53.0)


 


Mean Corpuscular Volume    87 fL () 


 


Mean Corpuscular Hemoglobin    28 pg (25-35) 


 


Mean Corpuscular Hemoglobin


Concent 


 


 


 33 g/dL


(31-37)


 


Red Cell Distribution Width


 


 


 


 13.9 %


(11.5-14.5)


 


Platelet Count


 


 


 


 342 x10^3/uL


(140-400)


 


Neutrophils (%) (Auto)    80 % (31-73) 


 


Lymphocytes (%) (Auto)    6 % (24-48) 


 


Monocytes (%) (Auto)    9 % (0-9) 


 


Eosinophils (%) (Auto)    5 % (0-3) 


 


Basophils (%) (Auto)    0 % (0-3) 


 


Neutrophils # (Auto)


 


 


 


 6.2 x10^3uL


(1.8-7.7)


 


Lymphocytes # (Auto)


 


 


 


 0.5 x10^3/uL


(1.0-4.8)


 


Monocytes # (Auto)


 


 


 


 0.7 x10^3/uL


(0.0-1.1)


 


Eosinophils # (Auto)


 


 


 


 0.4 x10^3/uL


(0.0-0.7)


 


Basophils # (Auto)


 


 


 


 0.0 x10^3/uL


(0.0-0.2)


 


Sodium Level


 


 


 


 128 mmol/L


(136-145)


 


Potassium Level


 


 


 


 3.7 mmol/L


(3.5-5.1)


 


Chloride Level


 


 


 


 92 mmol/L


()


 


Carbon Dioxide Level


 


 


 


 23 mmol/L


(21-32)


 


Anion Gap    13 (6-14) 


 


Blood Urea Nitrogen


 


 


 


 54 mg/dL


(8-26)


 


Creatinine


 


 


 


 10.5 mg/dL


(0.7-1.3)


 


Estimated GFR


(Cockcroft-Gault) 


 


 


 6.3 





 


Glucose Level


 


 


 


 490 mg/dL


(70-99)


 


Calcium Level


 


 


 


 7.0 mg/dL


(8.5-10.1)


 


Phosphorus Level


 


 


 


 5.2 mg/dL


(2.6-4.7)


 


Magnesium Level


 


 


 


 1.8 mg/dL


(1.8-2.4)


 


NT-Pro-B-Type Natriuretic


Peptide 


 


 


 > 69760 pg/mL


(0-124)


 


Albumin


 


 


 


 1.1 g/dL


(3.4-5.0)


 


Random Vancomycin Level    20.4 mcg/mL 


 


Test


 10/23/18


07:48 


 


 





 


Glucose (Fingerstick)


 485 mg/dL


(70-99) 


 


 











Microbiology


10/19/18 Blood Culture - Preliminary, Resulted


           NO GROWTH AFTER 3 DAYS


10/20/18 Anaerobic/Aerobic Culture, Resulted


           Pending


10/20/18 Anaerobic Culture Result 1 (ANTHONY), Resulted


           Pending


10/20/18 Aerobic Culture, Resulted


           Pending


10/20/18 Aerobic Culture Result 1 (ANTHONY), Resulted


           Pending


10/20/18 Gram Stain - Final, Resulted


           


10/20/18 Gram Stain Result 1 (ANTHONY) - Final, Resulted


           


10/20/18 Gram Stain Result 2 (ANTHONY) - Final, Resulted


           


10/21/18 Anaerobic/Aerobic Culture, Resulted


           Pending


10/21/18 Anaerobic Culture Result 1 (ANTHONY), Resulted


           Pending


10/21/18 Aerobic Culture, Resulted


           Pending


10/21/18 Aerobic Culture Result 1 (ANTHONY), Resulted


           Pending


10/21/18 Gram Stain - Final, Resulted


           


10/21/18 Gram Stain Result 1 (ANTHONY) - Final, Resulted


           


10/21/18 Gram Stain Result 2 (ANTHONY) - Final, Resulted


Medications





Current Medications


Sodium Chloride 250 ml @  250 mls/hr 1X  ONCE IV ;  Start 10/19/18 at 15:45;  

Stop 10/19/18 at 16:44;  Status Cancel


Piperacillin Sod/ Tazobactam Sod (Zosyn Per Pharmacy) 1 each PRN DAILY  PRN MC 

SEE COMMENTS;  Start 10/19/18 at 15:45;  Status UNV


Piperacillin Sod/ Tazobactam Sod 2.25 gm/Sodium Chloride 50 ml @  100 mls/hr 1X

  ONCE IV  Last administered on 10/19/18at 16:49;  Start 10/19/18 at 16:00;  

Stop 10/19/18 at 16:29;  Status DC


Ondansetron HCl (Zofran) 4 mg 1X  ONCE IV  Last administered on 10/19/18at 16:12

;  Start 10/19/18 at 16:00;  Stop 10/19/18 at 16:01;  Status DC


Hydromorphone HCl (Dilaudid) 0.5 mg PRN Q30MIN  PRN IV SEVERE PAIN Last 

administered on 10/21/18at 18:00;  Start 10/19/18 at 16:15;  Stop 10/21/18 at 18

:00;  Status DC


Hydromorphone HCl (Dilaudid) 2 mg STK-MED ONCE .ROUTE ;  Start 10/19/18 at 16:10

;  Stop 10/19/18 at 16:11;  Status DC


Sodium Chloride 500 ml @  500 mls/hr 1X  ONCE IV  Last administered on 10/19/

18at 16:49;  Start 10/19/18 at 16:30;  Stop 10/19/18 at 17:29;  Status DC


Aspirin (Children'S Aspirin) 324 mg 1X  ONCE PO  Last administered on 10/19/

18at 17:52;  Start 10/19/18 at 17:15;  Stop 10/19/18 at 17:16;  Status DC


Vancomycin HCl (Vanco Per Pharmacy) 1 each PRN DAILY  PRN MC SEE COMMENTS;  

Start 10/19/18 at 17:15;  Status UNV


Sodium Chloride 500 ml @  500 mls/hr 1X  ONCE IV  Last administered on 10/19/

18at 17:51;  Start 10/19/18 at 17:15;  Stop 10/19/18 at 18:14;  Status DC


Vancomycin HCl 1.75 gm/Sodium Chloride 500 ml @  250 mls/hr 1X  ONCE IV  Last 

administered on 10/19/18at 17:25;  Start 10/19/18 at 17:30;  Stop 10/19/18 at 19

:29;  Status DC


Acetaminophen (Tylenol) 650 mg PRN Q6HRS  PRN PO FEVER Last administered on 10/

21/18at 17:14;  Start 10/19/18 at 17:30


Ondansetron HCl (Zofran) 4 mg PRN Q6HRS  PRN IV NAUSEA/VOMITING, 1st IV CHOICE 

Last administered on 10/21/18at 17:17;  Start 10/19/18 at 17:30


Morphine Sulfate (Morphine Sulfate) 2 mg PRN Q2HR  PRN IV MILD-MODERATE PAIN;  

Start 10/19/18 at 17:30


Tramadol HCl (Ultram) 50 mg PRN Q6HRS  PRN PO MILD TO MODERATE PAIN Last 

administered on 10/22/18at 21:11;  Start 10/19/18 at 17:30


Docusate Sodium (Colace) 100 mg PRN DAILY  PRN PO CONSTIPATION 1ST CHOICE;  

Start 10/19/18 at 17:30


Piperacillin Sod/ Tazobactam Sod 3.375 gm/Sodium Chloride 50 ml @  100 mls/hr 

Q6HRS IV ;  Start 10/19/18 at 18:00;  Status UNV


Piperacillin Sod/ Tazobactam Sod (Zosyn Per Pharmacy) 1 each PRN DAILY  PRN MC 

SEE COMMENTS;  Start 10/19/18 at 17:30;  Status UNV


Vancomycin HCl (Vanco Per Pharmacy) 1 each PRN DAILY  PRN MC SEE COMMENTS Last 

administered on 10/22/18at 10:28;  Start 10/19/18 at 17:30


Insulin Human Lispro (HumaLOG) 0-9 UNITS TIDWMEALS SQ  Last administered on 10/

22/18at 12:47;  Start 10/20/18 at 08:00


Dextrose (Dextrose 50%-Water Syringe) 12.5 gm PRN Q15MIN  PRN IV SEE COMMENTS 

Last administered on 10/20/18at 17:32;  Start 10/19/18 at 17:30


Heparin Sodium (Porcine) (Heparin Sodium) 5,000 unit Q8HRS SQ  Last 

administered on 10/22/18at 12:48;  Start 10/19/18 at 22:00


Senna/Docusate Sodium (Senna Plus) 1 tab BID PO  Last administered on 10/21/

18at 08:54;  Start 10/19/18 at 21:00


Docusate Sodium (Colace) 100 mg BID PO  Last administered on 10/21/18at 08:55;  

Start 10/19/18 at 21:00


Magnesium Hydroxide (Milk Of Magnesia) 2,400 mg PRN Q12HR  PRN PO CONSTIPATION 

2ND CHOICE;  Start 10/19/18 at 17:45


Sodium Chloride 1,000 ml @  75 mls/hr 1X  ONCE IV ;  Start 10/19/18 at 17:45;  

Stop 10/19/18 at 19:47;  Status DC


Pantoprazole Sodium (PROTONIX VIAL for IV PUSH) 40 mg DAILYAC IVP  Last 

administered on 10/22/18at 08:41;  Start 10/20/18 at 07:30


Piperacillin Sod/ Tazobactam Sod 2.25 gm/Sodium Chloride 50 ml @  100 mls/hr 

Q6HRS IV ;  Start 10/19/18 at 18:00;  Stop 10/19/18 at 18:07;  Status DC


Vancomycin HCl (Vancomycin Random Level) 1 each 1X  ONCE MC  Last administered 

on 10/21/18at 17:00;  Start 10/21/18 at 17:00;  Stop 10/21/18 at 17:01;  Status 

DC


Piperacillin Sod/ Tazobactam Sod 2.25 gm/Sodium Chloride 50 ml @  100 mls/hr 

Q8HRS IV  Last administered on 10/23/18at 06:22;  Start 10/19/18 at 22:00


Promethazine HCl (Phenergan) 12.5 mg PRN Q4HRS  PRN PO NAUSEA/VOMITING Last 

administered on 10/20/18at 21:43;  Start 10/20/18 at 01:15;  Stop 10/22/18 at 11

:59;  Status DC


Potassium Chloride (Klor-Con) 40 meq Q2H PO  Last administered on 10/20/18at 09:

13;  Start 10/20/18 at 07:00;  Stop 10/20/18 at 09:01;  Status DC


Aspirin (Children'S Aspirin) 81 mg DAILYWBKFT PO  Last administered on 10/22/

18at 08:41;  Start 10/20/18 at 09:00


Carvedilol (Coreg) 12.5 mg BIDWMEALS PO ;  Start 10/20/18 at 09:00


Calcium Acetate (Phoslo) 667 mg TIDWMEALS PO  Last administered on 10/22/18at 17

:13;  Start 10/20/18 at 09:00


Vitamin D (Vitamin D3) 5,000 unit DAILY PO  Last administered on 10/22/18at 08:

41;  Start 10/20/18 at 09:00


Vitamin B Complex/ Vitamin C (Leif-Ana) 1 tab DAILY PO  Last administered on 10

/22/18at 08:41;  Start 10/20/18 at 09:00


Insulin Glargine (Lantus) 15 units QHS SQ  Last administered on 10/22/18at 21:12

;  Start 10/20/18 at 21:00;  Stop 10/23/18 at 08:44;  Status DC


Baclofen (Lioresal) 10 mg PRN TID  PRN PO for Hiccups Last administered on 10/21

/18at 08:54;  Start 10/20/18 at 11:30


Metoclopramide HCl (Reglan) 5 mg PRN [id number]  PRN PO ;  Start 10/20/18 at 12:

00;  Stop 10/20/18 at 12:00;  Status DC


Norepinephrine Bitartrate 250 ml @  1.875 mls/ hr CONT  PRN IV SEE I/O RECORD 

Last administered on 10/21/18at 18:37;  Start 10/20/18 at 21:00


Metoclopramide HCl (Reglan) 5 mg PRN [id number]  PRN PO NAUSEA/VOMITING;  Start 

10/21/18 at 08:45


Silver Sulfadiazine (Silvadene) 1 justo DAILY TP  Last administered on 10/22/18at 

08:41;  Start 10/21/18 at 11:30


Magnesium Sulfate 50 ml @ 25 mls/hr PRN DAILY  PRN IV for Mag < 1.7 on am labs;

  Start 10/21/18 at 11:45


Sodium Chloride 1,000 ml @  75 mls/hr [id number] IV  Last administered on 10/22/

18at 01:46;  Start 10/21/18 at 11:45


Nystatin (Nystatin Oral Susp) 5 ml [id number]  Last administered on 10/22/

18at 21:11;  Start 10/21/18 at 13:00


Vancomycin HCl (Vancomycin Oral Solution) 125 mg [id number] PO  Last administered 

on 10/22/18at 21:11;  Start 10/21/18 at 14:00


Potassium Chloride (Klor-Con) 40 meq 1X  ONCE PO  Last administered on 10/21/

18at 13:42;  Start 10/21/18 at 14:00;  Stop 10/21/18 at 14:01;  Status DC


Potassium Chloride (Klor-Con) 40 meq 1X  ONCE PO ;  Start 10/21/18 at 21:00;  

Stop 10/21/18 at 21:01;  Status DC


Vancomycin HCl 500 mg/Sodium Chloride 100 ml @  100 mls/hr 1X  ONCE IV  Last 

administered on 10/21/18at 21:35;  Start 10/21/18 at 21:00;  Stop 10/21/18 at 21

:59;  Status DC


Metoclopramide HCl (Reglan Vial) 5 mg PRN Q6HRS  PRN IV NAUSEA/VOMITING, 2nd IV 

CHOICE Last administered on 10/22/18at 01:46;  Start 10/21/18 at 19:15


Prochlorperazine Edisylate (Compazine) 10 mg PRN Q6HRS  PRN IV NAUSEA/VOMITING, 

3rd IV CHOICE;  Start 10/21/18 at 19:15


Lactobacillus Rhamnosus (Culturelle) 1 cap BID PO  Last administered on 10/22/

18at 21:11;  Start 10/22/18 at 21:00


Insulin Human Lispro (HumaLOG) 15 units 1X  ONCE SQ  Last administered on 10/22/

18at 10:30;  Start 10/22/18 at 10:15;  Stop 10/22/18 at 10:16;  Status DC


Vancomycin HCl (Vancomycin Random Level) 1 each 1X  ONCE MC ;  Start 10/23/18 

at 06:00;  Stop 10/23/18 at 06:01;  Status DC


Potassium Chloride (Klor-Con) 40 meq 1X  ONCE PO  Last administered on 10/22/

18at 12:49;  Start 10/22/18 at 12:30;  Stop 10/22/18 at 12:31;  Status DC


Promethazine HCl (Phenergan) 12.5 mg PRN Q6HRS  PRN PO NAUSEA/VOMITING;  Start 

10/22/18 at 11:45


Loperamide HCl (Imodium) 2 mg PRN Q15MIN  PRN PO DIARRHEA Last administered on 

10/22/18at 16:57;  Start 10/22/18 at 17:00


Albumin Human 250 ml @  62.5 mls/hr 1X  ONCE IV  Last administered on 10/22/

18at 23:34;  Start 10/22/18 at 23:30;  Stop 10/23/18 at 03:29;  Status DC


Insulin Glargine (Lantus) 30 units QHS SQ ;  Start 10/23/18 at 21:00


Insulin Human Lispro (HumaLOG) 10 units TIDWMEALS SQ ;  Start 10/23/18 at 12:00


Insulin Human Lispro (HumaLOG) 20 units 1X  ONCE SQ  Last administered on 10/23/

18at 09:18;  Start 10/23/18 at 08:51;  Stop 10/23/18 at 08:52;  Status DC


Mupirocin (Bactroban) 1 justo BID TP ;  Start 10/23/18 at 10:00





Active Scripts


Active


Reported


Metolazone 5 Mg Tablet 5 Mg PO DAILY


Vitamin D3 (Cholecalciferol (Vitamin D3)) 5,000 Unit Tablet 1 Tab PO DAILY


Calcium Acetate 667 Mg Tablet 667 Mg PO TIDWMEALS


Renal Caps Softgel (Folic Acid/Vitamin B Comp W-C) 1 Mg Capsule 1 Cap PO DAILY


Tresiba Flextouch U-100 (Insulin Degludec) 100 Unit/1 Ml Insuln.pen 100 Unit SQ 


Carvedilol 12.5 Mg Tablet 1 Tab PO BIDWMEALS


Furosemide 40 Mg Tablet 1 Tab PO DAILY


Aspirin 81 Mg Tab.chew 81 Mg PO DAILYWBKFT


Vitals/I & O





Vital Sign - Last 24 Hours








 10/22/18 10/22/18 10/22/18 10/22/18





 10:00 11:00 12:00 13:00


 


Temp  97.6  





  97.6  


 


Pulse 90 100 100 100


 


Resp 18 18 16 16


 


B/P (MAP) 95/68 (77) 101/71 (81) 102/65 (77) 98/73 (81)


 


Pulse Ox  96  96


 


O2 Delivery Room Air Room Air Room Air Room Air


 


    





    





 10/22/18 10/22/18 10/22/18 10/22/18





 14:00 15:00 19:00 20:00


 


Temp   98.0 





   98.0 


 


Pulse 87 91 96 


 


Resp 16 16 22 


 


B/P (MAP) 88/61 (70) 92/60 (71) 101/64 (76) 


 


Pulse Ox   96 


 


O2 Delivery Room Air Room Air Room Air Room Air


 


    





    





 10/22/18 10/22/18 10/22/18 10/22/18





 21:11 22:11 22:42 23:05


 


Temp   98.1 





   98.1 


 


Pulse   90 88


 


Resp   20 


 


B/P (MAP)   85/67 (73) 75/57 (63)


 


Pulse Ox 96 96 95 


 


O2 Delivery Room Air Room Air Room Air 


 


    





    





 10/22/18 10/22/18 10/23/18 10/23/18





 23:15 23:35 00:45 01:53


 


Pulse 90 88 98 91


 


B/P (MAP) 84/60 (68) 87/66 (73) 101/64 (76) 86/64 (71)





 10/23/18 10/23/18 10/23/18 10/23/18





 03:00 04:00 05:00 06:00


 


Temp 97.9   





 97.9   


 


Pulse 94 94 94 98


 


Resp 24   


 


B/P (MAP) 89/64 (72) 84/62 (69) 75/51 (59) 95/64 (74)


 


Pulse Ox 94   


 


O2 Delivery Room Air   


 


    





    





 10/23/18   





 06:55   


 


Temp 97.5   





 97.5   


 


Pulse 102   


 


Resp 16   


 


B/P (MAP) 94/71 (79)   


 


Pulse Ox 95   


 


O2 Delivery Room Air   














Intake and Output   


 


 10/22/18 10/22/18 10/23/18





 15:00 23:00 07:00


 


Intake Total 410 ml 946 ml 250 ml


 


Balance 410 ml 946 ml 250 ml











Nutrition Consultation


Dietary Evaluation:


Recommendations by RD:  Protein supplementation


Comments:  


REC renal/ADA diet w/double meat portions





Continue w/leif-ana


Expected Outcomes/Goals:  


PO intake to meet >75% est needs


Interpretation of weight loss:  >1-2% in 1 week





Malnutrition Findings:


Food and Nutrition Intake (Mod:  <75% est energy req 7days


Weight Status:  Appropriate











MARY DALY MD Oct 23, 2018 09:48

## 2018-10-23 NOTE — PDOC
SURGICAL PROGRESS NOTE


Subjective


He is without new complaints.  He denies any foot or leg pain.  


He is uncertain how long ulcer has been present.


Vital Signs





Vital Signs








  Date Time  Temp Pulse Resp B/P (MAP) Pulse Ox O2 Delivery O2 Flow Rate FiO2


 


10/23/18 06:55 97.5 102 16 94/71 (79) 95 Room Air  





 97.5       








I&O











Intake and Output 


 


 10/23/18





 07:00


 


Intake Total 1606 ml


 


Balance 1606 ml


 


 


 


Intake Oral 420 ml


 


IV Total 1186 ml


 


# Bowel Movements 1








General:  Alert, No acute distress


HEENT:  Atraumatic


Lungs:  Normal air movement


Heart:  Regular rate


Abdomen:  Soft, No tenderness, Other (PD catheter)


Extremities:  Other (No skin breakdown on the right foot, Left medial foot 

ulcer over region of medial left 1st MP, No fluctuance, callus over distal left 

1st toe (removed) with no underlying skin breakdown)


Neuro:  Other (decreased foot sensation consistent with peripheral neuropathy)


I have reviewed the following


Reviewed the arterial US.  Good biphasic flow to the foot.


Problem List


Problems


Medical Problems:


(1) Abdominal pain


Status: Acute  








Assessment/Plan


ESRD on peritoneal dialysis


Left medial foot ulcer


Good arterial flow to the left foot.





The foot xray showed some distal phalanx changes.  Would recommend MRI of the 

foot to better evaluate for osteomyelitis.  I would not recommend any arterial 

intervention at this time.  Would apply mupirocin ointment to the left medial 

foot ulcer twice a day.  Would continue IV abx at least till MRI results back.











JARON GOLDSMITH MD Oct 23, 2018 09:14

## 2018-10-23 NOTE — PDOC
Renal-Progress Notes


Subjective Notes


Notes


NO SOB





History of Present Illness


Hx of present illness


STABLE





Vitals


Vitals





Vital Signs








  Date Time  Temp Pulse Resp B/P (MAP) Pulse Ox O2 Delivery O2 Flow Rate FiO2


 


10/23/18 09:44  106  104/73    


 


10/23/18 06:55 97.5  16  95 Room Air  





 97.5       








Weight


Weight [ ]





I.O.


Intake and Output











Intake and Output 


 


 10/23/18





 07:00


 


Intake Total 1606 ml


 


Balance 1606 ml


 


 


 


Intake Oral 420 ml


 


IV Total 1186 ml


 


# Bowel Movements 1











Labs


Labs





Laboratory Tests








Test


 10/22/18


12:33 10/22/18


17:16 10/22/18


20:56 10/23/18


05:00


 


Glucose (Fingerstick)


 261 mg/dL


(70-99) 108 mg/dL


(70-99) 196 mg/dL


(70-99) 





 


White Blood Count


 


 


 


 7.7 x10^3/uL


(4.0-11.0)


 


Red Blood Count


 


 


 


 3.82 x10^6/uL


(4.30-5.70)


 


Hemoglobin


 


 


 


 10.9 g/dL


(13.0-17.5)


 


Hematocrit


 


 


 


 33.2 %


(39.0-53.0)


 


Mean Corpuscular Volume    87 fL () 


 


Mean Corpuscular Hemoglobin    28 pg (25-35) 


 


Mean Corpuscular Hemoglobin


Concent 


 


 


 33 g/dL


(31-37)


 


Red Cell Distribution Width


 


 


 


 13.9 %


(11.5-14.5)


 


Platelet Count


 


 


 


 342 x10^3/uL


(140-400)


 


Neutrophils (%) (Auto)    80 % (31-73) 


 


Lymphocytes (%) (Auto)    6 % (24-48) 


 


Monocytes (%) (Auto)    9 % (0-9) 


 


Eosinophils (%) (Auto)    5 % (0-3) 


 


Basophils (%) (Auto)    0 % (0-3) 


 


Neutrophils # (Auto)


 


 


 


 6.2 x10^3uL


(1.8-7.7)


 


Lymphocytes # (Auto)


 


 


 


 0.5 x10^3/uL


(1.0-4.8)


 


Monocytes # (Auto)


 


 


 


 0.7 x10^3/uL


(0.0-1.1)


 


Eosinophils # (Auto)


 


 


 


 0.4 x10^3/uL


(0.0-0.7)


 


Basophils # (Auto)


 


 


 


 0.0 x10^3/uL


(0.0-0.2)


 


Sodium Level


 


 


 


 128 mmol/L


(136-145)


 


Potassium Level


 


 


 


 3.7 mmol/L


(3.5-5.1)


 


Chloride Level


 


 


 


 92 mmol/L


()


 


Carbon Dioxide Level


 


 


 


 23 mmol/L


(21-32)


 


Anion Gap    13 (6-14) 


 


Blood Urea Nitrogen


 


 


 


 54 mg/dL


(8-26)


 


Creatinine


 


 


 


 10.5 mg/dL


(0.7-1.3)


 


Estimated GFR


(Cockcroft-Gault) 


 


 


 6.3 





 


Glucose Level


 


 


 


 490 mg/dL


(70-99)


 


Calcium Level


 


 


 


 7.0 mg/dL


(8.5-10.1)


 


Phosphorus Level


 


 


 


 5.2 mg/dL


(2.6-4.7)


 


Magnesium Level


 


 


 


 1.8 mg/dL


(1.8-2.4)


 


NT-Pro-B-Type Natriuretic


Peptide 


 


 


 > 23795 pg/mL


(0-124)


 


Albumin


 


 


 


 1.1 g/dL


(3.4-5.0)


 


Random Vancomycin Level    20.4 mcg/mL 


 


Test


 10/23/18


07:48 


 


 





 


Glucose (Fingerstick)


 485 mg/dL


(70-99) 


 


 














Micro


Micro





Microbiology


10/19/18 Blood Culture - Preliminary, Resulted


           NO GROWTH AFTER 3 DAYS


10/20/18 Anaerobic/Aerobic Culture, Resulted


           Pending


10/20/18 Anaerobic Culture Result 1 (ANTHONY), Resulted


           Pending


10/20/18 Aerobic Culture, Resulted


           Pending


10/20/18 Aerobic Culture Result 1 (ANTHONY), Resulted


           Pending


10/20/18 Gram Stain - Final, Resulted


           


10/20/18 Gram Stain Result 1 (ANTHONY) - Final, Resulted


           


10/20/18 Gram Stain Result 2 (ANTHONY) - Final, Resulted


           


10/21/18 Anaerobic/Aerobic Culture, Resulted


           Pending


10/21/18 Anaerobic Culture Result 1 (ANTHONY), Resulted


           Pending


10/21/18 Aerobic Culture, Resulted


           Pending


10/21/18 Aerobic Culture Result 1 (ANTHONY), Resulted


           Pending


10/21/18 Gram Stain - Final, Resulted


           


10/21/18 Gram Stain Result 1 (ANTHONY) - Final, Resulted


           


10/21/18 Gram Stain Result 2 (ANTHONY) - Final, Resulted





Review of Systems


Constitutional:  yes: weakness, alert, oriented


Ears/Nose/Throat:  Yes: no symptom reported


Eyes:  Yes: no symptom reported


Pulmonary:  Yes no symptom reported


Cardiovascular:  Yes no symptom reported


Gastrointestional:  Yes: no symptom reported


Genitourinary:  Yes: no symptom reported


Musculoskeletal:  Yes: no symptom reported


Skin:  Yes no symptom reported


Psychiatric/Neurological:  Yes: no symptom reported


Endocrine:  Yes: no symptom reported





Physical Exam


General Appearance:  no apparent distress


Respiratory:  bilateral CTA


Heart:  S1S2


Abdomen:  soft, bowel sounds present


Genitourinary:  bladder flat


Extremities:  pulses present


Neurology:  alert, oriented


Musculoskeletal:  Osteoarthritis





Assessment


Assessment


IMP





ESRD


LEUCOCYTOSIS-RESOLVED


ABD PAIN RESOLVED


PROB SEPSIS


LEFT FOOT ULCER


SECONDARY HYPERPARATHYROIDISM


HYPONATREMIA


HYPOKALEMIA-RESOLVED


S/P AICD GENERATION CHANGE


SEVERE CM





PLAN





STOP IVF'S-SHOULD CORRECT LOW NA


CONT CCPD


SUPPORTIVE CARE


HOME SOON











CONSTANTIN GOLDSMITH MD Oct 23, 2018 11:37

## 2018-10-24 VITALS — SYSTOLIC BLOOD PRESSURE: 84 MMHG | DIASTOLIC BLOOD PRESSURE: 52 MMHG

## 2018-10-24 VITALS — DIASTOLIC BLOOD PRESSURE: 53 MMHG | SYSTOLIC BLOOD PRESSURE: 84 MMHG

## 2018-10-24 VITALS — DIASTOLIC BLOOD PRESSURE: 64 MMHG | SYSTOLIC BLOOD PRESSURE: 106 MMHG

## 2018-10-24 VITALS — DIASTOLIC BLOOD PRESSURE: 49 MMHG | SYSTOLIC BLOOD PRESSURE: 74 MMHG

## 2018-10-24 VITALS — SYSTOLIC BLOOD PRESSURE: 84 MMHG | DIASTOLIC BLOOD PRESSURE: 56 MMHG

## 2018-10-24 VITALS — DIASTOLIC BLOOD PRESSURE: 64 MMHG | SYSTOLIC BLOOD PRESSURE: 101 MMHG

## 2018-10-24 VITALS — SYSTOLIC BLOOD PRESSURE: 100 MMHG | DIASTOLIC BLOOD PRESSURE: 73 MMHG

## 2018-10-24 VITALS — DIASTOLIC BLOOD PRESSURE: 66 MMHG | SYSTOLIC BLOOD PRESSURE: 96 MMHG

## 2018-10-24 LAB
ALBUMIN SERPL-MCNC: 1.1 G/DL (ref 3.4–5)
ANION GAP SERPL CALC-SCNC: 12 MMOL/L (ref 6–14)
BASOPHILS # BLD AUTO: 0.1 X10^3/UL (ref 0–0.2)
BASOPHILS NFR BLD: 1 % (ref 0–3)
BUN SERPL-MCNC: 54 MG/DL (ref 8–26)
CALCIUM SERPL-MCNC: 7.2 MG/DL (ref 8.5–10.1)
CHLORIDE SERPL-SCNC: 92 MMOL/L (ref 98–107)
CO2 SERPL-SCNC: 24 MMOL/L (ref 21–32)
CREAT SERPL-MCNC: 11 MG/DL (ref 0.7–1.3)
EOSINOPHIL NFR BLD: 0.5 X10^3/UL (ref 0–0.7)
EOSINOPHIL NFR BLD: 5 % (ref 0–3)
ERYTHROCYTE [DISTWIDTH] IN BLOOD BY AUTOMATED COUNT: 13.5 % (ref 11.5–14.5)
GFR SERPLBLD BASED ON 1.73 SQ M-ARVRAT: 6 ML/MIN
GLUCOSE SERPL-MCNC: 381 MG/DL (ref 70–99)
HCT VFR BLD CALC: 32.6 % (ref 39–53)
HGB BLD-MCNC: 10.9 G/DL (ref 13–17.5)
LYMPHOCYTES # BLD: 0.5 X10^3/UL (ref 1–4.8)
LYMPHOCYTES NFR BLD AUTO: 6 % (ref 24–48)
MAGNESIUM SERPL-MCNC: 1.9 MG/DL (ref 1.8–2.4)
MCH RBC QN AUTO: 29 PG (ref 25–35)
MCHC RBC AUTO-ENTMCNC: 34 G/DL (ref 31–37)
MCV RBC AUTO: 86 FL (ref 79–100)
MONO #: 0.8 X10^3/UL (ref 0–1.1)
MONOCYTES NFR BLD: 9 % (ref 0–9)
NEUT #: 7.4 X10^3UL (ref 1.8–7.7)
NEUTROPHILS NFR BLD AUTO: 80 % (ref 31–73)
PHOSPHATE SERPL-MCNC: 4.5 MG/DL (ref 2.6–4.7)
PLATELET # BLD AUTO: 349 X10^3/UL (ref 140–400)
POTASSIUM SERPL-SCNC: 3.4 MMOL/L (ref 3.5–5.1)
RBC # BLD AUTO: 3.77 X10^6/UL (ref 4.3–5.7)
SODIUM SERPL-SCNC: 128 MMOL/L (ref 136–145)
WBC # BLD AUTO: 9.2 X10^3/UL (ref 4–11)

## 2018-10-24 RX ADMIN — CALCIUM ACETATE SCH MG: 667 CAPSULE ORAL at 08:23

## 2018-10-24 RX ADMIN — CALCIUM ACETATE SCH MG: 667 CAPSULE ORAL at 12:19

## 2018-10-24 RX ADMIN — VANCOMYCIN HYDROCHLORIDE SCH MG: 500 INJECTION, POWDER, LYOPHILIZED, FOR SOLUTION INTRAVENOUS at 21:10

## 2018-10-24 RX ADMIN — MUPIROCIN SCH APP: 20 OINTMENT TOPICAL at 08:32

## 2018-10-24 RX ADMIN — INSULIN GLARGINE SCH UNITS: 100 INJECTION, SOLUTION SUBCUTANEOUS at 21:20

## 2018-10-24 RX ADMIN — VANCOMYCIN HYDROCHLORIDE SCH MG: 500 INJECTION, POWDER, LYOPHILIZED, FOR SOLUTION INTRAVENOUS at 13:38

## 2018-10-24 RX ADMIN — HEPARIN SODIUM SCH UNIT: 5000 INJECTION, SOLUTION INTRAVENOUS; SUBCUTANEOUS at 05:26

## 2018-10-24 RX ADMIN — NYSTATIN SCH ML: 100000 SUSPENSION ORAL at 08:25

## 2018-10-24 RX ADMIN — HEPARIN SODIUM SCH UNIT: 5000 INJECTION, SOLUTION INTRAVENOUS; SUBCUTANEOUS at 13:39

## 2018-10-24 RX ADMIN — VANCOMYCIN HYDROCHLORIDE SCH MG: 500 INJECTION, POWDER, LYOPHILIZED, FOR SOLUTION INTRAVENOUS at 08:31

## 2018-10-24 RX ADMIN — MUPIROCIN SCH APP: 20 OINTMENT TOPICAL at 21:00

## 2018-10-24 RX ADMIN — CARVEDILOL SCH MG: 12.5 TABLET, FILM COATED ORAL at 17:34

## 2018-10-24 RX ADMIN — INSULIN LISPRO SCH UNITS: 100 INJECTION, SOLUTION INTRAVENOUS; SUBCUTANEOUS at 17:35

## 2018-10-24 RX ADMIN — HEPARIN SODIUM SCH UNIT: 5000 INJECTION, SOLUTION INTRAVENOUS; SUBCUTANEOUS at 21:21

## 2018-10-24 RX ADMIN — BACLOFEN PRN MG: 10 TABLET ORAL at 08:23

## 2018-10-24 RX ADMIN — Medication SCH CAP: at 08:25

## 2018-10-24 RX ADMIN — Medication SCH CAP: at 21:11

## 2018-10-24 RX ADMIN — DOCUSATE SODIUM SCH MG: 100 CAPSULE, LIQUID FILLED ORAL at 08:31

## 2018-10-24 RX ADMIN — Medication SCH TAB: at 08:25

## 2018-10-24 RX ADMIN — CARVEDILOL SCH MG: 12.5 TABLET, FILM COATED ORAL at 08:00

## 2018-10-24 RX ADMIN — INSULIN LISPRO SCH UNITS: 100 INJECTION, SOLUTION INTRAVENOUS; SUBCUTANEOUS at 08:31

## 2018-10-24 RX ADMIN — PIPERACILLIN SODIUM AND TAZOBACTAM SODIUM SCH MLS/HR: 2; .25 INJECTION, POWDER, LYOPHILIZED, FOR SOLUTION INTRAVENOUS at 13:37

## 2018-10-24 RX ADMIN — NYSTATIN SCH ML: 100000 SUSPENSION ORAL at 13:10

## 2018-10-24 RX ADMIN — CALCIUM ACETATE SCH MG: 667 CAPSULE ORAL at 17:33

## 2018-10-24 RX ADMIN — VANCOMYCIN HYDROCHLORIDE PRN EACH: 1 INJECTION, POWDER, LYOPHILIZED, FOR SOLUTION INTRAVENOUS at 11:34

## 2018-10-24 RX ADMIN — INSULIN LISPRO SCH UNITS: 100 INJECTION, SOLUTION INTRAVENOUS; SUBCUTANEOUS at 17:00

## 2018-10-24 RX ADMIN — NYSTATIN SCH ML: 100000 SUSPENSION ORAL at 21:00

## 2018-10-24 RX ADMIN — INSULIN LISPRO SCH UNITS: 100 INJECTION, SOLUTION INTRAVENOUS; SUBCUTANEOUS at 12:00

## 2018-10-24 RX ADMIN — PANTOPRAZOLE SODIUM SCH MG: 40 TABLET, DELAYED RELEASE ORAL at 08:23

## 2018-10-24 RX ADMIN — PIPERACILLIN SODIUM AND TAZOBACTAM SODIUM SCH MLS/HR: 2; .25 INJECTION, POWDER, LYOPHILIZED, FOR SOLUTION INTRAVENOUS at 05:24

## 2018-10-24 RX ADMIN — SILVER SULFADIAZINE SCH APP: 10 CREAM TOPICAL at 08:32

## 2018-10-24 RX ADMIN — NYSTATIN SCH ML: 100000 SUSPENSION ORAL at 13:00

## 2018-10-24 RX ADMIN — CHOLECALCIFEROL CAP 125 MCG (5000 UNIT) SCH UNIT: 125 CAP at 08:24

## 2018-10-24 RX ADMIN — SENNOSIDES AND DOCUSATE SODIUM SCH TAB: 8.6; 5 TABLET ORAL at 08:31

## 2018-10-24 RX ADMIN — INSULIN LISPRO SCH UNITS: 100 INJECTION, SOLUTION INTRAVENOUS; SUBCUTANEOUS at 08:30

## 2018-10-24 RX ADMIN — VANCOMYCIN HYDROCHLORIDE SCH MG: 500 INJECTION, POWDER, LYOPHILIZED, FOR SOLUTION INTRAVENOUS at 17:33

## 2018-10-24 RX ADMIN — PIPERACILLIN SODIUM AND TAZOBACTAM SODIUM SCH MLS/HR: 2; .25 INJECTION, POWDER, LYOPHILIZED, FOR SOLUTION INTRAVENOUS at 21:11

## 2018-10-24 RX ADMIN — ASPIRIN 81 MG SCH MG: 81 TABLET ORAL at 08:24

## 2018-10-24 NOTE — PDOC
PROGRESS NOTES


Chief Complaint


Chief Complaint


Osteo distal phalanx, left foot


Abdominal Pain, no peritonitis- resolved


ESRD on PD


AOCD


Hypoglycemia, resolved


Sepsis POA with no organ dysfcn


Severe CM With EF 10-% Indwelling defibrillator


DM 2 better now


Loose stools, on bowel regimen





History of Present Illness


History of Present Illness


He has no complaints


Sugars are better since I adjusted insulin yesterday


Some loose stools per chart-was getting bowel regimen


Bone scan done, osteomyelitis findings on distal phalanx left foot


MRI cannot be done


Thoughts of CAT scan by ID


pt does not want any amputation when i mentioned





Bit hypotensive, systolic 80s-ICU because needing levophed prn


Patient denies any symptoms


Patient claims usually runs low





PLAN:


Thoughts about CAT scan


cont present insulin regimen


Cancel bowel regimen


for the hypotension, check Cortisol and TSH-last TSH check 2015 was normal





Vitals


Vitals





Vital Signs








  Date Time  Temp Pulse Resp B/P (MAP) Pulse Ox O2 Delivery O2 Flow Rate FiO2


 


10/24/18 12:08 98.3 88 16 84/52 (63) 96 Room Air  





 98.3       











Physical Exam


Physical Exam


GENERAL:  Propped up in bed, resting quietly 


HEENT:  Normal conjunctivae.  Oral cavity:  Pharynx pink and moist.


NECK:  Supple.


LUNGS:  Clear to auscultation.


HEART:  S1 and S2.  Left-sided chest AICD site unremarkable for infection.


ABDOMEN:  Mildly distended.  Bowel sounds active, soft, nontender.  Peritoneal


catheter without redness or drainage.


EXTREMITIES:  No gross edema or cyanosis.  Ulcer left foot. Distal pulses weak.

  wound looks good


SKIN:  Warm without rash.


PIV


General:  Alert, No acute distress


Heart:  Regular rate, Normal S1, Normal S2


Lungs:  Clear


Abdomen:  Soft, No tenderness, Other (PD catheter)


Extremities:  No clubbing, No cyanosis, Other (No skin breakdown on the right 

foot, Left medial foot ulcer over region of medial left 1st MP, No fluctuance, 

callus over distal left 1st toe (removed) with no underlying skin breakdown)


Skin:  Other (chornic leg closed lesions; left chest incision site is open to 

air, no swelling or erythema or tenderness with palpation. No drain and 

incision is well approximated with dry steri strips. No discoloration. )





Labs


LABS





Laboratory Tests








Test


 10/23/18


15:25 10/23/18


16:20 10/23/18


16:32 10/23/18


20:58


 


Clostridium difficile Toxin


(PCR) Negative


(Negative) 


 


 





 


Glucose (Fingerstick)


 


 40 mg/dL


(70-99) 120 mg/dL


(70-99) 176 mg/dL


(70-99)


 


Test


 10/24/18


04:35 10/24/18


08:23 


 





 


White Blood Count


 9.2 x10^3/uL


(4.0-11.0) 


 


 





 


Red Blood Count


 3.77 x10^6/uL


(4.30-5.70) 


 


 





 


Hemoglobin


 10.9 g/dL


(13.0-17.5) 


 


 





 


Hematocrit


 32.6 %


(39.0-53.0) 


 


 





 


Mean Corpuscular Volume 86 fL ()    


 


Mean Corpuscular Hemoglobin 29 pg (25-35)    


 


Mean Corpuscular Hemoglobin


Concent 34 g/dL


(31-37) 


 


 





 


Red Cell Distribution Width


 13.5 %


(11.5-14.5) 


 


 





 


Platelet Count


 349 x10^3/uL


(140-400) 


 


 





 


Neutrophils (%) (Auto) 80 % (31-73)    


 


Lymphocytes (%) (Auto) 6 % (24-48)    


 


Monocytes (%) (Auto) 9 % (0-9)    


 


Eosinophils (%) (Auto) 5 % (0-3)    


 


Basophils (%) (Auto) 1 % (0-3)    


 


Neutrophils # (Auto)


 7.4 x10^3uL


(1.8-7.7) 


 


 





 


Lymphocytes # (Auto)


 0.5 x10^3/uL


(1.0-4.8) 


 


 





 


Monocytes # (Auto)


 0.8 x10^3/uL


(0.0-1.1) 


 


 





 


Eosinophils # (Auto)


 0.5 x10^3/uL


(0.0-0.7) 


 


 





 


Basophils # (Auto)


 0.1 x10^3/uL


(0.0-0.2) 


 


 





 


Sodium Level


 128 mmol/L


(136-145) 


 


 





 


Potassium Level


 3.4 mmol/L


(3.5-5.1) 


 


 





 


Chloride Level


 92 mmol/L


() 


 


 





 


Carbon Dioxide Level


 24 mmol/L


(21-32) 


 


 





 


Anion Gap 12 (6-14)    


 


Blood Urea Nitrogen


 54 mg/dL


(8-26) 


 


 





 


Creatinine


 11.0 mg/dL


(0.7-1.3) 


 


 





 


Estimated GFR


(Cockcroft-Gault) 6.0 


 


 


 





 


Glucose Level


 381 mg/dL


(70-99) 


 


 





 


Calcium Level


 7.2 mg/dL


(8.5-10.1) 


 


 





 


Phosphorus Level


 4.5 mg/dL


(2.6-4.7) 


 


 





 


Magnesium Level


 1.9 mg/dL


(1.8-2.4) 


 


 





 


Albumin


 1.1 g/dL


(3.4-5.0) 


 


 





 


Glucose (Fingerstick)


 


 353 mg/dL


(70-99) 


 














Review of Systems


Review of Systems


A 14 point ROS was completed with the following noted as positive:





Other systems reviewed and negative.


\CONSTITUTIONAL:        No fever or chills


EYES:                          No recent changes


SKIN:               No rash or itching


CARDIOVASCULAR:     No chest pain, syncope, palpitations, or edema


RESPIRATORY:            No SOB or cough


GASTROINTESTINAL:    No nausea, vomiting or abdominal pain


NEUROLOGICAL:          No headaches or weakness


ENDOCRINE:               No cold or heat intolerance


GENITOURINARY:         No urgency or frequency of urination


MUSCULOSKELETAL:   No back pain or joint pain


LYMPHATICS:               No enlarged lymph nodes


PSYCHIATRIC:              No anxiety or depression





Assessment and Plan


Assessmemt and Plan


Problems


Medical Problems:


(1) Abdominal pain


Status: Acute  











Comment


Review of Relevant


I have reviewed the following items sean (where applicable) has been applied.


Labs





Laboratory Tests








Test


 10/22/18


12:33 10/22/18


17:16 10/22/18


20:56 10/23/18


05:00


 


Glucose (Fingerstick)


 261 mg/dL


(70-99) 108 mg/dL


(70-99) 196 mg/dL


(70-99) 





 


White Blood Count


 


 


 


 7.7 x10^3/uL


(4.0-11.0)


 


Red Blood Count


 


 


 


 3.82 x10^6/uL


(4.30-5.70)


 


Hemoglobin


 


 


 


 10.9 g/dL


(13.0-17.5)


 


Hematocrit


 


 


 


 33.2 %


(39.0-53.0)


 


Mean Corpuscular Volume    87 fL () 


 


Mean Corpuscular Hemoglobin    28 pg (25-35) 


 


Mean Corpuscular Hemoglobin


Concent 


 


 


 33 g/dL


(31-37)


 


Red Cell Distribution Width


 


 


 


 13.9 %


(11.5-14.5)


 


Platelet Count


 


 


 


 342 x10^3/uL


(140-400)


 


Neutrophils (%) (Auto)    80 % (31-73) 


 


Lymphocytes (%) (Auto)    6 % (24-48) 


 


Monocytes (%) (Auto)    9 % (0-9) 


 


Eosinophils (%) (Auto)    5 % (0-3) 


 


Basophils (%) (Auto)    0 % (0-3) 


 


Neutrophils # (Auto)


 


 


 


 6.2 x10^3uL


(1.8-7.7)


 


Lymphocytes # (Auto)


 


 


 


 0.5 x10^3/uL


(1.0-4.8)


 


Monocytes # (Auto)


 


 


 


 0.7 x10^3/uL


(0.0-1.1)


 


Eosinophils # (Auto)


 


 


 


 0.4 x10^3/uL


(0.0-0.7)


 


Basophils # (Auto)


 


 


 


 0.0 x10^3/uL


(0.0-0.2)


 


Sodium Level


 


 


 


 128 mmol/L


(136-145)


 


Potassium Level


 


 


 


 3.7 mmol/L


(3.5-5.1)


 


Chloride Level


 


 


 


 92 mmol/L


()


 


Carbon Dioxide Level


 


 


 


 23 mmol/L


(21-32)


 


Anion Gap    13 (6-14) 


 


Blood Urea Nitrogen


 


 


 


 54 mg/dL


(8-26)


 


Creatinine


 


 


 


 10.5 mg/dL


(0.7-1.3)


 


Estimated GFR


(Cockcroft-Gault) 


 


 


 6.3 





 


Glucose Level


 


 


 


 490 mg/dL


(70-99)


 


Hemoglobin A1c


 


 


 


 10.0 %


(4.8-5.6)


 


Calcium Level


 


 


 


 7.0 mg/dL


(8.5-10.1)


 


Phosphorus Level


 


 


 


 5.2 mg/dL


(2.6-4.7)


 


Magnesium Level


 


 


 


 1.8 mg/dL


(1.8-2.4)


 


NT-Pro-B-Type Natriuretic


Peptide 


 


 


 > 10705 pg/mL


(0-124)


 


Albumin


 


 


 


 1.1 g/dL


(3.4-5.0)


 


Random Vancomycin Level    20.4 mcg/mL 


 


Test


 10/23/18


07:48 10/23/18


11:35 10/23/18


15:25 10/23/18


16:20


 


Glucose (Fingerstick)


 485 mg/dL


(70-99) 216 mg/dL


(70-99) 


 40 mg/dL


(70-99)


 


Clostridium difficile Toxin


(PCR) 


 


 Negative


(Negative) 





 


Test


 10/23/18


16:32 10/23/18


20:58 10/24/18


04:35 10/24/18


08:23


 


Glucose (Fingerstick)


 120 mg/dL


(70-99) 176 mg/dL


(70-99) 


 353 mg/dL


(70-99)


 


White Blood Count


 


 


 9.2 x10^3/uL


(4.0-11.0) 





 


Red Blood Count


 


 


 3.77 x10^6/uL


(4.30-5.70) 





 


Hemoglobin


 


 


 10.9 g/dL


(13.0-17.5) 





 


Hematocrit


 


 


 32.6 %


(39.0-53.0) 





 


Mean Corpuscular Volume   86 fL ()  


 


Mean Corpuscular Hemoglobin   29 pg (25-35)  


 


Mean Corpuscular Hemoglobin


Concent 


 


 34 g/dL


(31-37) 





 


Red Cell Distribution Width


 


 


 13.5 %


(11.5-14.5) 





 


Platelet Count


 


 


 349 x10^3/uL


(140-400) 





 


Neutrophils (%) (Auto)   80 % (31-73)  


 


Lymphocytes (%) (Auto)   6 % (24-48)  


 


Monocytes (%) (Auto)   9 % (0-9)  


 


Eosinophils (%) (Auto)   5 % (0-3)  


 


Basophils (%) (Auto)   1 % (0-3)  


 


Neutrophils # (Auto)


 


 


 7.4 x10^3uL


(1.8-7.7) 





 


Lymphocytes # (Auto)


 


 


 0.5 x10^3/uL


(1.0-4.8) 





 


Monocytes # (Auto)


 


 


 0.8 x10^3/uL


(0.0-1.1) 





 


Eosinophils # (Auto)


 


 


 0.5 x10^3/uL


(0.0-0.7) 





 


Basophils # (Auto)


 


 


 0.1 x10^3/uL


(0.0-0.2) 





 


Sodium Level


 


 


 128 mmol/L


(136-145) 





 


Potassium Level


 


 


 3.4 mmol/L


(3.5-5.1) 





 


Chloride Level


 


 


 92 mmol/L


() 





 


Carbon Dioxide Level


 


 


 24 mmol/L


(21-32) 





 


Anion Gap   12 (6-14)  


 


Blood Urea Nitrogen


 


 


 54 mg/dL


(8-26) 





 


Creatinine


 


 


 11.0 mg/dL


(0.7-1.3) 





 


Estimated GFR


(Cockcroft-Gault) 


 


 6.0 


 





 


Glucose Level


 


 


 381 mg/dL


(70-99) 





 


Calcium Level


 


 


 7.2 mg/dL


(8.5-10.1) 





 


Phosphorus Level


 


 


 4.5 mg/dL


(2.6-4.7) 





 


Magnesium Level


 


 


 1.9 mg/dL


(1.8-2.4) 





 


Albumin


 


 


 1.1 g/dL


(3.4-5.0) 











Laboratory Tests








Test


 10/23/18


15:25 10/23/18


16:20 10/23/18


16:32 10/23/18


20:58


 


Clostridium difficile Toxin


(PCR) Negative


(Negative) 


 


 





 


Glucose (Fingerstick)


 


 40 mg/dL


(70-99) 120 mg/dL


(70-99) 176 mg/dL


(70-99)


 


Test


 10/24/18


04:35 10/24/18


08:23 


 





 


White Blood Count


 9.2 x10^3/uL


(4.0-11.0) 


 


 





 


Red Blood Count


 3.77 x10^6/uL


(4.30-5.70) 


 


 





 


Hemoglobin


 10.9 g/dL


(13.0-17.5) 


 


 





 


Hematocrit


 32.6 %


(39.0-53.0) 


 


 





 


Mean Corpuscular Volume 86 fL ()    


 


Mean Corpuscular Hemoglobin 29 pg (25-35)    


 


Mean Corpuscular Hemoglobin


Concent 34 g/dL


(31-37) 


 


 





 


Red Cell Distribution Width


 13.5 %


(11.5-14.5) 


 


 





 


Platelet Count


 349 x10^3/uL


(140-400) 


 


 





 


Neutrophils (%) (Auto) 80 % (31-73)    


 


Lymphocytes (%) (Auto) 6 % (24-48)    


 


Monocytes (%) (Auto) 9 % (0-9)    


 


Eosinophils (%) (Auto) 5 % (0-3)    


 


Basophils (%) (Auto) 1 % (0-3)    


 


Neutrophils # (Auto)


 7.4 x10^3uL


(1.8-7.7) 


 


 





 


Lymphocytes # (Auto)


 0.5 x10^3/uL


(1.0-4.8) 


 


 





 


Monocytes # (Auto)


 0.8 x10^3/uL


(0.0-1.1) 


 


 





 


Eosinophils # (Auto)


 0.5 x10^3/uL


(0.0-0.7) 


 


 





 


Basophils # (Auto)


 0.1 x10^3/uL


(0.0-0.2) 


 


 





 


Sodium Level


 128 mmol/L


(136-145) 


 


 





 


Potassium Level


 3.4 mmol/L


(3.5-5.1) 


 


 





 


Chloride Level


 92 mmol/L


() 


 


 





 


Carbon Dioxide Level


 24 mmol/L


(21-32) 


 


 





 


Anion Gap 12 (6-14)    


 


Blood Urea Nitrogen


 54 mg/dL


(8-26) 


 


 





 


Creatinine


 11.0 mg/dL


(0.7-1.3) 


 


 





 


Estimated GFR


(Cockcroft-Gault) 6.0 


 


 


 





 


Glucose Level


 381 mg/dL


(70-99) 


 


 





 


Calcium Level


 7.2 mg/dL


(8.5-10.1) 


 


 





 


Phosphorus Level


 4.5 mg/dL


(2.6-4.7) 


 


 





 


Magnesium Level


 1.9 mg/dL


(1.8-2.4) 


 


 





 


Albumin


 1.1 g/dL


(3.4-5.0) 


 


 





 


Glucose (Fingerstick)


 


 353 mg/dL


(70-99) 


 











Microbiology


10/19/18 Blood Culture - Preliminary, Resulted


           NO GROWTH AFTER 4 DAYS


10/20/18 Anaerobic/Aerobic Culture - Preliminary, Resulted


           


10/20/18 Anaerobic Culture Result 1 (ANTHONY) - Preliminary, Resulted


           


10/20/18 Aerobic Culture - Final, Resulted


           


10/20/18 Aerobic Culture Result 1 (ANTHONY) - Final, Resulted


           


10/20/18 Gram Stain - Final, Resulted


           


10/20/18 Gram Stain Result 1 (ANTHONY) - Final, Resulted


           


10/20/18 Gram Stain Result 2 (ANTHONY) - Final, Resulted


           


10/21/18 Anaerobic/Aerobic Culture - Preliminary, Resulted


           


10/21/18 Anaerobic Culture Result 1 (ANTHONY) - Preliminary, Resulted


           


10/21/18 Aerobic Culture - Final, Resulted


           


10/21/18 Aerobic Culture Result 1 (ANTHONY) - Final, Resulted


           


10/21/18 Gram Stain - Final, Resulted


           


10/21/18 Gram Stain Result 1 (ANTHONY) - Final, Resulted


           


10/21/18 Gram Stain Result 2 (ANTHONY) - Final, Resulted


Medications





Current Medications


Sodium Chloride 250 ml @  250 mls/hr 1X  ONCE IV ;  Start 10/19/18 at 15:45;  

Stop 10/19/18 at 16:44;  Status Cancel


Piperacillin Sod/ Tazobactam Sod (Zosyn Per Pharmacy) 1 each PRN DAILY  PRN MC 

SEE COMMENTS;  Start 10/19/18 at 15:45;  Status UNV


Piperacillin Sod/ Tazobactam Sod 2.25 gm/Sodium Chloride 50 ml @  100 mls/hr 1X

  ONCE IV  Last administered on 10/19/18at 16:49;  Start 10/19/18 at 16:00;  

Stop 10/19/18 at 16:29;  Status DC


Ondansetron HCl (Zofran) 4 mg 1X  ONCE IV  Last administered on 10/19/18at 16:12

;  Start 10/19/18 at 16:00;  Stop 10/19/18 at 16:01;  Status DC


Hydromorphone HCl (Dilaudid) 0.5 mg PRN Q30MIN  PRN IV SEVERE PAIN Last 

administered on 10/21/18at 18:00;  Start 10/19/18 at 16:15;  Stop 10/21/18 at 18

:00;  Status DC


Hydromorphone HCl (Dilaudid) 2 mg STK-MED ONCE .ROUTE ;  Start 10/19/18 at 16:10

;  Stop 10/19/18 at 16:11;  Status DC


Sodium Chloride 500 ml @  500 mls/hr 1X  ONCE IV  Last administered on 10/19/

18at 16:49;  Start 10/19/18 at 16:30;  Stop 10/19/18 at 17:29;  Status DC


Aspirin (Children'S Aspirin) 324 mg 1X  ONCE PO  Last administered on 10/19/

18at 17:52;  Start 10/19/18 at 17:15;  Stop 10/19/18 at 17:16;  Status DC


Vancomycin HCl (Vanco Per Pharmacy) 1 each PRN DAILY  PRN MC SEE COMMENTS;  

Start 10/19/18 at 17:15;  Status UNV


Sodium Chloride 500 ml @  500 mls/hr 1X  ONCE IV  Last administered on 10/19/

18at 17:51;  Start 10/19/18 at 17:15;  Stop 10/19/18 at 18:14;  Status DC


Vancomycin HCl 1.75 gm/Sodium Chloride 500 ml @  250 mls/hr 1X  ONCE IV  Last 

administered on 10/19/18at 17:25;  Start 10/19/18 at 17:30;  Stop 10/19/18 at 19

:29;  Status DC


Acetaminophen (Tylenol) 650 mg PRN Q6HRS  PRN PO FEVER Last administered on 10/

21/18at 17:14;  Start 10/19/18 at 17:30


Ondansetron HCl (Zofran) 4 mg PRN Q6HRS  PRN IV NAUSEA/VOMITING, 1st IV CHOICE 

Last administered on 10/21/18at 17:17;  Start 10/19/18 at 17:30


Morphine Sulfate (Morphine Sulfate) 2 mg PRN Q2HR  PRN IV MILD-MODERATE PAIN;  

Start 10/19/18 at 17:30


Tramadol HCl (Ultram) 50 mg PRN Q6HRS  PRN PO MILD TO MODERATE PAIN Last 

administered on 10/22/18at 21:11;  Start 10/19/18 at 17:30


Docusate Sodium (Colace) 100 mg PRN DAILY  PRN PO CONSTIPATION 1ST CHOICE;  

Start 10/19/18 at 17:30


Piperacillin Sod/ Tazobactam Sod 3.375 gm/Sodium Chloride 50 ml @  100 mls/hr 

Q6HRS IV ;  Start 10/19/18 at 18:00;  Status UNV


Piperacillin Sod/ Tazobactam Sod (Zosyn Per Pharmacy) 1 each PRN DAILY  PRN MC 

SEE COMMENTS;  Start 10/19/18 at 17:30;  Status UNV


Vancomycin HCl (Vanco Per Pharmacy) 1 each PRN DAILY  PRN MC SEE COMMENTS Last 

administered on 10/24/18at 11:34;  Start 10/19/18 at 17:30


Insulin Human Lispro (HumaLOG) 0-9 UNITS TIDWMEALS SQ  Last administered on 10/

24/18at 08:30;  Start 10/20/18 at 08:00


Dextrose (Dextrose 50%-Water Syringe) 12.5 gm PRN Q15MIN  PRN IV SEE COMMENTS 

Last administered on 10/23/18at 16:24;  Start 10/19/18 at 17:30


Heparin Sodium (Porcine) (Heparin Sodium) 5,000 unit Q8HRS SQ  Last 

administered on 10/24/18at 05:26;  Start 10/19/18 at 22:00


Senna/Docusate Sodium (Senna Plus) 1 tab BID PO  Last administered on 10/21/

18at 08:54;  Start 10/19/18 at 21:00;  Stop 10/24/18 at 09:07;  Status DC


Docusate Sodium (Colace) 100 mg BID PO  Last administered on 10/21/18at 08:55;  

Start 10/19/18 at 21:00;  Stop 10/24/18 at 09:06;  Status DC


Magnesium Hydroxide (Milk Of Magnesia) 2,400 mg PRN Q12HR  PRN PO CONSTIPATION 

2ND CHOICE;  Start 10/19/18 at 17:45


Sodium Chloride 1,000 ml @  75 mls/hr 1X  ONCE IV ;  Start 10/19/18 at 17:45;  

Stop 10/19/18 at 19:47;  Status DC


Pantoprazole Sodium (PROTONIX VIAL for IV PUSH) 40 mg DAILYAC IVP  Last 

administered on 10/23/18at 10:04;  Start 10/20/18 at 07:30;  Stop 10/23/18 at 12

:07;  Status DC


Piperacillin Sod/ Tazobactam Sod 2.25 gm/Sodium Chloride 50 ml @  100 mls/hr 

Q6HRS IV ;  Start 10/19/18 at 18:00;  Stop 10/19/18 at 18:07;  Status DC


Vancomycin HCl (Vancomycin Random Level) 1 each 1X  ONCE MC  Last administered 

on 10/21/18at 17:00;  Start 10/21/18 at 17:00;  Stop 10/21/18 at 17:01;  Status 

DC


Piperacillin Sod/ Tazobactam Sod 2.25 gm/Sodium Chloride 50 ml @  100 mls/hr 

Q8HRS IV  Last administered on 10/24/18at 05:24;  Start 10/19/18 at 22:00


Promethazine HCl (Phenergan) 12.5 mg PRN Q4HRS  PRN PO NAUSEA/VOMITING Last 

administered on 10/20/18at 21:43;  Start 10/20/18 at 01:15;  Stop 10/22/18 at 11

:59;  Status DC


Potassium Chloride (Klor-Con) 40 meq Q2H PO  Last administered on 10/20/18at 09:

13;  Start 10/20/18 at 07:00;  Stop 10/20/18 at 09:01;  Status DC


Aspirin (Children'S Aspirin) 81 mg [id number] PO  Last administered on 10/24/

18at 08:24;  Start 10/20/18 at 09:00


Carvedilol (Coreg) 12.5 mg BIDWMEALS PO  Last administered on 10/23/18at 17:55;

  Start 10/20/18 at 09:00


Calcium Acetate (Phoslo) 667 mg TIDWMEALS PO  Last administered on 10/24/18at 12

:19;  Start 10/20/18 at 09:00


Vitamin D (Vitamin D3) 5,000 unit DAILY PO  Last administered on 10/24/18at 08:

24;  Start 10/20/18 at 09:00


Vitamin B Complex/ Vitamin C (Leif-Ana) 1 tab DAILY PO  Last administered on 10

/24/18at 08:25;  Start 10/20/18 at 09:00


Insulin Glargine (Lantus) 15 units QHS SQ  Last administered on 10/22/18at 21:12

;  Start 10/20/18 at 21:00;  Stop 10/23/18 at 08:44;  Status DC


Baclofen (Lioresal) 10 mg PRN TID  PRN PO for Hiccups Last administered on 10/24

/18at 08:23;  Start 10/20/18 at 11:30


Metoclopramide HCl (Reglan) 5 mg PRN [id number]  PRN PO ;  Start 10/20/18 at 12:

00;  Stop 10/20/18 at 12:00;  Status DC


Norepinephrine Bitartrate 250 ml @  1.875 mls/ hr CONT  PRN IV SEE I/O RECORD 

Last administered on 10/21/18at 18:37;  Start 10/20/18 at 21:00;  Stop 10/23/18 

at 14:50;  Status DC


Metoclopramide HCl (Reglan) 5 mg PRN [id number]  PRN PO NAUSEA/VOMITING, 2ND 

CHOICE;  Start 10/21/18 at 08:45


Silver Sulfadiazine (Silvadene) 1 justo DAILY TP  Last administered on 10/22/18at 

08:41;  Start 10/21/18 at 11:30


Magnesium Sulfate 50 ml @ 25 mls/hr PRN DAILY  PRN IV for Mag < 1.7 on am labs;

  Start 10/21/18 at 11:45


Sodium Chloride 1,000 ml @  75 mls/hr [id number] IV  Last administered on 10/22/

18at 01:46;  Start 10/21/18 at 11:45;  Stop 10/23/18 at 10:49;  Status DC


Nystatin (Nystatin Oral Susp) 5 ml [id number]  Last administered on 10/23/

18at 14:48;  Start 10/21/18 at 13:00


Vancomycin HCl (Vancomycin Oral Solution) 125 mg [id number] PO  Last administered 

on 10/24/18at 08:31;  Start 10/21/18 at 14:00


Potassium Chloride (Klor-Con) 40 meq 1X  ONCE PO  Last administered on 10/21/

18at 13:42;  Start 10/21/18 at 14:00;  Stop 10/21/18 at 14:01;  Status DC


Potassium Chloride (Klor-Con) 40 meq 1X  ONCE PO ;  Start 10/21/18 at 21:00;  

Stop 10/21/18 at 21:01;  Status DC


Vancomycin HCl 500 mg/Sodium Chloride 100 ml @  100 mls/hr 1X  ONCE IV  Last 

administered on 10/21/18at 21:35;  Start 10/21/18 at 21:00;  Stop 10/21/18 at 21

:59;  Status DC


Metoclopramide HCl (Reglan Vial) 5 mg PRN Q6HRS  PRN IV NAUSEA/VOMITING, 2nd IV 

CHOICE Last administered on 10/22/18at 01:46;  Start 10/21/18 at 19:15


Prochlorperazine Edisylate (Compazine) 10 mg PRN Q6HRS  PRN IV NAUSEA/VOMITING, 

3rd IV CHOICE;  Start 10/21/18 at 19:15


Lactobacillus Rhamnosus (Culturelle) 1 cap BID PO  Last administered on 10/24/

18at 08:25;  Start 10/22/18 at 21:00


Insulin Human Lispro (HumaLOG) 15 units 1X  ONCE SQ  Last administered on 10/22/

18at 10:30;  Start 10/22/18 at 10:15;  Stop 10/22/18 at 10:16;  Status DC


Vancomycin HCl (Vancomycin Random Level) 1 each 1X  ONCE MC  Last administered 

on 10/23/18at 06:00;  Start 10/23/18 at 06:00;  Stop 10/23/18 at 06:01;  Status 

DC


Potassium Chloride (Klor-Con) 40 meq 1X  ONCE PO  Last administered on 10/22/

18at 12:49;  Start 10/22/18 at 12:30;  Stop 10/22/18 at 12:31;  Status DC


Promethazine HCl (Phenergan) 12.5 mg PRN Q6HRS  PRN PO NAUSEA/VOMITING, 1ST 

CHOICE;  Start 10/22/18 at 11:45


Loperamide HCl (Imodium) 2 mg PRN Q15MIN  PRN PO DIARRHEA Last administered on 

10/23/18at 21:01;  Start 10/22/18 at 17:00


Albumin Human 250 ml @  62.5 mls/hr 1X  ONCE IV  Last administered on 10/22/

18at 23:34;  Start 10/22/18 at 23:30;  Stop 10/23/18 at 03:29;  Status DC


Insulin Glargine (Lantus) 30 units QHS SQ  Last administered on 10/23/18at 21:03

;  Start 10/23/18 at 21:00


Insulin Human Lispro (HumaLOG) 10 units TIDWMEALS SQ  Last administered on 10/24

/18at 08:31;  Start 10/23/18 at 12:00


Insulin Human Lispro (HumaLOG) 20 units 1X  ONCE SQ  Last administered on 10/23/

18at 09:18;  Start 10/23/18 at 08:51;  Stop 10/23/18 at 08:52;  Status DC


Mupirocin (Bactroban) 1 justo BID TP  Last administered on 10/24/18at 08:32;  

Start 10/23/18 at 10:00


Pantoprazole Sodium (Protonix) 40 mg DAILYAC PO  Last administered on 10/24/

18at 08:23;  Start 10/24/18 at 07:30


Vancomycin HCl 500 mg/Sodium Chloride 100 ml @  100 mls/hr Q48H IV  Last 

administered on 10/23/18at 16:26;  Start 10/23/18 at 16:00





Active Scripts


Active


Reported


Metolazone 5 Mg Tablet 5 Mg PO DAILY


Vitamin D3 (Cholecalciferol (Vitamin D3)) 5,000 Unit Tablet 1 Tab PO DAILY


Calcium Acetate 667 Mg Tablet 667 Mg PO TIDWMEALS


Renal Caps Softgel (Folic Acid/Vitamin B Comp W-C) 1 Mg Capsule 1 Cap PO DAILY


Tresiba Flextouch U-100 (Insulin Degludec) 100 Unit/1 Ml Insuln.pen 100 Unit SQ 


Carvedilol 12.5 Mg Tablet 1 Tab PO BIDWMEALS


Furosemide 40 Mg Tablet 1 Tab PO DAILY


Aspirin 81 Mg Tab.chew 81 Mg PO DAILYWBKFT


Vitals/I & O





Vital Sign - Last 24 Hours








 10/23/18 10/23/18 10/23/18 10/23/18





 15:33 17:55 19:21 19:28


 


Temp 97.9  97.6 





 97.9  97.6 


 


Pulse 90 96 95 


 


Resp 20  18 


 


B/P (MAP) 98/66 (77) 104/69 99/71 (80) 


 


Pulse Ox   99 


 


O2 Delivery Room Air  Room Air Room Air


 


    





    





 10/23/18 10/24/18 10/24/18 10/24/18





 22:41 03:00 07:00 11:00


 


Temp 97.1 98.2 98.3 





 97.1 98.2 98.3 


 


Pulse 93 94 88 88


 


Resp 18 18 16 


 


B/P (MAP) 96/72 (80) 84/56 (65) 96/66 (76) 84/53 (63)


 


Pulse Ox 99 95 96 


 


O2 Delivery Room Air Room Air Room Air 


 


    





    





 10/24/18   





 12:08   


 


Temp 98.3   





 98.3   


 


Pulse 88   


 


Resp 16   


 


B/P (MAP) 84/52 (63)   


 


Pulse Ox 96   


 


O2 Delivery Room Air   














Intake and Output   


 


 10/23/18 10/23/18 10/24/18





 15:00 23:00 07:00


 


Intake Total  780 ml 


 


Output Total  3 ml 


 


Balance  777 ml 











Nutrition Consultation


Dietary Evaluation:


Recommendations by RD:  Protein supplementation


Comments:  


REC renal/ADA diet w/double meat portions





Continue w/leif-ana


Expected Outcomes/Goals:  


PO intake to meet >75% est needs


Interpretation of weight loss:  >1-2% in 1 week





Malnutrition Findings:


Food and Nutrition Intake (Mod:  <75% est energy req 7days


Weight Status:  Appropriate











MARY DALY MD Oct 24, 2018 12:31

## 2018-10-24 NOTE — RAD
Three-phase bone scan, 10/23/2018:



History: Left great toe wound, possible osteomyelitis



Following IV injection of 23.0 mCi of technetium 99m MDP, imaging of both feet

was performed.



The dynamic flow study is inadequate due to technical factors including

patient motion.  



Blood pool images demonstrate mildly increased activity in the area of

interest at the distal end of the great toe.  



The 3 hour delayed images demonstrate a greater degree of abnormal activity in

the region of the distal phalanx of the left great toe, compatible with

osteomyelitis, as suggested radiographically.





IMPRESSION: Abnormal activity involving the distal phalanx of the left great

toe compatible with osteomyelitis.`

## 2018-10-24 NOTE — PDOC
Infectious Disease Note


Subjective


Subjective


pt is feeling better





ROS


ROS


no n/v/d/





Vital Sign


Vital Signs





Vital Signs








  Date Time  Temp Pulse Resp B/P (MAP) Pulse Ox O2 Delivery O2 Flow Rate FiO2


 


10/24/18 03:00 98.2 94 18 84/56 (65) 95 Room Air  





 98.2       











Physical Exam


PHYSICAL EXAM


GENERAL:  Propped up in bed, resting quietly 


HEENT:  Normal conjunctivae.  Oral cavity:  Pharynx pink and moist.


NECK:  Supple.


LUNGS:  Clear to auscultation.


HEART:  S1 and S2.  Left-sided chest AICD site unremarkable for infection.


ABDOMEN:  Mildly distended.  Bowel sounds active, soft, nontender.  Peritoneal


catheter without redness or drainage.


EXTREMITIES:  No gross edema or cyanosis.  Ulcer left foot. Distal pulses weak.

  wound looks good


SKIN:  Warm without rash.


PIV





Labs


Lab





Laboratory Tests








Test


 10/23/18


11:35 10/23/18


15:25 10/23/18


16:20 10/23/18


16:32


 


Glucose (Fingerstick)


 216 mg/dL


(70-99) 


 40 mg/dL


(70-99) 120 mg/dL


(70-99)


 


Clostridium difficile Toxin


(PCR) 


 Negative


(Negative) 


 





 


Test


 10/23/18


20:58 10/24/18


04:35 


 





 


Glucose (Fingerstick)


 176 mg/dL


(70-99) 


 


 





 


White Blood Count


 


 9.2 x10^3/uL


(4.0-11.0) 


 





 


Red Blood Count


 


 3.77 x10^6/uL


(4.30-5.70) 


 





 


Hemoglobin


 


 10.9 g/dL


(13.0-17.5) 


 





 


Hematocrit


 


 32.6 %


(39.0-53.0) 


 





 


Mean Corpuscular Volume  86 fL ()   


 


Mean Corpuscular Hemoglobin  29 pg (25-35)   


 


Mean Corpuscular Hemoglobin


Concent 


 34 g/dL


(31-37) 


 





 


Red Cell Distribution Width


 


 13.5 %


(11.5-14.5) 


 





 


Platelet Count


 


 349 x10^3/uL


(140-400) 


 





 


Neutrophils (%) (Auto)  80 % (31-73)   


 


Lymphocytes (%) (Auto)  6 % (24-48)   


 


Monocytes (%) (Auto)  9 % (0-9)   


 


Eosinophils (%) (Auto)  5 % (0-3)   


 


Basophils (%) (Auto)  1 % (0-3)   


 


Neutrophils # (Auto)


 


 7.4 x10^3uL


(1.8-7.7) 


 





 


Lymphocytes # (Auto)


 


 0.5 x10^3/uL


(1.0-4.8) 


 





 


Monocytes # (Auto)


 


 0.8 x10^3/uL


(0.0-1.1) 


 





 


Eosinophils # (Auto)


 


 0.5 x10^3/uL


(0.0-0.7) 


 





 


Basophils # (Auto)


 


 0.1 x10^3/uL


(0.0-0.2) 


 





 


Sodium Level


 


 128 mmol/L


(136-145) 


 





 


Potassium Level


 


 3.4 mmol/L


(3.5-5.1) 


 





 


Chloride Level


 


 92 mmol/L


() 


 





 


Carbon Dioxide Level


 


 24 mmol/L


(21-32) 


 





 


Anion Gap  12 (6-14)   


 


Blood Urea Nitrogen


 


 54 mg/dL


(8-26) 


 





 


Creatinine


 


 11.0 mg/dL


(0.7-1.3) 


 





 


Estimated GFR


(Cockcroft-Gault) 


 6.0 


 


 





 


Glucose Level


 


 381 mg/dL


(70-99) 


 





 


Calcium Level


 


 7.2 mg/dL


(8.5-10.1) 


 





 


Phosphorus Level


 


 4.5 mg/dL


(2.6-4.7) 


 





 


Magnesium Level


 


 1.9 mg/dL


(1.8-2.4) 


 





 


Albumin


 


 1.1 g/dL


(3.4-5.0) 


 











Micro





Microbiology


10/19/18 Blood Culture - Preliminary, Resulted


           NO GROWTH AFTER 2 DAYS


10/20/18 Anaerobic/Aerobic Culture, Resulted


           Pending


10/20/18 Anaerobic Culture Result 1 (ANTHONY), Resulted


           Pending


10/20/18 Aerobic Culture, Resulted


           Pending


10/20/18 Aerobic Culture Result 1 (ANTHONY), Resulted


           Pending


10/20/18 Gram Stain - Final, Resulted


           


10/20/18 Gram Stain Result 1 (ANTHONY) - Final, Resulted


           


10/20/18 Gram Stain Result 2 (ANTHONY) - Final, Resulted


           


10/21/18 Anaerobic/Aerobic Culture, Resulted


           Pending


10/21/18 Anaerobic Culture Result 1 (ANTHONY), Resulted


           Pending


10/21/18 Aerobic Culture, Resulted


           Pending


10/21/18 Aerobic Culture Result 1 (ANTHONY), Resulted


           Pending


10/21/18 Gram Stain - Final, Resulted


           


10/21/18 Gram Stain Result 1 (ANTHONY) - Final, Resulted


           


10/21/18 Gram Stain Result 2 (ANTHONY) - Final, Resulted





Objective


Assessment


Sepsis POA


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 


Fever


Leukocytosis


Abdominal pain


   -Dialysate clear, . culture in process 


Left lingula consolidation 


Diarrhea 


ESRD on peritoneal dialysis


   - catheter changed 2015  


s/p AICD generator change on 10/15/18.


Chronic systolic CHF





Plan


Plan of Care





Continue vanc and Zosyn, renal dosing


Local wound 


Monitor labs/temp


f/u c/s


x ray of foot with osteo, clinically does not match, bone scan pending, mri 

cannot be done











MACHO HARDWICK MD Oct 24, 2018 08:26

## 2018-10-24 NOTE — PDOC
Objective:


Objective:


Reviewed w/ RN - loose stools improved, last occurred yesterday.


Looks like received Imodium last night.


Vital Signs:





 Vital Signs








  Date Time  Temp Pulse Resp B/P (MAP) Pulse Ox O2 Delivery O2 Flow Rate FiO2


 


10/24/18 07:00 98.3 88 16 96/66 (76) 96 Room Air  





 98.3       








Labs:





Laboratory Tests








Test


 10/23/18


11:35 10/23/18


15:25 10/23/18


16:20 10/23/18


16:32


 


Glucose (Fingerstick) 216 mg/dL   40 mg/dL  120 mg/dL 


 


Clostridium difficile Toxin


(PCR) 


 Negative 


 


 





 


Test


 10/23/18


20:58 10/24/18


04:35 10/24/18


08:23 





 


Glucose (Fingerstick) 176 mg/dL   353 mg/dL  


 


White Blood Count  9.2 x10^3/uL   


 


Red Blood Count  3.77 x10^6/uL   


 


Hemoglobin  10.9 g/dL   


 


Hematocrit  32.6 %   


 


Mean Corpuscular Volume  86 fL   


 


Mean Corpuscular Hemoglobin  29 pg   


 


Mean Corpuscular Hemoglobin


Concent 


 34 g/dL 


 


 





 


Red Cell Distribution Width  13.5 %   


 


Platelet Count  349 x10^3/uL   


 


Neutrophils (%) (Auto)  80 %   


 


Lymphocytes (%) (Auto)  6 %   


 


Monocytes (%) (Auto)  9 %   


 


Eosinophils (%) (Auto)  5 %   


 


Basophils (%) (Auto)  1 %   


 


Neutrophils # (Auto)  7.4 x10^3uL   


 


Lymphocytes # (Auto)  0.5 x10^3/uL   


 


Monocytes # (Auto)  0.8 x10^3/uL   


 


Eosinophils # (Auto)  0.5 x10^3/uL   


 


Basophils # (Auto)  0.1 x10^3/uL   


 


Sodium Level  128 mmol/L   


 


Potassium Level  3.4 mmol/L   


 


Chloride Level  92 mmol/L   


 


Carbon Dioxide Level  24 mmol/L   


 


Anion Gap  12   


 


Blood Urea Nitrogen  54 mg/dL   


 


Creatinine  11.0 mg/dL   


 


Estimated GFR


(Cockcroft-Gault) 


 6.0 


 


 





 


Glucose Level  381 mg/dL   


 


Calcium Level  7.2 mg/dL   


 


Phosphorus Level  4.5 mg/dL   


 


Magnesium Level  1.9 mg/dL   


 


Albumin  1.1 g/dL   








Imaging:


Bone Scan


IMPRESSION: Abnormal activity involving the distal phalanx of the left great 

toe compatible with osteomyelitis.





PE:





GEN: NAD


LUNGS: room air


HEART: RRR


NEURO/PSYCH: sleeping, not awakened





A/P:


Left toe osteo


ESRD on PD


Loose stools - better, C Diff neg





--


Stable from GI standpoint.











SUSAN BRINK Oct 24, 2018 11:10

## 2018-10-24 NOTE — PDOC
Renal-Progress Notes


Subjective Notes


Notes


NO COMPLAINTS





History of Present Illness


Hx of present illness


STABLE





Vitals


Vitals





Vital Signs








  Date Time  Temp Pulse Resp B/P (MAP) Pulse Ox O2 Delivery O2 Flow Rate FiO2


 


10/24/18 12:08 98.3 88 16 84/52 (63) 96 Room Air  





 98.3       








Weight


Weight [ ]





I.O.


Intake and Output











Intake and Output 


 


 10/24/18





 07:00


 


Intake Total 780 ml


 


Output Total 3 ml


 


Balance 777 ml


 


 


 


Intake Oral 780 ml


 


Stool Total 3 ml


 


# Voids 3











Labs


Labs





Laboratory Tests








Test


 10/23/18


15:25 10/23/18


16:20 10/23/18


16:32 10/23/18


20:58


 


Clostridium difficile Toxin


(PCR) Negative


(Negative) 


 


 





 


Glucose (Fingerstick)


 


 40 mg/dL


(70-99) 120 mg/dL


(70-99) 176 mg/dL


(70-99)


 


Test


 10/24/18


04:35 10/24/18


08:23 


 





 


White Blood Count


 9.2 x10^3/uL


(4.0-11.0) 


 


 





 


Red Blood Count


 3.77 x10^6/uL


(4.30-5.70) 


 


 





 


Hemoglobin


 10.9 g/dL


(13.0-17.5) 


 


 





 


Hematocrit


 32.6 %


(39.0-53.0) 


 


 





 


Mean Corpuscular Volume 86 fL ()    


 


Mean Corpuscular Hemoglobin 29 pg (25-35)    


 


Mean Corpuscular Hemoglobin


Concent 34 g/dL


(31-37) 


 


 





 


Red Cell Distribution Width


 13.5 %


(11.5-14.5) 


 


 





 


Platelet Count


 349 x10^3/uL


(140-400) 


 


 





 


Neutrophils (%) (Auto) 80 % (31-73)    


 


Lymphocytes (%) (Auto) 6 % (24-48)    


 


Monocytes (%) (Auto) 9 % (0-9)    


 


Eosinophils (%) (Auto) 5 % (0-3)    


 


Basophils (%) (Auto) 1 % (0-3)    


 


Neutrophils # (Auto)


 7.4 x10^3uL


(1.8-7.7) 


 


 





 


Lymphocytes # (Auto)


 0.5 x10^3/uL


(1.0-4.8) 


 


 





 


Monocytes # (Auto)


 0.8 x10^3/uL


(0.0-1.1) 


 


 





 


Eosinophils # (Auto)


 0.5 x10^3/uL


(0.0-0.7) 


 


 





 


Basophils # (Auto)


 0.1 x10^3/uL


(0.0-0.2) 


 


 





 


Sodium Level


 128 mmol/L


(136-145) 


 


 





 


Potassium Level


 3.4 mmol/L


(3.5-5.1) 


 


 





 


Chloride Level


 92 mmol/L


() 


 


 





 


Carbon Dioxide Level


 24 mmol/L


(21-32) 


 


 





 


Anion Gap 12 (6-14)    


 


Blood Urea Nitrogen


 54 mg/dL


(8-26) 


 


 





 


Creatinine


 11.0 mg/dL


(0.7-1.3) 


 


 





 


Estimated GFR


(Cockcroft-Gault) 6.0 


 


 


 





 


Glucose Level


 381 mg/dL


(70-99) 


 


 





 


Calcium Level


 7.2 mg/dL


(8.5-10.1) 


 


 





 


Phosphorus Level


 4.5 mg/dL


(2.6-4.7) 


 


 





 


Magnesium Level


 1.9 mg/dL


(1.8-2.4) 


 


 





 


Albumin


 1.1 g/dL


(3.4-5.0) 


 


 





 


Glucose (Fingerstick)


 


 353 mg/dL


(70-99) 


 














Micro


Micro





Microbiology


10/19/18 Blood Culture - Preliminary, Resulted


           NO GROWTH AFTER 4 DAYS


10/20/18 Anaerobic/Aerobic Culture - Preliminary, Resulted


           


10/20/18 Anaerobic Culture Result 1 (ANTHONY) - Preliminary, Resulted


           


10/20/18 Aerobic Culture - Final, Resulted


           


10/20/18 Aerobic Culture Result 1 (ANTHONY) - Final, Resulted


           


10/20/18 Gram Stain - Final, Resulted


           


10/20/18 Gram Stain Result 1 (ANTHONY) - Final, Resulted


           


10/20/18 Gram Stain Result 2 (ANTHONY) - Final, Resulted


           


10/21/18 Anaerobic/Aerobic Culture - Preliminary, Resulted


           


10/21/18 Anaerobic Culture Result 1 (ANTHONY) - Preliminary, Resulted


           


10/21/18 Aerobic Culture - Final, Resulted


           


10/21/18 Aerobic Culture Result 1 (ANTHONY) - Final, Resulted


           


10/21/18 Gram Stain - Final, Resulted


           


10/21/18 Gram Stain Result 1 (ANTHONY) - Final, Resulted


           


10/21/18 Gram Stain Result 2 (ANTHONY) - Final, Resulted





Review of Systems


Constitutional:  yes: weakness, alert, oriented


Ears/Nose/Throat:  Yes: no symptom reported


Eyes:  Yes: no symptom reported


Pulmonary:  Yes no symptom reported


Cardiovascular:  Yes no symptom reported


Gastrointestional:  Yes: no symptom reported


Genitourinary:  Yes: no symptom reported


Musculoskeletal:  Yes: no symptom reported


Skin:  Yes no symptom reported


Psychiatric/Neurological:  Yes: no symptom reported


Endocrine:  Yes: no symptom reported





Physical Exam


General Appearance:  no apparent distress


Respiratory:  bilateral CTA


Heart:  S1S2


Abdomen:  soft, bowel sounds present


Genitourinary:  bladder flat


Extremities:  pulses present


Neurology:  alert, oriented


Musculoskeletal:  Osteoarthritis





Assessment


Assessment


IMP





ESRD


LEUCOCYTOSIS-RESOLVED


ABD PAIN RESOLVED


PROB SEPSIS


LEFT FOOT ULCER-? OSTEOMYELITIS


SECONDARY HYPERPARATHYROIDISM


HYPONATREMIA


HYPOKALEMIA-RESOLVED


S/P AICD GENERATION CHANGE


SEVERE CM





PLAN





CONT CCPD


SUPPORTIVE CARE


ANTIBIOTICS PER ID











CONSTANTIN GOLDSMITH MD Oct 24, 2018 12:24

## 2018-10-24 NOTE — PDOC
PROGRESS NOTES


Subjective


Subjective


Pt resting comfortably in bed. Feeling much better today. Bone scan done. No 

complaints today. Denies fevers, chills.





Objective


Objective





Vital Signs








  Date Time  Temp Pulse Resp B/P (MAP) Pulse Ox O2 Delivery O2 Flow Rate FiO2


 


10/24/18 12:08 98.3 88 16 84/52 (63) 96 Room Air  





 98.3       














Intake and Output 


 


 10/24/18





 07:00


 


Intake Total 780 ml


 


Output Total 3 ml


 


Balance 777 ml


 


 


 


Intake Oral 780 ml


 


Stool Total 3 ml


 


# Voids 3











Physical Exam


Physical Exam


Awake, alert and in no apparent distress


Nonlabored respirations


Left foot with dressing clean, dry and intact.





Assessment


Assessment


Problems


Medical Problems:


(1) Abdominal pain


Status: Acute  











Plan


Plan of Care


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 

showed. The location of the osteo is inconsistent with location of the current 

wound, may be chronic osteo? Pt does have sufficient arterial flow as mentioned 

previously to theoretically heal current wound. His WBC has recently stabilized 

and pt condition improving with IV abx. At this point I don't see urgent 

indication for surgical intervention. Speaking with Dr. Brewer, we agree to 

continue IV abx and watch for improvement. If condition worsens we can re-

evaluate and reconsider necessity for wound debridement or toe amputation. 

Discussed this with patient who seemed enlightened and agreed with the current 

plan. I did discuss with him the potential need in the future to reassess if 

condition fails to improve or worsens. He exhibited understanding. All his 

questions were answered to satisfaction.  





We recommend to continue wound care with mupirocin or silvadene.





Comment


Review of Relevant


I have reviewed the following items sean (where applicable) has been applied.


Labs





Laboratory Tests








Test


 10/22/18


17:16 10/22/18


20:56 10/23/18


05:00 10/23/18


07:48


 


Glucose (Fingerstick)


 108 mg/dL


(70-99) 196 mg/dL


(70-99) 


 485 mg/dL


(70-99)


 


White Blood Count


 


 


 7.7 x10^3/uL


(4.0-11.0) 





 


Red Blood Count


 


 


 3.82 x10^6/uL


(4.30-5.70) 





 


Hemoglobin


 


 


 10.9 g/dL


(13.0-17.5) 





 


Hematocrit


 


 


 33.2 %


(39.0-53.0) 





 


Mean Corpuscular Volume   87 fL ()  


 


Mean Corpuscular Hemoglobin   28 pg (25-35)  


 


Mean Corpuscular Hemoglobin


Concent 


 


 33 g/dL


(31-37) 





 


Red Cell Distribution Width


 


 


 13.9 %


(11.5-14.5) 





 


Platelet Count


 


 


 342 x10^3/uL


(140-400) 





 


Neutrophils (%) (Auto)   80 % (31-73)  


 


Lymphocytes (%) (Auto)   6 % (24-48)  


 


Monocytes (%) (Auto)   9 % (0-9)  


 


Eosinophils (%) (Auto)   5 % (0-3)  


 


Basophils (%) (Auto)   0 % (0-3)  


 


Neutrophils # (Auto)


 


 


 6.2 x10^3uL


(1.8-7.7) 





 


Lymphocytes # (Auto)


 


 


 0.5 x10^3/uL


(1.0-4.8) 





 


Monocytes # (Auto)


 


 


 0.7 x10^3/uL


(0.0-1.1) 





 


Eosinophils # (Auto)


 


 


 0.4 x10^3/uL


(0.0-0.7) 





 


Basophils # (Auto)


 


 


 0.0 x10^3/uL


(0.0-0.2) 





 


Sodium Level


 


 


 128 mmol/L


(136-145) 





 


Potassium Level


 


 


 3.7 mmol/L


(3.5-5.1) 





 


Chloride Level


 


 


 92 mmol/L


() 





 


Carbon Dioxide Level


 


 


 23 mmol/L


(21-32) 





 


Anion Gap   13 (6-14)  


 


Blood Urea Nitrogen


 


 


 54 mg/dL


(8-26) 





 


Creatinine


 


 


 10.5 mg/dL


(0.7-1.3) 





 


Estimated GFR


(Cockcroft-Gault) 


 


 6.3 


 





 


Glucose Level


 


 


 490 mg/dL


(70-99) 





 


Hemoglobin A1c


 


 


 10.0 %


(4.8-5.6) 





 


Calcium Level


 


 


 7.0 mg/dL


(8.5-10.1) 





 


Phosphorus Level


 


 


 5.2 mg/dL


(2.6-4.7) 





 


Magnesium Level


 


 


 1.8 mg/dL


(1.8-2.4) 





 


NT-Pro-B-Type Natriuretic


Peptide 


 


 > 40405 pg/mL


(0-124) 





 


Albumin


 


 


 1.1 g/dL


(3.4-5.0) 





 


Random Vancomycin Level   20.4 mcg/mL  


 


Test


 10/23/18


11:35 10/23/18


15:25 10/23/18


16:20 10/23/18


16:32


 


Glucose (Fingerstick)


 216 mg/dL


(70-99) 


 40 mg/dL


(70-99) 120 mg/dL


(70-99)


 


Clostridium difficile Toxin


(PCR) 


 Negative


(Negative) 


 





 


Test


 10/23/18


20:58 10/24/18


04:35 10/24/18


08:23 10/24/18


12:21


 


Glucose (Fingerstick)


 176 mg/dL


(70-99) 


 353 mg/dL


(70-99) 75 mg/dL


(70-99)


 


White Blood Count


 


 9.2 x10^3/uL


(4.0-11.0) 


 





 


Red Blood Count


 


 3.77 x10^6/uL


(4.30-5.70) 


 





 


Hemoglobin


 


 10.9 g/dL


(13.0-17.5) 


 





 


Hematocrit


 


 32.6 %


(39.0-53.0) 


 





 


Mean Corpuscular Volume  86 fL ()   


 


Mean Corpuscular Hemoglobin  29 pg (25-35)   


 


Mean Corpuscular Hemoglobin


Concent 


 34 g/dL


(31-37) 


 





 


Red Cell Distribution Width


 


 13.5 %


(11.5-14.5) 


 





 


Platelet Count


 


 349 x10^3/uL


(140-400) 


 





 


Neutrophils (%) (Auto)  80 % (31-73)   


 


Lymphocytes (%) (Auto)  6 % (24-48)   


 


Monocytes (%) (Auto)  9 % (0-9)   


 


Eosinophils (%) (Auto)  5 % (0-3)   


 


Basophils (%) (Auto)  1 % (0-3)   


 


Neutrophils # (Auto)


 


 7.4 x10^3uL


(1.8-7.7) 


 





 


Lymphocytes # (Auto)


 


 0.5 x10^3/uL


(1.0-4.8) 


 





 


Monocytes # (Auto)


 


 0.8 x10^3/uL


(0.0-1.1) 


 





 


Eosinophils # (Auto)


 


 0.5 x10^3/uL


(0.0-0.7) 


 





 


Basophils # (Auto)


 


 0.1 x10^3/uL


(0.0-0.2) 


 





 


Sodium Level


 


 128 mmol/L


(136-145) 


 





 


Potassium Level


 


 3.4 mmol/L


(3.5-5.1) 


 





 


Chloride Level


 


 92 mmol/L


() 


 





 


Carbon Dioxide Level


 


 24 mmol/L


(21-32) 


 





 


Anion Gap  12 (6-14)   


 


Blood Urea Nitrogen


 


 54 mg/dL


(8-26) 


 





 


Creatinine


 


 11.0 mg/dL


(0.7-1.3) 


 





 


Estimated GFR


(Cockcroft-Gault) 


 6.0 


 


 





 


Glucose Level


 


 381 mg/dL


(70-99) 


 





 


Calcium Level


 


 7.2 mg/dL


(8.5-10.1) 


 





 


Phosphorus Level


 


 4.5 mg/dL


(2.6-4.7) 


 





 


Magnesium Level


 


 1.9 mg/dL


(1.8-2.4) 


 





 


Albumin


 


 1.1 g/dL


(3.4-5.0) 


 











Laboratory Tests








Test


 10/23/18


15:25 10/23/18


16:20 10/23/18


16:32 10/23/18


20:58


 


Clostridium difficile Toxin


(PCR) Negative


(Negative) 


 


 





 


Glucose (Fingerstick)


 


 40 mg/dL


(70-99) 120 mg/dL


(70-99) 176 mg/dL


(70-99)


 


Test


 10/24/18


04:35 10/24/18


08:23 10/24/18


12:21 





 


White Blood Count


 9.2 x10^3/uL


(4.0-11.0) 


 


 





 


Red Blood Count


 3.77 x10^6/uL


(4.30-5.70) 


 


 





 


Hemoglobin


 10.9 g/dL


(13.0-17.5) 


 


 





 


Hematocrit


 32.6 %


(39.0-53.0) 


 


 





 


Mean Corpuscular Volume 86 fL ()    


 


Mean Corpuscular Hemoglobin 29 pg (25-35)    


 


Mean Corpuscular Hemoglobin


Concent 34 g/dL


(31-37) 


 


 





 


Red Cell Distribution Width


 13.5 %


(11.5-14.5) 


 


 





 


Platelet Count


 349 x10^3/uL


(140-400) 


 


 





 


Neutrophils (%) (Auto) 80 % (31-73)    


 


Lymphocytes (%) (Auto) 6 % (24-48)    


 


Monocytes (%) (Auto) 9 % (0-9)    


 


Eosinophils (%) (Auto) 5 % (0-3)    


 


Basophils (%) (Auto) 1 % (0-3)    


 


Neutrophils # (Auto)


 7.4 x10^3uL


(1.8-7.7) 


 


 





 


Lymphocytes # (Auto)


 0.5 x10^3/uL


(1.0-4.8) 


 


 





 


Monocytes # (Auto)


 0.8 x10^3/uL


(0.0-1.1) 


 


 





 


Eosinophils # (Auto)


 0.5 x10^3/uL


(0.0-0.7) 


 


 





 


Basophils # (Auto)


 0.1 x10^3/uL


(0.0-0.2) 


 


 





 


Sodium Level


 128 mmol/L


(136-145) 


 


 





 


Potassium Level


 3.4 mmol/L


(3.5-5.1) 


 


 





 


Chloride Level


 92 mmol/L


() 


 


 





 


Carbon Dioxide Level


 24 mmol/L


(21-32) 


 


 





 


Anion Gap 12 (6-14)    


 


Blood Urea Nitrogen


 54 mg/dL


(8-26) 


 


 





 


Creatinine


 11.0 mg/dL


(0.7-1.3) 


 


 





 


Estimated GFR


(Cockcroft-Gault) 6.0 


 


 


 





 


Glucose Level


 381 mg/dL


(70-99) 


 


 





 


Calcium Level


 7.2 mg/dL


(8.5-10.1) 


 


 





 


Phosphorus Level


 4.5 mg/dL


(2.6-4.7) 


 


 





 


Magnesium Level


 1.9 mg/dL


(1.8-2.4) 


 


 





 


Albumin


 1.1 g/dL


(3.4-5.0) 


 


 





 


Glucose (Fingerstick)


 


 353 mg/dL


(70-99) 75 mg/dL


(70-99) 











Microbiology


10/19/18 Blood Culture - Preliminary, Resulted


           NO GROWTH AFTER 4 DAYS


10/20/18 Anaerobic/Aerobic Culture - Preliminary, Resulted


           


10/20/18 Anaerobic Culture Result 1 (ANTHONY) - Preliminary, Resulted


           


10/20/18 Aerobic Culture - Final, Resulted


           


10/20/18 Aerobic Culture Result 1 (ANTHONY) - Final, Resulted


           


10/20/18 Gram Stain - Final, Resulted


           


10/20/18 Gram Stain Result 1 (ANTHONY) - Final, Resulted


           


10/20/18 Gram Stain Result 2 (ANTHONY) - Final, Resulted


           


10/21/18 Anaerobic/Aerobic Culture - Preliminary, Resulted


           


10/21/18 Anaerobic Culture Result 1 (ANTHONY) - Preliminary, Resulted


           


10/21/18 Aerobic Culture - Final, Resulted


           


10/21/18 Aerobic Culture Result 1 (ANTHONY) - Final, Resulted


           


10/21/18 Gram Stain - Final, Resulted


           


10/21/18 Gram Stain Result 1 (ANTHONY) - Final, Resulted


           


10/21/18 Gram Stain Result 2 (ANTHONY) - Final, Resulted


Medications





Current Medications


Sodium Chloride 250 ml @  250 mls/hr 1X  ONCE IV ;  Start 10/19/18 at 15:45;  

Stop 10/19/18 at 16:44;  Status Cancel


Piperacillin Sod/ Tazobactam Sod (Zosyn Per Pharmacy) 1 each PRN DAILY  PRN MC 

SEE COMMENTS;  Start 10/19/18 at 15:45;  Status UNV


Piperacillin Sod/ Tazobactam Sod 2.25 gm/Sodium Chloride 50 ml @  100 mls/hr 1X

  ONCE IV  Last administered on 10/19/18at 16:49;  Start 10/19/18 at 16:00;  

Stop 10/19/18 at 16:29;  Status DC


Ondansetron HCl (Zofran) 4 mg 1X  ONCE IV  Last administered on 10/19/18at 16:12

;  Start 10/19/18 at 16:00;  Stop 10/19/18 at 16:01;  Status DC


Hydromorphone HCl (Dilaudid) 0.5 mg PRN Q30MIN  PRN IV SEVERE PAIN Last 

administered on 10/21/18at 18:00;  Start 10/19/18 at 16:15;  Stop 10/21/18 at 18

:00;  Status DC


Hydromorphone HCl (Dilaudid) 2 mg STK-MED ONCE .ROUTE ;  Start 10/19/18 at 16:10

;  Stop 10/19/18 at 16:11;  Status DC


Sodium Chloride 500 ml @  500 mls/hr 1X  ONCE IV  Last administered on 10/19/

18at 16:49;  Start 10/19/18 at 16:30;  Stop 10/19/18 at 17:29;  Status DC


Aspirin (Children'S Aspirin) 324 mg 1X  ONCE PO  Last administered on 10/19/

18at 17:52;  Start 10/19/18 at 17:15;  Stop 10/19/18 at 17:16;  Status DC


Vancomycin HCl (Vanco Per Pharmacy) 1 each PRN DAILY  PRN MC SEE COMMENTS;  

Start 10/19/18 at 17:15;  Status UNV


Sodium Chloride 500 ml @  500 mls/hr 1X  ONCE IV  Last administered on 10/19/

18at 17:51;  Start 10/19/18 at 17:15;  Stop 10/19/18 at 18:14;  Status DC


Vancomycin HCl 1.75 gm/Sodium Chloride 500 ml @  250 mls/hr 1X  ONCE IV  Last 

administered on 10/19/18at 17:25;  Start 10/19/18 at 17:30;  Stop 10/19/18 at 19

:29;  Status DC


Acetaminophen (Tylenol) 650 mg PRN Q6HRS  PRN PO FEVER Last administered on 10/

21/18at 17:14;  Start 10/19/18 at 17:30


Ondansetron HCl (Zofran) 4 mg PRN Q6HRS  PRN IV NAUSEA/VOMITING, 1st IV CHOICE 

Last administered on 10/21/18at 17:17;  Start 10/19/18 at 17:30


Morphine Sulfate (Morphine Sulfate) 2 mg PRN Q2HR  PRN IV MILD-MODERATE PAIN;  

Start 10/19/18 at 17:30


Tramadol HCl (Ultram) 50 mg PRN Q6HRS  PRN PO MILD TO MODERATE PAIN Last 

administered on 10/22/18at 21:11;  Start 10/19/18 at 17:30


Docusate Sodium (Colace) 100 mg PRN DAILY  PRN PO CONSTIPATION 1ST CHOICE;  

Start 10/19/18 at 17:30


Piperacillin Sod/ Tazobactam Sod 3.375 gm/Sodium Chloride 50 ml @  100 mls/hr 

Q6HRS IV ;  Start 10/19/18 at 18:00;  Status UNV


Piperacillin Sod/ Tazobactam Sod (Zosyn Per Pharmacy) 1 each PRN DAILY  PRN MC 

SEE COMMENTS;  Start 10/19/18 at 17:30;  Status UNV


Vancomycin HCl (Vanco Per Pharmacy) 1 each PRN DAILY  PRN MC SEE COMMENTS Last 

administered on 10/24/18at 11:34;  Start 10/19/18 at 17:30


Insulin Human Lispro (HumaLOG) 0-9 UNITS TIDWMEALS SQ  Last administered on 10/

24/18at 08:30;  Start 10/20/18 at 08:00


Dextrose (Dextrose 50%-Water Syringe) 12.5 gm PRN Q15MIN  PRN IV SEE COMMENTS 

Last administered on 10/23/18at 16:24;  Start 10/19/18 at 17:30


Heparin Sodium (Porcine) (Heparin Sodium) 5,000 unit Q8HRS SQ  Last 

administered on 10/24/18at 13:39;  Start 10/19/18 at 22:00


Senna/Docusate Sodium (Senna Plus) 1 tab BID PO  Last administered on 10/21/

18at 08:54;  Start 10/19/18 at 21:00;  Stop 10/24/18 at 09:07;  Status DC


Docusate Sodium (Colace) 100 mg BID PO  Last administered on 10/21/18at 08:55;  

Start 10/19/18 at 21:00;  Stop 10/24/18 at 09:06;  Status DC


Magnesium Hydroxide (Milk Of Magnesia) 2,400 mg PRN Q12HR  PRN PO CONSTIPATION 

2ND CHOICE;  Start 10/19/18 at 17:45


Sodium Chloride 1,000 ml @  75 mls/hr 1X  ONCE IV ;  Start 10/19/18 at 17:45;  

Stop 10/19/18 at 19:47;  Status DC


Pantoprazole Sodium (PROTONIX VIAL for IV PUSH) 40 mg DAILYAC IVP  Last 

administered on 10/23/18at 10:04;  Start 10/20/18 at 07:30;  Stop 10/23/18 at 12

:07;  Status DC


Piperacillin Sod/ Tazobactam Sod 2.25 gm/Sodium Chloride 50 ml @  100 mls/hr 

Q6HRS IV ;  Start 10/19/18 at 18:00;  Stop 10/19/18 at 18:07;  Status DC


Vancomycin HCl (Vancomycin Random Level) 1 each 1X  ONCE MC  Last administered 

on 10/21/18at 17:00;  Start 10/21/18 at 17:00;  Stop 10/21/18 at 17:01;  Status 

DC


Piperacillin Sod/ Tazobactam Sod 2.25 gm/Sodium Chloride 50 ml @  100 mls/hr 

Q8HRS IV  Last administered on 10/24/18at 13:37;  Start 10/19/18 at 22:00


Promethazine HCl (Phenergan) 12.5 mg PRN Q4HRS  PRN PO NAUSEA/VOMITING Last 

administered on 10/20/18at 21:43;  Start 10/20/18 at 01:15;  Stop 10/22/18 at 11

:59;  Status DC


Potassium Chloride (Klor-Con) 40 meq Q2H PO  Last administered on 10/20/18at 09:

13;  Start 10/20/18 at 07:00;  Stop 10/20/18 at 09:01;  Status DC


Aspirin (Children'S Aspirin) 81 mg DAILYWBKFT PO  Last administered on 10/24/

18at 08:24;  Start 10/20/18 at 09:00


Carvedilol (Coreg) 12.5 mg BIDWMEALS PO  Last administered on 10/23/18at 17:55;

  Start 10/20/18 at 09:00


Calcium Acetate (Phoslo) 667 mg TIDWMEALS PO  Last administered on 10/24/18at 12

:19;  Start 10/20/18 at 09:00


Vitamin D (Vitamin D3) 5,000 unit DAILY PO  Last administered on 10/24/18at 08:

24;  Start 10/20/18 at 09:00


Vitamin B Complex/ Vitamin C (Alison-Aime) 1 tab DAILY PO  Last administered on 10

/24/18at 08:25;  Start 10/20/18 at 09:00


Insulin Glargine (Lantus) 15 units QHS SQ  Last administered on 10/22/18at 21:12

;  Start 10/20/18 at 21:00;  Stop 10/23/18 at 08:44;  Status DC


Baclofen (Lioresal) 10 mg PRN TID  PRN PO for Hiccups Last administered on 10/24

/18at 08:23;  Start 10/20/18 at 11:30


Metoclopramide HCl (Reglan) 5 mg PRN BFRMEALHC  PRN PO ;  Start 10/20/18 at 12:

00;  Stop 10/20/18 at 12:00;  Status DC


Norepinephrine Bitartrate 250 ml @  1.875 mls/ hr CONT  PRN IV SEE I/O RECORD 

Last administered on 10/21/18at 18:37;  Start 10/20/18 at 21:00;  Stop 10/23/18 

at 14:50;  Status DC


Metoclopramide HCl (Reglan) 5 mg PRN BFRMEALHC  PRN PO NAUSEA/VOMITING, 2ND 

CHOICE;  Start 10/21/18 at 08:45


Silver Sulfadiazine (Silvadene) 1 justo DAILY TP  Last administered on 10/22/18at 

08:41;  Start 10/21/18 at 11:30


Magnesium Sulfate 50 ml @ 25 mls/hr PRN DAILY  PRN IV for Mag < 1.7 on am labs;

  Start 10/21/18 at 11:45


Sodium Chloride 1,000 ml @  75 mls/hr [id number] IV  Last administered on 10/22/

18at 01:46;  Start 10/21/18 at 11:45;  Stop 10/23/18 at 10:49;  Status DC


Nystatin (Nystatin Oral Susp) 5 ml [id number]  Last administered on 10/23/

18at 14:48;  Start 10/21/18 at 13:00


Vancomycin HCl (Vancomycin Oral Solution) 125 mg [id number] PO  Last administered 

on 10/24/18at 13:38;  Start 10/21/18 at 14:00


Potassium Chloride (Klor-Con) 40 meq 1X  ONCE PO  Last administered on 10/21/

18at 13:42;  Start 10/21/18 at 14:00;  Stop 10/21/18 at 14:01;  Status DC


Potassium Chloride (Klor-Con) 40 meq 1X  ONCE PO ;  Start 10/21/18 at 21:00;  

Stop 10/21/18 at 21:01;  Status DC


Vancomycin HCl 500 mg/Sodium Chloride 100 ml @  100 mls/hr 1X  ONCE IV  Last 

administered on 10/21/18at 21:35;  Start 10/21/18 at 21:00;  Stop 10/21/18 at 21

:59;  Status DC


Metoclopramide HCl (Reglan Vial) 5 mg PRN Q6HRS  PRN IV NAUSEA/VOMITING, 2nd IV 

CHOICE Last administered on 10/22/18at 01:46;  Start 10/21/18 at 19:15


Prochlorperazine Edisylate (Compazine) 10 mg PRN Q6HRS  PRN IV NAUSEA/VOMITING, 

3rd IV CHOICE;  Start 10/21/18 at 19:15


Lactobacillus Rhamnosus (Culturelle) 1 cap BID PO  Last administered on 10/24/

18at 08:25;  Start 10/22/18 at 21:00


Insulin Human Lispro (HumaLOG) 15 units 1X  ONCE SQ  Last administered on 10/22/

18at 10:30;  Start 10/22/18 at 10:15;  Stop 10/22/18 at 10:16;  Status DC


Vancomycin HCl (Vancomycin Random Level) 1 each 1X  ONCE MC  Last administered 

on 10/23/18at 06:00;  Start 10/23/18 at 06:00;  Stop 10/23/18 at 06:01;  Status 

DC


Potassium Chloride (Klor-Con) 40 meq 1X  ONCE PO  Last administered on 10/22/

18at 12:49;  Start 10/22/18 at 12:30;  Stop 10/22/18 at 12:31;  Status DC


Promethazine HCl (Phenergan) 12.5 mg PRN Q6HRS  PRN PO NAUSEA/VOMITING, 1ST 

CHOICE;  Start 10/22/18 at 11:45


Loperamide HCl (Imodium) 2 mg PRN Q15MIN  PRN PO DIARRHEA Last administered on 

10/23/18at 21:01;  Start 10/22/18 at 17:00


Albumin Human 250 ml @  62.5 mls/hr 1X  ONCE IV  Last administered on 10/22/

18at 23:34;  Start 10/22/18 at 23:30;  Stop 10/23/18 at 03:29;  Status DC


Insulin Glargine (Lantus) 30 units QHS SQ  Last administered on 10/23/18at 21:03

;  Start 10/23/18 at 21:00


Insulin Human Lispro (HumaLOG) 10 units TIDWMEALS SQ  Last administered on 10/24

/18at 08:31;  Start 10/23/18 at 12:00


Insulin Human Lispro (HumaLOG) 20 units 1X  ONCE SQ  Last administered on 10/23/

18at 09:18;  Start 10/23/18 at 08:51;  Stop 10/23/18 at 08:52;  Status DC


Mupirocin (Bactroban) 1 justo BID TP  Last administered on 10/24/18at 08:32;  

Start 10/23/18 at 10:00


Pantoprazole Sodium (Protonix) 40 mg DAILYAC PO  Last administered on 10/24/

18at 08:23;  Start 10/24/18 at 07:30


Vancomycin HCl 500 mg/Sodium Chloride 100 ml @  100 mls/hr Q48H IV  Last 

administered on 10/23/18at 16:26;  Start 10/23/18 at 16:00





Active Scripts


Active


Reported


Metolazone 5 Mg Tablet 5 Mg PO DAILY


Vitamin D3 (Cholecalciferol (Vitamin D3)) 5,000 Unit Tablet 1 Tab PO DAILY


Calcium Acetate 667 Mg Tablet 667 Mg PO TIDWMEALS


Renal Caps Softgel (Folic Acid/Vitamin B Comp W-C) 1 Mg Capsule 1 Cap PO DAILY


Tresiba Flextouch U-100 (Insulin Degludec) 100 Unit/1 Ml Insuln.pen 100 Unit SQ 


Carvedilol 12.5 Mg Tablet 1 Tab PO BIDWMEALS


Furosemide 40 Mg Tablet 1 Tab PO DAILY


Aspirin 81 Mg Tab.chew 81 Mg PO DAILYWBKFT


Vitals/I & O





Vital Sign - Last 24 Hours








 10/23/18 10/23/18 10/23/18 10/23/18





 15:33 17:55 19:21 19:28


 


Temp 97.9  97.6 





 97.9  97.6 


 


Pulse 90 96 95 


 


Resp 20  18 


 


B/P (MAP) 98/66 (77) 104/69 99/71 (80) 


 


Pulse Ox   99 


 


O2 Delivery Room Air  Room Air Room Air


 


    





    





 10/23/18 10/24/18 10/24/18 10/24/18





 22:41 03:00 07:00 11:00


 


Temp 97.1 98.2 98.3 





 97.1 98.2 98.3 


 


Pulse 93 94 88 88


 


Resp 18 18 16 


 


B/P (MAP) 96/72 (80) 84/56 (65) 96/66 (76) 84/53 (63)


 


Pulse Ox 99 95 96 


 


O2 Delivery Room Air Room Air Room Air 


 


    





    





 10/24/18   





 12:08   


 


Temp 98.3   





 98.3   


 


Pulse 88   


 


Resp 16   


 


B/P (MAP) 84/52 (63)   


 


Pulse Ox 96   


 


O2 Delivery Room Air   














Intake and Output   


 


 10/23/18 10/23/18 10/24/18





 15:00 23:00 07:00


 


Intake Total  780 ml 


 


Output Total  3 ml 


 


Balance  777 ml 

















ELIU CORONA Oct 24, 2018 13:56

## 2018-10-25 VITALS — SYSTOLIC BLOOD PRESSURE: 105 MMHG | DIASTOLIC BLOOD PRESSURE: 76 MMHG

## 2018-10-25 VITALS — SYSTOLIC BLOOD PRESSURE: 126 MMHG | DIASTOLIC BLOOD PRESSURE: 68 MMHG

## 2018-10-25 VITALS — SYSTOLIC BLOOD PRESSURE: 101 MMHG | DIASTOLIC BLOOD PRESSURE: 56 MMHG

## 2018-10-25 LAB
ALBUMIN SERPL-MCNC: 1.2 G/DL (ref 3.4–5)
ANION GAP SERPL CALC-SCNC: 12 MMOL/L (ref 6–14)
BUN SERPL-MCNC: 56 MG/DL (ref 8–26)
CALCIUM SERPL-MCNC: 7 MG/DL (ref 8.5–10.1)
CHLORIDE SERPL-SCNC: 94 MMOL/L (ref 98–107)
CO2 SERPL-SCNC: 24 MMOL/L (ref 21–32)
CREAT SERPL-MCNC: 11.4 MG/DL (ref 0.7–1.3)
GFR SERPLBLD BASED ON 1.73 SQ M-ARVRAT: 5.7 ML/MIN
GLUCOSE SERPL-MCNC: 278 MG/DL (ref 70–99)
MAGNESIUM SERPL-MCNC: 2 MG/DL (ref 1.8–2.4)
PHOSPHATE SERPL-MCNC: 4.7 MG/DL (ref 2.6–4.7)
POTASSIUM SERPL-SCNC: 3.4 MMOL/L (ref 3.5–5.1)
SODIUM SERPL-SCNC: 130 MMOL/L (ref 136–145)

## 2018-10-25 PROCEDURE — 02HV33Z INSERTION OF INFUSION DEVICE INTO SUPERIOR VENA CAVA, PERCUTANEOUS APPROACH: ICD-10-PCS | Performed by: INTERNAL MEDICINE

## 2018-10-25 PROCEDURE — B548ZZA ULTRASONOGRAPHY OF SUPERIOR VENA CAVA, GUIDANCE: ICD-10-PCS | Performed by: INTERNAL MEDICINE

## 2018-10-25 RX ADMIN — VANCOMYCIN HYDROCHLORIDE SCH MG: 500 INJECTION, POWDER, LYOPHILIZED, FOR SOLUTION INTRAVENOUS at 08:35

## 2018-10-25 RX ADMIN — CALCIUM ACETATE SCH MG: 667 CAPSULE ORAL at 12:09

## 2018-10-25 RX ADMIN — NYSTATIN SCH ML: 100000 SUSPENSION ORAL at 11:34

## 2018-10-25 RX ADMIN — ASPIRIN 81 MG SCH MG: 81 TABLET ORAL at 08:35

## 2018-10-25 RX ADMIN — CARVEDILOL SCH MG: 12.5 TABLET, FILM COATED ORAL at 08:36

## 2018-10-25 RX ADMIN — LOPERAMIDE HYDROCHLORIDE PRN MG: 2 CAPSULE ORAL at 01:43

## 2018-10-25 RX ADMIN — Medication SCH TAB: at 08:35

## 2018-10-25 RX ADMIN — SILVER SULFADIAZINE SCH APP: 10 CREAM TOPICAL at 08:44

## 2018-10-25 RX ADMIN — HEPARIN SODIUM SCH UNIT: 5000 INJECTION, SOLUTION INTRAVENOUS; SUBCUTANEOUS at 05:33

## 2018-10-25 RX ADMIN — INSULIN LISPRO SCH UNITS: 100 INJECTION, SOLUTION INTRAVENOUS; SUBCUTANEOUS at 08:43

## 2018-10-25 RX ADMIN — PIPERACILLIN SODIUM AND TAZOBACTAM SODIUM SCH MLS/HR: 2; .25 INJECTION, POWDER, LYOPHILIZED, FOR SOLUTION INTRAVENOUS at 05:31

## 2018-10-25 RX ADMIN — INSULIN LISPRO SCH UNITS: 100 INJECTION, SOLUTION INTRAVENOUS; SUBCUTANEOUS at 12:00

## 2018-10-25 RX ADMIN — CHOLECALCIFEROL CAP 125 MCG (5000 UNIT) SCH UNIT: 125 CAP at 08:35

## 2018-10-25 RX ADMIN — Medication SCH CAP: at 08:35

## 2018-10-25 RX ADMIN — PANTOPRAZOLE SODIUM SCH MG: 40 TABLET, DELAYED RELEASE ORAL at 08:35

## 2018-10-25 RX ADMIN — PANTOPRAZOLE SODIUM SCH MG: 40 TABLET, DELAYED RELEASE ORAL at 05:31

## 2018-10-25 RX ADMIN — MUPIROCIN SCH APP: 20 OINTMENT TOPICAL at 08:44

## 2018-10-25 RX ADMIN — CALCIUM ACETATE SCH MG: 667 CAPSULE ORAL at 08:35

## 2018-10-25 RX ADMIN — NYSTATIN SCH ML: 100000 SUSPENSION ORAL at 08:44

## 2018-10-25 NOTE — PDOC
Renal-Progress Notes


Subjective Notes


Notes


NO NEW COMPLAINTS





History of Present Illness


Hx of present illness


NO CHANGE





Vitals


Vitals





Vital Signs








  Date Time  Temp Pulse Resp B/P (MAP) Pulse Ox O2 Delivery O2 Flow Rate FiO2


 


10/25/18 08:36  108  126/68    


 


10/25/18 08:09      Room Air  


 


10/25/18 07:00     96   


 


10/25/18 03:00 98.4  16     





 98.4       








Weight


Weight [ ]





I.O.


Intake and Output











Intake and Output 


 


 10/25/18





 07:00


 


 


 


# Voids 1











Labs


Labs





Laboratory Tests








Test


 10/24/18


12:21 10/24/18


16:55 10/24/18


21:09 10/25/18


05:45


 


Glucose (Fingerstick)


 75 mg/dL


(70-99) 163 mg/dL


(70-99) 214 mg/dL


(70-99) 





 


Sodium Level


 


 


 


 130 mmol/L


(136-145)


 


Potassium Level


 


 


 


 3.4 mmol/L


(3.5-5.1)


 


Chloride Level


 


 


 


 94 mmol/L


()


 


Carbon Dioxide Level


 


 


 


 24 mmol/L


(21-32)


 


Anion Gap    12 (6-14) 


 


Blood Urea Nitrogen


 


 


 


 56 mg/dL


(8-26)


 


Creatinine


 


 


 


 11.4 mg/dL


(0.7-1.3)


 


Estimated GFR


(Cockcroft-Gault) 


 


 


 5.7 





 


Glucose Level


 


 


 


 278 mg/dL


(70-99)


 


Calcium Level


 


 


 


 7.0 mg/dL


(8.5-10.1)


 


Phosphorus Level


 


 


 


 4.7 mg/dL


(2.6-4.7)


 


Magnesium Level


 


 


 


 2.0 mg/dL


(1.8-2.4)


 


Albumin


 


 


 


 1.2 g/dL


(3.4-5.0)


 


Thyroid Stimulating Hormone


(TSH) 


 


 


 3.782 uIU/mL


(0.358-3.74)


 


Cortisol AM Sample


 


 


 


 20.8 ug/dL


(4.3-22.4)


 


Test


 10/25/18


11:55 


 


 





 


Glucose (Fingerstick)


 42 mg/dL


(70-99) 


 


 














Micro


Micro





Microbiology


10/19/18 Blood Culture - Final, Complete


           NO GROWTH AFTER 5 DAYS


10/20/18 Anaerobic/Aerobic Culture - Preliminary, Resulted


           


10/20/18 Anaerobic Culture Result 1 (ANTHONY) - Preliminary, Resulted


           


10/20/18 Aerobic Culture - Final, Resulted


           


10/20/18 Aerobic Culture Result 1 (ANTHONY) - Final, Resulted


           


10/20/18 Gram Stain - Final, Resulted


           


10/20/18 Gram Stain Result 1 (ANTHONY) - Final, Resulted


           


10/20/18 Gram Stain Result 2 (ANTHONY) - Final, Resulted


           


10/21/18 Anaerobic/Aerobic Culture - Preliminary, Resulted


           


10/21/18 Anaerobic Culture Result 1 (ANTHONY) - Preliminary, Resulted


           


10/21/18 Aerobic Culture - Final, Resulted


           


10/21/18 Aerobic Culture Result 1 (ANTHONY) - Final, Resulted


           


10/21/18 Gram Stain - Final, Resulted


           


10/21/18 Gram Stain Result 1 (ANTHONY) - Final, Resulted


           


10/21/18 Gram Stain Result 2 (ANTHONY) - Final, Resulted





Review of Systems


Constitutional:  yes: weakness, alert, oriented


Ears/Nose/Throat:  Yes: no symptom reported


Eyes:  Yes: no symptom reported


Pulmonary:  Yes no symptom reported


Cardiovascular:  Yes no symptom reported


Gastrointestional:  Yes: no symptom reported


Genitourinary:  Yes: no symptom reported


Musculoskeletal:  Yes: no symptom reported


Skin:  Yes no symptom reported


Psychiatric/Neurological:  Yes: no symptom reported


Endocrine:  Yes: no symptom reported





Physical Exam


General Appearance:  no apparent distress


Respiratory:  bilateral CTA


Heart:  S1S2


Abdomen:  soft, bowel sounds present


Genitourinary:  bladder flat


Extremities:  pulses present


Neurology:  alert, oriented


Musculoskeletal:  Osteoarthritis





Assessment


Assessment


IMP





ESRD


LEUCOCYTOSIS-RESOLVED


ABD PAIN RESOLVED


PROB SEPSIS


LEFT FOOT ULCER-? OSTEOMYELITIS


SECONDARY HYPERPARATHYROIDISM


HYPONATREMIA-BETTER


HYPOKALEMIA


S/P AICD GENERATOR CHANGE


SEVERE CM





PLAN





CONT CCPD


REPLACE K


SUPPORTIVE CARE


ANTIBIOTICS PER ID











CONSTANTIN GOLDSMITH MD Oct 25, 2018 12:08

## 2018-10-25 NOTE — PDOC
Subjective:


Subjective:


Loose stool x 3 kept him up during the night.  Imodium helped?


Stomach gurgling but no pain.  Tolerating PO.





Objective:


Vital Signs:





 Vital Signs








  Date Time  Temp Pulse Resp B/P (MAP) Pulse Ox O2 Delivery O2 Flow Rate FiO2


 


10/25/18 08:36  108  126/68    


 


10/25/18 08:09      Room Air  


 


10/25/18 07:00     96   


 


10/25/18 03:00 98.4  16     





 98.4       








Labs:





Laboratory Tests








Test


 10/24/18


12:21 10/24/18


16:55 10/24/18


21:09 10/25/18


05:45


 


Glucose (Fingerstick)


 75 mg/dL


(70-99) 163 mg/dL


(70-99)  H 214 mg/dL


(70-99)  H 





 


Sodium Level


 


 


 


 130 mmol/L


(136-145)  L


 


Potassium Level


 


 


 


 3.4 mmol/L


(3.5-5.1)  L


 


Chloride Level


 


 


 


 94 mmol/L


()  L


 


Carbon Dioxide Level


 


 


 


 24 mmol/L


(21-32)


 


Anion Gap    12 (6-14)  


 


Blood Urea Nitrogen


 


 


 


 56 mg/dL


(8-26)  H


 


Creatinine


 


 


 


 11.4 mg/dL


(0.7-1.3)  H


 


Estimated GFR


(Cockcroft-Gault) 


 


 


 5.7  





 


Glucose Level


 


 


 


 278 mg/dL


(70-99)  H


 


Calcium Level


 


 


 


 7.0 mg/dL


(8.5-10.1)  L


 


Phosphorus Level


 


 


 


 4.7 mg/dL


(2.6-4.7)


 


Magnesium Level


 


 


 


 2.0 mg/dL


(1.8-2.4)


 


Albumin


 


 


 


 1.2 g/dL


(3.4-5.0)  L


 


Thyroid Stimulating Hormone


(TSH) 


 


 


 3.782 uIU/mL


(0.358-3.74)  H


 


Cortisol AM Sample


 


 


 


 20.8 ug/dL


(4.3-22.4)











PE:





GEN: NAD


LUNGS: CTAB


HEART: RRR


ABD: NABS, S/ND/NT


EXTREMITY/SKIN: socks on


NEURO/PSYCH: A & O 3





A/P:


Left toe osteomyelitis - atbx per ID


Loose stools - worse overnight, maybe Imodium helped, C Diff previously negative





--


Monitor stools - okay to continue Imodium PRN.











SUSAN BRINK Oct 25, 2018 11:05

## 2018-10-25 NOTE — DISCH
DISCHARGE


DISCHARGE INFORMATION:


DISCHARGE DATE:  Oct 25, 2018


FINAL DIAGNOSIS


Problems


Medical Problems:


(1) Abdominal pain


Status: Acute  








CONDITION ON DISCHARGE:  Stable





CODE STATUS:


Code Status:  Full





SKILLED NURSING:


SNF STAY <30 DAYS:  Yes





HOSPICE:


HOSPICE:  No


HOSPICE EVAL & TREAT:  No





LTAC:


ADMIT TO LTAC:  Yes





POST DISCHARGE ORDERS:


ACTIVITY ORDERS:  Activity as tolerated


WEIGHT BEARING STATUS:  As tolerated


DIET AFTER DISCHARGE:  Renal


WOUND/INCISION CARE:  Other, see below





CHECKS AFTER DISCHARGE:


CHECKS AFTER DISCHARGE:  Check blood press - daily, Check blood sugar, ac/hs





FOLLOW-UP:


ADDITIONAL FOLLOW-UP:  wound care





TREATMENT/EQUIPMENT ORDERS:


ADAPTIVE EQUIPMENT NEEDED:  None


INFUSION EQUIPMENT NEEDED:  PICC Line


Physical Therapy For:  Evalulation/Treatment


Occupational Therapy For:  Evaluation/Treatment





DISCHARGE MEDICATIONS:


Home Meds


Active Scripts


Silver Sulfadiazine (SSD) 25 Gm Cream..g., 1 LANNY TP DAILY for 10 Days, #10 EACH


   Prov:MARY DALY MD         10/25/18


Mupirocin (MUPIROCIN OINTMENT) 22 Gm Oint...g., 1 LANNY TP BID for 10 Days, MISC


   Prov:MARY DALY MD         10/25/18


Insulin Lispro (HUMALOG) 100 Unit/1 Ml Insuln.pen, 10 UNITS SQ TIDWMEALS for 30 

Days, EACH


   Prov:MARY DALY MD         10/25/18


Insulin Glargine,Hum.rec.anlog (LANTUS SOLOSTAR) 100 Unit/1 Ml Insuln.pen, 30 

UNITS SQ QHS for 30 Days, EACH


   Prov:MARY DALY MD         10/25/18


Metoclopramide Hcl (METOCLOPRAMIDE HCL) 5 Mg Tablet, 5 MG PO PRN BFRMEALHC PRN 

for NAUSEA/VOMITING, 2ND CHOICE, #30 TAB


   Prov:MARY DALY MD         10/25/18


Lactobacillus Rhamnosus Gg (CULTURELLE) 1 Each Cap.sprink, 1 CAP PO BID for 14 

Days, #28 CAP


   Prov:MARY DALY MD         10/25/18


[Pantoprazole] 40 MG TABLET.DR Ramon Conflict Check, 40 MG PO DAILYAC, #30


   Prov:MARY DALY MD         10/25/18


Loperamide Hcl (LOPERAMIDE) 2 Mg Capsule, 2 MG PO PRN Q15MIN PRN for DIARRHEA 

for 10 Days, CAP


   Prov:MARY DALY MD         10/25/18


Tramadol Hcl (TRAMADOL HCL) 50 Mg Tablet, 50 MG PO PRN Q6HRS PRN for MILD TO 

MODERATE PAIN, #30 TAB


   Prov:MARY DALY MD         10/25/18


Baclofen (BACLOFEN) 10 Mg Tablet, 10 MG PO PRN TID PRN for for Hiccups for 14 

Days, TAB


   Prov:MARY DALY MD         10/25/18


Nystatin (NYSTATIN) 100,000 Unit/1 Ml Oral.susp, 5 ML SWSW [id number] for 30 Days, 

MISC


   Prov:MARY DALY MD         10/25/18


Promethazine Hcl (PROMETHAZINE HCL) 12.5 Mg Tablet, 12.5 MG PO PRN Q6HRS PRN 

for NAUSEA/VOMITING, 1ST CHOICE for 30 Days, TAB


   Prov:MARY DALY MD         10/25/18


Reported Medications


Metolazone (METOLAZONE) 5 Mg Tablet, 5 MG PO DAILY, #30 TAB 0 Refills


   10/15/18


Cholecalciferol (Vitamin D3) (VITAMIN D3) 5,000 Unit Tablet, 1 TAB PO DAILY, #

30 TAB


   10/15/18


Calcium Acetate (CALCIUM ACETATE) 667 Mg Tablet, 667 MG PO TIDWMEALS for 

DIALYSIS PATIENTS, CAP


   10/15/18


Folic Acid/Vitamin B Comp W-C (RENAL CAPS SOFTGEL) 1 Mg Capsule, 1 CAP PO DAILY

, #30 CAP 5 Refills


   10/15/18


Insulin Degludec (Tresiba Flextouch U-100) 100 Unit/1 Ml Insuln.pen, 100 UNIT SQ

, EACH


   10/15/18


Carvedilol (CARVEDILOL) 12.5 Mg Tablet, 1 TAB PO BIDWMEALS, #180 TAB 1 Refill


   6/1/15


Furosemide (FUROSEMIDE) 40 Mg Tablet, 1 TAB PO DAILY, #30 TAB 5 Refills


   6/1/15


Aspirin (ASPIRIN) 81 Mg Tab.chew, 81 MG PO DAILYWBKFT, TAB.CHEW


   1/26/15


Discontinued Reported Medications


Fluticasone Propionate (Flonase Allergy Relief) 9.9 Ml [place].susp, 2 SPRAYS NS 

DAILY, BOTTLE


   10/15/18


Loratadine (LORATADINE) 10 Mg Tab.rapdis, 10 MG PO, TAB


   10/15/18











MARY DALY MD Oct 25, 2018 12:55

## 2018-10-25 NOTE — PDOC
Infectious Disease Note


Subjective


Subjective


pt is feeling better





ROS


ROS


no n/v/d/fever





Vital Sign


Vital Signs





Vital Signs








  Date Time  Temp Pulse Resp B/P (MAP) Pulse Ox O2 Delivery O2 Flow Rate FiO2


 


10/25/18 08:09      Room Air  


 


10/25/18 03:00 98.4 92 16 105/76 (86) 94   





 98.4       











Physical Exam


PHYSICAL EXAM


GENERAL:  Propped up in bed, resting quietly 


HEENT:  Normal conjunctivae.  Oral cavity:  Pharynx pink and moist.


NECK:  Supple.


LUNGS:  Clear to auscultation.


HEART:  S1 and S2.  Left-sided chest AICD site unremarkable for infection.


ABDOMEN:  Mildly distended.  Bowel sounds active, soft, nontender.  Peritoneal


catheter without redness or drainage.


EXTREMITIES:  No gross edema or cyanosis.  Ulcer left foot. Distal pulses weak.

  wound looks good


SKIN:  Warm without rash.


PIV





Labs


Lab





Laboratory Tests








Test


 10/24/18


08:23 10/24/18


12:21 10/24/18


16:55 10/24/18


21:09


 


Glucose (Fingerstick)


 353 mg/dL


(70-99) 75 mg/dL


(70-99) 163 mg/dL


(70-99) 214 mg/dL


(70-99)


 


Test


 10/25/18


05:45 


 


 





 


Sodium Level


 130 mmol/L


(136-145) 


 


 





 


Potassium Level


 3.4 mmol/L


(3.5-5.1) 


 


 





 


Chloride Level


 94 mmol/L


() 


 


 





 


Carbon Dioxide Level


 24 mmol/L


(21-32) 


 


 





 


Anion Gap 12 (6-14)    


 


Blood Urea Nitrogen


 56 mg/dL


(8-26) 


 


 





 


Creatinine


 11.4 mg/dL


(0.7-1.3) 


 


 





 


Estimated GFR


(Cockcroft-Gault) 5.7 


 


 


 





 


Glucose Level


 278 mg/dL


(70-99) 


 


 





 


Calcium Level


 7.0 mg/dL


(8.5-10.1) 


 


 





 


Phosphorus Level


 4.7 mg/dL


(2.6-4.7) 


 


 





 


Magnesium Level


 2.0 mg/dL


(1.8-2.4) 


 


 





 


Albumin


 1.2 g/dL


(3.4-5.0) 


 


 





 


Thyroid Stimulating Hormone


(TSH) 3.782 uIU/mL


(0.358-3.74) 


 


 











Micro





Microbiology


10/19/18 Blood Culture - Preliminary, Resulted


           NO GROWTH AFTER 2 DAYS


10/20/18 Anaerobic/Aerobic Culture, Resulted


           Pending


10/20/18 Anaerobic Culture Result 1 (ANTHONY), Resulted


           Pending


10/20/18 Aerobic Culture, Resulted


           Pending


10/20/18 Aerobic Culture Result 1 (ANTHONY), Resulted


           Pending


10/20/18 Gram Stain - Final, Resulted


           


10/20/18 Gram Stain Result 1 (ANTHONY) - Final, Resulted


           


10/20/18 Gram Stain Result 2 (ANTHONY) - Final, Resulted


           


10/21/18 Anaerobic/Aerobic Culture, Resulted


           Pending


10/21/18 Anaerobic Culture Result 1 (ANTHONY), Resulted


           Pending


10/21/18 Aerobic Culture, Resulted


           Pending


10/21/18 Aerobic Culture Result 1 (ANTHONY), Resulted


           Pending


10/21/18 Gram Stain - Final, Resulted


           


10/21/18 Gram Stain Result 1 (ANTHONY) - Final, Resulted


           


10/21/18 Gram Stain Result 2 (ANTHONY) - Final, Resulted





Objective


Assessment


Sepsis POA


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


Fever


Leukocytosis


Abdominal pain


   -Dialysate clear, . culture in process 


Left lingula consolidation 


Diarrhea 


ESRD on peritoneal dialysis


   - catheter changed 2015  


s/p AICD generator change on 10/15/18.


Chronic systolic CHF





Plan


Plan of Care





Continue vanc and Zosyn, renal dosing


Local wound 


Monitor labs/temp


f/u c/s


d/w MACHO Muller MD Oct 25, 2018 08:21

## 2018-10-25 NOTE — PDOC
PROGRESS NOTES


Chief Complaint


Chief Complaint


Osteo distal phalanx, left foot


Abdominal Pain, no peritonitis- resolved


ESRD on PD


AOCD


Hypoglycemia, resolved


Sepsis POA with no organ dysfcn


Severe CM With EF 10-% Indwelling defibrillator


DM 2 better now


Loose stools, on bowel regimen





History of Present Illness


History of Present Illness


Loose stools seems to be abating with Imodium


Bone scan consistent with osteomyelitis


Vascular surgery note and discussed with ID


Plan is IV antibiotics see for improvement


No surgical plans currently


Patient is also dialysis patient


Discussed with above services-LTAC screen





Plan: PICC line


LTAC screen by SW


To LTAC once accepted on dialysis, IV ABX and wound care


Patient agreeable with my plan


Discussed with RN Jesus





Vitals


Vitals





Vital Signs








  Date Time  Temp Pulse Resp B/P (MAP) Pulse Ox O2 Delivery O2 Flow Rate FiO2


 


10/25/18 08:36  108  126/68    


 


10/25/18 08:09      Room Air  


 


10/25/18 07:00     96   


 


10/25/18 03:00 98.4  16     





 98.4       











Physical Exam


Physical Exam


GENERAL:  Propped up in bed, resting quietly 


HEENT:  Normal conjunctivae.  Oral cavity:  Pharynx pink and moist.


NECK:  Supple.


LUNGS:  Clear to auscultation.


HEART:  S1 and S2.  Left-sided chest AICD site unremarkable for infection.


ABDOMEN:  Mildly distended.  Bowel sounds active, soft, nontender.  Peritoneal


catheter without redness or drainage.


EXTREMITIES:  No gross edema or cyanosis.  Ulcer left foot. Distal pulses weak.

  wound looks good


SKIN:  Warm without rash.


PIV


General:  Alert, No acute distress


Heart:  Regular rate, Normal S1, Normal S2


Lungs:  Clear


Abdomen:  Soft, No tenderness, Other (PD catheter)


Extremities:  No clubbing, No cyanosis, Other (No skin breakdown on the right 

foot, Left medial foot ulcer over region of medial left 1st MP, No fluctuance, 

callus over distal left 1st toe (removed) with no underlying skin breakdown)


Skin:  Other (chornic leg closed lesions; left chest incision site is open to 

air, no swelling or erythema or tenderness with palpation. No drain and 

incision is well approximated with dry steri strips. No discoloration. )





Labs


LABS





Laboratory Tests








Test


 10/24/18


12:21 10/24/18


16:55 10/24/18


21:09 10/25/18


05:45


 


Glucose (Fingerstick)


 75 mg/dL


(70-99) 163 mg/dL


(70-99) 214 mg/dL


(70-99) 





 


Sodium Level


 


 


 


 130 mmol/L


(136-145)


 


Potassium Level


 


 


 


 3.4 mmol/L


(3.5-5.1)


 


Chloride Level


 


 


 


 94 mmol/L


()


 


Carbon Dioxide Level


 


 


 


 24 mmol/L


(21-32)


 


Anion Gap    12 (6-14) 


 


Blood Urea Nitrogen


 


 


 


 56 mg/dL


(8-26)


 


Creatinine


 


 


 


 11.4 mg/dL


(0.7-1.3)


 


Estimated GFR


(Cockcroft-Gault) 


 


 


 5.7 





 


Glucose Level


 


 


 


 278 mg/dL


(70-99)


 


Calcium Level


 


 


 


 7.0 mg/dL


(8.5-10.1)


 


Phosphorus Level


 


 


 


 4.7 mg/dL


(2.6-4.7)


 


Magnesium Level


 


 


 


 2.0 mg/dL


(1.8-2.4)


 


Albumin


 


 


 


 1.2 g/dL


(3.4-5.0)


 


Thyroid Stimulating Hormone


(TSH) 


 


 


 3.782 uIU/mL


(0.358-3.74)


 


Cortisol AM Sample


 


 


 


 20.8 ug/dL


(4.3-22.4)


 


Test


 10/25/18


11:55 


 


 





 


Glucose (Fingerstick)


 42 mg/dL


(70-99) 


 


 














Review of Systems


Review of Systems


A 14 point ROS was completed with the following noted as positive:





Other systems reviewed and negative.


\CONSTITUTIONAL:        No fever or chills


EYES:                          No recent changes


SKIN:               No rash or itching


CARDIOVASCULAR:     No chest pain, syncope, palpitations, or edema


RESPIRATORY:            No SOB or cough


GASTROINTESTINAL:    No nausea, vomiting or abdominal pain


NEUROLOGICAL:          No headaches or weakness


ENDOCRINE:               No cold or heat intolerance


GENITOURINARY:         No urgency or frequency of urination


MUSCULOSKELETAL:   No back pain or joint pain


LYMPHATICS:               No enlarged lymph nodes


PSYCHIATRIC:              No anxiety or depression





Assessment and Plan


Assessmemt and Plan


Problems


Medical Problems:


(1) Abdominal pain


Status: Acute  











Comment


Review of Relevant


I have reviewed the following items sean (where applicable) has been applied.


Labs





Laboratory Tests








Test


 10/23/18


15:25 10/23/18


16:20 10/23/18


16:32 10/23/18


20:58


 


Clostridium difficile Toxin


(PCR) Negative


(Negative) 


 


 





 


Glucose (Fingerstick)


 


 40 mg/dL


(70-99) 120 mg/dL


(70-99) 176 mg/dL


(70-99)


 


Test


 10/24/18


04:35 10/24/18


08:23 10/24/18


12:21 10/24/18


16:55


 


White Blood Count


 9.2 x10^3/uL


(4.0-11.0) 


 


 





 


Red Blood Count


 3.77 x10^6/uL


(4.30-5.70) 


 


 





 


Hemoglobin


 10.9 g/dL


(13.0-17.5) 


 


 





 


Hematocrit


 32.6 %


(39.0-53.0) 


 


 





 


Mean Corpuscular Volume 86 fL ()    


 


Mean Corpuscular Hemoglobin 29 pg (25-35)    


 


Mean Corpuscular Hemoglobin


Concent 34 g/dL


(31-37) 


 


 





 


Red Cell Distribution Width


 13.5 %


(11.5-14.5) 


 


 





 


Platelet Count


 349 x10^3/uL


(140-400) 


 


 





 


Neutrophils (%) (Auto) 80 % (31-73)    


 


Lymphocytes (%) (Auto) 6 % (24-48)    


 


Monocytes (%) (Auto) 9 % (0-9)    


 


Eosinophils (%) (Auto) 5 % (0-3)    


 


Basophils (%) (Auto) 1 % (0-3)    


 


Neutrophils # (Auto)


 7.4 x10^3uL


(1.8-7.7) 


 


 





 


Lymphocytes # (Auto)


 0.5 x10^3/uL


(1.0-4.8) 


 


 





 


Monocytes # (Auto)


 0.8 x10^3/uL


(0.0-1.1) 


 


 





 


Eosinophils # (Auto)


 0.5 x10^3/uL


(0.0-0.7) 


 


 





 


Basophils # (Auto)


 0.1 x10^3/uL


(0.0-0.2) 


 


 





 


Sodium Level


 128 mmol/L


(136-145) 


 


 





 


Potassium Level


 3.4 mmol/L


(3.5-5.1) 


 


 





 


Chloride Level


 92 mmol/L


() 


 


 





 


Carbon Dioxide Level


 24 mmol/L


(21-32) 


 


 





 


Anion Gap 12 (6-14)    


 


Blood Urea Nitrogen


 54 mg/dL


(8-26) 


 


 





 


Creatinine


 11.0 mg/dL


(0.7-1.3) 


 


 





 


Estimated GFR


(Cockcroft-Gault) 6.0 


 


 


 





 


Glucose Level


 381 mg/dL


(70-99) 


 


 





 


Calcium Level


 7.2 mg/dL


(8.5-10.1) 


 


 





 


Phosphorus Level


 4.5 mg/dL


(2.6-4.7) 


 


 





 


Magnesium Level


 1.9 mg/dL


(1.8-2.4) 


 


 





 


Albumin


 1.1 g/dL


(3.4-5.0) 


 


 





 


Glucose (Fingerstick)


 


 353 mg/dL


(70-99) 75 mg/dL


(70-99) 163 mg/dL


(70-99)


 


Test


 10/24/18


21:09 10/25/18


05:45 10/25/18


11:55 





 


Glucose (Fingerstick)


 214 mg/dL


(70-99) 


 42 mg/dL


(70-99) 





 


Sodium Level


 


 130 mmol/L


(136-145) 


 





 


Potassium Level


 


 3.4 mmol/L


(3.5-5.1) 


 





 


Chloride Level


 


 94 mmol/L


() 


 





 


Carbon Dioxide Level


 


 24 mmol/L


(21-32) 


 





 


Anion Gap  12 (6-14)   


 


Blood Urea Nitrogen


 


 56 mg/dL


(8-26) 


 





 


Creatinine


 


 11.4 mg/dL


(0.7-1.3) 


 





 


Estimated GFR


(Cockcroft-Gault) 


 5.7 


 


 





 


Glucose Level


 


 278 mg/dL


(70-99) 


 





 


Calcium Level


 


 7.0 mg/dL


(8.5-10.1) 


 





 


Phosphorus Level


 


 4.7 mg/dL


(2.6-4.7) 


 





 


Magnesium Level


 


 2.0 mg/dL


(1.8-2.4) 


 





 


Albumin


 


 1.2 g/dL


(3.4-5.0) 


 





 


Thyroid Stimulating Hormone


(TSH) 


 3.782 uIU/mL


(0.358-3.74) 


 





 


Cortisol AM Sample


 


 20.8 ug/dL


(4.3-22.4) 


 











Laboratory Tests








Test


 10/24/18


12:21 10/24/18


16:55 10/24/18


21:09 10/25/18


05:45


 


Glucose (Fingerstick)


 75 mg/dL


(70-99) 163 mg/dL


(70-99) 214 mg/dL


(70-99) 





 


Sodium Level


 


 


 


 130 mmol/L


(136-145)


 


Potassium Level


 


 


 


 3.4 mmol/L


(3.5-5.1)


 


Chloride Level


 


 


 


 94 mmol/L


()


 


Carbon Dioxide Level


 


 


 


 24 mmol/L


(21-32)


 


Anion Gap    12 (6-14) 


 


Blood Urea Nitrogen


 


 


 


 56 mg/dL


(8-26)


 


Creatinine


 


 


 


 11.4 mg/dL


(0.7-1.3)


 


Estimated GFR


(Cockcroft-Gault) 


 


 


 5.7 





 


Glucose Level


 


 


 


 278 mg/dL


(70-99)


 


Calcium Level


 


 


 


 7.0 mg/dL


(8.5-10.1)


 


Phosphorus Level


 


 


 


 4.7 mg/dL


(2.6-4.7)


 


Magnesium Level


 


 


 


 2.0 mg/dL


(1.8-2.4)


 


Albumin


 


 


 


 1.2 g/dL


(3.4-5.0)


 


Thyroid Stimulating Hormone


(TSH) 


 


 


 3.782 uIU/mL


(0.358-3.74)


 


Cortisol AM Sample


 


 


 


 20.8 ug/dL


(4.3-22.4)


 


Test


 10/25/18


11:55 


 


 





 


Glucose (Fingerstick)


 42 mg/dL


(70-99) 


 


 











Microbiology


10/19/18 Blood Culture - Final, Complete


           NO GROWTH AFTER 5 DAYS


10/20/18 Anaerobic/Aerobic Culture - Preliminary, Resulted


           


10/20/18 Anaerobic Culture Result 1 (ANTHONY) - Preliminary, Resulted


           


10/20/18 Aerobic Culture - Final, Resulted


           


10/20/18 Aerobic Culture Result 1 (ANTHONY) - Final, Resulted


           


10/20/18 Gram Stain - Final, Resulted


           


10/20/18 Gram Stain Result 1 (ANTHONY) - Final, Resulted


           


10/20/18 Gram Stain Result 2 (ANTHONY) - Final, Resulted


           


10/21/18 Anaerobic/Aerobic Culture - Preliminary, Resulted


           


10/21/18 Anaerobic Culture Result 1 (ANTHONY) - Preliminary, Resulted


           


10/21/18 Aerobic Culture - Final, Resulted


           


10/21/18 Aerobic Culture Result 1 (ANTHONY) - Final, Resulted


           


10/21/18 Gram Stain - Final, Resulted


           


10/21/18 Gram Stain Result 1 (ANTHONY) - Final, Resulted


           


10/21/18 Gram Stain Result 2 (ANTHONY) - Final, Resulted


Medications





Current Medications


Sodium Chloride 250 ml @  250 mls/hr 1X  ONCE IV ;  Start 10/19/18 at 15:45;  

Stop 10/19/18 at 16:44;  Status Cancel


Piperacillin Sod/ Tazobactam Sod (Zosyn Per Pharmacy) 1 each PRN DAILY  PRN MC 

SEE COMMENTS;  Start 10/19/18 at 15:45;  Status UNV


Piperacillin Sod/ Tazobactam Sod 2.25 gm/Sodium Chloride 50 ml @  100 mls/hr 1X

  ONCE IV  Last administered on 10/19/18at 16:49;  Start 10/19/18 at 16:00;  

Stop 10/19/18 at 16:29;  Status DC


Ondansetron HCl (Zofran) 4 mg 1X  ONCE IV  Last administered on 10/19/18at 16:12

;  Start 10/19/18 at 16:00;  Stop 10/19/18 at 16:01;  Status DC


Hydromorphone HCl (Dilaudid) 0.5 mg PRN Q30MIN  PRN IV SEVERE PAIN Last 

administered on 10/21/18at 18:00;  Start 10/19/18 at 16:15;  Stop 10/21/18 at 18

:00;  Status DC


Hydromorphone HCl (Dilaudid) 2 mg STK-MED ONCE .ROUTE ;  Start 10/19/18 at 16:10

;  Stop 10/19/18 at 16:11;  Status DC


Sodium Chloride 500 ml @  500 mls/hr 1X  ONCE IV  Last administered on 10/19/

18at 16:49;  Start 10/19/18 at 16:30;  Stop 10/19/18 at 17:29;  Status DC


Aspirin (Children'S Aspirin) 324 mg 1X  ONCE PO  Last administered on 10/19/

18at 17:52;  Start 10/19/18 at 17:15;  Stop 10/19/18 at 17:16;  Status DC


Vancomycin HCl (Vanco Per Pharmacy) 1 each PRN DAILY  PRN MC SEE COMMENTS;  

Start 10/19/18 at 17:15;  Status UNV


Sodium Chloride 500 ml @  500 mls/hr 1X  ONCE IV  Last administered on 10/19/

18at 17:51;  Start 10/19/18 at 17:15;  Stop 10/19/18 at 18:14;  Status DC


Vancomycin HCl 1.75 gm/Sodium Chloride 500 ml @  250 mls/hr 1X  ONCE IV  Last 

administered on 10/19/18at 17:25;  Start 10/19/18 at 17:30;  Stop 10/19/18 at 19

:29;  Status DC


Acetaminophen (Tylenol) 650 mg PRN Q6HRS  PRN PO FEVER Last administered on 10/

21/18at 17:14;  Start 10/19/18 at 17:30


Ondansetron HCl (Zofran) 4 mg PRN Q6HRS  PRN IV NAUSEA/VOMITING, 1st IV CHOICE 

Last administered on 10/21/18at 17:17;  Start 10/19/18 at 17:30


Morphine Sulfate (Morphine Sulfate) 2 mg PRN Q2HR  PRN IV MILD-MODERATE PAIN;  

Start 10/19/18 at 17:30


Tramadol HCl (Ultram) 50 mg PRN Q6HRS  PRN PO MILD TO MODERATE PAIN Last 

administered on 10/22/18at 21:11;  Start 10/19/18 at 17:30


Docusate Sodium (Colace) 100 mg PRN DAILY  PRN PO CONSTIPATION 1ST CHOICE;  

Start 10/19/18 at 17:30


Piperacillin Sod/ Tazobactam Sod 3.375 gm/Sodium Chloride 50 ml @  100 mls/hr 

Q6HRS IV ;  Start 10/19/18 at 18:00;  Status UNV


Piperacillin Sod/ Tazobactam Sod (Zosyn Per Pharmacy) 1 each PRN DAILY  PRN MC 

SEE COMMENTS;  Start 10/19/18 at 17:30;  Status UNV


Vancomycin HCl (Vanco Per Pharmacy) 1 each PRN DAILY  PRN MC SEE COMMENTS Last 

administered on 10/24/18at 11:34;  Start 10/19/18 at 17:30


Insulin Human Lispro (HumaLOG) 0-9 UNITS TIDWMEALS SQ  Last administered on 10/

25/18at 08:43;  Start 10/20/18 at 08:00


Dextrose (Dextrose 50%-Water Syringe) 12.5 gm PRN Q15MIN  PRN IV SEE COMMENTS 

Last administered on 10/23/18at 16:24;  Start 10/19/18 at 17:30


Heparin Sodium (Porcine) (Heparin Sodium) 5,000 unit Q8HRS SQ  Last 

administered on 10/25/18at 05:33;  Start 10/19/18 at 22:00;  Stop 10/25/18 at 11

:36;  Status DC


Senna/Docusate Sodium (Senna Plus) 1 tab BID PO  Last administered on 10/21/

18at 08:54;  Start 10/19/18 at 21:00;  Stop 10/24/18 at 09:07;  Status DC


Docusate Sodium (Colace) 100 mg BID PO  Last administered on 10/21/18at 08:55;  

Start 10/19/18 at 21:00;  Stop 10/24/18 at 09:06;  Status DC


Magnesium Hydroxide (Milk Of Magnesia) 2,400 mg PRN Q12HR  PRN PO CONSTIPATION 

2ND CHOICE;  Start 10/19/18 at 17:45


Sodium Chloride 1,000 ml @  75 mls/hr 1X  ONCE IV ;  Start 10/19/18 at 17:45;  

Stop 10/19/18 at 19:47;  Status DC


Pantoprazole Sodium (PROTONIX VIAL for IV PUSH) 40 mg DAILYAC IVP  Last 

administered on 10/23/18at 10:04;  Start 10/20/18 at 07:30;  Stop 10/23/18 at 12

:07;  Status DC


Piperacillin Sod/ Tazobactam Sod 2.25 gm/Sodium Chloride 50 ml @  100 mls/hr 

Q6HRS IV ;  Start 10/19/18 at 18:00;  Stop 10/19/18 at 18:07;  Status DC


Vancomycin HCl (Vancomycin Random Level) 1 each 1X  ONCE MC  Last administered 

on 10/21/18at 17:00;  Start 10/21/18 at 17:00;  Stop 10/21/18 at 17:01;  Status 

DC


Piperacillin Sod/ Tazobactam Sod 2.25 gm/Sodium Chloride 50 ml @  100 mls/hr 

Q8HRS IV  Last administered on 10/25/18at 05:31;  Start 10/19/18 at 22:00


Promethazine HCl (Phenergan) 12.5 mg PRN Q4HRS  PRN PO NAUSEA/VOMITING Last 

administered on 10/20/18at 21:43;  Start 10/20/18 at 01:15;  Stop 10/22/18 at 11

:59;  Status DC


Potassium Chloride (Klor-Con) 40 meq Q2H PO  Last administered on 10/20/18at 09:

13;  Start 10/20/18 at 07:00;  Stop 10/20/18 at 09:01;  Status DC


Aspirin (Children'S Aspirin) 81 mg DAILYWBKFT PO  Last administered on 10/25/

18at 08:35;  Start 10/20/18 at 09:00


Carvedilol (Coreg) 12.5 mg BIDWMEALS PO  Last administered on 10/25/18at 08:36;

  Start 10/20/18 at 09:00


Calcium Acetate (Phoslo) 667 mg TIDWMEALS PO  Last administered on 10/25/18at 08

:35;  Start 10/20/18 at 09:00


Vitamin D (Vitamin D3) 5,000 unit DAILY PO  Last administered on 10/25/18at 08:

35;  Start 10/20/18 at 09:00


Vitamin B Complex/ Vitamin C (Leif-Ana) 1 tab DAILY PO  Last administered on 10

/25/18at 08:35;  Start 10/20/18 at 09:00


Insulin Glargine (Lantus) 15 units QHS SQ  Last administered on 10/22/18at 21:12

;  Start 10/20/18 at 21:00;  Stop 10/23/18 at 08:44;  Status DC


Baclofen (Lioresal) 10 mg PRN TID  PRN PO for Hiccups Last administered on 10/24

/18at 08:23;  Start 10/20/18 at 11:30


Metoclopramide HCl (Reglan) 5 mg PRN BFRMEALHC  PRN PO ;  Start 10/20/18 at 12:

00;  Stop 10/20/18 at 12:00;  Status DC


Norepinephrine Bitartrate 250 ml @  1.875 mls/ hr CONT  PRN IV SEE I/O RECORD 

Last administered on 10/21/18at 18:37;  Start 10/20/18 at 21:00;  Stop 10/23/18 

at 14:50;  Status DC


Metoclopramide HCl (Reglan) 5 mg PRN BFRMEALHC  PRN PO NAUSEA/VOMITING, 2ND 

CHOICE;  Start 10/21/18 at 08:45


Silver Sulfadiazine (Silvadene) 1 justo DAILY TP  Last administered on 10/25/18at 

08:44;  Start 10/21/18 at 11:30


Magnesium Sulfate 50 ml @ 25 mls/hr PRN DAILY  PRN IV for Mag < 1.7 on am labs;

  Start 10/21/18 at 11:45


Sodium Chloride 1,000 ml @  75 mls/hr [id number] IV  Last administered on 10/22/

18at 01:46;  Start 10/21/18 at 11:45;  Stop 10/23/18 at 10:49;  Status DC


Nystatin (Nystatin Oral Susp) 5 ml [id number]  Last administered on 10/23/

18at 14:48;  Start 10/21/18 at 13:00


Vancomycin HCl (Vancomycin Oral Solution) 125 mg [id number] PO  Last administered 

on 10/25/18at 08:35;  Start 10/21/18 at 14:00;  Stop 10/25/18 at 10:19;  Status 

DC


Potassium Chloride (Klor-Con) 40 meq 1X  ONCE PO  Last administered on 10/21/

18at 13:42;  Start 10/21/18 at 14:00;  Stop 10/21/18 at 14:01;  Status DC


Potassium Chloride (Klor-Con) 40 meq 1X  ONCE PO ;  Start 10/21/18 at 21:00;  

Stop 10/21/18 at 21:01;  Status DC


Vancomycin HCl 500 mg/Sodium Chloride 100 ml @  100 mls/hr 1X  ONCE IV  Last 

administered on 10/21/18at 21:35;  Start 10/21/18 at 21:00;  Stop 10/21/18 at 21

:59;  Status DC


Metoclopramide HCl (Reglan Vial) 5 mg PRN Q6HRS  PRN IV NAUSEA/VOMITING, 2nd IV 

CHOICE Last administered on 10/22/18at 01:46;  Start 10/21/18 at 19:15


Prochlorperazine Edisylate (Compazine) 10 mg PRN Q6HRS  PRN IV NAUSEA/VOMITING, 

3rd IV CHOICE;  Start 10/21/18 at 19:15


Lactobacillus Rhamnosus (Culturelle) 1 cap BID PO  Last administered on 10/25/

18at 08:35;  Start 10/22/18 at 21:00


Insulin Human Lispro (HumaLOG) 15 units 1X  ONCE SQ  Last administered on 10/22/

18at 10:30;  Start 10/22/18 at 10:15;  Stop 10/22/18 at 10:16;  Status DC


Vancomycin HCl (Vancomycin Random Level) 1 each 1X  ONCE MC  Last administered 

on 10/23/18at 06:00;  Start 10/23/18 at 06:00;  Stop 10/23/18 at 06:01;  Status 

DC


Potassium Chloride (Klor-Con) 40 meq 1X  ONCE PO  Last administered on 10/22/

18at 12:49;  Start 10/22/18 at 12:30;  Stop 10/22/18 at 12:31;  Status DC


Promethazine HCl (Phenergan) 12.5 mg PRN Q6HRS  PRN PO NAUSEA/VOMITING, 1ST 

CHOICE;  Start 10/22/18 at 11:45


Loperamide HCl (Imodium) 2 mg PRN Q15MIN  PRN PO DIARRHEA Last administered on 

10/25/18at 01:43;  Start 10/22/18 at 17:00


Albumin Human 250 ml @  62.5 mls/hr 1X  ONCE IV  Last administered on 10/22/

18at 23:34;  Start 10/22/18 at 23:30;  Stop 10/23/18 at 03:29;  Status DC


Insulin Glargine (Lantus) 30 units QHS SQ  Last administered on 10/24/18at 21:20

;  Start 10/23/18 at 21:00


Insulin Human Lispro (HumaLOG) 10 units TIDWMEALS SQ  Last administered on 10/25

/18at 08:43;  Start 10/23/18 at 12:00


Insulin Human Lispro (HumaLOG) 20 units 1X  ONCE SQ  Last administered on 10/23/

18at 09:18;  Start 10/23/18 at 08:51;  Stop 10/23/18 at 08:52;  Status DC


Mupirocin (Bactroban) 1 justo BID TP  Last administered on 10/25/18at 08:44;  

Start 10/23/18 at 10:00


Pantoprazole Sodium (Protonix) 40 mg DAILYAC PO  Last administered on 10/25/

18at 08:35;  Start 10/24/18 at 07:30


Vancomycin HCl 500 mg/Sodium Chloride 100 ml @  100 mls/hr Q48H IV  Last 

administered on 10/23/18at 16:26;  Start 10/23/18 at 16:00


Potassium Chloride (Klor-Con) 40 meq 1X  ONCE PO ;  Start 10/25/18 at 11:45;  

Stop 10/25/18 at 11:46;  Status DC





Active Scripts


Active


Reported


Metolazone 5 Mg Tablet 5 Mg PO DAILY


Vitamin D3 (Cholecalciferol (Vitamin D3)) 5,000 Unit Tablet 1 Tab PO DAILY


Calcium Acetate 667 Mg Tablet 667 Mg PO TIDWMEALS


Renal Caps Softgel (Folic Acid/Vitamin B Comp W-C) 1 Mg Capsule 1 Cap PO DAILY


Tresiba Flextouch U-100 (Insulin Degludec) 100 Unit/1 Ml Insuln.pen 100 Unit SQ 


Carvedilol 12.5 Mg Tablet 1 Tab PO BIDWMEALS


Furosemide 40 Mg Tablet 1 Tab PO DAILY


Aspirin 81 Mg Tab.chew 81 Mg PO DAILYWBKFT


Vitals/I & O





Vital Sign - Last 24 Hours








 10/24/18 10/24/18 10/24/18 10/24/18





 12:08 15:00 17:34 19:15


 


Temp 98.3 98.0  





 98.3 98.0  


 


Pulse 88 80 80 


 


Resp 16 20  


 


B/P (MAP) 84/52 (63) 100/73 (82) 100/73 


 


Pulse Ox 96 98  


 


O2 Delivery Room Air Room Air  Room Air


 


    





    





 10/24/18 10/24/18 10/24/18 10/25/18





 19:15 21:25 23:00 03:00


 


Temp 97.7  98.0 98.4





 97.7  98.0 98.4


 


Pulse 83 90 93 92


 


Resp 18  16 16


 


B/P (MAP) 74/49 (57) 101/64 (76) 106/64 (78) 105/76 (86)


 


Pulse Ox 98  91 94


 


O2 Delivery Room Air  Room Air Room Air


 


    





    





 10/25/18 10/25/18 10/25/18 





 07:00 08:09 08:36 


 


Pulse 108  108 


 


B/P (MAP) 126/68 (87)  126/68 


 


Pulse Ox 96   


 


O2 Delivery Room Air Room Air  











Nutrition Consultation


Dietary Evaluation:


Recommendations by RD:  Protein supplementation


Comments:  


REC renal/ADA diet w/double meat portions





Continue w/leif-ana


Expected Outcomes/Goals:  


PO intake to meet >75% est needs


Interpretation of weight loss:  >1-2% in 1 week





Malnutrition Findings:


Food and Nutrition Intake (Mod:  <75% est energy req 7days


Weight Status:  Appropriate











MARY DALY MD Oct 25, 2018 12:07

## 2018-10-25 NOTE — PDOC3
Discharge Summary


Visit Information


Date of Admission:  Oct 19, 2018


Date of Discharge:  Oct 25, 2018


Admitting Diagnosis Comment:


Osteo distal phalanx, left foot


Abdominal Pain, no peritonitis- resolved


ESRD on PD


AOCD


Hypoglycemia, resolved


Sepsis POA with no organ dysfcn


Severe CM With EF 10-% Indwelling defibrillator


DM 2 better now


Loose stools, on bowel regimen


Final Diagnosis


Problems


Medical Problems:


(1) Abdominal pain


Status: Acute  











Brief Hospital Course


Allergies





 Allergies








Coded Allergies Type Severity Reaction Last Updated Verified


 


  No Known Drug Allergies    6/22/15 No








Vital Signs





Vital Signs








  Date Time  Temp Pulse Resp B/P (MAP) Pulse Ox O2 Delivery O2 Flow Rate FiO2


 


10/25/18 08:36  108  126/68    


 


10/25/18 08:09      Room Air  


 


10/25/18 07:00     96   


 


10/25/18 03:00 98.4  16     





 98.4       








Lab Results





Laboratory Tests








Test


 10/23/18


15:25 10/23/18


16:20 10/23/18


16:32 10/23/18


20:58


 


Clostridium difficile Toxin


(PCR) Negative


(Negative) 


 


 





 


Glucose (Fingerstick)


 


 40 mg/dL


(70-99) 120 mg/dL


(70-99) 176 mg/dL


(70-99)


 


Test


 10/24/18


04:35 10/24/18


08:23 10/24/18


12:21 10/24/18


16:55


 


White Blood Count


 9.2 x10^3/uL


(4.0-11.0) 


 


 





 


Red Blood Count


 3.77 x10^6/uL


(4.30-5.70) 


 


 





 


Hemoglobin


 10.9 g/dL


(13.0-17.5) 


 


 





 


Hematocrit


 32.6 %


(39.0-53.0) 


 


 





 


Mean Corpuscular Volume 86 fL ()    


 


Mean Corpuscular Hemoglobin 29 pg (25-35)    


 


Mean Corpuscular Hemoglobin


Concent 34 g/dL


(31-37) 


 


 





 


Red Cell Distribution Width


 13.5 %


(11.5-14.5) 


 


 





 


Platelet Count


 349 x10^3/uL


(140-400) 


 


 





 


Neutrophils (%) (Auto) 80 % (31-73)    


 


Lymphocytes (%) (Auto) 6 % (24-48)    


 


Monocytes (%) (Auto) 9 % (0-9)    


 


Eosinophils (%) (Auto) 5 % (0-3)    


 


Basophils (%) (Auto) 1 % (0-3)    


 


Neutrophils # (Auto)


 7.4 x10^3uL


(1.8-7.7) 


 


 





 


Lymphocytes # (Auto)


 0.5 x10^3/uL


(1.0-4.8) 


 


 





 


Monocytes # (Auto)


 0.8 x10^3/uL


(0.0-1.1) 


 


 





 


Eosinophils # (Auto)


 0.5 x10^3/uL


(0.0-0.7) 


 


 





 


Basophils # (Auto)


 0.1 x10^3/uL


(0.0-0.2) 


 


 





 


Sodium Level


 128 mmol/L


(136-145) 


 


 





 


Potassium Level


 3.4 mmol/L


(3.5-5.1) 


 


 





 


Chloride Level


 92 mmol/L


() 


 


 





 


Carbon Dioxide Level


 24 mmol/L


(21-32) 


 


 





 


Anion Gap 12 (6-14)    


 


Blood Urea Nitrogen


 54 mg/dL


(8-26) 


 


 





 


Creatinine


 11.0 mg/dL


(0.7-1.3) 


 


 





 


Estimated GFR


(Cockcroft-Gault) 6.0 


 


 


 





 


Glucose Level


 381 mg/dL


(70-99) 


 


 





 


Calcium Level


 7.2 mg/dL


(8.5-10.1) 


 


 





 


Phosphorus Level


 4.5 mg/dL


(2.6-4.7) 


 


 





 


Magnesium Level


 1.9 mg/dL


(1.8-2.4) 


 


 





 


Albumin


 1.1 g/dL


(3.4-5.0) 


 


 





 


Glucose (Fingerstick)


 


 353 mg/dL


(70-99) 75 mg/dL


(70-99) 163 mg/dL


(70-99)


 


Test


 10/24/18


21:09 10/25/18


05:45 10/25/18


11:55 





 


Glucose (Fingerstick)


 214 mg/dL


(70-99) 


 42 mg/dL


(70-99) 





 


Sodium Level


 


 130 mmol/L


(136-145) 


 





 


Potassium Level


 


 3.4 mmol/L


(3.5-5.1) 


 





 


Chloride Level


 


 94 mmol/L


() 


 





 


Carbon Dioxide Level


 


 24 mmol/L


(21-32) 


 





 


Anion Gap  12 (6-14)   


 


Blood Urea Nitrogen


 


 56 mg/dL


(8-26) 


 





 


Creatinine


 


 11.4 mg/dL


(0.7-1.3) 


 





 


Estimated GFR


(Cockcroft-Gault) 


 5.7 


 


 





 


Glucose Level


 


 278 mg/dL


(70-99) 


 





 


Calcium Level


 


 7.0 mg/dL


(8.5-10.1) 


 





 


Phosphorus Level


 


 4.7 mg/dL


(2.6-4.7) 


 





 


Magnesium Level


 


 2.0 mg/dL


(1.8-2.4) 


 





 


Albumin


 


 1.2 g/dL


(3.4-5.0) 


 





 


Thyroid Stimulating Hormone


(TSH) 


 3.782 uIU/mL


(0.358-3.74) 


 





 


Cortisol AM Sample


 


 20.8 ug/dL


(4.3-22.4) 


 











Laboratory Tests








Test


 10/24/18


16:55 10/24/18


21:09 10/25/18


05:45 10/25/18


11:55


 


Glucose (Fingerstick)


 163 mg/dL


(70-99) 214 mg/dL


(70-99) 


 42 mg/dL


(70-99)


 


Sodium Level


 


 


 130 mmol/L


(136-145) 





 


Potassium Level


 


 


 3.4 mmol/L


(3.5-5.1) 





 


Chloride Level


 


 


 94 mmol/L


() 





 


Carbon Dioxide Level


 


 


 24 mmol/L


(21-32) 





 


Anion Gap   12 (6-14)  


 


Blood Urea Nitrogen


 


 


 56 mg/dL


(8-26) 





 


Creatinine


 


 


 11.4 mg/dL


(0.7-1.3) 





 


Estimated GFR


(Cockcroft-Gault) 


 


 5.7 


 





 


Glucose Level


 


 


 278 mg/dL


(70-99) 





 


Calcium Level


 


 


 7.0 mg/dL


(8.5-10.1) 





 


Phosphorus Level


 


 


 4.7 mg/dL


(2.6-4.7) 





 


Magnesium Level


 


 


 2.0 mg/dL


(1.8-2.4) 





 


Albumin


 


 


 1.2 g/dL


(3.4-5.0) 





 


Thyroid Stimulating Hormone


(TSH) 


 


 3.782 uIU/mL


(0.358-3.74) 





 


Cortisol AM Sample


 


 


 20.8 ug/dL


(4.3-22.4) 











Brief Hospital Course


Mr. Rebolledo  is a 53 old admitted for left foot wound. Comanage with ID and 

vascular surgery. BOne scan positive for osteomyelitis. The plan is IV 

antibiotics and monitor for response, no surgical plans yet. Patient is also a 

dialysis patient. Also getting wound care hence LTAC screen is ongoing. History 

of severe cardiomyopathy with EF 10% an indwelling defibrillator hence comanage 

also with cardiology but that seems to be chronic stable. We'll go to LTAC once 

accepted on IV antibiotics for 4-6 weeks-wound care, dialysis prerenal


Patient agreeable and plan of care


2 Notes today


Med list done


Discussed with social work, ID, RN Jesus





Discharge Information


Condition at Discharge:  Improved, Stable


Disposition/Orders:  Other (LTAC)


Scheduled


Aspirin (Aspirin) 81 Mg Tab.chew, 81 MG PO DAILYWBKFT, (Reported)


   Entered as Reported by: EVELIO BEYER on 1/26/15 0252


   Last Action: Continued on 10/20/18 0848 by SHERI HANNAH MD


Calcium Acetate (Calcium Acetate) 667 Mg Tablet, 667 MG PO TIDWMEALS for 

DIALYSIS PATIENTS, (Reported)


   Entered as Reported by: SHANT HANKINS on 10/15/18 0834


   Last Action: Converted on 10/20/18 0848 by SHERI HANNAH MD


Carvedilol (Carvedilol) 12.5 Mg Tablet, 1 TAB PO BIDWMEALS, #180 Ref 1 (Reported

)


   Entered as Reported by: SHRUTHI ELLIS on 6/1/15 0905


   Last Action: Edited on 10/20/18 0853 by Candy Mccarthy [facility]


Cholecalciferol (Vitamin D3) (Vitamin D3) 5,000 Unit Tablet, 1 TAB PO DAILY, #30

 (Reported)


   Entered as Reported by: SHANT HANKINS on 10/15/18 0834


   Last Action: Converted on 10/20/18 0848 by SHERI HANNAH MD


Folic Acid/Vitamin B Comp W-C (Renal Caps Softgel) 1 Mg Capsule, 1 CAP PO DAILY

, #30 Ref 5 (Reported)


   Entered as Reported by: SHANT HANKINS on 10/15/18 0834


   Last Action: Converted on 10/20/18 0848 by SHERI HANNAH MD


Furosemide (Furosemide) 40 Mg Tablet, 1 TAB PO DAILY, #30 Ref 5 (Reported)


   Entered as Reported by: SHRUTHI ELLIS on 6/1/15 0905


   Last Action: HELD on 10/20/18 0847 by SHERI HANNAH MD


Insulin Glargine,Hum.rec.anlog (Lantus Solostar) 100 Unit/1 Ml Insuln.pen, 30 

UNITS SQ QHS for 30 Days


   Prescribed by: MARY DALY on 10/25/18 1212


Insulin Lispro (Humalog) 100 Unit/1 Ml Insuln.pen, 10 UNITS SQ TIDWMEALS for 30 

Days


   Prescribed by: MARY DALY on 10/25/18 1212


Lactobacillus Rhamnosus Gg (Culturelle) 1 Each Cap.sprink, 1 CAP PO BID for 14 

Days, #28


   Prescribed by: MARY DALY on 10/25/18 1212


Metolazone (Metolazone) 5 Mg Tablet, 5 MG PO DAILY, #30 Ref 0 (Reported)


   Entered as Reported by: SHANT HANKINS on 10/15/18 0834


   Last Action: HELD on 10/20/18 0847 by SHERI HANNAH MD


Mupirocin (Mupirocin Ointment) 22 Gm Oint...g., 1 LANNY TP BID for 10 Days


   Prescribed by: MARY DALY on 10/25/18 1212


Nystatin (Nystatin) 100,000 Unit/1 Ml Oral.susp, 5 ML SWSW [id number] for 30 Days


   Prescribed by: MARY DALY on 10/25/18 1212


Silver Sulfadiazine (Ssd) 25 Gm Cream..g., 1 LANNY TP DAILY for 10 Days, #10


   Prescribed by: MARY DALY on 10/25/18 1212


[Pantoprazole] 40 MG TABLET.DR, 40 MG PO DAILYAC, #30


   Prescribed by: MARY DALY on 10/25/18 1212





Scheduled PRN


Baclofen (Baclofen) 10 Mg Tablet, 10 MG PO PRN TID PRN for for Hiccups for 14 

Days


   Prescribed by: MARY DALY on 10/25/18 1212


Loperamide Hcl (Loperamide) 2 Mg Capsule, 2 MG PO PRN Q15MIN PRN for DIARRHEA 

for 10 Days


   Prescribed by: MARY DALY on 10/25/18 1212


Metoclopramide Hcl (Metoclopramide Hcl) 5 Mg Tablet, 5 MG PO PRN BFRMEALHC PRN 

for NAUSEA/VOMITING, 2ND CHOICE, #30


   Prescribed by: MARY DALY on 10/25/18 1212


Promethazine Hcl (Promethazine Hcl) 12.5 Mg Tablet, 12.5 MG PO PRN Q6HRS PRN 

for NAUSEA/VOMITING, 1ST CHOICE for 30 Days


   Prescribed by: MARY DALY on 10/25/18 1212


Tramadol Hcl (Tramadol Hcl) 50 Mg Tablet, 50 MG PO PRN Q6HRS PRN for MILD TO 

MODERATE PAIN, #30


   Prescribed by: MARY DALY on 10/25/18 1212





Miscellaneous Medications


Insulin Degludec (Tresiba Flextouch U-100) 100 Unit/1 Ml Insuln.pen, 100 UNIT SQ

, (Reported)


   Entered as Reported by: SHANT HANKINS on 10/15/18 0834


   Last Action: HELD on 10/20/18 0847 by SHERI HANNAH MD





Discontinued Medications


Fluticasone Propionate (Flonase Allergy Relief) 9.9 Ml [place].susp, 2 SPRAYS NS 

DAILY, (Reported)


   Entered as Reported by: SHANT HANKINS on 10/15/18 0834


   Last Action: Discontinued on 10/19/18 2038 by JENNIFER MARTINEZ


Loratadine (Loratadine) 10 Mg Tab.rapdis, 10 MG PO, (Reported)


   Entered as Reported by: SHANT HANKINS on 10/15/18 0834


   Last Action: Discontinued on 10/19/18 2038 by MARY CASTELLANOS MD Oct 25, 2018 12:57

## 2019-10-14 ENCOUNTER — HOSPITAL ENCOUNTER (INPATIENT)
Dept: HOSPITAL 61 - ER | Age: 54
LOS: 2 days | Discharge: HOME | DRG: 291 | End: 2019-10-16
Attending: INTERNAL MEDICINE | Admitting: INTERNAL MEDICINE
Payer: COMMERCIAL

## 2019-10-14 VITALS — SYSTOLIC BLOOD PRESSURE: 100 MMHG | DIASTOLIC BLOOD PRESSURE: 80 MMHG

## 2019-10-14 VITALS — WEIGHT: 173.03 LBS | HEIGHT: 74 IN | BODY MASS INDEX: 22.21 KG/M2

## 2019-10-14 VITALS — DIASTOLIC BLOOD PRESSURE: 46 MMHG | SYSTOLIC BLOOD PRESSURE: 89 MMHG

## 2019-10-14 VITALS — DIASTOLIC BLOOD PRESSURE: 48 MMHG | SYSTOLIC BLOOD PRESSURE: 95 MMHG

## 2019-10-14 DIAGNOSIS — Z99.2: ICD-10-CM

## 2019-10-14 DIAGNOSIS — I24.8: ICD-10-CM

## 2019-10-14 DIAGNOSIS — I13.2: Primary | ICD-10-CM

## 2019-10-14 DIAGNOSIS — I48.91: ICD-10-CM

## 2019-10-14 DIAGNOSIS — Z91.19: ICD-10-CM

## 2019-10-14 DIAGNOSIS — K21.9: ICD-10-CM

## 2019-10-14 DIAGNOSIS — I25.10: ICD-10-CM

## 2019-10-14 DIAGNOSIS — E78.5: ICD-10-CM

## 2019-10-14 DIAGNOSIS — E87.6: ICD-10-CM

## 2019-10-14 DIAGNOSIS — Z82.49: ICD-10-CM

## 2019-10-14 DIAGNOSIS — M19.90: ICD-10-CM

## 2019-10-14 DIAGNOSIS — E78.00: ICD-10-CM

## 2019-10-14 DIAGNOSIS — R09.02: ICD-10-CM

## 2019-10-14 DIAGNOSIS — I50.43: ICD-10-CM

## 2019-10-14 DIAGNOSIS — E11.22: ICD-10-CM

## 2019-10-14 DIAGNOSIS — Z95.810: ICD-10-CM

## 2019-10-14 DIAGNOSIS — Z87.01: ICD-10-CM

## 2019-10-14 DIAGNOSIS — I47.2: ICD-10-CM

## 2019-10-14 DIAGNOSIS — I42.9: ICD-10-CM

## 2019-10-14 DIAGNOSIS — N18.6: ICD-10-CM

## 2019-10-14 LAB
ALBUMIN SERPL-MCNC: 2.3 G/DL (ref 3.4–5)
ALBUMIN/GLOB SERPL: 0.5 {RATIO} (ref 1–1.7)
ALP SERPL-CCNC: 126 U/L (ref 46–116)
ALT SERPL-CCNC: 32 U/L (ref 16–63)
ANION GAP SERPL CALC-SCNC: 13 MMOL/L (ref 6–14)
AST SERPL-CCNC: 26 U/L (ref 15–37)
BASOPHILS # BLD AUTO: 0 X10^3/UL (ref 0–0.2)
BASOPHILS NFR BLD: 1 % (ref 0–3)
BILIRUB SERPL-MCNC: 0.4 MG/DL (ref 0.2–1)
BUN SERPL-MCNC: 47 MG/DL (ref 8–26)
BUN/CREAT SERPL: 4 (ref 6–20)
CALCIUM SERPL-MCNC: 7.7 MG/DL (ref 8.5–10.1)
CHLORIDE SERPL-SCNC: 91 MMOL/L (ref 98–107)
CO2 SERPL-SCNC: 25 MMOL/L (ref 21–32)
CREAT SERPL-MCNC: 12.6 MG/DL (ref 0.7–1.3)
EOSINOPHIL NFR BLD: 0.1 X10^3/UL (ref 0–0.7)
EOSINOPHIL NFR BLD: 3 % (ref 0–3)
ERYTHROCYTE [DISTWIDTH] IN BLOOD BY AUTOMATED COUNT: 14.9 % (ref 11.5–14.5)
GFR SERPLBLD BASED ON 1.73 SQ M-ARVRAT: 5.1 ML/MIN
GLOBULIN SER-MCNC: 4.6 G/DL (ref 2.2–3.8)
GLUCOSE SERPL-MCNC: 211 MG/DL (ref 70–99)
HCT VFR BLD CALC: 40 % (ref 39–53)
HGB BLD-MCNC: 13.2 G/DL (ref 13–17.5)
LYMPHOCYTES # BLD: 0.7 X10^3/UL (ref 1–4.8)
LYMPHOCYTES NFR BLD AUTO: 18 % (ref 24–48)
MCH RBC QN AUTO: 29 PG (ref 25–35)
MCHC RBC AUTO-ENTMCNC: 33 G/DL (ref 31–37)
MCV RBC AUTO: 86 FL (ref 79–100)
MONO #: 0.5 X10^3/UL (ref 0–1.1)
MONOCYTES NFR BLD: 13 % (ref 0–9)
NEUT #: 2.7 X10^3/UL (ref 1.8–7.7)
NEUTROPHILS NFR BLD AUTO: 65 % (ref 31–73)
PLATELET # BLD AUTO: 160 X10^3/UL (ref 140–400)
POTASSIUM SERPL-SCNC: 3.3 MMOL/L (ref 3.5–5.1)
PROT SERPL-MCNC: 6.9 G/DL (ref 6.4–8.2)
RBC # BLD AUTO: 4.64 X10^6/UL (ref 4.3–5.7)
SODIUM SERPL-SCNC: 129 MMOL/L (ref 136–145)
WBC # BLD AUTO: 4.1 X10^3/UL (ref 4–11)

## 2019-10-14 PROCEDURE — 83605 ASSAY OF LACTIC ACID: CPT

## 2019-10-14 PROCEDURE — 80053 COMPREHEN METABOLIC PANEL: CPT

## 2019-10-14 PROCEDURE — 96374 THER/PROPH/DIAG INJ IV PUSH: CPT

## 2019-10-14 PROCEDURE — 87804 INFLUENZA ASSAY W/OPTIC: CPT

## 2019-10-14 PROCEDURE — 80061 LIPID PANEL: CPT

## 2019-10-14 PROCEDURE — 84443 ASSAY THYROID STIM HORMONE: CPT

## 2019-10-14 PROCEDURE — 96375 TX/PRO/DX INJ NEW DRUG ADDON: CPT

## 2019-10-14 PROCEDURE — 3E1M39Z IRRIGATION OF PERITONEAL CAVITY USING DIALYSATE, PERCUTANEOUS APPROACH: ICD-10-PCS | Performed by: INTERNAL MEDICINE

## 2019-10-14 PROCEDURE — 80048 BASIC METABOLIC PNL TOTAL CA: CPT

## 2019-10-14 PROCEDURE — 83880 ASSAY OF NATRIURETIC PEPTIDE: CPT

## 2019-10-14 PROCEDURE — 36415 COLL VENOUS BLD VENIPUNCTURE: CPT

## 2019-10-14 PROCEDURE — 93005 ELECTROCARDIOGRAM TRACING: CPT

## 2019-10-14 PROCEDURE — G0378 HOSPITAL OBSERVATION PER HR: HCPCS

## 2019-10-14 PROCEDURE — 93306 TTE W/DOPPLER COMPLETE: CPT

## 2019-10-14 PROCEDURE — 83735 ASSAY OF MAGNESIUM: CPT

## 2019-10-14 PROCEDURE — 71045 X-RAY EXAM CHEST 1 VIEW: CPT

## 2019-10-14 PROCEDURE — 84484 ASSAY OF TROPONIN QUANT: CPT

## 2019-10-14 PROCEDURE — 85025 COMPLETE CBC W/AUTO DIFF WBC: CPT

## 2019-10-14 PROCEDURE — 82962 GLUCOSE BLOOD TEST: CPT

## 2019-10-14 NOTE — EKG
[facility]

              [address] [place] [address]

Test Date:    2019-10-14               Test Time:    13:52:28

Pat Name:     BLAISE DOWNING            Department:   

Patient ID:   [id number]           Room:          

Gender:       M                        Technician:   

:          [id number]               Requested By: DAYLIN JOHNSON

Order Number: [id number]           Reading MD:   Ramirez Mcgraw MD

                                 Measurements

Intervals                              Axis          

Rate:         101                      P:            48

AL:           168                      QRS:          -34

QRSD:         152                      T:            111

QT:           378                                    

QTc:          497                                    

                           Interpretive Statements

SINUS TACHYCARDIA

PVC

IVCD

Electronically Signed On 10- 9:39:29 CDT by Ramirez Mcgraw MD

## 2019-10-14 NOTE — RAD
Single AP view of the chest.

 

Comparison:  10/19/2018.

 

Indication: Cough

 

Findings: 

 

Left subclavian pacemaker is unchanged. The heart is enlarged but stable.

There is no pneumothorax or effusion. There is mild pulmonary vascular 

congestion.

 

Impression: 

 

1. Mild pulmonary vascular congestion and cardiomegaly suggests possible 

volume overload.

 

 

Electronically signed by: Kaleb Hill MD (10/14/2019 1:33 PM) [facility]

## 2019-10-14 NOTE — HP
ADMIT DATE:  10/14/2019



CHIEF COMPLAINT:  Cough.



HISTORY OF PRESENT ILLNESS:  The patient is a pleasant 54-year-old male who is

on peritoneal dialysis.  He presented with a cough that has been occurring for a

month.  We did some imaging in the ER, it appears he has pulmonary edema.  He is

also hypoxic with 94% O2 sat on room air.  He also has azotemia with an elevated

BUN and creatinine of 12.6.  I discussed the case with the ER physician.  We are

going to admit the patient and consult his nephrologist and Cardiology.



PAST MEDICAL HISTORY:  History peritoneal dialysis, previous pneumonia,

noncompliance, CHF, diabetes, hypertension, hyperlipidemia, coronary artery

disease, peritoneal dialysis catheter, pacemaker.



ALLERGIES:  None.



FAMILY HISTORY:  Hypertension.



SOCIAL HISTORY:  He is .  He does not drink, smoke or take drugs.  He is

retired.



MEDICATIONS:  Reviewed, please refer to the MRAD.



REVIEW OF SYSTEMS:

GENERAL:  No history of weight change, weakness or fevers.

SKIN:  No bruising, hair changes or rashes.

EYES:  No blurred, double or loss of vision.

NOSE AND THROAT:  No history of nosebleeds, hoarseness or sore throat.

HEART:  No history of palpitations, chest pain or shortness of breath on

exertion.

LUNGS:  The patient complains of a cough.

GASTROINTESTINAL:  Denies changes in appetite, nausea, vomiting, diarrhea or

constipation.

GENITOURINARY:  No history of frequency, urgency, hesitancy or nocturia.

NEUROLOGIC:  Denies history of numbness, tingling, tremor or weakness.

PSYCHIATRIC:  No history of panic, anxiety or depression.

ENDOCRINE:  No history of heat or cold intolerance, polyuria or polydipsia.

EXTREMITIES:  Denies muscle weakness, joint pain, pain on walking or stiffness.



PHYSICAL EXAMINATION:

VITALS:  Within normal limits and are stable.

GENERAL:  No apparent distress.  Alert and oriented.

HEENT:  Head is normocephalic, atraumatic, pupils were equally round and

reactive to light and accommodation.

NECK:  Supple, no JVD, no thyromegaly was noted.

LUNGS:  Clear to auscultation in all lung fields without rhonchi or wheezing.

HEART:  RRR, S1, S2 present.  Peripheral pulses intact, no obvious murmurs were

noted.

ABDOMEN:  He has a peritoneal dialysis catheter in place.

EXTREMITIES:  Without any cyanosis, clubbing, or edema.  Pedal pulses intact,

Homans sign is negative.

NEUROLOGIC:  Normal speech, normal tone.  A & O x3, moves all extremities, no

obvious focal deficits.

PSYCHIATRIC:  He is depressed.

SKIN:  No ulcerations or rashes, good skin turgor, no jaundice.

VASCULAR:  Good capillary refill, neurovascular bundle appears to be intact.



LABORATORY DATA:  Hematology is normal.  BNP greater than 35,000.  Chest x-ray

shows vascular congestion.



ASSESSMENT AND PLAN:  Acute on chronic systolic and diastolic heart failure and

volume overload in a middle-aged male who is on peritoneal dialysis and hypoxia.

 The patient will be admitted.  We will consult Nephrology and Cardiology. 

Serial enzymes, serial EKGs.  Renal diet.  Home meds, deep venous thrombosis

prophylaxis.



PROGNOSIS:  Guarded.

 



______________________________

PRINCESS MACARIO DO



DR:  BENNIE/leonor  JOB#:  [id number] / [id number]

DD:  10/14/2019 19:42  DT:  10/14/2019 19:52

## 2019-10-14 NOTE — PHYS DOC
Past Medical History


Past Medical History:  CHF, Diabetes-Type II, High Cholesterol, Heart Disease, 

Renal Failure


 (DAYLIN JOHNSON)


Past Surgical History:  Pacemaker, Other


Additional Past Surgical Histo:  PERITONEAL DIALYSIS CATH PLACE LLQ


 (DAYLIN JOHNSON)


Alcohol Use:  None


Drug Use:  None


 (DAYLIN JOHNSON)


Attending Signature


I have participated in the care of this patient and I have reviewed and agree 

with all pertinent clinical information above including history, exam, and 

recommendations.





 (OTONIEL MCGILL MD)





Adult General


Chief Complaint


Chief Complaint:  COUGH





HPI


HPI





Patient is a 54  year old male who presents with 1 month of a cough. Patient 

states he went to his doctor and they gave him an inhaler but it did not work.. 

Patient states he was going to see his primary doctor today but the cough has 

become too severe. Patient states that he is weak and unable to eat. Patient 

states he did his peritoneal dialysis last night and he does every night. 

Patient states he also has a generalized headache that he rates a 9 out of 10.


 (DAYLIN JOHNSON)





Review of Systems


Review of Systems








Respiratory:  cough or denies shortness of breath []


Neurologic: headache, denies focal weakness or sensory changes []





All other systems were reviewed and found to be within normal limits, except as 

documented in this note.


 (DAYLIN JOHNSON)





Current Medications


Current Medications





Current Medications








 Medications


  (Trade)  Dose


 Ordered  Sig/Ivonne  Start Time


 Stop Time Status Last Admin


Dose Admin


 


 Fentanyl Citrate


  (Fentanyl 2ml


 Vial)  50 mcg  1X  ONCE  10/14/19 13:45


 10/14/19 13:46 DC 10/14/19 14:34


50 MCG


 


 Ondansetron HCl


  (Zofran)  4 mg  1X  ONCE  10/14/19 13:45


 10/14/19 13:46 DC 10/14/19 14:33


4 MG





 (OTONIEL MCGILL MD)





Allergies


Allergies





Allergies








Coded Allergies Type Severity Reaction Last Updated Verified


 


  No Known Drug Allergies    6/22/15 No





 (OTONIEL MCGILL MD)





Physical Exam


Physical Exam





Constitutional: Well developed, well nourished, no acute distress, non-toxic ap

pearance. []


HENT: Normocephalic, atraumatic, bilateral external ears normal, oropharynx 

moist, no oral exudates, nose normal. []


Eyes: PERRLA, EOMI, conjunctiva normal, no discharge. [] 


Cardiovascular:Heart rate regular rhythm, no murmur []


Lungs & Thorax:  Bilateral breath sounds clear to auscultation []


Abdomen: Bowel sounds normal, soft, no tenderness, no masses, no pulsatile 

masses. [] 


Skin: Warm, dry, no erythema, no rash. [] 


Back: No tenderness, no CVA tenderness. [] 


Extremities: No tenderness, no cyanosis, no clubbing, ROM intact, lower 

extremity 2+ edema. [] 


Neurologic: Alert and oriented X 3, normal motor function, normal sensory 

function, no focal deficits noted. []


Psychologic: Affect normal, judgement normal, mood normal. []


 (DAYLIN JOHNSON)





Current Patient Data


Vital Signs





                                   Vital Signs








  Date Time  Temp Pulse Resp B/P (MAP) Pulse Ox O2 Delivery O2 Flow Rate FiO2


 


10/14/19 15:00  104 18 122/73 (89) 90 Room Air  


 


10/14/19 13:05 99.8       





 99.8       





 (OTONIEL MCGILL MD)


Lab Values





                                Laboratory Tests








Test


 10/14/19


14:21


 


White Blood Count


 4.1 x10^3/uL


(4.0-11.0)


 


Red Blood Count


 4.64 x10^6/uL


(4.30-5.70)


 


Hemoglobin


 13.2 g/dL


(13.0-17.5)


 


Hematocrit


 40.0 %


(39.0-53.0)


 


Mean Corpuscular Volume


 86 fL ()





 


Mean Corpuscular Hemoglobin 29 pg (25-35)  


 


Mean Corpuscular Hemoglobin


Concent 33 g/dL


(31-37)


 


Red Cell Distribution Width


 14.9 %


(11.5-14.5)  H


 


Platelet Count


 160 x10^3/uL


(140-400)


 


Neutrophils (%) (Auto) 65 % (31-73)  


 


Lymphocytes (%) (Auto) 18 % (24-48)  L


 


Monocytes (%) (Auto) 13 % (0-9)  H


 


Eosinophils (%) (Auto) 3 % (0-3)  


 


Basophils (%) (Auto) 1 % (0-3)  


 


Neutrophils # (Auto)


 2.7 x10^3/uL


(1.8-7.7)


 


Lymphocytes # (Auto)


 0.7 x10^3/uL


(1.0-4.8)  L


 


Monocytes # (Auto)


 0.5 x10^3/uL


(0.0-1.1)


 


Eosinophils # (Auto)


 0.1 x10^3/uL


(0.0-0.7)


 


Basophils # (Auto)


 0.0 x10^3/uL


(0.0-0.2)


 


Sodium Level


 129 mmol/L


(136-145)  L


 


Potassium Level


 3.3 mmol/L


(3.5-5.1)  L


 


Chloride Level


 91 mmol/L


()  L


 


Carbon Dioxide Level


 25 mmol/L


(21-32)


 


Anion Gap 13 (6-14)  


 


Blood Urea Nitrogen


 47 mg/dL


(8-26)  H


 


Creatinine


 12.6 mg/dL


(0.7-1.3)  H


 


Estimated GFR


(Cockcroft-Gault) 5.1  





 


BUN/Creatinine Ratio 4 (6-20)  L


 


Glucose Level


 211 mg/dL


(70-99)  H


 


Lactic Acid Level


 1.6 mmol/L


(0.4-2.0)


 


Calcium Level


 7.7 mg/dL


(8.5-10.1)  L


 


Total Bilirubin


 0.4 mg/dL


(0.2-1.0)


 


Aspartate Amino Transferase


(AST) 26 U/L (15-37)





 


Alanine Aminotransferase (ALT)


 32 U/L (16-63)





 


Alkaline Phosphatase


 126 U/L


()  H


 


Troponin I Quantitative


 0.122 ng/mL


(0.000-0.055)


 


NT-Pro-B-Type Natriuretic


Peptide > 55706 pg/mL


(0-124)  H


 


Total Protein


 6.9 g/dL


(6.4-8.2)


 


Albumin


 2.3 g/dL


(3.4-5.0)  L


 


Albumin/Globulin Ratio


 0.5 (1.0-1.7)


L





                                Laboratory Tests


10/14/19 14:21








                                Laboratory Tests


10/14/19 14:21








 (OTONIEL MCGILL MD)


Lab Values





                                Laboratory Tests








Test


 10/14/19


14:21


 


White Blood Count


 4.1 x10^3/uL


(4.0-11.0)


 


Red Blood Count


 4.64 x10^6/uL


(4.30-5.70)


 


Hemoglobin


 13.2 g/dL


(13.0-17.5)


 


Hematocrit


 40.0 %


(39.0-53.0)


 


Mean Corpuscular Volume


 86 fL ()





 


Mean Corpuscular Hemoglobin 29 pg (25-35)  


 


Mean Corpuscular Hemoglobin


Concent 33 g/dL


(31-37)


 


Red Cell Distribution Width


 14.9 %


(11.5-14.5)  H


 


Platelet Count


 160 x10^3/uL


(140-400)


 


Neutrophils (%) (Auto) 65 % (31-73)  


 


Lymphocytes (%) (Auto) 18 % (24-48)  L


 


Monocytes (%) (Auto) 13 % (0-9)  H


 


Eosinophils (%) (Auto) 3 % (0-3)  


 


Basophils (%) (Auto) 1 % (0-3)  


 


Neutrophils # (Auto)


 2.7 x10^3/uL


(1.8-7.7)


 


Lymphocytes # (Auto)


 0.7 x10^3/uL


(1.0-4.8)  L


 


Monocytes # (Auto)


 0.5 x10^3/uL


(0.0-1.1)


 


Eosinophils # (Auto)


 0.1 x10^3/uL


(0.0-0.7)


 


Basophils # (Auto)


 0.0 x10^3/uL


(0.0-0.2)


 


Sodium Level


 129 mmol/L


(136-145)  L


 


Potassium Level


 3.3 mmol/L


(3.5-5.1)  L


 


Chloride Level


 91 mmol/L


()  L


 


Carbon Dioxide Level


 25 mmol/L


(21-32)


 


Anion Gap 13 (6-14)  


 


Blood Urea Nitrogen


 47 mg/dL


(8-26)  H


 


Creatinine


 12.6 mg/dL


(0.7-1.3)  H


 


Estimated GFR


(Cockcroft-Gault) 5.1  





 


BUN/Creatinine Ratio 4 (6-20)  L


 


Glucose Level


 211 mg/dL


(70-99)  H


 


Lactic Acid Level


 1.6 mmol/L


(0.4-2.0)


 


Calcium Level


 7.7 mg/dL


(8.5-10.1)  L


 


Total Bilirubin


 0.4 mg/dL


(0.2-1.0)


 


Aspartate Amino Transferase


(AST) 26 U/L (15-37)





 


Alanine Aminotransferase (ALT)


 32 U/L (16-63)





 


Alkaline Phosphatase


 126 U/L


()  H


 


Troponin I Quantitative


 0.122 ng/mL


(0.000-0.055)


 


NT-Pro-B-Type Natriuretic


Peptide > 90330 pg/mL


(0-124)  H


 


Total Protein


 6.9 g/dL


(6.4-8.2)


 


Albumin


 2.3 g/dL


(3.4-5.0)  L


 


Albumin/Globulin Ratio


 0.5 (1.0-1.7)


L





                                Laboratory Tests


10/14/19 14:21








                                Laboratory Tests


10/14/19 14:21








 (DAYLIN JOHNSON)





EKG


EKG


Sinus Tachy and no STEMI


Interpretation Time:


1352 and read by Dr Mcgill


 (DAYLIN JOHNSON)





Radiology/Procedures


Radiology/Procedures


[]


 (DAYLIN JOHNSON)


Impressions:


[facility]


                    [address] [place] [address]


                                 [phone]


                                        


                                 IMAGING REPORT





                                     Signed





PATIENT: BLAISE DOWNING    ACCOUNT: [id number]     MRN#: [id number]


: 1965           LOCATION: ER              AGE: 54


SEX: M                    EXAM DT: 10/14/19         ACCESSION#: [id number]


STATUS: REG ER            ORD. PHYSICIAN: DAYLIN JOHNSON


REASON: cough


PROCEDURE: PORTABLE CHEST 1V





Single AP view of the chest.


 


Comparison:  10/19/2018.


 


Indication: Cough


 


Findings: 


 


Left subclavian pacemaker is unchanged. The heart is enlarged but stable.


There is no pneumothorax or effusion. There is mild pulmonary vascular 


congestion.


 


Impression: 


 


1. Mild pulmonary vascular congestion and cardiomegaly suggests possible 


volume overload.


 


 


Electronically signed by: Kaleb Hill MD (10/14/2019 1:33 PM) [id number]














DICTATED and SIGNED BY:     KALEB HILL MD


DATE:     10/14/19 2797





 (DAYLIN JOHNSON)





Course & Med Decision Making


Course & Med Decision Making


Lungs are clear to auscultation in all lobes. Alert and oriented. Ambulatory the

 steady gait the patient is very weak. Speaks in clear sentences. Skin pink warm

 and dry. Mucous membranes are moist. 2+ lower extremity swelling. Patient 

denies chest pain, shortness of air, focal weakness, numbness or tingling, 

visual changes, abdominal pain, nausea, vomiting, diarrhea, fever. Patient's 

temp is 99.8 in the ED. PERRLA. Abdomen is soft and nontender.





Chest xray shows 1. Mild pulmonary vascular congestion and cardiomegaly suggests

 possible volume overload. 


After Zofran given patient continues to vomit. Patient is given Compazine 10 mg.

 I have spoken to Dr. Vigil for patient admission for CHF exacerbation. I will 

also talk to Dr. Reardon about dialysis. Dr Vigil states to let Mcghee and 

cardiology see the patient before we had in any medications for the patient.


 


 


 (DAYLIN JOHNSON)





Dragon Disclaimer


Dragon Disclaimer


This electronic medical record was generated, in whole or in part, using a voice

 recognition dictation system.


 (DAYLIN JOHNSON)





Departure


Departure


Impression:  


   Primary Impression:  


   CHF exacerbation


Disposition:  09 ADMITTED AS INPATIENT


Admitting Physician:  HIMS


 (DAYLIN JOHNSON)


Condition:  STABLE


Referrals:  


ANKUR CRUZ MD (PCP)





Problem Qualifiers








   Primary Impression:  


   CHF exacerbation


   Heart failure type:  unspecified  Qualified Codes:  I50.9 - Heart failure, 

   unspecified








DAYLIN JOHNSON            Oct 14, 2019 13:26


OTONIEL MCGILL MD              Oct 16, 2019 18:15

## 2019-10-15 VITALS — DIASTOLIC BLOOD PRESSURE: 66 MMHG | SYSTOLIC BLOOD PRESSURE: 102 MMHG

## 2019-10-15 VITALS — SYSTOLIC BLOOD PRESSURE: 96 MMHG | DIASTOLIC BLOOD PRESSURE: 50 MMHG

## 2019-10-15 VITALS — SYSTOLIC BLOOD PRESSURE: 81 MMHG | DIASTOLIC BLOOD PRESSURE: 52 MMHG

## 2019-10-15 VITALS — DIASTOLIC BLOOD PRESSURE: 65 MMHG | SYSTOLIC BLOOD PRESSURE: 101 MMHG

## 2019-10-15 VITALS — DIASTOLIC BLOOD PRESSURE: 65 MMHG | SYSTOLIC BLOOD PRESSURE: 92 MMHG

## 2019-10-15 VITALS — SYSTOLIC BLOOD PRESSURE: 89 MMHG | DIASTOLIC BLOOD PRESSURE: 63 MMHG

## 2019-10-15 LAB
INFLUENZA A PATIENT: NEGATIVE
INFLUENZA B PATIENT: NEGATIVE

## 2019-10-15 PROCEDURE — 3E1M39Z IRRIGATION OF PERITONEAL CAVITY USING DIALYSATE, PERCUTANEOUS APPROACH: ICD-10-PCS | Performed by: INTERNAL MEDICINE

## 2019-10-15 RX ADMIN — Medication SCH EA: at 21:28

## 2019-10-15 RX ADMIN — Medication SCH EA: at 09:00

## 2019-10-15 RX ADMIN — Medication SCH EA: at 17:00

## 2019-10-15 RX ADMIN — INSULIN LISPRO SCH UNITS: 100 INJECTION, SOLUTION INTRAVENOUS; SUBCUTANEOUS at 12:00

## 2019-10-15 RX ADMIN — CARVEDILOL SCH MG: 12.5 TABLET, FILM COATED ORAL at 17:35

## 2019-10-15 RX ADMIN — PANTOPRAZOLE SODIUM SCH MG: 40 TABLET, DELAYED RELEASE ORAL at 07:30

## 2019-10-15 RX ADMIN — ASPIRIN 81 MG SCH MG: 81 TABLET ORAL at 08:23

## 2019-10-15 RX ADMIN — Medication SCH EA: at 08:22

## 2019-10-15 RX ADMIN — CARVEDILOL SCH MG: 12.5 TABLET, FILM COATED ORAL at 08:23

## 2019-10-15 RX ADMIN — FUROSEMIDE SCH MG: 40 TABLET ORAL at 08:23

## 2019-10-15 RX ADMIN — INSULIN LISPRO SCH UNITS: 100 INJECTION, SOLUTION INTRAVENOUS; SUBCUTANEOUS at 17:00

## 2019-10-15 RX ADMIN — Medication SCH EA: at 12:00

## 2019-10-15 RX ADMIN — VITAMIN D, TAB 1000IU (100/BT) SCH UNIT: 25 TAB at 08:23

## 2019-10-15 NOTE — PDOC2
CONSULT


Date of Consult


Date of Consult


DATE: 10/15/19 


TIME: 11:46





Reason for Consult


Reason for Consult:


ESRD on PD





Source


Source:  Chart review, Patient





History of Present Illness


Reason for Visit:


Patient is a  54-year-old AA male  Dx of ESRD on PD for 5 years - follows with 

Dr. Mcghee 


 He presented with a cough that has been occurring for a month.  In the ER 94% 

O2 sat on room air.  


Cxr- reported as mild vascular congestion 





Currently states he is feeling much better. Last night PD with home prescription

.


Denies CP, No N/V. States has some RRF , denies any urinary symptoms 


Getting Evaluated for renal Tx at [facility]





Past Medical History


Cardiovascular:  CHF, HTN, Hyperlipidemia


CENTRAL NERVOUS SYSTEM:  Other


GI:  GERD


Heme/Onc:  Anemia NOS


Musculoskeletal:  Osteoarthritis


Renal/:  Chronic renal failure


Endocrine:  Diabetes





Past Surgical History


Past Surgical History:  Pacemaker, Other





Family History


Family History:  Other (noncontributory )





Social History


ALCOHOL:  none


Drugs:  None


Lives:  with Family


Domestic Violence:  Neg





Current Problem List


Problem List


Problems


Medical Problems:


(1) CHF exacerbation


Status: Acute  











Current Medications


Current Medications





Current Medications


Fentanyl Citrate (Fentanyl 2ml Vial) 50 mcg 1X  ONCE IVP  Last administered on 

10/14/19at 14:34;  Start 10/14/19 at 13:45;  Stop 10/14/19 at 13:46;  Status DC


Ondansetron HCl (Zofran) 4 mg 1X  ONCE IV  Last administered on 10/14/19at 

14:33;  Start 10/14/19 at 13:45;  Stop 10/14/19 at 13:46;  Status DC


Prochlorperazine Edisylate (Compazine) 10 mg 1X  ONCE IV  Last administered on 

10/14/19at 15:23;  Start 10/14/19 at 15:30;  Stop 10/14/19 at 15:31;  Status DC


Ondansetron HCl (Zofran) 4 mg PRN Q8HRS  PRN IV NAUSEA/VOMITING Last 

administered on 10/15/19at 04:15;  Start 10/14/19 at 15:30;  Stop 10/15/19 at 

15:29


Fentanyl Citrate (Fentanyl 2ml Vial) 50 mcg PRN Q1HR  PRN IV PAIN;  Start 

10/14/19 at 15:30;  Stop 10/15/19 at 15:29


Aspirin (Children'S Aspirin) 81 mg DAILYWBKFT PO  Last administered on 

10/15/19at 08:23;  Start 10/15/19 at 08:00


Carvedilol (Coreg) 12.5 mg BIDWMEALS PO  Last administered on 10/15/19at 08:23; 

Start 10/15/19 at 08:00


Furosemide (Lasix) 40 mg DAILY PO  Last administered on 10/15/19at 08:23;  Start

10/15/19 at 09:00


Calcium Acetate (Phoslo) 667 mg TIDWMEALS PO ;  Start 10/15/19 at 08:00;  Status

Cancel


Vitamin D (Vitamin D3) 1,000 unit DAILY PO  Last administered on 10/15/19at 

08:23;  Start 10/15/19 at 09:00


Pantoprazole Sodium (Protonix) 40 mg DAILYAC PO ;  Start 10/15/19 at 07:30


Non-Formulary Medication 2 ea TIDWMEALS PO  Last administered on 10/15/19at 

08:22;  Start 10/15/19 at 08:00


Non-Formulary Medication 30 ea DAILY SQ ;  Start 10/15/19 at 09:00


Zolpidem Tartrate (Ambien) 5 mg PRN QHS  PRN PO INSOMNIA Last administered on 

10/14/19at 21:34;  Start 10/14/19 at 21:30


Potassium Chloride (Klor-Con) 40 meq 1X  ONCE PO ;  Start 10/15/19 at 10:30;  

Stop 10/15/19 at 10:31;  Status DC


Furosemide (Lasix) 40 mg 1X  ONCE IVP ;  Start 10/15/19 at 10:30;  Stop 10/15/19

at 10:31;  Status DC


Insulin Glargine (Lantus Syringe) 15 unit QHS SQ ;  Start 10/15/19 at 21:00;  

Status UNV


Insulin Human Lispro (HumaLOG) 0-5 UNITS TIDWMEALS SQ ;  Start 10/15/19 at 12:00


Dextrose (Dextrose 50%-Water Syringe) 12.5 gm PRN Q15MIN  PRN IV SEE COMMENTS;  

Start 10/15/19 at 11:30


Non-Formulary Medication 15 ea QHS SQ ;  Start 10/15/19 at 21:00





Active Scripts


Active


Ssd (Silver Sulfadiazine) 25 Gm Cream..g. 1 Carley TP DAILY 10 Days


Mupirocin Ointment (Mupirocin) 22 Gm Oint...g. 1 Carley TP BID 10 Days


Humalog (Insulin Lispro) 100 Unit/1 Ml Insuln.pen 10 Units SQ TIDWMEALS 30 Days


Lantus Solostar (Insulin Glargine,Hum.rec.anlog) 100 Unit/1 Ml Insuln.pen 30 

Units SQ QHS 30 Days


Metoclopramide Hcl 5 Mg Tablet 5 Mg PO PRN BFRMEALHC PRN


Culturelle (Lactobacillus Rhamnosus Gg) 1 Each Cap.sprink 1 Cap PO BID 14 Days


Loperamide (Loperamide Hcl) 2 Mg Capsule 2 Mg PO PRN Q15MIN PRN 10 Days


Tramadol Hcl 50 Mg Tablet 50 Mg PO PRN Q6HRS PRN


Baclofen 10 Mg Tablet 10 Mg PO PRN TID PRN 14 Days


Nystatin 100,000 Unit/1 Ml Oral.susp 5 Ml SWSW [id number] 30 Days


Promethazine Hcl 12.5 Mg Tablet 12.5 Mg PO PRN Q6HRS PRN 30 Days


Reported


Alison-Aime Tablet (Folic Acid/Vitamin B Comp W-C) 0.8 Mg Tablet 1 Tab PO DAILY 30

Days


Metolazone 5 Mg Tablet 5 Mg PO DAILY


Vitamin D3 (Cholecalciferol (Vitamin D3)) 5,000 Unit Tablet 1 Tab PO DAILY


Calcium Acetate 667 Mg Tablet 667 Mg PO TIDWMEALS


Renal Caps Softgel (Folic Acid/Vitamin B Comp W-C) 1 Mg Capsule 1 Cap PO DAILY


Tresiba Flextouch U-100 (Insulin Degludec) 100 Unit/1 Ml Insuln.pen 100 Unit SQ 


Carvedilol ** (Carvedilol) 12.5 Mg Tablet 1 Tab PO BIDWMEALS


Furosemide 40 Mg Tablet 1 Tab PO DAILY


Aspirin 81 Mg Tab.chew 81 Mg PO DAILYWBKFT





Allergies


Allergies:  


Coded Allergies:  


     No Known Drug Allergies (Unverified , 6/22/15)





ROS


Review of System


   Per HPI





Physical Exam


Physical Exam


GENERAL:  NAD 


HEENT:  Head is normocephalic, atraumatic,  OM moist  


NECK:  Supple,


LUNGS:  Clear to auscultation , No use of accessory muscle  


HEART:  RRR, S1, S2 present.  


ABDOMEN: peritoneal dialysis catheter in place, No signs of infection 


EXTREMITIES:  Without any cyanosis, clubbing, or edema. 


NEUROLOGIC:  Grossly normal  


SKIN: No Rash 


 No Means


Vital Signs





Vital Signs








  Date Time  Temp Pulse Resp B/P (MAP) Pulse Ox O2 Delivery O2 Flow Rate FiO2


 


10/15/19 08:23  95  101/65    


 


10/15/19 08:00      Room Air  


 


10/15/19 07:00 98.5  18  98   





 98.5       


 


10/14/19 16:00       2.0 








Assessment & Plan


ESRD.- On PD under Dr. Mcghee's care 


Continue PD per Home prescription - uses 2.5%, has LBF with Extraneal , Dw Gabriel 


Has some RRF  


Getting eval for Renal Tx at [facility]   





Shortness of breath with Cough - likely CHF exacerbation


On PO Lasix


Cardiology consulted  





S/P AICD generator change: done  on 10/15/2018 ([facility]) stable. - cardiology 

consulted





Chronic systolic CHF





HTN - currently BP lower 


On Coreg- cardiology managing  





DM2 - per primary





Labs


Labs





Laboratory Tests








Test


 10/14/19


14:21 10/14/19


18:15 10/14/19


18:42 10/14/19


20:48


 


White Blood Count


 4.1 x10^3/uL


(4.0-11.0) 


 


 





 


Red Blood Count


 4.64 x10^6/uL


(4.30-5.70) 


 


 





 


Hemoglobin


 13.2 g/dL


(13.0-17.5) 


 


 





 


Hematocrit


 40.0 %


(39.0-53.0) 


 


 





 


Mean Corpuscular Volume 86 fL ()    


 


Mean Corpuscular Hemoglobin 29 pg (25-35)    


 


Mean Corpuscular Hemoglobin


Concent 33 g/dL


(31-37) 


 


 





 


Red Cell Distribution Width


 14.9 %


(11.5-14.5) 


 


 





 


Platelet Count


 160 x10^3/uL


(140-400) 


 


 





 


Neutrophils (%) (Auto) 65 % (31-73)    


 


Lymphocytes (%) (Auto) 18 % (24-48)    


 


Monocytes (%) (Auto) 13 % (0-9)    


 


Eosinophils (%) (Auto) 3 % (0-3)    


 


Basophils (%) (Auto) 1 % (0-3)    


 


Neutrophils # (Auto)


 2.7 x10^3/uL


(1.8-7.7) 


 


 





 


Lymphocytes # (Auto)


 0.7 x10^3/uL


(1.0-4.8) 


 


 





 


Monocytes # (Auto)


 0.5 x10^3/uL


(0.0-1.1) 


 


 





 


Eosinophils # (Auto)


 0.1 x10^3/uL


(0.0-0.7) 


 


 





 


Basophils # (Auto)


 0.0 x10^3/uL


(0.0-0.2) 


 


 





 


Sodium Level


 129 mmol/L


(136-145) 


 


 





 


Potassium Level


 3.3 mmol/L


(3.5-5.1) 


 


 





 


Chloride Level


 91 mmol/L


() 


 


 





 


Carbon Dioxide Level


 25 mmol/L


(21-32) 


 


 





 


Anion Gap 13 (6-14)    


 


Blood Urea Nitrogen


 47 mg/dL


(8-26) 


 


 





 


Creatinine


 12.6 mg/dL


(0.7-1.3) 


 


 





 


Estimated GFR


(Cockcroft-Gault) 5.1 


 


 


 





 


BUN/Creatinine Ratio 4 (6-20)    


 


Glucose Level


 211 mg/dL


(70-99) 


 


 





 


Lactic Acid Level


 1.6 mmol/L


(0.4-2.0) 


 


 





 


Calcium Level


 7.7 mg/dL


(8.5-10.1) 


 


 





 


Total Bilirubin


 0.4 mg/dL


(0.2-1.0) 


 


 





 


Aspartate Amino Transf


(AST/SGOT) 26 U/L (15-37) 


 


 


 





 


Alanine Aminotransferase


(ALT/SGPT) 32 U/L (16-63) 


 


 


 





 


Alkaline Phosphatase


 126 U/L


() 


 


 





 


Troponin I Quantitative


 0.122 ng/mL


(0.000-0.055) 0.137 ng/mL


(0.000-0.055) 


 





 


NT-Pro-B-Type Natriuretic


Peptide > 82957 pg/mL


(0-124) 


 


 





 


Total Protein


 6.9 g/dL


(6.4-8.2) 


 


 





 


Albumin


 2.3 g/dL


(3.4-5.0) 


 


 





 


Albumin/Globulin Ratio 0.5 (1.0-1.7)    


 


Glucose (Fingerstick)


 


 


 137 mg/dL


(70-99) 103 mg/dL


(70-99)


 


Test


 10/15/19


06:30 10/15/19


08:18 10/15/19


10:45 





 


Influenza Type A Antigen


 Negative


(NEGATIVE) 


 


 





 


Influenza Type B Antigen


 Negative


(NEGATIVE) 


 


 





 


Glucose (Fingerstick)


 


 198 mg/dL


(70-99) 


 





 


Magnesium Level


 


 


 1.5 mg/dL


(1.8-2.4) 











Laboratory Tests








Test


 10/14/19


14:21 10/14/19


18:15 10/14/19


18:42 10/14/19


20:48


 


White Blood Count


 4.1 x10^3/uL


(4.0-11.0) 


 


 





 


Red Blood Count


 4.64 x10^6/uL


(4.30-5.70) 


 


 





 


Hemoglobin


 13.2 g/dL


(13.0-17.5) 


 


 





 


Hematocrit


 40.0 %


(39.0-53.0) 


 


 





 


Mean Corpuscular Volume 86 fL ()    


 


Mean Corpuscular Hemoglobin 29 pg (25-35)    


 


Mean Corpuscular Hemoglobin


Concent 33 g/dL


(31-37) 


 


 





 


Red Cell Distribution Width


 14.9 %


(11.5-14.5) 


 


 





 


Platelet Count


 160 x10^3/uL


(140-400) 


 


 





 


Neutrophils (%) (Auto) 65 % (31-73)    


 


Lymphocytes (%) (Auto) 18 % (24-48)    


 


Monocytes (%) (Auto) 13 % (0-9)    


 


Eosinophils (%) (Auto) 3 % (0-3)    


 


Basophils (%) (Auto) 1 % (0-3)    


 


Neutrophils # (Auto)


 2.7 x10^3/uL


(1.8-7.7) 


 


 





 


Lymphocytes # (Auto)


 0.7 x10^3/uL


(1.0-4.8) 


 


 





 


Monocytes # (Auto)


 0.5 x10^3/uL


(0.0-1.1) 


 


 





 


Eosinophils # (Auto)


 0.1 x10^3/uL


(0.0-0.7) 


 


 





 


Basophils # (Auto)


 0.0 x10^3/uL


(0.0-0.2) 


 


 





 


Sodium Level


 129 mmol/L


(136-145) 


 


 





 


Potassium Level


 3.3 mmol/L


(3.5-5.1) 


 


 





 


Chloride Level


 91 mmol/L


() 


 


 





 


Carbon Dioxide Level


 25 mmol/L


(21-32) 


 


 





 


Anion Gap 13 (6-14)    


 


Blood Urea Nitrogen


 47 mg/dL


(8-26) 


 


 





 


Creatinine


 12.6 mg/dL


(0.7-1.3) 


 


 





 


Estimated GFR


(Cockcroft-Gault) 5.1 


 


 


 





 


BUN/Creatinine Ratio 4 (6-20)    


 


Glucose Level


 211 mg/dL


(70-99) 


 


 





 


Lactic Acid Level


 1.6 mmol/L


(0.4-2.0) 


 


 





 


Calcium Level


 7.7 mg/dL


(8.5-10.1) 


 


 





 


Total Bilirubin


 0.4 mg/dL


(0.2-1.0) 


 


 





 


Aspartate Amino Transf


(AST/SGOT) 26 U/L (15-37) 


 


 


 





 


Alanine Aminotransferase


(ALT/SGPT) 32 U/L (16-63) 


 


 


 





 


Alkaline Phosphatase


 126 U/L


() 


 


 





 


Troponin I Quantitative


 0.122 ng/mL


(0.000-0.055) 0.137 ng/mL


(0.000-0.055) 


 





 


NT-Pro-B-Type Natriuretic


Peptide > 64671 pg/mL


(0-124) 


 


 





 


Total Protein


 6.9 g/dL


(6.4-8.2) 


 


 





 


Albumin


 2.3 g/dL


(3.4-5.0) 


 


 





 


Albumin/Globulin Ratio 0.5 (1.0-1.7)    


 


Glucose (Fingerstick)


 


 


 137 mg/dL


(70-99) 103 mg/dL


(70-99)


 


Test


 10/15/19


06:30 10/15/19


08:18 10/15/19


10:45 





 


Influenza Type A Antigen


 Negative


(NEGATIVE) 


 


 





 


Influenza Type B Antigen


 Negative


(NEGATIVE) 


 


 





 


Glucose (Fingerstick)


 


 198 mg/dL


(70-99) 


 





 


Magnesium Level


 


 


 1.5 mg/dL


(1.8-2.4) 











Review


All relevant outside records, renal labs, imaging studies, telemetry/EKG's were 

reviewed.











FEDERICO MCCORMICK MD                Oct 15, 2019 11:55

## 2019-10-15 NOTE — PDOC
PROGRESS NOTES


Chief Complaint


Chief Complaint


A/P:


Shortness of breath with Cough - likely CHF exacerbation


S/P AICD generator change: done  on 10/15/2018 ([facility]) stable. - cardiology 

consulted


Chronic systolic CHF - uncompensated currently in acute exacerbation


ESRD - utilizes PD with last dialysis last night. Nephrology consulted


HTN - will monitor


DM2 - sliding scale, added back 15 u glargine





FEN - ADA renal diet


PPX - heparin


FULL CODE


Dispo - inpatient for acute CHF





History of Present Illness


History of Present Illness


Mr Rebolledo is a 54-year-old male w/ PMHx CHF with EF 10%, has PPM/ICD, battery 

changed on 10/15/18, DM2, HTN, HLD, CAD, and ESRD on on peritoneal dialysis.  He

presented with a cough that has been occurring for a month. Found on CXR with 

pulmonary edema and new hypoxia.  He also has azotemia with an elevated BUN and 

creatinine of 12.6.





Today he feels a bit better, though he still does have his cough. He is awaiting

pacer interrogation. A bit upset about his insulin regimen in house. Has been 

titrating tresiba daily based on glucose checks.





Vitals


Vitals





Vital Signs








  Date Time  Temp Pulse Resp B/P (MAP) Pulse Ox O2 Delivery O2 Flow Rate FiO2


 


10/15/19 03:00 100.3 95 20 96/50 (65) 93 Room Air  





 100.3       


 


10/14/19 16:00       2.0 











Physical Exam


General:  Alert, Oriented X3, Cooperative


Heart:  Regular rate


Lungs:  Clear





Labs


LABS





Laboratory Tests








Test


 10/14/19


14:21 10/14/19


18:15 10/14/19


18:42 10/14/19


20:48


 


White Blood Count


 4.1 x10^3/uL


(4.0-11.0) 


 


 





 


Red Blood Count


 4.64 x10^6/uL


(4.30-5.70) 


 


 





 


Hemoglobin


 13.2 g/dL


(13.0-17.5) 


 


 





 


Hematocrit


 40.0 %


(39.0-53.0) 


 


 





 


Mean Corpuscular Volume 86 fL ()    


 


Mean Corpuscular Hemoglobin 29 pg (25-35)    


 


Mean Corpuscular Hemoglobin


Concent 33 g/dL


(31-37) 


 


 





 


Red Cell Distribution Width


 14.9 %


(11.5-14.5) 


 


 





 


Platelet Count


 160 x10^3/uL


(140-400) 


 


 





 


Neutrophils (%) (Auto) 65 % (31-73)    


 


Lymphocytes (%) (Auto) 18 % (24-48)    


 


Monocytes (%) (Auto) 13 % (0-9)    


 


Eosinophils (%) (Auto) 3 % (0-3)    


 


Basophils (%) (Auto) 1 % (0-3)    


 


Neutrophils # (Auto)


 2.7 x10^3/uL


(1.8-7.7) 


 


 





 


Lymphocytes # (Auto)


 0.7 x10^3/uL


(1.0-4.8) 


 


 





 


Monocytes # (Auto)


 0.5 x10^3/uL


(0.0-1.1) 


 


 





 


Eosinophils # (Auto)


 0.1 x10^3/uL


(0.0-0.7) 


 


 





 


Basophils # (Auto)


 0.0 x10^3/uL


(0.0-0.2) 


 


 





 


Sodium Level


 129 mmol/L


(136-145) 


 


 





 


Potassium Level


 3.3 mmol/L


(3.5-5.1) 


 


 





 


Chloride Level


 91 mmol/L


() 


 


 





 


Carbon Dioxide Level


 25 mmol/L


(21-32) 


 


 





 


Anion Gap 13 (6-14)    


 


Blood Urea Nitrogen


 47 mg/dL


(8-26) 


 


 





 


Creatinine


 12.6 mg/dL


(0.7-1.3) 


 


 





 


Estimated GFR


(Cockcroft-Gault) 5.1 


 


 


 





 


BUN/Creatinine Ratio 4 (6-20)    


 


Glucose Level


 211 mg/dL


(70-99) 


 


 





 


Lactic Acid Level


 1.6 mmol/L


(0.4-2.0) 


 


 





 


Calcium Level


 7.7 mg/dL


(8.5-10.1) 


 


 





 


Total Bilirubin


 0.4 mg/dL


(0.2-1.0) 


 


 





 


Aspartate Amino Transf


(AST/SGOT) 26 U/L (15-37) 


 


 


 





 


Alanine Aminotransferase


(ALT/SGPT) 32 U/L (16-63) 


 


 


 





 


Alkaline Phosphatase


 126 U/L


() 


 


 





 


Troponin I Quantitative


 0.122 ng/mL


(0.000-0.055) 0.137 ng/mL


(0.000-0.055) 


 





 


NT-Pro-B-Type Natriuretic


Peptide > 62085 pg/mL


(0-124) 


 


 





 


Total Protein


 6.9 g/dL


(6.4-8.2) 


 


 





 


Albumin


 2.3 g/dL


(3.4-5.0) 


 


 





 


Albumin/Globulin Ratio 0.5 (1.0-1.7)    


 


Glucose (Fingerstick)


 


 


 137 mg/dL


(70-99) 103 mg/dL


(70-99)











Assessment and Plan


Assessmemt and Plan


Problems


Medical Problems:


(1) CHF exacerbation


Status: Acute  











Comment


Review of Relevant


I have reviewed the following items sean (where applicable) has been applied.


Labs





Laboratory Tests








Test


 10/14/19


14:21 10/14/19


18:15 10/14/19


18:42 10/14/19


20:48


 


White Blood Count


 4.1 x10^3/uL


(4.0-11.0) 


 


 





 


Red Blood Count


 4.64 x10^6/uL


(4.30-5.70) 


 


 





 


Hemoglobin


 13.2 g/dL


(13.0-17.5) 


 


 





 


Hematocrit


 40.0 %


(39.0-53.0) 


 


 





 


Mean Corpuscular Volume 86 fL ()    


 


Mean Corpuscular Hemoglobin 29 pg (25-35)    


 


Mean Corpuscular Hemoglobin


Concent 33 g/dL


(31-37) 


 


 





 


Red Cell Distribution Width


 14.9 %


(11.5-14.5) 


 


 





 


Platelet Count


 160 x10^3/uL


(140-400) 


 


 





 


Neutrophils (%) (Auto) 65 % (31-73)    


 


Lymphocytes (%) (Auto) 18 % (24-48)    


 


Monocytes (%) (Auto) 13 % (0-9)    


 


Eosinophils (%) (Auto) 3 % (0-3)    


 


Basophils (%) (Auto) 1 % (0-3)    


 


Neutrophils # (Auto)


 2.7 x10^3/uL


(1.8-7.7) 


 


 





 


Lymphocytes # (Auto)


 0.7 x10^3/uL


(1.0-4.8) 


 


 





 


Monocytes # (Auto)


 0.5 x10^3/uL


(0.0-1.1) 


 


 





 


Eosinophils # (Auto)


 0.1 x10^3/uL


(0.0-0.7) 


 


 





 


Basophils # (Auto)


 0.0 x10^3/uL


(0.0-0.2) 


 


 





 


Sodium Level


 129 mmol/L


(136-145) 


 


 





 


Potassium Level


 3.3 mmol/L


(3.5-5.1) 


 


 





 


Chloride Level


 91 mmol/L


() 


 


 





 


Carbon Dioxide Level


 25 mmol/L


(21-32) 


 


 





 


Anion Gap 13 (6-14)    


 


Blood Urea Nitrogen


 47 mg/dL


(8-26) 


 


 





 


Creatinine


 12.6 mg/dL


(0.7-1.3) 


 


 





 


Estimated GFR


(Cockcroft-Gault) 5.1 


 


 


 





 


BUN/Creatinine Ratio 4 (6-20)    


 


Glucose Level


 211 mg/dL


(70-99) 


 


 





 


Lactic Acid Level


 1.6 mmol/L


(0.4-2.0) 


 


 





 


Calcium Level


 7.7 mg/dL


(8.5-10.1) 


 


 





 


Total Bilirubin


 0.4 mg/dL


(0.2-1.0) 


 


 





 


Aspartate Amino Transf


(AST/SGOT) 26 U/L (15-37) 


 


 


 





 


Alanine Aminotransferase


(ALT/SGPT) 32 U/L (16-63) 


 


 


 





 


Alkaline Phosphatase


 126 U/L


() 


 


 





 


Troponin I Quantitative


 0.122 ng/mL


(0.000-0.055) 0.137 ng/mL


(0.000-0.055) 


 





 


NT-Pro-B-Type Natriuretic


Peptide > 16804 pg/mL


(0-124) 


 


 





 


Total Protein


 6.9 g/dL


(6.4-8.2) 


 


 





 


Albumin


 2.3 g/dL


(3.4-5.0) 


 


 





 


Albumin/Globulin Ratio 0.5 (1.0-1.7)    


 


Glucose (Fingerstick)


 


 


 137 mg/dL


(70-99) 103 mg/dL


(70-99)








Laboratory Tests








Test


 10/14/19


14:21 10/14/19


18:15 10/14/19


18:42 10/14/19


20:48


 


White Blood Count


 4.1 x10^3/uL


(4.0-11.0) 


 


 





 


Red Blood Count


 4.64 x10^6/uL


(4.30-5.70) 


 


 





 


Hemoglobin


 13.2 g/dL


(13.0-17.5) 


 


 





 


Hematocrit


 40.0 %


(39.0-53.0) 


 


 





 


Mean Corpuscular Volume 86 fL ()    


 


Mean Corpuscular Hemoglobin 29 pg (25-35)    


 


Mean Corpuscular Hemoglobin


Concent 33 g/dL


(31-37) 


 


 





 


Red Cell Distribution Width


 14.9 %


(11.5-14.5) 


 


 





 


Platelet Count


 160 x10^3/uL


(140-400) 


 


 





 


Neutrophils (%) (Auto) 65 % (31-73)    


 


Lymphocytes (%) (Auto) 18 % (24-48)    


 


Monocytes (%) (Auto) 13 % (0-9)    


 


Eosinophils (%) (Auto) 3 % (0-3)    


 


Basophils (%) (Auto) 1 % (0-3)    


 


Neutrophils # (Auto)


 2.7 x10^3/uL


(1.8-7.7) 


 


 





 


Lymphocytes # (Auto)


 0.7 x10^3/uL


(1.0-4.8) 


 


 





 


Monocytes # (Auto)


 0.5 x10^3/uL


(0.0-1.1) 


 


 





 


Eosinophils # (Auto)


 0.1 x10^3/uL


(0.0-0.7) 


 


 





 


Basophils # (Auto)


 0.0 x10^3/uL


(0.0-0.2) 


 


 





 


Sodium Level


 129 mmol/L


(136-145) 


 


 





 


Potassium Level


 3.3 mmol/L


(3.5-5.1) 


 


 





 


Chloride Level


 91 mmol/L


() 


 


 





 


Carbon Dioxide Level


 25 mmol/L


(21-32) 


 


 





 


Anion Gap 13 (6-14)    


 


Blood Urea Nitrogen


 47 mg/dL


(8-26) 


 


 





 


Creatinine


 12.6 mg/dL


(0.7-1.3) 


 


 





 


Estimated GFR


(Cockcroft-Gault) 5.1 


 


 


 





 


BUN/Creatinine Ratio 4 (6-20)    


 


Glucose Level


 211 mg/dL


(70-99) 


 


 





 


Lactic Acid Level


 1.6 mmol/L


(0.4-2.0) 


 


 





 


Calcium Level


 7.7 mg/dL


(8.5-10.1) 


 


 





 


Total Bilirubin


 0.4 mg/dL


(0.2-1.0) 


 


 





 


Aspartate Amino Transf


(AST/SGOT) 26 U/L (15-37) 


 


 


 





 


Alanine Aminotransferase


(ALT/SGPT) 32 U/L (16-63) 


 


 


 





 


Alkaline Phosphatase


 126 U/L


() 


 


 





 


Troponin I Quantitative


 0.122 ng/mL


(0.000-0.055) 0.137 ng/mL


(0.000-0.055) 


 





 


NT-Pro-B-Type Natriuretic


Peptide > 95032 pg/mL


(0-124) 


 


 





 


Total Protein


 6.9 g/dL


(6.4-8.2) 


 


 





 


Albumin


 2.3 g/dL


(3.4-5.0) 


 


 





 


Albumin/Globulin Ratio 0.5 (1.0-1.7)    


 


Glucose (Fingerstick)


 


 


 137 mg/dL


(70-99) 103 mg/dL


(70-99)








Medications





Current Medications


Fentanyl Citrate (Fentanyl 2ml Vial) 50 mcg 1X  ONCE IVP  Last administered on 

10/14/19at 14:34;  Start 10/14/19 at 13:45;  Stop 10/14/19 at 13:46;  Status DC


Ondansetron HCl (Zofran) 4 mg 1X  ONCE IV  Last administered on 10/14/19at 

14:33;  Start 10/14/19 at 13:45;  Stop 10/14/19 at 13:46;  Status DC


Prochlorperazine Edisylate (Compazine) 10 mg 1X  ONCE IV  Last administered on 

10/14/19at 15:23;  Start 10/14/19 at 15:30;  Stop 10/14/19 at 15:31;  Status DC


Ondansetron HCl (Zofran) 4 mg PRN Q8HRS  PRN IV NAUSEA/VOMITING Last 

administered on 10/15/19at 04:15;  Start 10/14/19 at 15:30;  Stop 10/15/19 at 

15:29


Fentanyl Citrate (Fentanyl 2ml Vial) 50 mcg PRN Q1HR  PRN IV PAIN;  Start 

10/14/19 at 15:30;  Stop 10/15/19 at 15:29


Aspirin (Children'S Aspirin) 81 mg DAILYWBKFT PO ;  Start 10/15/19 at 08:00


Carvedilol (Coreg) 12.5 mg BIDWMEALS PO ;  Start 10/15/19 at 08:00


Furosemide (Lasix) 40 mg DAILY PO ;  Start 10/15/19 at 09:00


Calcium Acetate (Phoslo) 667 mg TIDWMEALS PO ;  Start 10/15/19 at 08:00;  Status

Cancel


Vitamin D (Vitamin D3) 1,000 unit DAILY PO ;  Start 10/15/19 at 09:00


Pantoprazole Sodium (Protonix) 40 mg DAILYAC PO ;  Start 10/15/19 at 07:30


Non-Formulary Medication 2 ea TIDWMEALS PO ;  Start 10/15/19 at 08:00


Non-Formulary Medication 30 ea DAILY SQ ;  Start 10/15/19 at 09:00


Zolpidem Tartrate (Ambien) 5 mg PRN QHS  PRN PO INSOMNIA Last administered on 

10/14/19at 21:34;  Start 10/14/19 at 21:30





Active Scripts


Active


Ssd (Silver Sulfadiazine) 25 Gm Cream..g. 1 Carley TP DAILY 10 Days


Mupirocin Ointment (Mupirocin) 22 Gm Oint...g. 1 Carley TP BID 10 Days


Humalog (Insulin Lispro) 100 Unit/1 Ml Insuln.pen 10 Units SQ TIDWMEALS 30 Days


Lantus Solostar (Insulin Glargine,Hum.rec.anlog) 100 Unit/1 Ml Insuln.pen 30 

Units SQ QHS 30 Days


Metoclopramide Hcl 5 Mg Tablet 5 Mg PO PRN BFRMEALHC PRN


Culturelle (Lactobacillus Rhamnosus Gg) 1 Each Cap.sprink 1 Cap PO BID 14 Days


[Pantoprazole] 40 MG Tablet.dr 40 Mg PO DAILYAC


Loperamide (Loperamide Hcl) 2 Mg Capsule 2 Mg PO PRN Q15MIN PRN 10 Days


Tramadol Hcl 50 Mg Tablet 50 Mg PO PRN Q6HRS PRN


Baclofen 10 Mg Tablet 10 Mg PO PRN TID PRN 14 Days


Nystatin 100,000 Unit/1 Ml Oral.susp 5 Ml SWSW [id number] 30 Days


Promethazine Hcl 12.5 Mg Tablet 12.5 Mg PO PRN Q6HRS PRN 30 Days


Reported


Alison-Aime Tablet (Folic Acid/Vitamin B Comp W-C) 0.8 Mg Tablet 1 Tab PO DAILY 30

 Days


Metolazone 5 Mg Tablet 5 Mg PO DAILY


Vitamin D3 (Cholecalciferol (Vitamin D3)) 5,000 Unit Tablet 1 Tab PO DAILY


Calcium Acetate 667 Mg Tablet 667 Mg PO TIDWMEALS


Renal Caps Softgel (Folic Acid/Vitamin B Comp W-C) 1 Mg Capsule 1 Cap PO DAILY


Tresiba Flextouch U-100 (Insulin Degludec) 100 Unit/1 Ml Insuln.pen 100 Unit SQ 


Carvedilol ** (Carvedilol) 12.5 Mg Tablet 1 Tab PO BIDWMEALS


Furosemide 40 Mg Tablet 1 Tab PO DAILY


Aspirin 81 Mg Tab.chew 81 Mg PO DAILYWBKFT


Vitals/I & O





Vital Sign - Last 24 Hours








 10/14/19 10/14/19 10/14/19 10/14/19





 13:05 14:00 14:34 15:00


 


Temp 99.8   





 99.8   


 


Pulse 79 102  104


 


Resp 20 18 20 18


 


B/P (MAP) 146/107 (120) 96/76 (83)  122/73 (89)


 


Pulse Ox 95 94 93 90


 


O2 Delivery Room Air Room Air  Room Air


 


    





    





 10/14/19 10/14/19 10/14/19 10/14/19





 15:30 16:00 16:30 17:00


 


Temp    98.5





    98.5


 


Pulse 104 96 100 99


 


Resp 18 18 18 18


 


B/P (MAP) 110/90 (97) 102/69 (80) 104/79 (87) 95/48 (64)


 


Pulse Ox 90 91 97 87


 


O2 Delivery Nasal Cannula Nasal Cannula Room Air Room Air


 


O2 Flow Rate 2.0 2.0  


 


    





    





 10/14/19 10/14/19 10/14/19 10/14/19





 19:00 19:29 19:56 23:00


 


Temp 99.4   98.7





 99.4   98.7


 


Pulse 52   56


 


Resp 20   20


 


B/P (MAP) 100/80 (87)   89/46 (60)


 


Pulse Ox 97   93


 


O2 Delivery Room Air Room Air Room Air Room Air


 


    





    





 10/15/19   





 03:00   


 


Temp 100.3   





 100.3   


 


Pulse 95   


 


Resp 20   


 


B/P (MAP) 96/50 (65)   


 


Pulse Ox 93   


 


O2 Delivery Room Air   














Intake and Output   


 


 10/14/19 10/14/19 10/15/19





 15:00 23:00 07:00


 


Intake Total  0 ml 


 


Output Total   0 ml


 


Balance  0 ml 0 ml

















SHERI HANNAH MD        Oct 15, 2019 08:10

## 2019-10-15 NOTE — NUR
SS following for discharge planning. SS reviewed pt chart. Pt is from home with spouse and 
is currently on room air. SS will continue to follow for discharge planning.

## 2019-10-15 NOTE — PDOC2
ARNALDOCARRIEBINMAL MCKEON 10/15/19 1023:


CARDIAC CONSULT


DATE OF CONSULT


Date of Consult


DATE: 10/15/19 


TIME: 10:10





REASON FOR CONSULT


Reason for Consult:


CHF Exacerbation





REFERRING PHYSICIAN


Referring Physician:


LINDA Darling





SOURCE


Source:  Chart review, Patient





HISTORY OF PRESENT ILLNESS


HISTORY OF PRESENT ILLNESS


This is a 53 yo male who presented secondary to persistent cough. Patient 

reports cough has been present for the last month. Saw PCP, was inhaler, which 

did not improved his symptoms. Has had some mild LE edema. Denies any chest 

pain, palpitations, dizziness, diaphoresis, or orthopnea. Of note patient has a 

history of severe ICM with LVEF 10-15% s/p ICD placement. Unfortunately, has 

failed to followup at our office for HF management since 3/2018. Reports 

compliance with his medications and PD. Reports wife knows more details, but is 

not present.





PAST MEDICAL HISTORY


Past Medical History


Cardiovascular:  CHF (cardiomyopathy), HTN, Hyperlipidemia


Heme/Onc:  Anemia NOS


Musculoskeletal:  Osteoarthritis


ENT:  Allergic Rhinitis


Renal/:  Chronic renal failure (ESRD with PD)


Endocrine:  Diabetes (2)


Dermatology:  Other (acquired perforating leg dermatosis)





PAST SURGICAL HISTORY


Past Surgical History


 Pacemaker (AICD with generator change 10/15/2018), Other (PD cath placement to 

abd. )





FAMILY HISTORY


Family History:  Other (noncontributory )





SOCIAL HISTORY


Social History


Smoke:  No


ALCOHOL:  none


Drugs:  None


Lives:  with Family





CURRENT MEDICATIONS


CURRENT MEDICATIONS





Current Medications








 Medications


  (Trade)  Dose


 Ordered  Sig/Ivonne


 Route


 PRN Reason  Start Time


 Stop Time Status Last Admin


Dose Admin


 


 Fentanyl Citrate


  (Fentanyl 2ml


 Vial)  50 mcg  1X  ONCE


 IVP


   10/14/19 13:45


 10/14/19 13:46 DC 10/14/19 14:34





 


 Ondansetron HCl


  (Zofran)  4 mg  1X  ONCE


 IV


   10/14/19 13:45


 10/14/19 13:46 DC 10/14/19 14:33





 


 Prochlorperazine


 Edisylate


  (Compazine)  10 mg  1X  ONCE


 IV


   10/14/19 15:30


 10/14/19 15:31 DC 10/14/19 15:23





 


 Ondansetron HCl


  (Zofran)  4 mg  PRN Q8HRS  PRN


 IV


 NAUSEA/VOMITING  10/14/19 15:30


 10/15/19 15:29  10/15/19 04:15





 


 Aspirin


  (Children'S


 Aspirin)  81 mg  DAILYWBKFT


 PO


   10/15/19 08:00


    10/15/19 08:23





 


 Carvedilol


  (Coreg)  12.5 mg  BIDWMEALS


 PO


   10/15/19 08:00


    10/15/19 08:23





 


 Furosemide


  (Lasix)  40 mg  DAILY


 PO


   10/15/19 09:00


    10/15/19 08:23





 


 Vitamin D


  (Vitamin D3)  1,000 unit  DAILY


 PO


   10/15/19 09:00


    10/15/19 08:23





 


 Non-Formulary


 Medication  2 ea  TIDWMEALS


 PO


   10/15/19 08:00


    10/15/19 08:22





 


 Zolpidem Tartrate


  (Ambien)  5 mg  PRN QHS  PRN


 PO


 INSOMNIA  10/14/19 21:30


    10/14/19 21:34














ALLERGIES


ALLERGIES:  


Coded Allergies:  


     No Known Drug Allergies (Unverified , 6/22/15)





ROS


Review of System


14 point ROS conducted with pertinent positives noted above in HPI.





PHYSICAL EXAM


PHYSICAL EXAM


General:  Alert, Oriented X3, Cooperative, 


HEENT:  Atraumatic, Mucous membr. moist/pink


Lungs:  bibasilar crackles 


Heart:  Regular rate, Other (3/6 systolic murmur to LLS border)


Abdomen:  Soft, nontender 


Extremities:  No cyanosis, No edema


Skin:  1+ bilateral LE edema 


Neuro:  Normal speech, Sensation intact


Psych/Mental Status:  Mental status NL, Other (flat affect)


MUSCULOSKELETAL:  Osteoarthritic changes both hands





VITALS/I&O


VITALS/I&O:





                                   Vital Signs








  Date Time  Temp Pulse Resp B/P (MAP) Pulse Ox O2 Delivery O2 Flow Rate FiO2


 


10/15/19 08:23  95  101/65    


 


10/15/19 08:00      Room Air  


 


10/15/19 07:00 98.5  18  98   





 98.5       


 


10/14/19 16:00       2.0 














                                    I & O   


 


 10/14/19 10/14/19 10/15/19





 15:00 23:00 07:00


 


Intake Total  0 ml 


 


Output Total   0 ml


 


Balance  0 ml 0 ml











LABS


Lab:





                                Laboratory Tests








Test


 10/14/19


14:21 10/14/19


18:15 10/14/19


18:42 10/14/19


20:48


 


White Blood Count


 4.1 x10^3/uL


(4.0-11.0) 


 


 





 


Red Blood Count


 4.64 x10^6/uL


(4.30-5.70) 


 


 





 


Hemoglobin


 13.2 g/dL


(13.0-17.5) 


 


 





 


Hematocrit


 40.0 %


(39.0-53.0) 


 


 





 


Mean Corpuscular Volume


 86 fL ()


 


 


 





 


Mean Corpuscular Hemoglobin 29 pg (25-35)     


 


Mean Corpuscular Hemoglobin


Concent 33 g/dL


(31-37) 


 


 





 


Red Cell Distribution Width


 14.9 %


(11.5-14.5)  H 


 


 





 


Platelet Count


 160 x10^3/uL


(140-400) 


 


 





 


Neutrophils (%) (Auto) 65 % (31-73)     


 


Lymphocytes (%) (Auto) 18 % (24-48)  L   


 


Monocytes (%) (Auto) 13 % (0-9)  H   


 


Eosinophils (%) (Auto) 3 % (0-3)     


 


Basophils (%) (Auto) 1 % (0-3)     


 


Neutrophils # (Auto)


 2.7 x10^3/uL


(1.8-7.7) 


 


 





 


Lymphocytes # (Auto)


 0.7 x10^3/uL


(1.0-4.8)  L 


 


 





 


Monocytes # (Auto)


 0.5 x10^3/uL


(0.0-1.1) 


 


 





 


Eosinophils # (Auto)


 0.1 x10^3/uL


(0.0-0.7) 


 


 





 


Basophils # (Auto)


 0.0 x10^3/uL


(0.0-0.2) 


 


 





 


Sodium Level


 129 mmol/L


(136-145)  L 


 


 





 


Potassium Level


 3.3 mmol/L


(3.5-5.1)  L 


 


 





 


Chloride Level


 91 mmol/L


()  L 


 


 





 


Carbon Dioxide Level


 25 mmol/L


(21-32) 


 


 





 


Anion Gap 13 (6-14)     


 


Blood Urea Nitrogen


 47 mg/dL


(8-26)  H 


 


 





 


Creatinine


 12.6 mg/dL


(0.7-1.3)  H 


 


 





 


Estimated GFR


(Cockcroft-Gault) 5.1  


 


 


 





 


BUN/Creatinine Ratio 4 (6-20)  L   


 


Glucose Level


 211 mg/dL


(70-99)  H 


 


 





 


Lactic Acid Level


 1.6 mmol/L


(0.4-2.0) 


 


 





 


Calcium Level


 7.7 mg/dL


(8.5-10.1)  L 


 


 





 


Total Bilirubin


 0.4 mg/dL


(0.2-1.0) 


 


 





 


Aspartate Amino Transferase


(AST) 26 U/L (15-37)


 


 


 





 


Alanine Aminotransferase (ALT)


 32 U/L (16-63)


 


 


 





 


Alkaline Phosphatase


 126 U/L


()  H 


 


 





 


Troponin I Quantitative


 0.122 ng/mL


(0.000-0.055) 0.137 ng/mL


(0.000-0.055) 


 





 


NT-Pro-B-Type Natriuretic


Peptide > 16687 pg/mL


(0-124)  H 


 


 





 


Total Protein


 6.9 g/dL


(6.4-8.2) 


 


 





 


Albumin


 2.3 g/dL


(3.4-5.0)  L 


 


 





 


Albumin/Globulin Ratio


 0.5 (1.0-1.7)


L 


 


 





 


Glucose (Fingerstick)


 


 


 137 mg/dL


(70-99)  H 103 mg/dL


(70-99)  H


 


Test


 10/15/19


06:30 10/15/19


08:18 


 





 


Influenza Type A Antigen


 Negative


(NEGATIVE) 


 


 





 


Influenza Type B Antigen


 Negative


(NEGATIVE) 


 


 





 


Glucose (Fingerstick)


 


 198 mg/dL


(70-99)  H 


 








                                Laboratory Tests


10/14/19 14:21








                                Laboratory Tests


10/14/19 14:21











ECHOCARDIOGRAM


ECHOCARDIOGRAM


<Conclusion>


Left ventricle systolic function is severely impaired.


The Ejection Fraction is 10-15%.


There is mild concentric left ventricular hypertrophy.


There is global hypokinesis of the left ventricle.


There is a pacemaker lead in the right ventricle.


The left atrium is mildly dilated.


Doppler and Color Flow revealed trace aortic regurgitation.


Doppler and Color Flow revealed mild mitral regurgitation.


Doppler and Color Flow revealed mild tricuspid regurgitation.


The PA pressure was estimated at 50 mmHg.


The IVC is dilated and collapses <50% with inspiration.


There is no evidence of significant pericardial effusion.





DATE:     01/26/15 1607








<Conclusion>


Left ventricle systolic function is severely impaired.


The Ejection Fraction is 10-15%.


Pacemaker/ICD lead noted in the right atrium and ventricle.


Mild aortic regurgitation.


Trace to mild mitral regurgitation.


Moderate tricuspid regurgitation.


The PA pressure was estimated at 41 mmHg.


There is no evidence of significant pericardial effusion.





DATE:     10/22/18 1038





ASSESSMENT/PLAN


ASSESSMENT/PLAN


1. Persistent cough


2. Acute on chronic systolic HF; CXR with vascular congestion


3. Mild troponin elevation; highest 0.137. Most probably type II, demand 

ischemia. CP free


4. CMP s/p AICD ([facility]). LVEF 10-15% (10/2018)


5. ESRD on PD. 


6. Hypertension; controlled 


7. Hyperlipidemia; not on statin 


8. Diabetes, II


9. chronic LBBB


10.  Hypokalemia 


11.  Febrile; ? etiology








Recommendations





Will attempted diuresis with Lasix


Replace K


Check Mg


Continue coreg


BP will not tolerate addition of ACEi presently.


Supportive care





ALL COLLINS MD 10/16/19 0755:


CARDIAC CONSULT


ASSESSMENT/PLAN


ASSESSMENT/PLAN


Pt. seen and examined. Agree with above NP note. 


He has not had good f/u with cardiology. I emphasized the need for f/u of his 

device and routine cardiac issues. 


Supportive care. We will be happy to see him in the office if he so chooses. 


Thanks











BIN MCINTOSH           Oct 15, 2019 10:23


ALL COLLINS MD       Oct 16, 2019 07:55

## 2019-10-16 VITALS — SYSTOLIC BLOOD PRESSURE: 84 MMHG | DIASTOLIC BLOOD PRESSURE: 59 MMHG

## 2019-10-16 VITALS — DIASTOLIC BLOOD PRESSURE: 68 MMHG | SYSTOLIC BLOOD PRESSURE: 99 MMHG

## 2019-10-16 VITALS — SYSTOLIC BLOOD PRESSURE: 103 MMHG | DIASTOLIC BLOOD PRESSURE: 73 MMHG

## 2019-10-16 LAB
ANION GAP SERPL CALC-SCNC: 9 MMOL/L (ref 6–14)
BUN SERPL-MCNC: 52 MG/DL (ref 8–26)
CALCIUM SERPL-MCNC: 7.3 MG/DL (ref 8.5–10.1)
CHLORIDE SERPL-SCNC: 91 MMOL/L (ref 98–107)
CHOLEST SERPL-MCNC: 118 MG/DL (ref 0–200)
CHOLEST/HDLC SERPL: 2.1 {RATIO}
CO2 SERPL-SCNC: 30 MMOL/L (ref 21–32)
CREAT SERPL-MCNC: 13.2 MG/DL (ref 0.7–1.3)
GFR SERPLBLD BASED ON 1.73 SQ M-ARVRAT: 4.8 ML/MIN
GLUCOSE SERPL-MCNC: 185 MG/DL (ref 70–99)
HDLC SERPL-MCNC: 56 MG/DL (ref 40–60)
LDLC: 53 MG/DL (ref 0–100)
MAGNESIUM SERPL-MCNC: 2.4 MG/DL (ref 1.8–2.4)
POTASSIUM SERPL-SCNC: 3.5 MMOL/L (ref 3.5–5.1)
SODIUM SERPL-SCNC: 130 MMOL/L (ref 136–145)
TRIGL SERPL-MCNC: 44 MG/DL (ref 0–150)
VLDLC: 9 MG/DL (ref 0–40)

## 2019-10-16 RX ADMIN — CARVEDILOL SCH MG: 12.5 TABLET, FILM COATED ORAL at 08:40

## 2019-10-16 RX ADMIN — Medication SCH EA: at 12:29

## 2019-10-16 RX ADMIN — ASPIRIN 81 MG SCH MG: 81 TABLET ORAL at 08:39

## 2019-10-16 RX ADMIN — INSULIN LISPRO SCH UNITS: 100 INJECTION, SOLUTION INTRAVENOUS; SUBCUTANEOUS at 12:00

## 2019-10-16 RX ADMIN — PANTOPRAZOLE SODIUM SCH MG: 40 TABLET, DELAYED RELEASE ORAL at 08:39

## 2019-10-16 RX ADMIN — FUROSEMIDE SCH MG: 40 TABLET ORAL at 08:39

## 2019-10-16 RX ADMIN — Medication SCH EA: at 08:40

## 2019-10-16 RX ADMIN — INSULIN LISPRO SCH UNITS: 100 INJECTION, SOLUTION INTRAVENOUS; SUBCUTANEOUS at 08:00

## 2019-10-16 RX ADMIN — VITAMIN D, TAB 1000IU (100/BT) SCH UNIT: 25 TAB at 08:39

## 2019-10-16 NOTE — PDOC
JOSE PEREZ APRN 10/16/19 0958:


CARDIO Progress Notes


Date and Time


Date of Service


10/16/2019


Time of Evaluation


0930





Subjective


Subjective:  No Chest Pain, No shortness of breath, No Palpitations





Vitals


Vitals





Vital Signs








  Date Time  Temp Pulse Resp B/P (MAP) Pulse Ox O2 Delivery O2 Flow Rate FiO2


 


10/16/19 08:40  86  99/68    


 


10/16/19 08:00      Room Air  


 


10/16/19 07:00 98.6    96   





 98.6       


 


10/16/19 03:00   18     


 


10/15/19 11:00       2.0 








Weight


Weight [ ]





Input and Output


Intake and Output











Intake and Output 


 


 10/16/19





 07:00


 


Intake Total 880 ml


 


Balance 880 ml


 


 


 


Intake Oral 880 ml


 


# Voids 5











Laboratory


Labs





Laboratory Tests








Test


 10/15/19


10:45 10/15/19


11:39 10/15/19


17:03 10/15/19


21:08


 


Magnesium Level


 1.5 mg/dL


(1.8-2.4) 


 


 





 


Triglycerides Level


 44 mg/dL


(0-150) 


 


 





 


Cholesterol Level


 118 mg/dL


(0-200) 


 


 





 


LDL Cholesterol, Calculated


 53 mg/dL


(0-100) 


 


 





 


VLDL Cholesterol, Calculated 9 mg/dL (0-40)    


 


Non-HDL Cholesterol Calculated


 62 mg/dL


(0-129) 


 


 





 


HDL Cholesterol


 56 mg/dL


(40-60) 


 


 





 


Cholesterol/HDL Ratio 2.1    


 


Glucose (Fingerstick)


 


 147 mg/dL


(70-99) 129 mg/dL


(70-99) 164 mg/dL


(70-99)


 


Test


 10/16/19


08:21 


 


 





 


Glucose (Fingerstick)


 236 mg/dL


(70-99) 


 


 














Physical Exam


HEENT:  Neck Supple W Full Motion


Chest:  Symmetric


LUNGS:  Other (diminisehd abses)


Heart:  RRR (SR)


Abdomen:  Soft N/T


Extremities:  No Calf Tenderness


Neurology:  alert, oriented, follow commands





Assessment


Assessment


1. Acute on chronic systolic CHF


3. Mild troponin elevation; peaked 0.137. suspect type II, demand mediated. CP 

free


4. CMP s/p AICD ([facility]). LVEF 10-15% (10/2018). No significant arrhythmias 

per interrogation. AFIB burden <1%. PVAB/PVARP adjusted to prevent inappropriate

mode switching 


5. ESRD on PD. 


6. Hypertension: low end


7. HLP: lipids are on goal without statin


8. Diabetes, II


9. Chronic LBBB with occasional NSVT


10.  Hypokalemia 








Recommendations


1. Continue with lasix therapy and fluid off loading per PD. Discussed weighing 

self daily 


2. BMP and Mg. Replace K and Mg as warranted. TTE pending


3. Continue coreg per BP trend


4. Will need ACEi or ARB if BP allows and would consider entresto as an outpt


5. Follow up with Dr. Collins on November 27 at 0930


6. Home health referral for CHF monitoring





ALL COLLINS MD 10/16/19 1813:


CARDIO Progress Notes


Plan


Plan


Pt. seen and examined. Agree with above NP note. 


He needs f/u at a tertiary medical center with heart txp capabilities. 


Will arrange for evaluation at [facility]. 


Thanks











JOSE PEREZ          Oct 16, 2019 09:58


ALL COLLINS MD       Oct 16, 2019 18:13

## 2019-10-16 NOTE — NUR
Discharge Note:



DIANA DOWNING [facility]



Discharge instructions and discharge home medications reviewed with Patient and a copy 
given. All questions have been answered and understanding verbalized. Declined home health 
for heart failure clinic.

## 2019-10-16 NOTE — PDOC
[facility] [facility] PROGRESS NOTE


Chief Complaint


Chief Complaint


A/P:


Shortness of breath with Cough - likely CHF exacerbation


S/P AICD generator change: done  on 10/15/2018 ([facility]) stable. - cardiology 

consulted


Chronic systolic CHF - uncompensated currently in acute exacerbation


ESRD - utilizes PD with last dialysis last night. Nephrology consulted


HTN - will monitor


DM2 - sliding scale, added back 15 u glargine





FEN - ADA renal diet


PPX - heparin


FULL CODE


Dispo - inpatient for acute CHF





History of Present Illness


History of Present Illness


Mr Rebolledo is a 54-year-old male w/ PMHx CHF with EF 10%, has PPM/ICD, battery 

changed on 10/15/18, DM2, HTN, HLD, CAD, and ESRD on on peritoneal dialysis.  He

presented with a cough that has been occurring for a month. Found on CXR with 

pulmonary edema and new hypoxia.  He also has azotemia with an elevated BUN and 

creatinine of 12.6. Patient was seen and examined today, discussed with him the 

possibility of being discharged upon approval from other subspecialties. 





Today he feels a bit better, though he still does have his cough. He is awaiting

pacer interrogation. A bit upset about his insulin regimen in house. Has been 

titrating tresiba daily based on glucose checks.





Vitals/I&O


Vitals/I&O:





                                   Vital Signs








  Date Time  Temp Pulse Resp B/P (MAP) Pulse Ox O2 Delivery O2 Flow Rate FiO2


 


10/16/19 10:53 98.1 76  84/59 (67) 98 Room Air  





 98.1       


 


10/16/19 03:00   18     


 


10/15/19 11:00       2.0 














                                    I & O   


 


 10/15/19 10/15/19 10/16/19





 15:00 23:00 07:00


 


Intake Total 380 ml 500 ml 0 ml


 


Balance 380 ml 500 ml 0 ml











Physical Exam


General:  Alert, Oriented X3, Cooperative


Heart:  Regular rate, No murmurs


Lungs:  Clear


Abdomen:  Normal bowel sounds, Soft, No tenderness, No hepatosplenomegaly


Extremities:  No clubbing, No cyanosis, No edema, Normal pulses


Skin:  No rashes, No breakdown, No significant lesion





Labs


Labs:





Laboratory Tests








Test


 10/15/19


17:03 10/15/19


21:08 10/16/19


08:21 10/16/19


10:00


 


Glucose (Fingerstick)


 129 mg/dL


(70-99) 164 mg/dL


(70-99) 236 mg/dL


(70-99) 





 


Sodium Level


 


 


 


 130 mmol/L


(136-145)


 


Potassium Level


 


 


 


 3.5 mmol/L


(3.5-5.1)


 


Chloride Level


 


 


 


 91 mmol/L


()


 


Carbon Dioxide Level


 


 


 


 30 mmol/L


(21-32)


 


Anion Gap    9 (6-14) 


 


Blood Urea Nitrogen


 


 


 


 52 mg/dL


(8-26)


 


Creatinine


 


 


 


 13.2 mg/dL


(0.7-1.3)


 


Estimated GFR


(Cockcroft-Gault) 


 


 


 4.8 





 


Glucose Level


 


 


 


 185 mg/dL


(70-99)


 


Calcium Level


 


 


 


 7.3 mg/dL


(8.5-10.1)


 


Magnesium Level


 


 


 


 2.4 mg/dL


(1.8-2.4)


 


Thyroid Stimulating Hormone


(TSH) 


 


 


 3.553 uIU/mL


(0.358-3.74)


 


Test


 10/16/19


10:47 


 


 





 


Glucose (Fingerstick)


 183 mg/dL


(70-99) 


 


 














Review of Systems


Review of Systems:


Patient denies SOB and N/V





Assessment and Plan


Assessmemt and Plan


Problems


Medical Problems:


(1) CHF exacerbation


Status: Acute  





Assessment: CHF exacerbation. Patient was seen and examined. 





Plan: 


1. Continue Peritoneal dialysis 


2. Resume home medications (Tresiba) for glucose control 


3. DVT prophylaxis 


4. Full code


5. await further subspecialty input. 


6. Discharge pending approval of other subspecialists





Comment


Review of Relevant


I have reviewed the following items sean (where applicable) has been applied.


Medications:





Current Medications








 Medications


  (Trade)  Dose


 Ordered  Sig/Ivonne


 Route


 PRN Reason  Start Time


 Stop Time Status Last Admin


Dose Admin


 


 Magnesium Sulfate  100 ml @ 


 25 mls/hr  1X  ONCE


 IV


   10/15/19 12:30


 10/15/19 16:29 DC 10/15/19 12:12





 


 Loperamide HCl


  (Imodium)  2 mg  PRN Q2HR  PRN


 PO


 DIARRHEA  10/16/19 06:30


    10/16/19 07:25




















PRINCESS MACARIO III DO           Oct 16, 2019 12:33

## 2019-10-16 NOTE — PDOC
SUBJECTIVE


ROS


Stable


BP dropped in 70's this am





OBJECTIVE


Vital Signs





Vital Signs








  Date Time  Temp Pulse Resp B/P (MAP) Pulse Ox O2 Delivery O2 Flow Rate FiO2


 


10/16/19 08:40  86  99/68    


 


10/16/19 08:00      Room Air  


 


10/16/19 07:00 98.6    96   





 98.6       


 


10/16/19 03:00   18     


 


10/15/19 11:00       2.0 








I & 0











Intake and Output 


 


 10/16/19





 07:00


 


Intake Total 880 ml


 


Balance 880 ml


 


 


 


Intake Oral 880 ml


 


# Voids 5











PHYSICAL EXAM


Physical Exam


GENERAL:  NAD 


HEENT:  Head is normocephalic, atraumatic,  OM moist  


NECK:  Supple,


LUNGS:  Clear to auscultation , No use of accessory muscle  


HEART:  RRR, S1, S2 present.  


ABDOMEN: peritoneal dialysis catheter in place, No signs of infection 


EXTREMITIES:  Without any cyanosis, clubbing, or edema. 


NEUROLOGIC:  Grossly normal  


SKIN: No Rash 


 No Means





DIAGNOSIS/ASSESSMENT


Assessment & Plan


ESRD.- On PD under Dr. Mcghee's care 


Continue PD per Home prescription - uses 2.5%, has LBF with Extraneal , Dw Gbariel 


Has some RRF  


Getting eval for Renal Tx at [facility] [place]   





Shortness of breath with Cough - likely CHF exacerbation


Not significantly vol Overloaded clinically and CxR 


On PO Lasix,Cardiology consulted  





S/P AICD generator change: done  on 10/15/2018 ([facility]) stable. - cardiology 

consulted





Chronic systolic CHF





HTN - currently BP lower 


On Coreg- cardiology managing  





DM2 - per primary





COMMENT/RELEVANT DATA


Meds





Current Medications








 Medications


  (Trade)  Dose


 Ordered  Sig/Ivonne  Start Time


 Stop Time Status Last Admin


Dose Admin


 


 Aspirin


  ([facility]


 Aspirin)  81 mg  DAILYWBKFT  10/15/19 08:00


    10/16/19 08:39


81 MG


 


 Calcium Acetate


  (Phoslo)  667 mg  TIDWMEALS  10/15/19 08:00


   Cancel  





 


 Carvedilol


  (Coreg)  12.5 mg  BIDWMEALS  10/15/19 08:00


    10/16/19 08:40


12.5 MG


 


 Dextrose


  (Dextrose


 50%-Water Syringe)  12.5 gm  PRN Q15MIN  PRN  10/15/19 11:30


     





 


 Fentanyl Citrate


  (Fentanyl 2ml


 Vial)  50 mcg  PRN Q1HR  PRN  10/14/19 15:30


 10/15/19 15:29 DC  





 


 Furosemide


  (Lasix)  40 mg  1X  ONCE  10/15/19 10:30


 10/15/19 10:31 DC 10/15/19 12:11


40 MG


 


 Insulin Glargine


  (Lantus Syringe)  15 unit  QHS  10/15/19 21:00


   UNV  





 


 Insulin Human


 Lispro


  (HumaLOG)  0-5 UNITS  TIDWMEALS  10/15/19 12:00


     





 


 Loperamide HCl


  (Imodium)  2 mg  PRN Q2HR  PRN  10/16/19 06:30


    10/16/19 07:25


2 MG


 


 Magnesium Sulfate  100 ml @ 


 25 mls/hr  1X  ONCE  10/15/19 12:30


 10/15/19 16:29 DC 10/15/19 12:12


25 MLS/HR


 


 Non-Formulary


 Medication  15 ea  QHS  10/15/19 21:00


     





 


 Ondansetron HCl


  (Zofran)  4 mg  PRN Q8HRS  PRN  10/14/19 15:30


 10/15/19 15:29 DC 10/15/19 04:15


4 MG


 


 Pantoprazole


 Sodium


  (Protonix)  40 mg  DAILYAC  10/15/19 07:30


    10/16/19 08:39


40 MG


 


 Potassium Chloride


  (Klor-Con)  40 meq  1X  ONCE  10/15/19 10:30


 10/15/19 10:31 DC 10/15/19 12:10


40 MEQ


 


 Prochlorperazine


 Edisylate


  (Compazine)  10 mg  1X  ONCE  10/14/19 15:30


 10/14/19 15:31 DC 10/14/19 15:23


10 MG


 


 Vitamin D


  (Vitamin D3)  1,000 unit  DAILY  10/15/19 09:00


    10/16/19 08:39


1,000 UNIT


 


 Zolpidem Tartrate


  (Ambien)  5 mg  PRN QHS  PRN  10/14/19 21:30


    10/14/19 21:34


5 MG








Lab





Laboratory Tests








Test


 10/15/19


10:45 10/15/19


11:39 10/15/19


17:03 10/15/19


21:08


 


Magnesium Level


 1.5 mg/dL


(1.8-2.4) 


 


 





 


Triglycerides Level


 44 mg/dL


(0-150) 


 


 





 


Cholesterol Level


 118 mg/dL


(0-200) 


 


 





 


LDL Cholesterol, Calculated


 53 mg/dL


(0-100) 


 


 





 


VLDL Cholesterol, Calculated 9 mg/dL (0-40)    


 


Non-HDL Cholesterol Calculated


 62 mg/dL


(0-129) 


 


 





 


HDL Cholesterol


 56 mg/dL


(40-60) 


 


 





 


Cholesterol/HDL Ratio 2.1    


 


Glucose (Fingerstick)


 


 147 mg/dL


(70-99) 129 mg/dL


(70-99) 164 mg/dL


(70-99)


 


Test


 10/16/19


08:21 


 


 





 


Glucose (Fingerstick)


 236 mg/dL


(70-99) 


 


 











Results


All relevant outside records, renal labs, imaging studies, telemetry/EKG's were 

reviewed.











FEDERICO MCCORMICK MD                Oct 16, 2019 10:18

## 2019-10-16 NOTE — CARD
MR#: [id number]

Account#: [id number]

Accession#: [id number]

Date of Study: 10/16/2019

Ordering Physician: BIN MCINTOSH, 

Referring Physician: BIN MCINTOSH, 

Tech: Cammy Winchester RDCS





--------------- APPROVED REPORT --------------





EXAM: Two-dimensional and M-mode echocardiogram with Doppler and color Doppler.



Other Information 

Quality : GoodHR: 82bpm

Rhythm : NSR



INDICATION

Congestive Heart Failure 



2D DIMENSIONS 

RVDd3.2 (2.9-3.5cm)Left Atrium(2D)4.5 (1.6-4.0cm)

IVSd1.2 (0.7-1.1cm)Aortic Root(2D)3.0 (2.0-3.7cm)

LVDd7.2 (3.9-5.9cm)LVOT Diameter2.2 (1.8-2.4cm)

PWd1.2 (0.7-1.1cm)LVDs7.0 (2.5-4.0cm)

FS (%) 3.7 %SV22.5 ml

LVEF(%)8.2 (>50%)



M-Mode DIMENSIONS 

Left Atrium(MM)4.82 (2.5-4.0cm)Aortic Root3.54 (2.2-3.7cm)



Aortic Valve

AoV Peak Kirk.104.4cm/sAoV VTI15.7cm

AO Peak GR.4.4mmHgLVOT  VTI 4.69cm

AO Mean GR.2mmHgAVA   (VTI)1.30cm2

AI P 1/2 [id number]



Mitral Valve

MV E Wcbvaamk07.7cm/sMV E Peak Gr.41mmHg

MV DECEL [id number] A Mibgnhyw44.9cm/s

E/A  Ratio1.8



Tricuspid Valve

TR P. Wenishom571uy/sRAP YDRKIDEQ2ieMj

TR Peak Gr.97ubOkSWNM40daLj



 LEFT VENTRICLE 

The Left Ventricle is severely dilated. There is mild concentric left ventricular hypertrophy. The sy
stolic function is severely impaired. The Ejection Fraction is 15%. There is severe global hypokinesi
s of the left ventricle. Transmitral Doppler flow pattern is abnormal.



 RIGHT VENTRICLE 

The right ventricle is normal size. There is normal right ventricular wall thickness. The right ventr
icular systolic function is normal. ICD lead noted in RV/RA.



 ATRIA 

The left atrium is mildly dilated. The right atrium size is normal. The interatrial septum is intact 
with no evidence for an atrial septal defect or patent foramen ovale as noted on 2-D or Doppler imagi
ng.



 AORTIC VALVE 

The aortic valve is normal in structure and function. The aortic valve is trileaflet. Doppler and Col
or Flow revealed trace to mild aortic regurgitation. There is no significant aortic valvular stenosis
. There is no aortic valvular vegetation.



 MITRAL VALVE 

The mitral valve is thickened but opens well. There is no evidence of mitral valve prolapse. There is
 no mitral valve stenosis. Doppler and Color-flow revealed mild to moderate mitral regurgitation.



 TRICUSPID VALVE 

The tricuspid valve is normal in structure and function. Doppler and Color Flow revealed mild tricusp
id regurgitation. There is moderate pulmonary hypertension. The PA pressure was estimated at 48 mmHg.
 There is no tricuspid valve prolapse or vegetation. There is no tricuspid valve stenosis.



 PULMONIC VALVE 

Doppler and Color Flow revealed trace pulmonic valvular regurgitation. There is no pulmonic valvular 
stenosis.



 GREAT VESSELS 

The aortic root is normal in size. The ascending aorta is normal in size. The IVC is dilated and eda
apses >50% with inspiration.



 PERICARDIAL EFFUSION 

There is no evidence of significant pericardial effusion.



Critical Notification

Critical Value: No



<Conclusion>

The systolic function is severely impaired. The Ejection Fraction is 15%.

There is severe global hypokinesis of the left ventricle.

ICD lead noted in RV/RA.

Doppler and Color-flow revealed mild to moderate mitral regurgitation.

Doppler and Color Flow revealed mild tricuspid regurgitation. There is moderate pulmonary hypertensio
n. The PA pressure was estimated at 48 mmHg.



Signed by : Ramirez Mcgraw, 

Electronically Approved : 10/16/2019 11:30:02

## 2019-10-16 NOTE — PDOC
SUBJECTIVE


ROS


Stable, denies any complaints  


BP dropped in 70's this am





OBJECTIVE


Vital Signs





Vital Signs








  Date Time  Temp Pulse Resp B/P (MAP) Pulse Ox O2 Delivery O2 Flow Rate FiO2


 


10/16/19 10:53 98.1 76  84/59 (67) 98 Room Air  





 98.1       


 


10/16/19 03:00   18     


 


10/15/19 11:00       2.0 








I & 0











Intake and Output 


 


 10/16/19





 06:59


 


Intake Total 880 ml


 


Balance 880 ml


 


 


 


Intake Oral 880 ml


 


# Voids 5











PHYSICAL EXAM


Physical Exam


GENERAL:  NAD 


HEENT:  Head is normocephalic, atraumatic,  OM moist  


NECK:  Supple,


LUNGS:  Clear to auscultation , No use of accessory muscle  


HEART:  RRR, S1, S2 present.  


ABDOMEN: peritoneal dialysis catheter in place, No signs of infection 


EXTREMITIES:  Without any cyanosis, clubbing, or edema. 


NEUROLOGIC:  Grossly normal  


SKIN: No Rash 


 No Means





DIAGNOSIS/ASSESSMENT


Assessment & Plan


ESRD.- On PD under Dr. Mcghee's care 


Continue PD per Home prescription - uses 2.5%, has LBF with Extraneal , Dw Gabriel 


Has some RRF  


Getting eval for Renal Tx at [facility] [place]   





Shortness of breath with Cough - likely CHF exacerbation


Not significantly vol Overloaded clinically and CxR 


On PO Lasix,Cardiology consulted  





S/P AICD generator change: done  on 10/15/2018 ([facility]) stable. - cardiology 

consulted





Chronic systolic CHF





HTN - currently BP lower 


On Coreg- cardiology managing  





DM2 - per primary





COMMENT/RELEVANT DATA


Meds





Current Medications








 Medications


  (Trade)  Dose


 Ordered  Sig/Ivonne  Start Time


 Stop Time Status Last Admin


Dose Admin


 


 Aspirin


  ([facility]


 Aspirin)  81 mg  DAILYWBKFT  10/15/19 08:00


    10/16/19 08:39


81 MG


 


 Calcium Acetate


  (Phoslo)  667 mg  TIDWMEALS  10/15/19 08:00


   Cancel  





 


 Carvedilol


  (Coreg)  12.5 mg  BIDWMEALS  10/15/19 08:00


    10/16/19 08:40


12.5 MG


 


 Dextrose


  (Dextrose


 50%-Water Syringe)  12.5 gm  PRN Q15MIN  PRN  10/15/19 11:30


     





 


 Fentanyl Citrate


  (Fentanyl 2ml


 Vial)  50 mcg  PRN Q1HR  PRN  10/14/19 15:30


 10/15/19 15:29 DC  





 


 Furosemide


  (Lasix)  40 mg  1X  ONCE  10/15/19 10:30


 10/15/19 10:31 DC 10/15/19 12:11


40 MG


 


 Insulin Glargine


  (Lantus Syringe)  15 unit  QHS  10/15/19 21:00


   UNV  





 


 Insulin Human


 Lispro


  (HumaLOG)  0-5 UNITS  TIDWMEALS  10/15/19 12:00


     





 


 Loperamide HCl


  (Imodium)  2 mg  PRN Q2HR  PRN  10/16/19 06:30


    10/16/19 07:25


2 MG


 


 Magnesium Sulfate  100 ml @ 


 25 mls/hr  1X  ONCE  10/15/19 12:30


 10/15/19 16:29 DC 10/15/19 12:12


25 MLS/HR


 


 Non-Formulary


 Medication  15 ea  QHS  10/15/19 21:00


     





 


 Ondansetron HCl


  (Zofran)  4 mg  PRN Q8HRS  PRN  10/14/19 15:30


 10/15/19 15:29 DC 10/15/19 04:15


4 MG


 


 Pantoprazole


 Sodium


  (Protonix)  40 mg  DAILYAC  10/15/19 07:30


    10/16/19 08:39


40 MG


 


 Potassium Chloride


  (Klor-Con)  40 meq  1X  ONCE  10/15/19 10:30


 10/15/19 10:31 DC 10/15/19 12:10


40 MEQ


 


 Prochlorperazine


 Edisylate


  (Compazine)  10 mg  1X  ONCE  10/14/19 15:30


 10/14/19 15:31 DC 10/14/19 15:23


10 MG


 


 Vitamin D


  (Vitamin D3)  1,000 unit  DAILY  10/15/19 09:00


    10/16/19 08:39


1,000 UNIT


 


 Zolpidem Tartrate


  (Ambien)  5 mg  PRN QHS  PRN  10/14/19 21:30


    10/14/19 21:34


5 MG








Lab





Laboratory Tests








Test


 10/15/19


17:03 10/15/19


21:08 10/16/19


08:21 10/16/19


10:00


 


Glucose (Fingerstick)


 129 mg/dL


(70-99) 164 mg/dL


(70-99) 236 mg/dL


(70-99) 





 


Sodium Level


 


 


 


 130 mmol/L


(136-145)


 


Potassium Level


 


 


 


 3.5 mmol/L


(3.5-5.1)


 


Chloride Level


 


 


 


 91 mmol/L


()


 


Carbon Dioxide Level


 


 


 


 30 mmol/L


(21-32)


 


Anion Gap    9 (6-14) 


 


Blood Urea Nitrogen


 


 


 


 52 mg/dL


(8-26)


 


Creatinine


 


 


 


 13.2 mg/dL


(0.7-1.3)


 


Estimated GFR


(Cockcroft-Gault) 


 


 


 4.8 





 


Glucose Level


 


 


 


 185 mg/dL


(70-99)


 


Calcium Level


 


 


 


 7.3 mg/dL


(8.5-10.1)


 


Magnesium Level


 


 


 


 2.4 mg/dL


(1.8-2.4)


 


Thyroid Stimulating Hormone


(TSH) 


 


 


 3.553 uIU/mL


(0.358-3.74)








Results


All relevant outside records, renal labs, imaging studies, telemetry/EKG's were 

reviewed.











FEDERICO MCCORMICK MD                Oct 16, 2019 11:44

## 2019-12-30 ENCOUNTER — HOSPITAL ENCOUNTER (INPATIENT)
Dept: HOSPITAL 61 - ER | Age: 54
LOS: 11 days | Discharge: NURSING, REHAB OR LONG-TERM CARE | DRG: 853 | End: 2020-01-10
Attending: INTERNAL MEDICINE | Admitting: INTERNAL MEDICINE
Payer: MEDICARE

## 2019-12-30 VITALS — SYSTOLIC BLOOD PRESSURE: 84 MMHG | DIASTOLIC BLOOD PRESSURE: 42 MMHG

## 2019-12-30 VITALS — HEIGHT: 74 IN | BODY MASS INDEX: 25.99 KG/M2 | WEIGHT: 202.5 LBS

## 2019-12-30 VITALS — SYSTOLIC BLOOD PRESSURE: 82 MMHG | DIASTOLIC BLOOD PRESSURE: 52 MMHG

## 2019-12-30 VITALS — DIASTOLIC BLOOD PRESSURE: 47 MMHG | SYSTOLIC BLOOD PRESSURE: 84 MMHG

## 2019-12-30 DIAGNOSIS — D64.9: ICD-10-CM

## 2019-12-30 DIAGNOSIS — L08.9: ICD-10-CM

## 2019-12-30 DIAGNOSIS — M72.6: ICD-10-CM

## 2019-12-30 DIAGNOSIS — K21.9: ICD-10-CM

## 2019-12-30 DIAGNOSIS — E83.51: ICD-10-CM

## 2019-12-30 DIAGNOSIS — H10.419: ICD-10-CM

## 2019-12-30 DIAGNOSIS — I47.2: ICD-10-CM

## 2019-12-30 DIAGNOSIS — I44.7: ICD-10-CM

## 2019-12-30 DIAGNOSIS — M19.90: ICD-10-CM

## 2019-12-30 DIAGNOSIS — E86.1: ICD-10-CM

## 2019-12-30 DIAGNOSIS — E11.621: ICD-10-CM

## 2019-12-30 DIAGNOSIS — I34.0: ICD-10-CM

## 2019-12-30 DIAGNOSIS — Z95.0: ICD-10-CM

## 2019-12-30 DIAGNOSIS — E11.22: ICD-10-CM

## 2019-12-30 DIAGNOSIS — I42.9: ICD-10-CM

## 2019-12-30 DIAGNOSIS — M86.171: ICD-10-CM

## 2019-12-30 DIAGNOSIS — E11.69: ICD-10-CM

## 2019-12-30 DIAGNOSIS — L97.519: ICD-10-CM

## 2019-12-30 DIAGNOSIS — E87.6: ICD-10-CM

## 2019-12-30 DIAGNOSIS — K52.9: ICD-10-CM

## 2019-12-30 DIAGNOSIS — M86.371: ICD-10-CM

## 2019-12-30 DIAGNOSIS — N18.6: ICD-10-CM

## 2019-12-30 DIAGNOSIS — A41.9: Primary | ICD-10-CM

## 2019-12-30 DIAGNOSIS — E03.9: ICD-10-CM

## 2019-12-30 DIAGNOSIS — I13.2: ICD-10-CM

## 2019-12-30 DIAGNOSIS — Z79.4: ICD-10-CM

## 2019-12-30 DIAGNOSIS — A48.0: ICD-10-CM

## 2019-12-30 DIAGNOSIS — E11.40: ICD-10-CM

## 2019-12-30 DIAGNOSIS — I50.22: ICD-10-CM

## 2019-12-30 DIAGNOSIS — Z83.3: ICD-10-CM

## 2019-12-30 DIAGNOSIS — E78.5: ICD-10-CM

## 2019-12-30 DIAGNOSIS — E87.1: ICD-10-CM

## 2019-12-30 DIAGNOSIS — E43: ICD-10-CM

## 2019-12-30 DIAGNOSIS — E11.52: ICD-10-CM

## 2019-12-30 DIAGNOSIS — Z99.2: ICD-10-CM

## 2019-12-30 DIAGNOSIS — E78.00: ICD-10-CM

## 2019-12-30 LAB
% LYMPHS: 6 % (ref 24–48)
% MONOS: 5 % (ref 0–10)
% SEGS: 88 % (ref 35–66)
ALBUMIN SERPL-MCNC: 1.3 G/DL (ref 3.4–5)
ALBUMIN/GLOB SERPL: 0.2 {RATIO} (ref 1–1.7)
ALP SERPL-CCNC: 150 U/L (ref 46–116)
ALT SERPL-CCNC: 12 U/L (ref 16–63)
ANION GAP SERPL CALC-SCNC: 11 MMOL/L (ref 6–14)
ANISOCYTOSIS BLD QL SMEAR: SLIGHT
APTT BLD: 32 SEC (ref 24–38)
AST SERPL-CCNC: 17 U/L (ref 15–37)
BASOPHILS # BLD AUTO: 0.3 X10^3/UL (ref 0–0.2)
BASOPHILS NFR BLD: 1 % (ref 0–3)
BILIRUB SERPL-MCNC: 0.6 MG/DL (ref 0.2–1)
BUN SERPL-MCNC: 44 MG/DL (ref 8–26)
BUN/CREAT SERPL: 5 (ref 6–20)
CALCIUM SERPL-MCNC: 7.8 MG/DL (ref 8.5–10.1)
CHLORIDE SERPL-SCNC: 88 MMOL/L (ref 98–107)
CO2 SERPL-SCNC: 29 MMOL/L (ref 21–32)
CREAT SERPL-MCNC: 9 MG/DL (ref 0.7–1.3)
CRP SERPL-MCNC: 309 MG/L (ref 0–3.3)
EOSINOPHIL NFR BLD AUTO: 1 % (ref 0–5)
EOSINOPHIL NFR BLD: 0 % (ref 0–3)
EOSINOPHIL NFR BLD: 0.1 X10^3/UL (ref 0–0.7)
ERYTHROCYTE [DISTWIDTH] IN BLOOD BY AUTOMATED COUNT: 15.4 % (ref 11.5–14.5)
GFR SERPLBLD BASED ON 1.73 SQ M-ARVRAT: 7.5 ML/MIN
GLOBULIN SER-MCNC: 6.1 G/DL (ref 2.2–3.8)
GLUCOSE SERPL-MCNC: 121 MG/DL (ref 70–99)
HCT VFR BLD CALC: 34.8 % (ref 39–53)
HGB BLD-MCNC: 11.3 G/DL (ref 13–17.5)
LYMPHOCYTES # BLD: 0.8 X10^3/UL (ref 1–4.8)
LYMPHOCYTES NFR BLD AUTO: 3 % (ref 24–48)
MAGNESIUM SERPL-MCNC: 1.2 MG/DL (ref 1.8–2.4)
MCH RBC QN AUTO: 27 PG (ref 25–35)
MCHC RBC AUTO-ENTMCNC: 33 G/DL (ref 31–37)
MCV RBC AUTO: 82 FL (ref 79–100)
MONO #: 1.5 X10^3/UL (ref 0–1.1)
MONOCYTES NFR BLD: 5 % (ref 0–9)
NEUT #: 28.3 X10^3/UL (ref 1.8–7.7)
NEUTROPHILS NFR BLD AUTO: 91 % (ref 31–73)
PLATELET # BLD AUTO: 365 X10^3/UL (ref 140–400)
PLATELET # BLD EST: ADEQUATE 10*3/UL
POIKILOCYTOSIS BLD QL SMEAR: SLIGHT
POLYCHROMASIA BLD QL SMEAR: SLIGHT
POTASSIUM SERPL-SCNC: 3.2 MMOL/L (ref 3.5–5.1)
PROT SERPL-MCNC: 7.4 G/DL (ref 6.4–8.2)
PROTHROMBIN TIME: 15.1 SEC (ref 11.7–14)
RBC # BLD AUTO: 4.22 X10^6/UL (ref 4.3–5.7)
SODIUM SERPL-SCNC: 128 MMOL/L (ref 136–145)
WBC # BLD AUTO: 31.1 X10^3/UL (ref 4–11)

## 2019-12-30 PROCEDURE — 87075 CULTR BACTERIA EXCEPT BLOOD: CPT

## 2019-12-30 PROCEDURE — 86901 BLOOD TYPING SEROLOGIC RH(D): CPT

## 2019-12-30 PROCEDURE — 86304 IMMUNOASSAY TUMOR CA 125: CPT

## 2019-12-30 PROCEDURE — 83735 ASSAY OF MAGNESIUM: CPT

## 2019-12-30 PROCEDURE — 87205 SMEAR GRAM STAIN: CPT

## 2019-12-30 PROCEDURE — 36415 COLL VENOUS BLD VENIPUNCTURE: CPT

## 2019-12-30 PROCEDURE — 96366 THER/PROPH/DIAG IV INF ADDON: CPT

## 2019-12-30 PROCEDURE — 0Y6P0Z2 DETACHMENT AT RIGHT 1ST TOE, MID, OPEN APPROACH: ICD-10-PCS | Performed by: ORTHOPAEDIC SURGERY

## 2019-12-30 PROCEDURE — 86140 C-REACTIVE PROTEIN: CPT

## 2019-12-30 PROCEDURE — 85027 COMPLETE CBC AUTOMATED: CPT

## 2019-12-30 PROCEDURE — 85651 RBC SED RATE NONAUTOMATED: CPT

## 2019-12-30 PROCEDURE — 87040 BLOOD CULTURE FOR BACTERIA: CPT

## 2019-12-30 PROCEDURE — 88305 TISSUE EXAM BY PATHOLOGIST: CPT

## 2019-12-30 PROCEDURE — 86900 BLOOD TYPING SEROLOGIC ABO: CPT

## 2019-12-30 PROCEDURE — 83605 ASSAY OF LACTIC ACID: CPT

## 2019-12-30 PROCEDURE — G0378 HOSPITAL OBSERVATION PER HR: HCPCS

## 2019-12-30 PROCEDURE — 93005 ELECTROCARDIOGRAM TRACING: CPT

## 2019-12-30 PROCEDURE — 96368 THER/DIAG CONCURRENT INF: CPT

## 2019-12-30 PROCEDURE — P9045 ALBUMIN (HUMAN), 5%, 250 ML: HCPCS

## 2019-12-30 PROCEDURE — 86850 RBC ANTIBODY SCREEN: CPT

## 2019-12-30 PROCEDURE — A7015 AEROSOL MASK USED W NEBULIZE: HCPCS

## 2019-12-30 PROCEDURE — 84484 ASSAY OF TROPONIN QUANT: CPT

## 2019-12-30 PROCEDURE — 96365 THER/PROPH/DIAG IV INF INIT: CPT

## 2019-12-30 PROCEDURE — 85730 THROMBOPLASTIN TIME PARTIAL: CPT

## 2019-12-30 PROCEDURE — 82962 GLUCOSE BLOOD TEST: CPT

## 2019-12-30 PROCEDURE — 80061 LIPID PANEL: CPT

## 2019-12-30 PROCEDURE — 87077 CULTURE AEROBIC IDENTIFY: CPT

## 2019-12-30 PROCEDURE — 73630 X-RAY EXAM OF FOOT: CPT

## 2019-12-30 PROCEDURE — 85610 PROTHROMBIN TIME: CPT

## 2019-12-30 PROCEDURE — 83036 HEMOGLOBIN GLYCOSYLATED A1C: CPT

## 2019-12-30 PROCEDURE — 88307 TISSUE EXAM BY PATHOLOGIST: CPT

## 2019-12-30 PROCEDURE — 87071 CULTURE AEROBIC QUANT OTHER: CPT

## 2019-12-30 PROCEDURE — P9016 RBC LEUKOCYTES REDUCED: HCPCS

## 2019-12-30 PROCEDURE — 84443 ASSAY THYROID STIM HORMONE: CPT

## 2019-12-30 PROCEDURE — 71046 X-RAY EXAM CHEST 2 VIEWS: CPT

## 2019-12-30 PROCEDURE — 96361 HYDRATE IV INFUSION ADD-ON: CPT

## 2019-12-30 PROCEDURE — 96375 TX/PRO/DX INJ NEW DRUG ADDON: CPT

## 2019-12-30 PROCEDURE — 88311 DECALCIFY TISSUE: CPT

## 2019-12-30 PROCEDURE — A4461 SURGICL DRESS HOLD NON-REUSE: HCPCS

## 2019-12-30 PROCEDURE — 85007 BL SMEAR W/DIFF WBC COUNT: CPT

## 2019-12-30 PROCEDURE — 80048 BASIC METABOLIC PNL TOTAL CA: CPT

## 2019-12-30 PROCEDURE — 85025 COMPLETE CBC W/AUTO DIFF WBC: CPT

## 2019-12-30 PROCEDURE — 93923 UPR/LXTR ART STDY 3+ LVLS: CPT

## 2019-12-30 PROCEDURE — 86920 COMPATIBILITY TEST SPIN: CPT

## 2019-12-30 PROCEDURE — 30233N1 TRANSFUSION OF NONAUTOLOGOUS RED BLOOD CELLS INTO PERIPHERAL VEIN, PERCUTANEOUS APPROACH: ICD-10-PCS | Performed by: ORTHOPAEDIC SURGERY

## 2019-12-30 PROCEDURE — 80053 COMPREHEN METABOLIC PANEL: CPT

## 2019-12-30 RX ADMIN — INSULIN LISPRO SCH UNITS: 100 INJECTION, SOLUTION INTRAVENOUS; SUBCUTANEOUS at 17:00

## 2019-12-30 RX ADMIN — INSULIN LISPRO SCH UNITS: 100 INJECTION, SOLUTION INTRAVENOUS; SUBCUTANEOUS at 12:00

## 2019-12-30 RX ADMIN — ONDANSETRON PRN MG: 2 INJECTION INTRAMUSCULAR; INTRAVENOUS at 16:58

## 2019-12-30 RX ADMIN — PROMETHAZINE HYDROCHLORIDE PRN MG: 12.5 TABLET ORAL at 21:37

## 2019-12-30 RX ADMIN — SACUBITRIL AND VALSARTAN SCH TAB: 24; 26 TABLET, FILM COATED ORAL at 21:00

## 2019-12-30 NOTE — RAD
AP and Lateral Views of the Chest  12/30/2019 9:24 AM

 

Indication: Cough 

 

Comparison: chest radiograph October 19, 2018

 

Findings: There is a dual-lead pacemaking/ICD device from left subclavian 

approach, grossly unchanged in position. No pneumothorax or pleural 

effusion is seen. Mild interstitial coarsening is noted throughout the 

lungs, similar to prior study. Mild cardiomegaly, stable.

 

 

 

IMPRESSION: 

1.No evidence of acute cardiopulmonary process.

2. Similar mild cardiomegaly.

 

Electronically signed by: James Real MD (12/30/2019 10:27 AM) [id number]

## 2019-12-30 NOTE — RAD
Examination: FOOT RIGHT 3V

 

History: Nonhealing wound for past 2 months.

 

Comparison/Correlation: None

 

Findings: Total 3 images the right foot were obtained. The distal first 

and second digits are not included on the oblique view. Overlying bandages

at the lateral aspect of the midfoot and forefoot may limit evaluation for

fine detail.

 

Extensive soft tissue gas is present involving the left foot. This is 

primarily evident at the midfoot and metatarsophalangeal region. Soft 

tissue gas is evident at the plantar aspect of the hindfoot and midfoot. 

Soft tissue gas is also evident at the posterior distal calf and posterior

to the ankle. The superior extent of soft tissue gas involving the calf is

not included on this exam.

 

Deformity of the second and third metatarsal heads noted. There are 

metatarsophalangeal joint narrowing present. Suspicion of lytic 

involvement of the fifth metatarsal bone distally is raised. Overlying 

soft tissue gas limits assessment.

 

Impression:

Significant soft tissue gas involving the right foot. Examination of soft 

tissue gas into the distal calf region. Full extent of involvement is not 

included on this exam.

Findings of significant concern for necrotizing fasciitis. Possibly loss 

myelitis involving the fifth tarsal bone distally is also raised. 

Assessment for bony destruction is limited with soft tissue gas present.

 

Electronically signed by: Lucas Deluca MD (12/30/2019 10:40 AM) [facility]

## 2019-12-30 NOTE — PDOC2
CONSULT


Date of Consult


Date of Consult


DATE: 12/30/19 


TIME: 16:08





Reason for Consult


Reason for Consult:


ESRD





Referring Physician


Referring Physician:


MALIKA





Identification/Chief Complaint


Chief Complaint


FOOT PAIN





Source


Source:  Chart review, Patient





History of Present Illness


Reason for Visit:


THIS IS A 54 YR OLD WITH RIGHT FOOT WOUND THAT IS NOT HEALING. NOW HERE WITH 

POSSIBLE NECROTIZING FASCIITIS. HAS SIGNIFICANT LEUCOCYTOSIS. LABS C/W ESRD. HE 

IS ON PD AT HOME. CURRENTLY UNDERGOING EVALUATION BY ORTHO. ESRD IS DUE TO DM II

AND HTN





Past Medical History


Cardiovascular:  CHF, HTN, Hyperlipidemia


CENTRAL NERVOUS SYSTEM:  Other


GI:  GERD


Heme/Onc:  Anemia NOS


Musculoskeletal:  Osteoarthritis


Renal/:  Chronic renal failure


Endocrine:  Diabetes, Hyperparathyroidism





Past Surgical History


Past Surgical History:  Pacemaker, Other





Family History


Family History:  Other





Social History


ALCOHOL:  none


Drugs:  None


Lives:  with Family


Domestic Violence:  Neg





Current Problem List


Problem List


Problems


Medical Problems:


(1) CHF (congestive heart failure)


Status: Acute  





(2) Chronic kidney disease


Status: Acute  





(3) Diabetes


Status: Acute  





(4) Sepsis


Status: Acute  





(5) Severe sepsis


Status: Acute  











Current Medications


Current Medications





Current Medications


Clindamycin Phosphate 50 ml @  100 mls/hr 1X  ONCE IV  Last administered on 

12/30/19at 09:53;  Start 12/30/19 at 09:30;  Stop 12/30/19 at 09:59;  Status DC


Sodium Chloride 500 ml @  500 mls/hr 1X  ONCE IV  Last administered on 

12/30/19at 10:11;  Start 12/30/19 at 10:15;  Stop 12/30/19 at 11:14;  Status DC


Ondansetron HCl (Zofran) 4 mg 1X  ONCE IVP  Last administered on 12/30/19at 

10:34;  Start 12/30/19 at 10:30;  Stop 12/30/19 at 10:31;  Status DC


Famotidine (Pepcid Vial) 20 mg 1X  ONCE IVP  Last administered on 12/30/19at 

10:34;  Start 12/30/19 at 10:30;  Stop 12/30/19 at 10:31;  Status DC


Vancomycin HCl 2 gm/Sodium Chloride 500 ml @  250 mls/hr 1X  ONCE IV  Last 

administered on 12/30/19at 10:34;  Start 12/30/19 at 10:45;  Stop 12/30/19 at 

12:44;  Status DC


Ondansetron HCl (Zofran) 4 mg PRN Q8HRS  PRN IV NAUSEA/VOMITING;  Start 12/30/19

at 10:45;  Stop 12/31/19 at 10:44


Fentanyl Citrate (Fentanyl 2ml Vial) 50 mcg PRN Q2HRS  PRN IV PAIN;  Start 

12/30/19 at 10:45


Acetaminophen (Tylenol) 650 mg PRN Q4HRS  PRN PO FEVER;  Start 12/30/19 at 

10:45;  Stop 12/31/19 at 10:44


Insulin Human Lispro (HumaLOG) 0-5 UNITS TIDWMEALS SQ ;  Start 12/30/19 at 12:00


Dextrose (Dextrose 50%-Water Syringe) 12.5 gm PRN Q15MIN  PRN IV SEE COMMENTS;  

Start 12/30/19 at 10:45


Dextrose (Iv Dextrose 5%) 250 ml PRN Q15MIN  PRN IV SEE COMMENTS;  Start 

12/30/19 at 10:45


Sodium Chloride 500 ml @  500 mls/hr 1X  ONCE IV  Last administered on 

12/30/19at 12:13;  Start 12/30/19 at 12:15;  Stop 12/30/19 at 13:14;  Status DC


Magnesium Sulfate 50 ml @ 25 mls/hr 1X  ONCE IV  Last administered on 12/30/19at

14:49;  Start 12/30/19 at 14:00;  Stop 12/30/19 at 15:59;  Status DC


Aspirin (Children'S Aspirin) 81 mg DAILYWBKFT PO ;  Start 12/31/19 at 08:00


Furosemide (Lasix) 80 mg DAILY PO ;  Start 12/31/19 at 09:00


Loperamide HCl (Imodium) 2 mg PRN Q15MIN  PRN PO DIARRHEA;  Start 12/30/19 at 

15:30


Pantoprazole Sodium (Protonix) 40 mg DAILYAC PO ;  Start 12/31/19 at 07:30


Potassium Chloride (Klor-Con) 20 meq DAILY PO ;  Start 12/31/19 at 09:00


Promethazine HCl (Phenergan) 12.5 mg PRN Q6HRS  PRN PO NAUSEA/VOMITING, 1ST 

CHOICE;  Start 12/30/19 at 15:30


Sacubitril/ Valsartan (Entresto 24 Mg-26 Mg) 1 tab BID PO ;  Start 12/30/19 at 

21:00


Metolazone (Zaroxolyn) 5 mg DAILY PO ;  Start 12/31/19 at 09:00





Active Scripts


Active


Ssd (Silver Sulfadiazine) 25 Gm Cream..g. 1 Carley TP DAILY 10 Days


Mupirocin Ointment (Mupirocin) 22 Gm Oint...g. 1 Carley TP BID 10 Days


Humalog (Insulin Lispro) 100 Unit/1 Ml Insuln.pen 10 Units SQ TIDWMEALS 30 Days


Lantus Solostar (Insulin Glargine,Hum.rec.anlog) 100 Unit/1 Ml Insuln.pen 30 

Units SQ QHS 30 Days


Metoclopramide Hcl 5 Mg Tablet 5 Mg PO PRN BFRMEALHC PRN


Culturelle (Lactobacillus Rhamnosus Gg) 1 Each Cap.sprink 1 Cap PO BID 14 Days


Loperamide (Loperamide Hcl) 2 Mg Capsule 2 Mg PO PRN Q15MIN PRN 10 Days


Tramadol Hcl 50 Mg Tablet 50 Mg PO PRN Q6HRS PRN


Baclofen 10 Mg Tablet 10 Mg PO PRN TID PRN 14 Days


Nystatin 100,000 Unit/1 Ml Oral.susp 5 Ml SWSW [id number] 30 Days


Promethazine Hcl 12.5 Mg Tablet 12.5 Mg PO PRN Q6HRS PRN 30 Days


Reported


Entresto 24 mg-26 mg Tablet (Sacubitril/Valsartan) 1 Each Tablet 1 Each PO DAILY


Furosemide 80 Mg Tablet 1 Tab PO DAILY


Klor-Con M20 (Potassium Chloride) 20 Meq Tab.er.prt 1 Tab PO DAILY 30 Days


Pantoprazole Sodium  ** (Pantoprazole Sodium) 40 Mg Tablet.dr 40 Mg PO DAILYAC


Alison-Aime Tablet (Folic Acid/Vitamin B Comp W-C) 0.8 Mg Tablet 1 Tab PO DAILY 30

Days


Metolazone 5 Mg Tablet 5 Mg PO DAILY


Vitamin D3 (Cholecalciferol (Vitamin D3)) 5,000 Unit Tablet 1 Tab PO DAILY


Calcium Acetate 667 Mg Tablet 667 Mg PO TIDWMEALS


Renal Caps Softgel (Folic Acid/Vitamin B Comp W-C) 1 Mg Capsule 1 Cap PO DAILY


Tresiba Flextouch U-100 (Insulin Degludec) 100 Unit/1 Ml Insuln.pen 100 Unit SQ 


Furosemide 40 Mg Tablet 1 Tab PO DAILY


Aspirin 81 Mg Tab.chew 81 Mg PO DAILYWBKFT





Allergies


Allergies:  


Coded Allergies:  


     No Known Drug Allergies (Unverified , 6/22/15)





ROS


General:  YES: Fatigue, Malaise, Appetite


PSYCHOLOGICAL ROS:  YES: Anxiety


Eyes:  Yes Decreased vision


HEENT:  YES: Visual Changes


ALLERGY AND IMMUNOLOGY:  YES: Seasonal Allergies


Respiratory:  YES: Cough, Shortness of breath


Gastrointestinal:  Yes Constipation


Genitourinary:  YES Other (ANURIA)


Musculoskeletal:  Yes Muscular Weakness


Neurological:  Yes Weakness


Skin:  Yes Dry Skin





Physical Exam


General:  Alert, Oriented X3, Cooperative, No acute distress


HEENT:  Atraumatic, PERRLA, EOMI, Mucous membr. moist/pink


Lungs:  Clear to auscultation, Normal air movement


Heart:  Regular rate


Abdomen:  Normal bowel sounds, Soft, No tenderness


Skin:  No breakdown


Neuro:  Normal speech, Sensation intact


Psych/Mental Status:  Mental status NL, Mood NL


MUSCULOSKELETAL:  No joint tenderness, No swelling





Vitals


VITALS





Vital Signs








  Date Time  Temp Pulse Resp B/P (MAP) Pulse Ox O2 Delivery O2 Flow Rate FiO2


 


12/30/19 14:30 98.4 74 18 82/52 (62) 94   





 98.4       


 


12/30/19 12:45      Room Air  











Labs


Labs





Laboratory Tests








Test


 12/30/19


09:30


 


White Blood Count


 31.1 x10^3/uL


(4.0-11.0)


 


Red Blood Count


 4.22 x10^6/uL


(4.30-5.70)


 


Hemoglobin


 11.3 g/dL


(13.0-17.5)


 


Hematocrit


 34.8 %


(39.0-53.0)


 


Mean Corpuscular Volume 82 fL () 


 


Mean Corpuscular Hemoglobin 27 pg (25-35) 


 


Mean Corpuscular Hemoglobin


Concent 33 g/dL


(31-37)


 


Red Cell Distribution Width


 15.4 %


(11.5-14.5)


 


Platelet Count


 365 x10^3/uL


(140-400)


 


Neutrophils (%) (Auto) 91 % (31-73) 


 


Lymphocytes (%) (Auto) 3 % (24-48) 


 


Monocytes (%) (Auto) 5 % (0-9) 


 


Eosinophils (%) (Auto) 0 % (0-3) 


 


Basophils (%) (Auto) 1 % (0-3) 


 


Neutrophils # (Auto)


 28.3 x10^3/uL


(1.8-7.7)


 


Lymphocytes # (Auto)


 0.8 x10^3/uL


(1.0-4.8)


 


Monocytes # (Auto)


 1.5 x10^3/uL


(0.0-1.1)


 


Eosinophils # (Auto)


 0.1 x10^3/uL


(0.0-0.7)


 


Basophils # (Auto)


 0.3 x10^3/uL


(0.0-0.2)


 


Segmented Neutrophils % 88 % (35-66) 


 


Lymphocytes % 6 % (24-48) 


 


Monocytes % 5 % (0-10) 


 


Eosinophils % 1 % (0-5) 


 


Platelet Estimate


 Adequate


(ADEQUATE)


 


Large Platelets Occ 


 


Polychromasia Slight 


 


Poikilocytosis Slight 


 


Anisocytosis Slight 


 


Erythrocyte Sedimentation Rate 73 (0-15) 


 


Prothrombin Time


 15.1 SEC


(11.7-14.0)


 


Prothromb Time International


Ratio 1.2 (0.8-1.1) 





 


Activated Partial


Thromboplast Time 32 SEC (24-38) 





 


Sodium Level


 128 mmol/L


(136-145)


 


Potassium Level


 3.2 mmol/L


(3.5-5.1)


 


Chloride Level


 88 mmol/L


()


 


Carbon Dioxide Level


 29 mmol/L


(21-32)


 


Anion Gap 11 (6-14) 


 


Blood Urea Nitrogen


 44 mg/dL


(8-26)


 


Creatinine


 9.0 mg/dL


(0.7-1.3)


 


Estimated GFR


(Cockcroft-Gault) 7.5 





 


BUN/Creatinine Ratio 5 (6-20) 


 


Glucose Level


 121 mg/dL


(70-99)


 


Lactic Acid Level


 1.6 mmol/L


(0.4-2.0)


 


Calcium Level


 7.8 mg/dL


(8.5-10.1)


 


Magnesium Level


 1.2 mg/dL


(1.8-2.4)


 


Total Bilirubin


 0.6 mg/dL


(0.2-1.0)


 


Aspartate Amino Transf


(AST/SGOT) 17 U/L (15-37) 





 


Alanine Aminotransferase


(ALT/SGPT) 12 U/L (16-63) 





 


Alkaline Phosphatase


 150 U/L


()


 


C-Reactive Protein,


Quantitative 309.0 mg/L


(0-3.3)


 


Total Protein


 7.4 g/dL


(6.4-8.2)


 


Albumin


 1.3 g/dL


(3.4-5.0)


 


Albumin/Globulin Ratio 0.2 (1.0-1.7) 








Laboratory Tests








Test


 12/30/19


09:30


 


White Blood Count


 31.1 x10^3/uL


(4.0-11.0)


 


Red Blood Count


 4.22 x10^6/uL


(4.30-5.70)


 


Hemoglobin


 11.3 g/dL


(13.0-17.5)


 


Hematocrit


 34.8 %


(39.0-53.0)


 


Mean Corpuscular Volume 82 fL () 


 


Mean Corpuscular Hemoglobin 27 pg (25-35) 


 


Mean Corpuscular Hemoglobin


Concent 33 g/dL


(31-37)


 


Red Cell Distribution Width


 15.4 %


(11.5-14.5)


 


Platelet Count


 365 x10^3/uL


(140-400)


 


Neutrophils (%) (Auto) 91 % (31-73) 


 


Lymphocytes (%) (Auto) 3 % (24-48) 


 


Monocytes (%) (Auto) 5 % (0-9) 


 


Eosinophils (%) (Auto) 0 % (0-3) 


 


Basophils (%) (Auto) 1 % (0-3) 


 


Neutrophils # (Auto)


 28.3 x10^3/uL


(1.8-7.7)


 


Lymphocytes # (Auto)


 0.8 x10^3/uL


(1.0-4.8)


 


Monocytes # (Auto)


 1.5 x10^3/uL


(0.0-1.1)


 


Eosinophils # (Auto)


 0.1 x10^3/uL


(0.0-0.7)


 


Basophils # (Auto)


 0.3 x10^3/uL


(0.0-0.2)


 


Segmented Neutrophils % 88 % (35-66) 


 


Lymphocytes % 6 % (24-48) 


 


Monocytes % 5 % (0-10) 


 


Eosinophils % 1 % (0-5) 


 


Platelet Estimate


 Adequate


(ADEQUATE)


 


Large Platelets Occ 


 


Polychromasia Slight 


 


Poikilocytosis Slight 


 


Anisocytosis Slight 


 


Erythrocyte Sedimentation Rate 73 (0-15) 


 


Prothrombin Time


 15.1 SEC


(11.7-14.0)


 


Prothromb Time International


Ratio 1.2 (0.8-1.1) 





 


Activated Partial


Thromboplast Time 32 SEC (24-38) 





 


Sodium Level


 128 mmol/L


(136-145)


 


Potassium Level


 3.2 mmol/L


(3.5-5.1)


 


Chloride Level


 88 mmol/L


()


 


Carbon Dioxide Level


 29 mmol/L


(21-32)


 


Anion Gap 11 (6-14) 


 


Blood Urea Nitrogen


 44 mg/dL


(8-26)


 


Creatinine


 9.0 mg/dL


(0.7-1.3)


 


Estimated GFR


(Cockcroft-Gault) 7.5 





 


BUN/Creatinine Ratio 5 (6-20) 


 


Glucose Level


 121 mg/dL


(70-99)


 


Lactic Acid Level


 1.6 mmol/L


(0.4-2.0)


 


Calcium Level


 7.8 mg/dL


(8.5-10.1)


 


Magnesium Level


 1.2 mg/dL


(1.8-2.4)


 


Total Bilirubin


 0.6 mg/dL


(0.2-1.0)


 


Aspartate Amino Transf


(AST/SGOT) 17 U/L (15-37) 





 


Alanine Aminotransferase


(ALT/SGPT) 12 U/L (16-63) 





 


Alkaline Phosphatase


 150 U/L


()


 


C-Reactive Protein,


Quantitative 309.0 mg/L


(0-3.3)


 


Total Protein


 7.4 g/dL


(6.4-8.2)


 


Albumin


 1.3 g/dL


(3.4-5.0)


 


Albumin/Globulin Ratio 0.2 (1.0-1.7) 











Assessment/Plan


Assessment/Plan


IMP


RIGHT FOOT WOUND CONCERNING FOR NECROTIZING FASCIITIS


POORLY CONTROLLED DM II


ANEMIA


HYPOKALEMIA


ESRD


HTN HX


HX OF CM AND CHF








PLAN





REPLACE K


ANTIBIOTICS


WOUND CARE


ORTHO EVALUATION AND TX


PROB NEEDS AMPUTATION


APD TONIGHT


WILL FOLLOW











CONSTANTIN GOLDSMITH MD                 Dec 30, 2019 16:14

## 2019-12-30 NOTE — PDOC1
History and Physical


Date of Admission:


Date of Admission


DATE: 12/30/19 


TIME: 13:01





Chief Complaint:


Problems:  


(1) Hyperglycemia due to type 2 diabetes mellitus


(2) Renal failure (ARF), acute on chronic


(3) Peritoneal dialysis catheter dysfunction


(4) Muscle cramps


(5) Patient left without being seen


(6) DKA (diabetic ketoacidoses)


(7) Abdominal pain


(8) CHF exacerbation


(9) Sepsis


(10) Osteomyelitis


(11) Severe sepsis


(12) Diabetes


(13) CHF (congestive heart failure)


(14) Chronic kidney disease


Chief Complain:


Foot wound





History of Present Illness:


HPI:


Past Medical History:  CHF, Diabetes-Type II, High Cholesterol, Heart Disease, 

Renal Failure


Past Surgical History:  Pacemaker, Other





Patient is 54-year-old male with past medical history of diabetes, chronic 

kidney disease on peritoneal dialysis, and high cholesterol is presenting to the

emergency department with a nonhealing right foot wound for the last 2 weeks. 

Patient states that he has a history of foot ulcers, his last one was on the 

left side and he was admitted hospital for 30 days with that wound and almost 

needed an amputation. Patient states that his sugars have been all over the 

place the last couple months. Patient denies nausea, vomiting, fevers, chills, 

chest pain, shortness of breath, abdominal pain, constipation, diarrhea, 

dysuria, and increased frequency. Patient does reports some cough. Patient is 

presenting to the emergency department because he is tired of the smell and he 

is not able to take care of it on his own anymore. Patient denies any trauma.





Past Medical/Surgical History:


PMH/PSH:


Past Medical History:  CHF, Diabetes-Type II, High Cholesterol, Heart Disease, 

Renal Failure


Past Surgical History:  Pacemaker, Other


Additional Past Surgical Histo:  PERITONEAL DIALYSIS CATH PLACE LLQ, 

DEFIBRILLATOR


Additional Information:  


nonsmoker


Alcohol Use:  None


Drug Use:  None





Allergies:


Allergies:  


Coded Allergies:  


     No Known Drug Allergies (Unverified , 6/22/15)





Family History:


Family History:


DM





Social History:


Social Hisoty:


No smoking ETOH or drugs





Current Medications:


Current Medications





Current Medications


Clindamycin Phosphate 50 ml @  100 mls/hr 1X  ONCE IV  Last administered on 

12/30/19at 09:53;  Start 12/30/19 at 09:30;  Stop 12/30/19 at 09:59;  Status DC


Sodium Chloride 500 ml @  500 mls/hr 1X  ONCE IV  Last administered on 

12/30/19at 10:11;  Start 12/30/19 at 10:15;  Stop 12/30/19 at 11:14;  Status DC


Ondansetron HCl (Zofran) 4 mg 1X  ONCE IVP  Last administered on 12/30/19at 

10:34;  Start 12/30/19 at 10:30;  Stop 12/30/19 at 10:31;  Status DC


Famotidine (Pepcid Vial) 20 mg 1X  ONCE IVP  Last administered on 12/30/19at 

10:34;  Start 12/30/19 at 10:30;  Stop 12/30/19 at 10:31;  Status DC


Vancomycin HCl 2 gm/Sodium Chloride 500 ml @  250 mls/hr 1X  ONCE IV  Last 

administered on 12/30/19at 10:34;  Start 12/30/19 at 10:45;  Stop 12/30/19 at 

12:44;  Status DC


Ondansetron HCl (Zofran) 4 mg PRN Q8HRS  PRN IV NAUSEA/VOMITING;  Start 12/30/19

at 10:45;  Stop 12/31/19 at 10:44


Fentanyl Citrate (Fentanyl 2ml Vial) 50 mcg PRN Q2HRS  PRN IV PAIN;  Start 

12/30/19 at 10:45


Acetaminophen (Tylenol) 650 mg PRN Q4HRS  PRN PO FEVER;  Start 12/30/19 at 

10:45;  Stop 12/31/19 at 10:44


Insulin Human Lispro (HumaLOG) 0-5 UNITS TIDWMEALS SQ ;  Start 12/30/19 at 12:00


Dextrose (Dextrose 50%-Water Syringe) 12.5 gm PRN Q15MIN  PRN IV SEE COMMENTS;  

Start 12/30/19 at 10:45


Dextrose (Iv Dextrose 5%) 250 ml PRN Q15MIN  PRN IV SEE COMMENTS;  Start 

12/30/19 at 10:45


Sodium Chloride 500 ml @  500 mls/hr 1X  ONCE IV  Last administered on 

12/30/19at 12:13;  Start 12/30/19 at 12:15;  Stop 12/30/19 at 13:14





Active Scripts


Active


Ssd (Silver Sulfadiazine) 25 Gm Cream..g. 1 Carley TP DAILY 10 Days


Mupirocin Ointment (Mupirocin) 22 Gm Oint...g. 1 Carley TP BID 10 Days


Humalog (Insulin Lispro) 100 Unit/1 Ml Insuln.pen 10 Units SQ TIDWMEALS 30 Days


Lantus Solostar (Insulin Glargine,Hum.rec.anlog) 100 Unit/1 Ml Insuln.pen 30 

Units SQ QHS 30 Days


Metoclopramide Hcl 5 Mg Tablet 5 Mg PO PRN BFRMEALHC PRN


Culturelle (Lactobacillus Rhamnosus Gg) 1 Each Cap.sprink 1 Cap PO BID 14 Days


Loperamide (Loperamide Hcl) 2 Mg Capsule 2 Mg PO PRN Q15MIN PRN 10 Days


Tramadol Hcl 50 Mg Tablet 50 Mg PO PRN Q6HRS PRN


Baclofen 10 Mg Tablet 10 Mg PO PRN TID PRN 14 Days


Nystatin 100,000 Unit/1 Ml Oral.susp 5 Ml SWSW [id number] 30 Days


Promethazine Hcl 12.5 Mg Tablet 12.5 Mg PO PRN Q6HRS PRN 30 Days


Reported


Alison-Aime Tablet (Folic Acid/Vitamin B Comp W-C) 0.8 Mg Tablet 1 Tab PO DAILY 30

Days


Metolazone 5 Mg Tablet 5 Mg PO DAILY


Vitamin D3 (Cholecalciferol (Vitamin D3)) 5,000 Unit Tablet 1 Tab PO DAILY


Calcium Acetate 667 Mg Tablet 667 Mg PO TIDWMEALS


Renal Caps Softgel (Folic Acid/Vitamin B Comp W-C) 1 Mg Capsule 1 Cap PO DAILY


Tresiba Flextouch U-100 (Insulin Degludec) 100 Unit/1 Ml Insuln.pen 100 Unit SQ 


Carvedilol ** (Carvedilol) 12.5 Mg Tablet 1 Tab PO BIDWMEALS


Furosemide 40 Mg Tablet 1 Tab PO DAILY


Aspirin 81 Mg Tab.chew 81 Mg PO DAILYWBKFT





ROS:


Review of Systems


Review of System


REVIEW OF SYSTEMS:


GENERAL:  Denies weakness


SKIN:  No bruising, hair changes or rashes.


EYES:  No blurred, double or loss of vision.


NOSE AND THROAT:  No history of nosebleeds, hoarseness or sore throat.


HEART:  No history of palpitations, chest pain or shortness of breath on


exertion.


LUNGS:  Denies cough, hemoptysis, wheezing or shortness of breath.


GASTROINTESTINAL:  Denies changes in appetite, nausea, vomiting, diarrhea or


constipation.


GENITOURINARY:  No history of frequency, urgency, hesitancy or nocturia.


NEUROLOGIC:  Denies history of numbness, tingling, tremor or weakness.


PSYCHIATRIC:  No history of panic, anxiety or depression.


ENDOCRINE:  No history of heat or cold intolerance, polyuria or polydipsia.


EXTREMITIES: co l foot pain





Physical Exam:


Vital Signs:





Vital Signs








  Date Time  Temp Pulse Resp B/P (MAP) Pulse Ox O2 Delivery O2 Flow Rate FiO2


 


12/30/19 12:15  100 20 84/55 (65) 94 Room Air  


 


12/30/19 09:12 97.8       





 97.8       








Physcial Exam:


GEN:   No apparent distress.  Alert and oriented


HEENT:   Normal cephalic, atraumatic, external auditory canals are patent


EYES:   Extraocular muscles are intact, pupil are equally round and reactive to 

light and accommodation


MUSCULOSKELETAL:  Well developed , well nourished, good range of motion


ENDOCRINE:   Ny thyromegaly was palpated


LYMPHATICS:   No cervical chain or axillary nodes were noted


HEMATOPOIETIC:  No bruising


NECK:   Supple, no JVD, no thyromegaly was noted


LUNGS:   Clear to auscultation in all lung fields without rhonchi or wheezing


HEART:    RRR, S!, S2 present.  Peripheral pulses intact, no obvious murmurs 

noted


ABDOMEN:   Soft, nontender.  Positive bowel sounds, no organomegaly, normal 

bowel sounds


EXTREMITIES:   L foot with Diabetic lesion. See pict


NEUROLOGIC:   Normal speech and tone.  A&O x 3, moves all extremities, no 

obvious focal deficits


PSYCHIATRIC:   Normal affect, normal mood. Stable


SKIN:   No ulcerations or rashes, good skin turgor, no jaundice


VASCULAR:   Good capillary refill, neurovascular bundle appears to be intact





Labs:


Labs:





Laboratory Tests








Test


 12/30/19


09:30


 


White Blood Count


 31.1 x10^3/uL


(4.0-11.0)


 


Red Blood Count


 4.22 x10^6/uL


(4.30-5.70)


 


Hemoglobin


 11.3 g/dL


(13.0-17.5)


 


Hematocrit


 34.8 %


(39.0-53.0)


 


Mean Corpuscular Volume 82 fL () 


 


Mean Corpuscular Hemoglobin 27 pg (25-35) 


 


Mean Corpuscular Hemoglobin


Concent 33 g/dL


(31-37)


 


Red Cell Distribution Width


 15.4 %


(11.5-14.5)


 


Platelet Count


 365 x10^3/uL


(140-400)


 


Neutrophils (%) (Auto) 91 % (31-73) 


 


Lymphocytes (%) (Auto) 3 % (24-48) 


 


Monocytes (%) (Auto) 5 % (0-9) 


 


Eosinophils (%) (Auto) 0 % (0-3) 


 


Basophils (%) (Auto) 1 % (0-3) 


 


Neutrophils # (Auto)


 28.3 x10^3/uL


(1.8-7.7)


 


Lymphocytes # (Auto)


 0.8 x10^3/uL


(1.0-4.8)


 


Monocytes # (Auto)


 1.5 x10^3/uL


(0.0-1.1)


 


Eosinophils # (Auto)


 0.1 x10^3/uL


(0.0-0.7)


 


Basophils # (Auto)


 0.3 x10^3/uL


(0.0-0.2)


 


Segmented Neutrophils % 88 % (35-66) 


 


Lymphocytes % 6 % (24-48) 


 


Monocytes % 5 % (0-10) 


 


Eosinophils % 1 % (0-5) 


 


Platelet Estimate


 Adequate


(ADEQUATE)


 


Large Platelets Occ 


 


Polychromasia Slight 


 


Poikilocytosis Slight 


 


Anisocytosis Slight 


 


Erythrocyte Sedimentation Rate 73 (0-15) 


 


Prothrombin Time


 15.1 SEC


(11.7-14.0)


 


Prothromb Time International


Ratio 1.2 (0.8-1.1) 





 


Activated Partial


Thromboplast Time 32 SEC (24-38) 





 


Sodium Level


 128 mmol/L


(136-145)


 


Potassium Level


 3.2 mmol/L


(3.5-5.1)


 


Chloride Level


 88 mmol/L


()


 


Carbon Dioxide Level


 29 mmol/L


(21-32)


 


Anion Gap 11 (6-14) 


 


Blood Urea Nitrogen


 44 mg/dL


(8-26)


 


Creatinine


 9.0 mg/dL


(0.7-1.3)


 


Estimated GFR


(Cockcroft-Gault) 7.5 





 


BUN/Creatinine Ratio 5 (6-20) 


 


Glucose Level


 121 mg/dL


(70-99)


 


Lactic Acid Level


 1.6 mmol/L


(0.4-2.0)


 


Calcium Level


 7.8 mg/dL


(8.5-10.1)


 


Magnesium Level


 1.2 mg/dL


(1.8-2.4)


 


Total Bilirubin


 0.6 mg/dL


(0.2-1.0)


 


Aspartate Amino Transf


(AST/SGOT) 17 U/L (15-37) 





 


Alanine Aminotransferase


(ALT/SGPT) 12 U/L (16-63) 





 


Alkaline Phosphatase


 150 U/L


()


 


C-Reactive Protein,


Quantitative 309.0 mg/L


(0-3.3)


 


Total Protein


 7.4 g/dL


(6.4-8.2)


 


Albumin


 1.3 g/dL


(3.4-5.0)


 


Albumin/Globulin Ratio 0.2 (1.0-1.7) 








Laboratory Tests








Test


 12/30/19


09:30


 


White Blood Count


 31.1 x10^3/uL


(4.0-11.0)


 


Red Blood Count


 4.22 x10^6/uL


(4.30-5.70)


 


Hemoglobin


 11.3 g/dL


(13.0-17.5)


 


Hematocrit


 34.8 %


(39.0-53.0)


 


Mean Corpuscular Volume 82 fL () 


 


Mean Corpuscular Hemoglobin 27 pg (25-35) 


 


Mean Corpuscular Hemoglobin


Concent 33 g/dL


(31-37)


 


Red Cell Distribution Width


 15.4 %


(11.5-14.5)


 


Platelet Count


 365 x10^3/uL


(140-400)


 


Neutrophils (%) (Auto) 91 % (31-73) 


 


Lymphocytes (%) (Auto) 3 % (24-48) 


 


Monocytes (%) (Auto) 5 % (0-9) 


 


Eosinophils (%) (Auto) 0 % (0-3) 


 


Basophils (%) (Auto) 1 % (0-3) 


 


Neutrophils # (Auto)


 28.3 x10^3/uL


(1.8-7.7)


 


Lymphocytes # (Auto)


 0.8 x10^3/uL


(1.0-4.8)


 


Monocytes # (Auto)


 1.5 x10^3/uL


(0.0-1.1)


 


Eosinophils # (Auto)


 0.1 x10^3/uL


(0.0-0.7)


 


Basophils # (Auto)


 0.3 x10^3/uL


(0.0-0.2)


 


Segmented Neutrophils % 88 % (35-66) 


 


Lymphocytes % 6 % (24-48) 


 


Monocytes % 5 % (0-10) 


 


Eosinophils % 1 % (0-5) 


 


Platelet Estimate


 Adequate


(ADEQUATE)


 


Large Platelets Occ 


 


Polychromasia Slight 


 


Poikilocytosis Slight 


 


Anisocytosis Slight 


 


Erythrocyte Sedimentation Rate 73 (0-15) 


 


Prothrombin Time


 15.1 SEC


(11.7-14.0)


 


Prothromb Time International


Ratio 1.2 (0.8-1.1) 





 


Activated Partial


Thromboplast Time 32 SEC (24-38) 





 


Sodium Level


 128 mmol/L


(136-145)


 


Potassium Level


 3.2 mmol/L


(3.5-5.1)


 


Chloride Level


 88 mmol/L


()


 


Carbon Dioxide Level


 29 mmol/L


(21-32)


 


Anion Gap 11 (6-14) 


 


Blood Urea Nitrogen


 44 mg/dL


(8-26)


 


Creatinine


 9.0 mg/dL


(0.7-1.3)


 


Estimated GFR


(Cockcroft-Gault) 7.5 





 


BUN/Creatinine Ratio 5 (6-20) 


 


Glucose Level


 121 mg/dL


(70-99)


 


Lactic Acid Level


 1.6 mmol/L


(0.4-2.0)


 


Calcium Level


 7.8 mg/dL


(8.5-10.1)


 


Magnesium Level


 1.2 mg/dL


(1.8-2.4)


 


Total Bilirubin


 0.6 mg/dL


(0.2-1.0)


 


Aspartate Amino Transf


(AST/SGOT) 17 U/L (15-37) 





 


Alanine Aminotransferase


(ALT/SGPT) 12 U/L (16-63) 





 


Alkaline Phosphatase


 150 U/L


()


 


C-Reactive Protein,


Quantitative 309.0 mg/L


(0-3.3)


 


Total Protein


 7.4 g/dL


(6.4-8.2)


 


Albumin


 1.3 g/dL


(3.4-5.0)


 


Albumin/Globulin Ratio 0.2 (1.0-1.7) 











Assessment/Plan


Assessment/Plan


Past Medical History:  CHF, Diabetes-Type II, High Cholesterol, Heart Disease, 

Renal Failure


PDiabetic foot





Plan


IV antibx


Consult ortho 


Pain meds


DVT proph


Labs











PRINCESS MACARIO III DO           Dec 30, 2019 13:03

## 2019-12-30 NOTE — PDOC2
CONSULT


Date of Consult


Date of Consult


DATE: 19 


TIME: 15:23





Reason for Consult


Reason for Consult:


Right foot osteomyelitis





Identification/Chief Complaint


Chief Complaint


Right foot wound, and right foot bad odor





Source


Source:  Chart review, Patient





History of Present Illness


Reason for Visit:


Patient is 54-year-old male with past medical history of diabetes, chronic 

kidney disease on peritoneal dialysis, and high cholesterol is presenting to the

emergency department with a nonhealing right foot wound for the last 2 weeks. 

Patient states that he has a history of foot ulcers, his last one was on the 

left side and he was admitted hospital for 30 days with that wound and almost 

needed an amputation. Patient states that his sugars have been all over the 

place the last couple months. Patient denies nausea, vomiting, fevers, chills, 

chest pain, shortness of breath, abdominal pain, constipation, diarrhea, 

dysuria, and increased frequency. Patient does reports some cough. Patient is 

presenting to the emergency department because he is tired of the smell and he 

is not able to take care of it on his own anymore. Patient denies any trauma.  

He said he has had problems with the foot since October but is getting worse 

now. He does not recall ever being assessed for arterial vascular disease. He 

sounds like a type I diabetic, started on insulin when he was about 22 years 

old. He previously worked as a  but is unable to do so now and retired in 

.





Past Medical History


Cardiovascular:  CHF, HTN, Hyperlipidemia


CENTRAL NERVOUS SYSTEM:  Other


GI:  GERD


Heme/Onc:  Anemia NOS


Musculoskeletal:  Osteoarthritis


Renal/:  Chronic renal failure


Endocrine:  Diabetes





Past Surgical History


Past Surgical History:  Pacemaker, Other





Family History


Family History:  Other





Social History


Social History


He lives with his wife. He worked for many years as a  in [place] and 

then in [place], but retired in  due to his medical issues.


ALCOHOL:  none


Drugs:  None


Lives:  with Family


Domestic Violence:  Neg





Current Problem List


Problem List


Problems


Medical Problems:


(1) CHF (congestive heart failure)


Status: Acute  





(2) Chronic kidney disease


Status: Acute  





(3) Diabetes


Status: Acute  





(4) Sepsis


Status: Acute  





(5) Severe sepsis


Status: Acute  











Current Medications


Current Medications





Current Medications


Clindamycin Phosphate 50 ml @  100 mls/hr 1X  ONCE IV  Last administered on 

19at 09:53;  Start 19 at 09:30;  Stop 19 at 09:59;  Status DC


Sodium Chloride 500 ml @  500 mls/hr 1X  ONCE IV  Last administered on 

19at 10:11;  Start 19 at 10:15;  Stop 19 at 11:14;  Status DC


Ondansetron HCl (Zofran) 4 mg 1X  ONCE IVP  Last administered on 19at 

10:34;  Start 19 at 10:30;  Stop 19 at 10:31;  Status DC


Famotidine (Pepcid Vial) 20 mg 1X  ONCE IVP  Last administered on 19at 

10:34;  Start 19 at 10:30;  Stop 19 at 10:31;  Status DC


Vancomycin HCl 2 gm/Sodium Chloride 500 ml @  250 mls/hr 1X  ONCE IV  Last 

administered on 19at 10:34;  Start 19 at 10:45;  Stop 19 at 

12:44;  Status DC


Ondansetron HCl (Zofran) 4 mg PRN Q8HRS  PRN IV NAUSEA/VOMITING;  Start 19

at 10:45;  Stop 19 at 10:44


Fentanyl Citrate (Fentanyl 2ml Vial) 50 mcg PRN Q2HRS  PRN IV PAIN;  Start 

19 at 10:45


Acetaminophen (Tylenol) 650 mg PRN Q4HRS  PRN PO FEVER;  Start 19 at 

10:45;  Stop 19 at 10:44


Insulin Human Lispro (HumaLOG) 0-5 UNITS TIDWMEALS SQ ;  Start 19 at 12:00


Dextrose (Dextrose 50%-Water Syringe) 12.5 gm PRN Q15MIN  PRN IV SEE COMMENTS;  

Start 19 at 10:45


Dextrose (Iv Dextrose 5%) 250 ml PRN Q15MIN  PRN IV SEE COMMENTS;  Start 

19 at 10:45


Sodium Chloride 500 ml @  500 mls/hr 1X  ONCE IV  Last administered on 

19at 12:13;  Start 19 at 12:15;  Stop 19 at 13:14;  Status DC


Magnesium Sulfate 50 ml @ 25 mls/hr 1X  ONCE IV  Last administered on 19at

14:49;  Start 19 at 14:00;  Stop 19 at 15:59





Active Scripts


Active


Ssd (Silver Sulfadiazine) 25 Gm Cream..g. 1 Carley TP DAILY 10 Days


Mupirocin Ointment (Mupirocin) 22 Gm Oint...g. 1 Carley TP BID 10 Days


Humalog (Insulin Lispro) 100 Unit/1 Ml Insuln.pen 10 Units SQ TIDWMEALS 30 Days


Lantus Solostar (Insulin Glargine,Hum.rec.anlog) 100 Unit/1 Ml Insuln.pen 30 

Units SQ QHS 30 Days


Metoclopramide Hcl 5 Mg Tablet 5 Mg PO PRN BFRMEALHC PRN


Culturelle (Lactobacillus Rhamnosus Gg) 1 Each Cap.sprink 1 Cap PO BID 14 Days


Loperamide (Loperamide Hcl) 2 Mg Capsule 2 Mg PO PRN Q15MIN PRN 10 Days


Tramadol Hcl 50 Mg Tablet 50 Mg PO PRN Q6HRS PRN


Baclofen 10 Mg Tablet 10 Mg PO PRN TID PRN 14 Days


Nystatin 100,000 Unit/1 Ml Oral.susp 5 Ml SWSW [id number] 30 Days


Promethazine Hcl 12.5 Mg Tablet 12.5 Mg PO PRN Q6HRS PRN 30 Days


Reported


Alison-Aime Tablet (Folic Acid/Vitamin B Comp W-C) 0.8 Mg Tablet 1 Tab PO DAILY 30

Days


Metolazone 5 Mg Tablet 5 Mg PO DAILY


Vitamin D3 (Cholecalciferol (Vitamin D3)) 5,000 Unit Tablet 1 Tab PO DAILY


Calcium Acetate 667 Mg Tablet 667 Mg PO TIDWMEALS


Renal Caps Softgel (Folic Acid/Vitamin B Comp W-C) 1 Mg Capsule 1 Cap PO DAILY


Tresiba Flextouch U-100 (Insulin Degludec) 100 Unit/1 Ml Insuln.pen 100 Unit SQ 


Carvedilol ** (Carvedilol) 12.5 Mg Tablet 1 Tab PO BIDWMEALS


Furosemide 40 Mg Tablet 1 Tab PO DAILY


Aspirin 81 Mg Tab.chew 81 Mg PO DAILYWBKFT





Allergies


Allergies:  


Coded Allergies:  


     No Known Drug Allergies (Unverified , 6/22/15)





Physical Exam


General:  Alert, Cooperative


HEENT:  Atraumatic


Lungs:  Normal air movement


Heart:  Regular rate


Extremities:  No edema, Other (decreased pulses in the right foot. I cannot 

palpate a pulse although there may be a faint pulse.)


Skin:  Other (there is dry gangrene of the great toe and wet gangrene of the 

lateral aspect of foot over the fifth metatarsal. There is a bad odor.)


Neuro:  Other (severe peripheral neuropathy, almost absent sensation in the 

feet)





Vitals


VITALS





Vital Signs








  Date Time  Temp Pulse Resp B/P (MAP) Pulse Ox O2 Delivery O2 Flow Rate FiO2


 


19 14:30 98.4 74 18 82/52 (62) 94   





 98.4       


 


19 12:45      Room Air  











Labs


Labs





Laboratory Tests








Test


 19


09:30


 


White Blood Count


 31.1 x10^3/uL


(4.0-11.0)


 


Red Blood Count


 4.22 x10^6/uL


(4.30-5.70)


 


Hemoglobin


 11.3 g/dL


(13.0-17.5)


 


Hematocrit


 34.8 %


(39.0-53.0)


 


Mean Corpuscular Volume 82 fL () 


 


Mean Corpuscular Hemoglobin 27 pg (25-35) 


 


Mean Corpuscular Hemoglobin


Concent 33 g/dL


(31-37)


 


Red Cell Distribution Width


 15.4 %


(11.5-14.5)


 


Platelet Count


 365 x10^3/uL


(140-400)


 


Neutrophils (%) (Auto) 91 % (31-73) 


 


Lymphocytes (%) (Auto) 3 % (24-48) 


 


Monocytes (%) (Auto) 5 % (0-9) 


 


Eosinophils (%) (Auto) 0 % (0-3) 


 


Basophils (%) (Auto) 1 % (0-3) 


 


Neutrophils # (Auto)


 28.3 x10^3/uL


(1.8-7.7)


 


Lymphocytes # (Auto)


 0.8 x10^3/uL


(1.0-4.8)


 


Monocytes # (Auto)


 1.5 x10^3/uL


(0.0-1.1)


 


Eosinophils # (Auto)


 0.1 x10^3/uL


(0.0-0.7)


 


Basophils # (Auto)


 0.3 x10^3/uL


(0.0-0.2)


 


Segmented Neutrophils % 88 % (35-66) 


 


Lymphocytes % 6 % (24-48) 


 


Monocytes % 5 % (0-10) 


 


Eosinophils % 1 % (0-5) 


 


Platelet Estimate


 Adequate


(ADEQUATE)


 


Large Platelets Occ 


 


Polychromasia Slight 


 


Poikilocytosis Slight 


 


Anisocytosis Slight 


 


Erythrocyte Sedimentation Rate 73 (0-15) 


 


Prothrombin Time


 15.1 SEC


(11.7-14.0)


 


Prothromb Time International


Ratio 1.2 (0.8-1.1) 





 


Activated Partial


Thromboplast Time 32 SEC (24-38) 





 


Sodium Level


 128 mmol/L


(136-145)


 


Potassium Level


 3.2 mmol/L


(3.5-5.1)


 


Chloride Level


 88 mmol/L


()


 


Carbon Dioxide Level


 29 mmol/L


(21-32)


 


Anion Gap 11 (6-14) 


 


Blood Urea Nitrogen


 44 mg/dL


(8-26)


 


Creatinine


 9.0 mg/dL


(0.7-1.3)


 


Estimated GFR


(Cockcroft-Gault) 7.5 





 


BUN/Creatinine Ratio 5 (6-20) 


 


Glucose Level


 121 mg/dL


(70-99)


 


Lactic Acid Level


 1.6 mmol/L


(0.4-2.0)


 


Calcium Level


 7.8 mg/dL


(8.5-10.1)


 


Magnesium Level


 1.2 mg/dL


(1.8-2.4)


 


Total Bilirubin


 0.6 mg/dL


(0.2-1.0)


 


Aspartate Amino Transf


(AST/SGOT) 17 U/L (15-37) 





 


Alanine Aminotransferase


(ALT/SGPT) 12 U/L (16-63) 





 


Alkaline Phosphatase


 150 U/L


()


 


C-Reactive Protein,


Quantitative 309.0 mg/L


(0-3.3)


 


Total Protein


 7.4 g/dL


(6.4-8.2)


 


Albumin


 1.3 g/dL


(3.4-5.0)


 


Albumin/Globulin Ratio 0.2 (1.0-1.7) 








Laboratory Tests








Test


 19


09:30


 


White Blood Count


 31.1 x10^3/uL


(4.0-11.0)


 


Red Blood Count


 4.22 x10^6/uL


(4.30-5.70)


 


Hemoglobin


 11.3 g/dL


(13.0-17.5)


 


Hematocrit


 34.8 %


(39.0-53.0)


 


Mean Corpuscular Volume 82 fL () 


 


Mean Corpuscular Hemoglobin 27 pg (25-35) 


 


Mean Corpuscular Hemoglobin


Concent 33 g/dL


(31-37)


 


Red Cell Distribution Width


 15.4 %


(11.5-14.5)


 


Platelet Count


 365 x10^3/uL


(140-400)


 


Neutrophils (%) (Auto) 91 % (31-73) 


 


Lymphocytes (%) (Auto) 3 % (24-48) 


 


Monocytes (%) (Auto) 5 % (0-9) 


 


Eosinophils (%) (Auto) 0 % (0-3) 


 


Basophils (%) (Auto) 1 % (0-3) 


 


Neutrophils # (Auto)


 28.3 x10^3/uL


(1.8-7.7)


 


Lymphocytes # (Auto)


 0.8 x10^3/uL


(1.0-4.8)


 


Monocytes # (Auto)


 1.5 x10^3/uL


(0.0-1.1)


 


Eosinophils # (Auto)


 0.1 x10^3/uL


(0.0-0.7)


 


Basophils # (Auto)


 0.3 x10^3/uL


(0.0-0.2)


 


Segmented Neutrophils % 88 % (35-66) 


 


Lymphocytes % 6 % (24-48) 


 


Monocytes % 5 % (0-10) 


 


Eosinophils % 1 % (0-5) 


 


Platelet Estimate


 Adequate


(ADEQUATE)


 


Large Platelets Occ 


 


Polychromasia Slight 


 


Poikilocytosis Slight 


 


Anisocytosis Slight 


 


Erythrocyte Sedimentation Rate 73 (0-15) 


 


Prothrombin Time


 15.1 SEC


(11.7-14.0)


 


Prothromb Time International


Ratio 1.2 (0.8-1.1) 





 


Activated Partial


Thromboplast Time 32 SEC (24-38) 





 


Sodium Level


 128 mmol/L


(136-145)


 


Potassium Level


 3.2 mmol/L


(3.5-5.1)


 


Chloride Level


 88 mmol/L


()


 


Carbon Dioxide Level


 29 mmol/L


(21-32)


 


Anion Gap 11 (6-14) 


 


Blood Urea Nitrogen


 44 mg/dL


(8-26)


 


Creatinine


 9.0 mg/dL


(0.7-1.3)


 


Estimated GFR


(Cockcroft-Gault) 7.5 





 


BUN/Creatinine Ratio 5 (6-20) 


 


Glucose Level


 121 mg/dL


(70-99)


 


Lactic Acid Level


 1.6 mmol/L


(0.4-2.0)


 


Calcium Level


 7.8 mg/dL


(8.5-10.1)


 


Magnesium Level


 1.2 mg/dL


(1.8-2.4)


 


Total Bilirubin


 0.6 mg/dL


(0.2-1.0)


 


Aspartate Amino Transf


(AST/SGOT) 17 U/L (15-37) 





 


Alanine Aminotransferase


(ALT/SGPT) 12 U/L (16-63) 





 


Alkaline Phosphatase


 150 U/L


()


 


C-Reactive Protein,


Quantitative 309.0 mg/L


(0-3.3)


 


Total Protein


 7.4 g/dL


(6.4-8.2)


 


Albumin


 1.3 g/dL


(3.4-5.0)


 


Albumin/Globulin Ratio 0.2 (1.0-1.7) 











Images


Images


I reviewed the photos of the foot showing the severe ischemic changes of the 

distal great toe, and the wet-appearing gangrene of the lateral aspect of the 

foot with necrotic nonviable tissue.





Report reviewed, images independently reviewed.  Probable osteomyelitis of the 

fifth metatarsal with apparent bone destruction. Soft-tissue gas is present in 

the foot and probably in the lower calf area. 





[facility]  


                              [address] [place] [address]  


                                           [phone]  


 


                                           IMAGING REPORT  


 


                                               Signed  


 


PATIENT: BLAISE DOWNING           ACCOUNT: [id number]            MRN#: 

[id number]  


: 1965                  LOCATION: ER                     AGE: 54  


SEX: M                           EXAM DT: 19                ACCESSION#: 

[id number]  


STATUS: REG ER                   ORD. PHYSICIAN: ADALI DURAN DO  


REASON: cough  


PROCEDURE: FOOT RIGHT 3V  


 


Examination: FOOT RIGHT 3V  


 


 History: Nonhealing wound for past 2 months.  


 


 Comparison/Correlation: None  


 


 Findings: Total 3 images the right foot were obtained. The distal first   


 and second digits are not included on the oblique view. Overlying bandages  


 at the lateral aspect of the midfoot and forefoot may limit evaluation for  


 fine detail.  


 


 Extensive soft tissue gas is present involving the left foot. This is   


 primarily evident at the midfoot and metatarsophalangeal region. Soft   


 tissue gas is evident at the plantar aspect of the hindfoot and midfoot.   


 Soft tissue gas is also evident at the posterior distal calf and posterior  


 to the ankle. The superior extent of soft tissue gas involving the calf is  


 not included on this exam.  


 


 Deformity of the second and third metatarsal heads noted. There are   


 metatarsophalangeal joint narrowing present. Suspicion of lytic   


 involvement of the fifth metatarsal bone distally is raised. Overlying   


 soft tissue gas limits assessment.  


 


 Impression:  


 Significant soft tissue gas involving the right foot. Examination of soft   


 tissue gas into the distal calf region. Full extent of involvement is not   


 included on this exam.  


 Findings of significant concern for necrotizing fasciitis. Possibly loss   


 myelitis involving the fifth tarsal bone distally is also raised.   


 Assessment for bony destruction is limited with soft tissue gas present.  


 


 Electronically signed by: Lucas Jaramillo MD (2019 10:40 AM) [facility]  


 


 


 


 


DICTATED and SIGNED BY:     LUCAS JARAMILLO MD  


DATE:     19 1040





Assessment/Plan


Assessment/Plan


In the short-term I recommended extensive debridement.  This should help prevent

sepsis and worsening complications from the dead tissue and infection. I believe

he will need amputation of the tip of the great toe since it is nonviable. I can

do a debridement today of the fifth metatarsal and the lateral border of the 

foot and then further assess. He may require a below-knee amputation in the 

intermediate term, possibly during his hospitalization but he is not agreeable 

to proceed with below-knee amputation at this time and it may not be necessary 

depending on blood flow, and viability of the local tissues. He is in agreement 

to proceed with extensive debridement possible tip of the great toe amputation 

and other procedures in the future. All of his questions were answered. The 

surgical site was marked me.











NICOLA HANEY MD                 Dec 30, 2019 15:25

## 2019-12-30 NOTE — PHYS DOC
Past Medical History


Past Medical History:  CHF, Diabetes-Type II, High Cholesterol, Heart Disease, 

Renal Failure


Past Surgical History:  Pacemaker, Other


Additional Past Surgical Histo:  PERITONEAL DIALYSIS CATH PLACE [facility]E


Additional Information:  


nonsmoker


Alcohol Use:  None


Drug Use:  None





Adult General


Chief Complaint


Chief Complaint:  WOUND CHECK





HPI


HPI


Patient is 54-year-old male with past medical history of diabetes, chronic 

kidney disease on peritoneal dialysis, and high cholesterol is presenting to the

emergency department with a nonhealing right foot wound for the last 2 weeks. 

Patient states that he has a history of foot ulcers, his last one was on the 

left side and he was admitted hospital for 30 days with that wound and almost 

needed an amputation. Patient states that his sugars have been all over the 

place the last couple months. Patient denies nausea, vomiting, fevers, chills, 

chest pain, shortness of breath, abdominal pain, constipation, diarrhea, 

dysuria, and increased frequency. Patient does reports some cough. Patient is 

presenting to the emergency department because he is tired of the smell and he 

is not able to take care of it on his own anymore. Patient denies any trauma.





Review of Systems


Review of Systems


Constitutional: Denies fever or chills 


Eyes: Denies redness or eye pain 


HENT: Denies nasal congestion or sore throat


Respiratory: Reports cough; denies shortness of breath 


Cardiovascular: Denies chest pain or palpitations


GI: Denies abdominal pain


: Denies dysuria or hematuria


Musculoskeletal: Denies back pain or joint pain


Integument: Reports blackened nonhealing right-sided foot wound


Neurologic: Denies headache, focal weakness or sensory changes





Complete systems were reviewed and found to be within normal limits, except as 

documented in this note.





Current Medications


Current Medications





Current Medications








 Medications


  (Trade)  Dose


 Ordered  Sig/Ivonne  Start Time


 Stop Time Status Last Admin


Dose Admin


 


 Clindamycin


 Phosphate  50 ml @ 


 100 mls/hr  1X  ONCE  12/30/19 09:30


 12/30/19 09:59 DC 12/30/19 09:53


100 MLS/HR


 


 Famotidine


  (Pepcid Vial)  20 mg  1X  ONCE  12/30/19 10:30


 12/30/19 10:31 DC 12/30/19 10:34


20 MG


 


 Ondansetron HCl


  (Zofran)  4 mg  1X  ONCE  12/30/19 10:30


 12/30/19 10:31 DC 12/30/19 10:34


4 MG


 


 Sodium Chloride  500 ml @ 


 500 mls/hr  1X  ONCE  12/30/19 10:15


 12/30/19 11:14 DC 12/30/19 10:11


500 MLS/HR











Allergies


Allergies





Allergies








Coded Allergies Type Severity Reaction Last Updated Verified


 


  No Known Drug Allergies    6/22/15 No











Physical Exam


Physical Exam


Constitutional: Well developed, well nourished, no acute distress, non-toxic 

appearance


HENT: Normocephalic, atraumatic, oropharynx moist


Eyes: Conjunctiva normal, no discharge


Neck: Normal range of motion, no tenderness, supple


Cardiovascular: Heart rate normal, regular rhythm


Lungs & Thorax:  Bilateral breath sounds clear to auscultation, no wheezing


Abdomen: Soft, no tenderness, distended, no peritoneal signs


Skin: Warm, dry, unstageable chronic ulcer to the lateral aspect of the right 

foot, necrotic tissue and pus present, necrosis of the little toe of the right 

foot present


Extremities: No tenderness, ROM intact, no edema


Neurologic: Alert and oriented X 3, no focal deficits noted


Psychologic: Affect normal, judgement normal





Current Patient Data


Vital Signs





                                   Vital Signs








  Date Time  Temp Pulse Resp B/P (MAP) Pulse Ox O2 Delivery O2 Flow Rate FiO2


 


12/30/19 10:15  112 20 139/66 (90) 97 Room Air  


 


12/30/19 09:12 97.8       





 97.8       








Lab Values





                                Laboratory Tests








Test


 12/30/19


09:30


 


White Blood Count


 31.1 x10^3/uL


(4.0-11.0)  H


 


Red Blood Count


 4.22 x10^6/uL


(4.30-5.70)  L


 


Hemoglobin


 11.3 g/dL


(13.0-17.5)  L


 


Hematocrit


 34.8 %


(39.0-53.0)  L


 


Mean Corpuscular Volume


 82 fL ()





 


Mean Corpuscular Hemoglobin 27 pg (25-35)  


 


Mean Corpuscular Hemoglobin


Concent 33 g/dL


(31-37)


 


Red Cell Distribution Width


 15.4 %


(11.5-14.5)  H


 


Platelet Count


 365 x10^3/uL


(140-400)


 


Neutrophils (%) (Auto) 91 % (31-73)  H


 


Lymphocytes (%) (Auto) 3 % (24-48)  L


 


Monocytes (%) (Auto) 5 % (0-9)  


 


Eosinophils (%) (Auto) 0 % (0-3)  


 


Basophils (%) (Auto) 1 % (0-3)  


 


Neutrophils # (Auto)


 28.3 x10^3/uL


(1.8-7.7)  H


 


Lymphocytes # (Auto)


 0.8 x10^3/uL


(1.0-4.8)  L


 


Monocytes # (Auto)


 1.5 x10^3/uL


(0.0-1.1)  H


 


Eosinophils # (Auto)


 0.1 x10^3/uL


(0.0-0.7)


 


Basophils # (Auto)


 0.3 x10^3/uL


(0.0-0.2)  H


 


Segmented Neutrophils % 88 % (35-66)  H


 


Lymphocytes % 6 % (24-48)  L


 


Monocytes % 5 % (0-10)  


 


Eosinophils % 1 % (0-5)  


 


Platelet Estimate


 Adequate


(ADEQUATE)


 


Large Platelets Occ  


 


Polychromasia Slight  


 


Poikilocytosis Slight  


 


Anisocytosis Slight  


 


Erythrocyte Sedimentation Rate 73 (0-15)  H


 


Prothrombin Time


 15.1 SEC


(11.7-14.0)  H


 


Prothrombin Time INR


 1.2 (0.8-1.1)


H


 


Activated Partial


Thromboplast Time 32 SEC (24-38)





 


Sodium Level


 128 mmol/L


(136-145)  L


 


Potassium Level


 3.2 mmol/L


(3.5-5.1)  L


 


Chloride Level


 88 mmol/L


()  L


 


Carbon Dioxide Level


 29 mmol/L


(21-32)


 


Anion Gap 11 (6-14)  


 


Blood Urea Nitrogen


 44 mg/dL


(8-26)  H


 


Creatinine


 9.0 mg/dL


(0.7-1.3)  H


 


Estimated GFR


(Cockcroft-Gault) 7.5  





 


BUN/Creatinine Ratio 5 (6-20)  L


 


Glucose Level


 121 mg/dL


(70-99)  H


 


Lactic Acid Level


 1.6 mmol/L


(0.4-2.0)


 


Calcium Level


 7.8 mg/dL


(8.5-10.1)  L


 


Magnesium Level


 1.2 mg/dL


(1.8-2.4)  L


 


Total Bilirubin


 0.6 mg/dL


(0.2-1.0)


 


Aspartate Amino Transferase


(AST) 17 U/L (15-37)





 


Alanine Aminotransferase (ALT)


 12 U/L (16-63)


L


 


Alkaline Phosphatase


 150 U/L


()  H


 


C-Reactive Protein,


Quantitative 309.0 mg/L


(0-3.3)  H


 


Total Protein


 7.4 g/dL


(6.4-8.2)


 


Albumin


 1.3 g/dL


(3.4-5.0)  L


 


Albumin/Globulin Ratio


 0.2 (1.0-1.7)


L





                                Laboratory Tests


12/30/19 09:30








                                Laboratory Tests


12/30/19 09:30











EKG


EKG


[]





Radiology/Procedures


Radiology/Procedures


PROCEDURE: CHEST PA & LATERAL





 


AP and Lateral Views of the Chest  12/30/2019 9:24 AM


 


Indication: Cough 


 


Comparison: chest radiograph October 19, 2018


 


Findings: There is a dual-lead pacemaking/ICD device from left subclavian 


approach, grossly unchanged in position. No pneumothorax or pleural 


effusion is seen. Mild interstitial coarsening is noted throughout the 


lungs, similar to prior study. Mild cardiomegaly, stable.


 


 


 


IMPRESSION: 


1.No evidence of acute cardiopulmonary process.


2. Similar mild cardiomegaly.


 


Electronically signed by: James Real MD (12/30/2019 10:27 AM) [id number]





PROCEDURE: FOOT RIGHT 3V





Examination: FOOT RIGHT 3V


 


History: Nonhealing wound for past 2 months.


 


Comparison/Correlation: None


 


Findings: Total 3 images the right foot were obtained. The distal first 


and second digits are not included on the oblique view. Overlying bandages


at the lateral aspect of the midfoot and forefoot may limit evaluation for


fine detail.


 


Extensive soft tissue gas is present involving the left foot. This is 


primarily evident at the midfoot and metatarsophalangeal region. Soft 


tissue gas is evident at the plantar aspect of the hindfoot and midfoot. 


Soft tissue gas is also evident at the posterior distal calf and posterior


to the ankle. The superior extent of soft tissue gas involving the calf is


not included on this exam.


 


Deformity of the second and third metatarsal heads noted. There are 


metatarsophalangeal joint narrowing present. Suspicion of lytic 


involvement of the fifth metatarsal bone distally is raised. Overlying 


soft tissue gas limits assessment.


 


Impression:


Significant soft tissue gas involving the right foot. Examination of soft 


tissue gas into the distal calf region. Full extent of involvement is not 


included on this exam.


Findings of significant concern for necrotizing fasciitis. Possibly loss 


myelitis involving the fifth tarsal bone distally is also raised. 


Assessment for bony destruction is limited with soft tissue gas present.


 


Electronically signed by: Lucas Deluca MD (12/30/2019 10:40 AM) [facility]





Course & Med Decision Making


Course & Med Decision Making


Pertinent Labs and Imaging studies reviewed. (See chart for details)





Patient is a 54-year-old male with a past medical history of diabetes, high 

cholesterol, heart disease, and chronic kidney disease on peritoneal dialysis 

that is presenting to emergency Department with a two-week nonhealing right-

sided foot ulcer.


Patient was seen and examined at bedside.


Physical exam was significant for right-sided ulcer


Patient states that he has a history of nonhealing ulcers, his last ulcer was on

 the left side and he was admitted for 30 days for treatment of that ulcer.


Labs and imaging ordered.


Chest x-ray without acute process, right foot x-ray indicative of osteomyelitis.


Sirs criteria met, with infection source being osteomyelitis, severe sepsis met 

due to low BP.  Patient chronically low due to ESRD on peritoneal dialysis.  IVF

 bolus of total 1 liter given.


Broad spectrum antibiotics started for osteomyelitis.


Lactic acid of 1.6, white count of 31.1, other labs at baseline.


Patient will be admitted for further treatment of osteomyelitis, patient is in 

agreement with the plan.





Patient requiring admission for further evaluation and treatment. Discussed with

 Dr. Vigil (hospitalist) who is in agreement with admission. Discussed findings

 and plan with patient and family, who acknowledge understanding and agreement.





Dragon Disclaimer


Dragon Disclaimer


This electronic medical record was generated, in whole or in part, using a voice

 recognition dictation system.





Departure


Departure


Impression:  


   Primary Impression:  


   Severe sepsis


   Additional Impressions:  


   Osteomyelitis


   Chronic kidney disease


   Diabetes


   CHF (congestive heart failure)


Disposition:  09 ADMITTED AS INPATIENT


Admitting Physician:  HIMS ([place])


Condition:  GUARDED


Referrals:  


ANKUR CRUZ MD (PCP)





Date and Time of Reassessment


Date:  Dec 30, 2019


Time:  11:00





Fluid Challenge


Is the fluid challenge complet:  No


IBW Target Volume Used:  No


BMI > 30:  No





Vital Signs


Vital Signs:





Vital Signs








  Date Time  Temp Pulse Resp B/P (MAP) Pulse Ox O2 Delivery O2 Flow Rate FiO2


 


12/30/19 10:15  112 20 139/66 (90) 97 Room Air  


 


12/30/19 09:12 97.8       





 97.8       








Temperature Source:  Oral





Respirations


Respiratory Effort:  Normal


Respiratory Pattern:  Normal





Cardiovascular


Pulse Rhythm:  Regular


Heart:  Nml rate, reg. rhythm





Lung Sounds


Breath Sounds:  Clear





Capillary Refil


Capillary Refill:  Rt Hand < 3 seconds





Peripheral Pulse


Pulse Location:  Radial


Pulse Strength:  Normal (2+)


Pulse Assessment Method:  Palpation





Integumentary


Skin:  Warm, Dry, Other (right foot eschar/swelling)


Skin Moisture:  Dry


Skin Turgor:  Normal


Skin Color:  warm, dry


Fingernail Color:  WNL





Critical Care Time


Critical care time was 30 minutes which includes time at bedside, spent in 

discussion of patient's care with specialists and/or family members, with 

interpretation of laboratory and/or radiological studies and is exclusive of 

procedures.





Problem Qualifiers








   Additional Impressions:  


   Osteomyelitis


   Osteomyelitis type:  unspecified type  Osteomyelitis location:  foot  

   Laterality:  right  Qualified Codes:  M86.9 - Osteomyelitis, unspecified


   Chronic kidney disease


   Chronic kidney disease stage:  on chronic dialysis  Qualified Codes:  N18.6 -

    End stage renal disease; Z99.2 - Dependence on renal dialysis


   Diabetes


   Diabetes mellitus type:  type 2  Diabetes mellitus long term insulin use:  

   unspecified long term insulin use status  Diabetes mellitus complication 

   status:  with kidney complications  Diabetes mellitus complication detail:  

   with chronic kidney disease  Chronic kidney disease stage:  on chronic 

   dialysis  Qualified Codes:  E11.22 - Type 2 diabetes mellitus with diabetic 

   chronic kidney disease; N18.6 - End stage renal disease; Z99.2 - Dependence 

   on renal dialysis


   CHF (congestive heart failure)


   Heart failure type:  unspecified  Heart failure chronicity:  chronic  

   Qualified Codes:  I50.9 - Heart failure, unspecified








ADALI DURAN DO             Dec 30, 2019 09:34

## 2019-12-31 VITALS — SYSTOLIC BLOOD PRESSURE: 85 MMHG | DIASTOLIC BLOOD PRESSURE: 54 MMHG

## 2019-12-31 VITALS — SYSTOLIC BLOOD PRESSURE: 91 MMHG | DIASTOLIC BLOOD PRESSURE: 66 MMHG

## 2019-12-31 VITALS — DIASTOLIC BLOOD PRESSURE: 57 MMHG | SYSTOLIC BLOOD PRESSURE: 86 MMHG

## 2019-12-31 VITALS — SYSTOLIC BLOOD PRESSURE: 93 MMHG | DIASTOLIC BLOOD PRESSURE: 63 MMHG

## 2019-12-31 VITALS — DIASTOLIC BLOOD PRESSURE: 55 MMHG | SYSTOLIC BLOOD PRESSURE: 90 MMHG

## 2019-12-31 VITALS — DIASTOLIC BLOOD PRESSURE: 65 MMHG | SYSTOLIC BLOOD PRESSURE: 98 MMHG

## 2019-12-31 VITALS — SYSTOLIC BLOOD PRESSURE: 89 MMHG | DIASTOLIC BLOOD PRESSURE: 51 MMHG

## 2019-12-31 VITALS — DIASTOLIC BLOOD PRESSURE: 65 MMHG | SYSTOLIC BLOOD PRESSURE: 97 MMHG

## 2019-12-31 VITALS — DIASTOLIC BLOOD PRESSURE: 66 MMHG | SYSTOLIC BLOOD PRESSURE: 95 MMHG

## 2019-12-31 VITALS — DIASTOLIC BLOOD PRESSURE: 63 MMHG | SYSTOLIC BLOOD PRESSURE: 81 MMHG

## 2019-12-31 LAB
ANION GAP SERPL CALC-SCNC: 10 MMOL/L (ref 6–14)
BUN SERPL-MCNC: 44 MG/DL (ref 8–26)
CALCIUM SERPL-MCNC: 7.1 MG/DL (ref 8.5–10.1)
CHLORIDE SERPL-SCNC: 91 MMOL/L (ref 98–107)
CO2 SERPL-SCNC: 26 MMOL/L (ref 21–32)
CREAT SERPL-MCNC: 8.8 MG/DL (ref 0.7–1.3)
GFR SERPLBLD BASED ON 1.73 SQ M-ARVRAT: 7.7 ML/MIN
GLUCOSE SERPL-MCNC: 452 MG/DL (ref 70–99)
MAGNESIUM SERPL-MCNC: 1.6 MG/DL (ref 1.8–2.4)
POTASSIUM SERPL-SCNC: 3.2 MMOL/L (ref 3.5–5.1)
SODIUM SERPL-SCNC: 127 MMOL/L (ref 136–145)

## 2019-12-31 PROCEDURE — 0JBQ0ZZ EXCISION OF RIGHT FOOT SUBCUTANEOUS TISSUE AND FASCIA, OPEN APPROACH: ICD-10-PCS | Performed by: ORTHOPAEDIC SURGERY

## 2019-12-31 PROCEDURE — 0Y6X0Z0 DETACHMENT AT RIGHT 5TH TOE, COMPLETE, OPEN APPROACH: ICD-10-PCS | Performed by: ORTHOPAEDIC SURGERY

## 2019-12-31 RX ADMIN — ONDANSETRON PRN MG: 2 INJECTION INTRAMUSCULAR; INTRAVENOUS at 01:17

## 2019-12-31 RX ADMIN — INSULIN LISPRO SCH UNITS: 100 INJECTION, SOLUTION INTRAVENOUS; SUBCUTANEOUS at 08:00

## 2019-12-31 RX ADMIN — SACUBITRIL AND VALSARTAN SCH TAB: 24; 26 TABLET, FILM COATED ORAL at 08:54

## 2019-12-31 RX ADMIN — ONDANSETRON PRN MG: 2 INJECTION INTRAMUSCULAR; INTRAVENOUS at 08:56

## 2019-12-31 RX ADMIN — Medication SCH EA: at 16:52

## 2019-12-31 RX ADMIN — MEROPENEM SCH MLS/HR: 500 INJECTION, POWDER, FOR SOLUTION INTRAVENOUS at 21:00

## 2019-12-31 RX ADMIN — MEROPENEM SCH MLS/HR: 500 INJECTION, POWDER, FOR SOLUTION INTRAVENOUS at 15:32

## 2019-12-31 RX ADMIN — SACUBITRIL AND VALSARTAN SCH TAB: 24; 26 TABLET, FILM COATED ORAL at 21:00

## 2019-12-31 RX ADMIN — PANTOPRAZOLE SODIUM SCH MG: 40 TABLET, DELAYED RELEASE ORAL at 08:53

## 2019-12-31 RX ADMIN — INSULIN LISPRO SCH UNITS: 100 INJECTION, SOLUTION INTRAVENOUS; SUBCUTANEOUS at 12:00

## 2019-12-31 RX ADMIN — ASPIRIN 81 MG SCH MG: 81 TABLET ORAL at 08:53

## 2019-12-31 RX ADMIN — DEXTROSE SCH MLS/HR: 50 INJECTION, SOLUTION INTRAVENOUS at 16:48

## 2019-12-31 RX ADMIN — POTASSIUM CHLORIDE SCH MEQ: 1500 TABLET, EXTENDED RELEASE ORAL at 15:30

## 2019-12-31 RX ADMIN — FUROSEMIDE SCH MG: 80 TABLET ORAL at 15:30

## 2019-12-31 RX ADMIN — INSULIN LISPRO SCH UNITS: 100 INJECTION, SOLUTION INTRAVENOUS; SUBCUTANEOUS at 16:52

## 2019-12-31 NOTE — PDOC
Renal-Progress Notes


Subjective Notes


Notes


NO COMPLAINTS





History of Present Illness


Hx of present illness


STABLE





Vitals


Vitals





Vital Signs








  Date Time  Temp Pulse Resp B/P (MAP) Pulse Ox O2 Delivery O2 Flow Rate FiO2


 


12/31/19 14:53 97.8 107 18 89/51 (64) 94 Room Air  





 97.8       








Weight


Weight [ ]





I.O.


Intake and Output











Intake and Output 


 


 12/31/19





 07:00


 


Intake Total 2440 ml


 


Balance 2440 ml


 


 


 


Intake Oral 890 ml


 


IV Total 1550 ml











Labs


Labs





Laboratory Tests








Test


 12/30/19


16:45 12/31/19


04:00 12/31/19


08:13 12/31/19


10:48


 


Glucose (Fingerstick)


 106 mg/dL


(70-99) 


 398 mg/dL


(70-99) 465 mg/dL


(70-99)


 


Sodium Level


 


 127 mmol/L


(136-145) 


 





 


Potassium Level


 


 3.2 mmol/L


(3.5-5.1) 


 





 


Chloride Level


 


 91 mmol/L


() 


 





 


Carbon Dioxide Level


 


 26 mmol/L


(21-32) 


 





 


Anion Gap  10 (6-14)   


 


Blood Urea Nitrogen


 


 44 mg/dL


(8-26) 


 





 


Creatinine


 


 8.8 mg/dL


(0.7-1.3) 


 





 


Estimated GFR


(Cockcroft-Gault) 


 7.7 


 


 





 


Glucose Level


 


 452 mg/dL


(70-99) 


 





 


Calcium Level


 


 7.1 mg/dL


(8.5-10.1) 


 





 


Magnesium Level


 


 1.6 mg/dL


(1.8-2.4) 


 





 


Test


 12/31/19


14:00 


 


 





 


Glucose (Fingerstick)


 425 mg/dL


(70-99) 


 


 














Micro


Micro





Microbiology


12/30/19 Blood Culture - Preliminary, Resulted


           NO GROWTH AFTER 1 DAY





Review of Systems


Constitutional:  yes: weakness, alert, oriented


Ears/Nose/Throat:  Yes: no symptom reported


Eyes:  Yes: no symptom reported


Pulmonary:  Yes no symptom reported


Cardiovascular:  Yes no symptom reported


Gastrointestional:  Yes: no symptom reported


Genitourinary:  Yes: no symptom reported


Musculoskeletal:  Yes: no symptom reported


Skin:  Yes no symptom reported


Psychiatric/Neurological:  Yes: no symptom reported


Endocrine:  Yes: no symptom reported





Physical Exam


General Appearance:  no apparent distress


Skin:  warm, dry


Respiratory:  bilateral CTA


Heart:  S1S2


Abdomen:  soft, bowel sounds present


Genitourinary:  bladder flat


Extremities:  pulses present


Neurology:  alert


Musculoskeletal:  Osteoarthritis





Assessment


Assessment


IMP


RIGHT FOOT WOUND CONCERNING FOR NECROTIZING FASCIITIS


POORLY CONTROLLED DM II


ANEMIA


HYPOKALEMIA-BETTER


HYPONATREMIA-STABLE


ESRD


HTN HX


HX OF CM AND CHF








PLAN





REPLACE K DAILY


ANTIBIOTICS


WOUND CARE


ORTHO EVALUATION AND TX


PROB NEEDS AMPUTATION


APD DAILY TONIGHT


WILL FOLLOW











CONSTANTIN GOLDSMITH MD                 Dec 31, 2019 16:20

## 2019-12-31 NOTE — PDOC4
Operative Note


Operative Note


Date of Procedure: December 31, 2019


Pre-Op Diagnosis:   


1.   Chronic multifocal osteomyelitis, right ankle and foot M86.371


2.   Diabetes mellitus due to underlying condition with diabetic peripheral 

angiopathy with gangrene E08.52


Post-Op Diagnosis:   


same


Procedure:      


1.   Amputation metatarsal with toe single CPT 27492  (right little toe)


2.   Amputation toe, interphalangeal joint CPT 04174 (right great toe)


3.   Incision bone cortex for osteomyelitis, foot CPT 82535





Surgeon: Nicola Romero MD


Assistant:  Ky Worley


Anesthesia:  General


EBL: 25 mL


Specimens Obtained:  right great toe


Complications:  none


Drains: packed open


Findings:  extensive gangrene lateral aspect of the foot. Auto amputation of the

right little toe. Gangrenous fifth metatarsal is excised. The plantar aspect of 

the foot has further macerated tissue and may not the be salvageable. The great 

toe had dry gangrene at the tip and a interphalangeal amputation was performed 

and closed.


Indications for Procedure: 


This patient is a 54 -year-old with type 1 diabetes, neuropathy, and presents 

with a gangrenous foot with dry gangrene of the great toe and wet gangrene of 

the lateral aspect of the foot. He did not consent to a below-knee amputation 

although it may be needed. He was agreeable to great toe amputation and 

extensive debridement of the lateral foot. We talked about the potential risks 

benefits and alternatives. We talked about the need for possible additional 

surgery.





Procedure in Detail: The patient was identified in the preoperative holding 

area.  The correct right lower extremity was marked by me.  The patient was 

taken to the operating room where general anesthetic was used.  The patient was 

positioned supine on the operating table. 


A tourniquet was not used.   The patient remains on scheduled antibiotics so no 

addtional antibiotics were given.  A timeout procedure was performed.


The lower limb was prepared with Betadine. Sterile drapes were applied. A 

Penrose was used around the base of the great toe, and an elliptical incision 

was made removing the dry gangrenous tip of the great toe. An interphalangeal am

putation was performed. Betadine irrigation was used followed by saline 

irrigation. The Penrose tourniquet was released. Minimal hemostasis with 

electrocautery was required. My assistant closed this incision in a single layer

with 2-0 nylon interrupted sutures.





I proceeded with the lateral foot debridement. The small toe has essentially 

auto amputated, and is necrotic and barely still attached. I did an elliptical 

incision on the lateral border of the foot. There was foul odor and necrotic 

tissue. Most of the fifth metatarsal is involved, and it was excised as part of 

the debridement.  Therefore I performed a metatarsal with toe amputation as part

of the debridement. The fourth metatarsal has some involvement and rongeurs were

used to remove infected bone of the fourth metatarsal head and neck area. 

Copious Betadine irrigation was used followed by saline irrigation. The lateral 

foot wound was packed open.





I removed some nonviable skin from the plantar aspect of the foot, and there is 

a soft macerated area in the central portion of the foot which probably 

indicates additional plantar infection, questionable tissue, and may indicate t

he entire foot is no longer salvageable.





Sterile dressings were applied including Kerlix. Tahe patient returned to the 

recovery room in stable condition. Needle and sponge counts were correct.











NICOLA ROMERO MD                 Dec 31, 2019 21:12

## 2019-12-31 NOTE — NUR
Blood sugar 465. Received order from Dr Vigil for 30u humalog x1 dose. Pt refused insulin 
at this time.

## 2019-12-31 NOTE — PDOC
Infectious Disease Note


Vital Signs:


Vital Signs





Vital Signs








  Date Time  Temp Pulse Resp B/P (MAP) Pulse Ox O2 Delivery O2 Flow Rate FiO2


 


12/31/19 11:00 98.8 94 16 86/57 (67) 99 Room Air  





 98.8       











Medications:


Inpatient Meds:





Current Medications








 Medications


  (Trade)  Dose


 Ordered  Sig/Ivonne  Start Time


 Stop Time Status Last Admin


Dose Admin


 


 Acetaminophen


  (Tylenol)  650 mg  PRN Q4HRS  PRN  12/30/19 10:45


 12/31/19 10:44 DC  





 


 Aspirin


  (Children'S


 Aspirin)  81 mg  DAILYWBKFT  12/31/19 08:00


    12/31/19 08:53


81 MG


 


 Clindamycin


 Phosphate  50 ml @ 


 100 mls/hr  1X  ONCE  12/30/19 09:30


 12/30/19 09:59 DC 12/30/19 09:53


100 MLS/HR


 


 Dextrose


  (Dextrose


 50%-Water Syringe)  12.5 gm  PRN Q15MIN  PRN  12/30/19 10:45


     





 


 Dextrose


  (Iv Dextrose 5%)  250 ml  PRN Q15MIN  PRN  12/30/19 10:45


     





 


 Famotidine


  (Pepcid Vial)  20 mg  1X  ONCE  12/30/19 10:30


 12/30/19 10:31 DC 12/30/19 10:34


20 MG


 


 Fentanyl Citrate


  (Fentanyl 2ml


 Vial)  50 mcg  PRN Q2HRS  PRN  12/30/19 10:45


     





 


 Furosemide


  (Lasix)  80 mg  DAILY  12/31/19 09:00


     





 


 Insulin Human


 Lispro


  (HumaLOG)  10 units  1X  ONCE  12/31/19 11:15


 12/31/19 11:16 DC  





 


 Loperamide HCl


  (Imodium)  2 mg  PRN Q15MIN  PRN  12/30/19 15:30


     





 


 Magnesium Sulfate  50 ml @ 25


 mls/hr  1X  ONCE  12/30/19 14:00


 12/30/19 15:59 DC 12/30/19 14:49


25 MLS/HR


 


 Metolazone


  (Zaroxolyn)  5 mg  DAILY  12/31/19 09:00


     





 


 Ondansetron HCl


  (Zofran)  4 mg  PRN Q8HRS  PRN  12/30/19 10:45


 12/31/19 10:44 DC 12/31/19 08:56


4 MG


 


 Pantoprazole


 Sodium


  (Protonix)  40 mg  DAILYAC  12/31/19 07:30


    12/31/19 08:53


40 MG


 


 Potassium Chloride


  (Klor-Con)  20 meq  DAILYWBKFT  12/31/19 08:00


     





 


 Promethazine HCl


  (Phenergan)  12.5 mg  PRN Q6HRS  PRN  12/30/19 15:30


    12/30/19 21:37


12.5 MG


 


 Sacubitril/


 Valsartan


  (Entresto 24


 Mg-26 Mg)  1 tab  BID  12/30/19 21:00


    12/31/19 08:54


1 TAB


 


 Sodium Chloride  500 ml @ 


 500 mls/hr  1X  ONCE  12/31/19 01:00


 12/31/19 01:59 DC 12/31/19 01:11


500 MLS/HR


 


 Vancomycin HCl 2


 gm/Sodium Chloride  500 ml @ 


 250 mls/hr  1X  ONCE  12/30/19 10:45


 12/30/19 12:44 DC 12/30/19 10:34


250 MLS/HR











Labs:


Lab





Laboratory Tests








Test


 12/30/19


16:45 12/31/19


04:00 12/31/19


08:13 12/31/19


10:48


 


Glucose (Fingerstick)


 106 mg/dL


(70-99) 


 398 mg/dL


(70-99) 465 mg/dL


(70-99)


 


Sodium Level


 


 127 mmol/L


(136-145) 


 





 


Potassium Level


 


 3.2 mmol/L


(3.5-5.1) 


 





 


Chloride Level


 


 91 mmol/L


() 


 





 


Carbon Dioxide Level


 


 26 mmol/L


(21-32) 


 





 


Anion Gap  10 (6-14)   


 


Blood Urea Nitrogen


 


 44 mg/dL


(8-26) 


 





 


Creatinine


 


 8.8 mg/dL


(0.7-1.3) 


 





 


Estimated GFR


(Cockcroft-Gault) 


 7.7 


 


 





 


Glucose Level


 


 452 mg/dL


(70-99) 


 





 


Calcium Level


 


 7.1 mg/dL


(8.5-10.1) 


 





 


Magnesium Level


 


 1.6 mg/dL


(1.8-2.4) 


 














Objective:


Assessment:


 ID consult dictated


pt seen and examined


[id number]





Plan:


Plan of Care


Thank you











LEONIDES HARDWICK MD           Dec 31, 2019 14:29

## 2019-12-31 NOTE — NUR
Spoke to Dr Mcghee. Blood sugars are also out of wack due to no insulin added to dialysate. He 
says he will monitor and adjust as needed. Pt scheduled for surgery today with Dr Romero. 
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

## 2019-12-31 NOTE — PDOC
[employer] PROGRESS NOTE


Chief Complaint


Chief Complaint


Necrotizing Fasciitis of RLE


CHF


Diabetes-Type II


High Cholesterol


Heart Disease


Renal Failure





History of Present Illness


History of Present Illness


12/31/19


Pt seen and examined


Pt was sitting up in bed, eating breakfast in NAD


DW pt


DW RN


Reviewed pt's chart





Vitals/I&O


Vitals/I&O:





                                   Vital Signs








  Date Time  Temp Pulse Resp B/P (MAP) Pulse Ox O2 Delivery O2 Flow Rate FiO2


 


12/31/19 08:54  104  90/55    


 


12/31/19 07:00   16  95 Room Air  


 


12/31/19 03:45 98.0       





 98.0       














                                    I & O   


 


 12/30/19 12/30/19 12/31/19





 15:00 23:00 07:00


 


Intake Total 1550 ml 590 ml 300 ml


 


Balance 1550 ml 590 ml 300 ml











Physical Exam


General:  Alert, Oriented X3, Cooperative, No acute distress


Heart:  Regular rate, Normal S1, Normal S2


Lungs:  Clear


Abdomen:  Normal bowel sounds, Soft, No tenderness


Extremities:  No clubbing, No cyanosis, Other (Non-healing diabetic ulcer of 

right foot)


Skin:  No rashes, No breakdown





Labs


Labs:





Laboratory Tests








Test


 12/30/19


16:45 12/31/19


04:00 12/31/19


08:13


 


Glucose (Fingerstick)


 106 mg/dL


(70-99) 


 398 mg/dL


(70-99)


 


Sodium Level


 


 127 mmol/L


(136-145) 





 


Potassium Level


 


 3.2 mmol/L


(3.5-5.1) 





 


Chloride Level


 


 91 mmol/L


() 





 


Carbon Dioxide Level


 


 26 mmol/L


(21-32) 





 


Anion Gap  10 (6-14)  


 


Blood Urea Nitrogen


 


 44 mg/dL


(8-26) 





 


Creatinine


 


 8.8 mg/dL


(0.7-1.3) 





 


Estimated GFR


(Cockcroft-Gault) 


 7.7 


 





 


Glucose Level


 


 452 mg/dL


(70-99) 





 


Calcium Level


 


 7.1 mg/dL


(8.5-10.1) 





 


Magnesium Level


 


 1.6 mg/dL


(1.8-2.4) 














Review of Systems


Review of Systems:


No c/o headache


No c/o CP





Assessment and Plan


Assessmemt and Plan


Problems


Medical Problems:


(1) CHF (congestive heart failure)


Status: Acute  





(2) Chronic kidney disease


Status: Acute  





(3) Diabetes


Status: Acute  





(4) Sepsis


Status: Acute  





(5) Severe sepsis


Status: Acute  





Assessment


Necrotizing Fasciitis of RLE


CHF


Diabetes-Type II


High Cholesterol


Heart Disease


Renal Failure





Plan


Wound Care


IV Abx


Insulin


DVT Prophylaxis


PT/OT


Labs


Home meds


Full code


Appreciate subspecialist input








Comment


Review of Relevant


I have reviewed the following items sean (where applicable) has been applied.


Medications:





Current Medications








 Medications


  (Trade)  Dose


 Ordered  Sig/Ivonne


 Route


 PRN Reason  Start Time


 Stop Time Status Last Admin


Dose Admin


 


 Sodium Chloride  500 ml @ 


 500 mls/hr  1X  ONCE


 IV


   12/30/19 12:15


 12/30/19 13:14 DC 12/30/19 12:13





 


 Magnesium Sulfate  50 ml @ 25


 mls/hr  1X  ONCE


 IV


   12/30/19 14:00


 12/30/19 15:59 DC 12/30/19 14:49





 


 Aspirin


  (Children'S


 Aspirin)  81 mg  DAILYWBKFT


 PO


   12/31/19 08:00


    12/31/19 08:53





 


 Pantoprazole


 Sodium


  (Protonix)  40 mg  DAILYAC


 PO


   12/31/19 07:30


    12/31/19 08:53





 


 Promethazine HCl


  (Phenergan)  12.5 mg  PRN Q6HRS  PRN


 PO


 NAUSEA/VOMITING, 1ST CHOICE  12/30/19 15:30


    12/30/19 21:37





 


 Sacubitril/


 Valsartan


  (Entresto 24


 Mg-26 Mg)  1 tab  BID


 PO


   12/30/19 21:00


    12/31/19 08:54





 


 Potassium Chloride


  (Klor-Con)  20 meq  1X  ONCE


 PO


   12/30/19 16:15


 12/30/19 16:26 DC 12/30/19 16:57





 


 Sodium Chloride  500 ml @ 


 500 mls/hr  1X  ONCE


 IV


   12/31/19 01:00


 12/31/19 01:59 DC 12/31/19 01:11




















PRINCESS MACARIO III DO           Dec 31, 2019 11:29

## 2019-12-31 NOTE — NUR
Pt took Traceba 25u per Dr Garber approval for blood sugar 425. Insulin sent to pharmacy to 
be scanned for administration.

## 2020-01-01 VITALS — SYSTOLIC BLOOD PRESSURE: 103 MMHG | DIASTOLIC BLOOD PRESSURE: 70 MMHG

## 2020-01-01 VITALS — SYSTOLIC BLOOD PRESSURE: 90 MMHG | DIASTOLIC BLOOD PRESSURE: 60 MMHG

## 2020-01-01 VITALS — SYSTOLIC BLOOD PRESSURE: 99 MMHG | DIASTOLIC BLOOD PRESSURE: 72 MMHG

## 2020-01-01 VITALS — DIASTOLIC BLOOD PRESSURE: 56 MMHG | SYSTOLIC BLOOD PRESSURE: 87 MMHG

## 2020-01-01 VITALS — SYSTOLIC BLOOD PRESSURE: 95 MMHG | DIASTOLIC BLOOD PRESSURE: 55 MMHG

## 2020-01-01 VITALS — DIASTOLIC BLOOD PRESSURE: 50 MMHG | SYSTOLIC BLOOD PRESSURE: 83 MMHG

## 2020-01-01 VITALS — SYSTOLIC BLOOD PRESSURE: 79 MMHG | DIASTOLIC BLOOD PRESSURE: 50 MMHG

## 2020-01-01 VITALS — DIASTOLIC BLOOD PRESSURE: 66 MMHG | SYSTOLIC BLOOD PRESSURE: 97 MMHG

## 2020-01-01 LAB
ANION GAP SERPL CALC-SCNC: 12 MMOL/L (ref 6–14)
BUN SERPL-MCNC: 45 MG/DL (ref 8–26)
CALCIUM SERPL-MCNC: 7.1 MG/DL (ref 8.5–10.1)
CHLORIDE SERPL-SCNC: 89 MMOL/L (ref 98–107)
CO2 SERPL-SCNC: 23 MMOL/L (ref 21–32)
CREAT SERPL-MCNC: 8.8 MG/DL (ref 0.7–1.3)
GFR SERPLBLD BASED ON 1.73 SQ M-ARVRAT: 7.7 ML/MIN
GLUCOSE SERPL-MCNC: 335 MG/DL (ref 70–99)
HBA1C MFR BLD: 8 % (ref 4.8–5.6)
POTASSIUM SERPL-SCNC: 3.3 MMOL/L (ref 3.5–5.1)
SODIUM SERPL-SCNC: 124 MMOL/L (ref 136–145)

## 2020-01-01 RX ADMIN — Medication SCH EA: at 08:39

## 2020-01-01 RX ADMIN — INSULIN LISPRO SCH UNITS: 100 INJECTION, SOLUTION INTRAVENOUS; SUBCUTANEOUS at 17:00

## 2020-01-01 RX ADMIN — DAPTOMYCIN SCH MLS/HR: 500 INJECTION, POWDER, LYOPHILIZED, FOR SOLUTION INTRAVENOUS at 21:43

## 2020-01-01 RX ADMIN — Medication SCH EA: at 08:38

## 2020-01-01 RX ADMIN — PROMETHAZINE HYDROCHLORIDE PRN MG: 12.5 TABLET ORAL at 15:48

## 2020-01-01 RX ADMIN — SACUBITRIL AND VALSARTAN SCH TAB: 24; 26 TABLET, FILM COATED ORAL at 21:00

## 2020-01-01 RX ADMIN — PROMETHAZINE HYDROCHLORIDE PRN MG: 12.5 TABLET ORAL at 07:06

## 2020-01-01 RX ADMIN — DEXTROSE SCH MLS/HR: 50 INJECTION, SOLUTION INTRAVENOUS at 16:34

## 2020-01-01 RX ADMIN — ASPIRIN 81 MG SCH MG: 81 TABLET ORAL at 08:38

## 2020-01-01 RX ADMIN — PROMETHAZINE HYDROCHLORIDE PRN MG: 12.5 TABLET ORAL at 21:49

## 2020-01-01 RX ADMIN — MEROPENEM SCH MLS/HR: 500 INJECTION, POWDER, FOR SOLUTION INTRAVENOUS at 22:27

## 2020-01-01 RX ADMIN — INSULIN LISPRO SCH UNITS: 100 INJECTION, SOLUTION INTRAVENOUS; SUBCUTANEOUS at 08:00

## 2020-01-01 RX ADMIN — Medication SCH EA: at 13:06

## 2020-01-01 RX ADMIN — POTASSIUM CHLORIDE SCH MEQ: 1500 TABLET, EXTENDED RELEASE ORAL at 08:38

## 2020-01-01 RX ADMIN — PANTOPRAZOLE SODIUM SCH MG: 40 TABLET, DELAYED RELEASE ORAL at 08:37

## 2020-01-01 RX ADMIN — POTASSIUM CHLORIDE SCH MEQ: 1500 TABLET, EXTENDED RELEASE ORAL at 17:48

## 2020-01-01 RX ADMIN — FUROSEMIDE SCH MG: 80 TABLET ORAL at 08:38

## 2020-01-01 RX ADMIN — INSULIN LISPRO SCH UNITS: 100 INJECTION, SOLUTION INTRAVENOUS; SUBCUTANEOUS at 13:11

## 2020-01-01 RX ADMIN — SACUBITRIL AND VALSARTAN SCH TAB: 24; 26 TABLET, FILM COATED ORAL at 08:37

## 2020-01-01 RX ADMIN — MEROPENEM SCH MLS/HR: 500 INJECTION, POWDER, FOR SOLUTION INTRAVENOUS at 10:11

## 2020-01-01 RX ADMIN — Medication SCH EA: at 17:00

## 2020-01-01 NOTE — PDOC2
CONSULT


Date of Consult


Date of Consult


DATE: 1/1/20 


TIME: 17:27





Reason for Consult


Reason for Consult:


Cardiomyopathy, ICD





Referring Physician


Referring Physician:


Dr. Vigil





Identification/Chief Complaint


Chief Complaint


Right foot pain





Source


Source:  Chart review, Patient





History of Present Illness


Reason for Visit:


The patient is a 54-year-old male who was admitted through the emergency room on

the 30th due to a nonhealing right foot wound. Patient has been worked up and is

been placed on antibiotics by the ID service. The orthopedic service also 

proceeded with toe amputations but there was concern that the patient may have a

may need a more extensive amputation in the near future. From a cardiac 

viewpoint the patient has a severe cardiomyopathy with an echocardiogram on 

10/16/19 showing an ejection fraction of 15% with mild to moderate mitral 

regurgitation and a pulmonary artery pressure at 48 mmHg. Patient also has an I

CD in place with a change out generator on 10/15/18 for a [employer] device. He 

denies chest pain. He does report some shortness of breath which is improved. 

Additionally he has end-stage renal disease and is on dialysis.





Past Medical History


Cardiovascular:  CHF, HTN, Hyperlipidemia, Other (defibrillator)


Pulmonary:  Bronchitis


CENTRAL NERVOUS SYSTEM:  Other


GI:  GERD


Heme/Onc:  Anemia NOS


Musculoskeletal:  Osteoarthritis


Renal/:  Chronic renal failure


Endocrine:  Diabetes, Hyperparathyroidism





Past Surgical History


Past Surgical History:  Other (defibrilator,  PD catheter)





Family History


Family History:  Heart Disease, Other





Social History


No


ALCOHOL:  none


Drugs:  None


Lives:  with Family


Domestic Violence:  Neg





Current Problem List


Problem List


Problems


Medical Problems:


(1) CHF (congestive heart failure)


Status: Acute  





(2) Chronic kidney disease


Status: Acute  





(3) Diabetes


Status: Acute  





(4) Sepsis


Status: Acute  





(5) Severe sepsis


Status: Acute  











Current Medications


Current Medications





Current Medications


Clindamycin Phosphate 50 ml @  100 mls/hr 1X  ONCE IV  Last administered on 

12/30/19at 09:53;  Start 12/30/19 at 09:30;  Stop 12/30/19 at 09:59;  Status DC


Sodium Chloride 500 ml @  500 mls/hr 1X  ONCE IV  Last administered on 

12/30/19at 10:11;  Start 12/30/19 at 10:15;  Stop 12/30/19 at 11:14;  Status DC


Ondansetron HCl (Zofran) 4 mg 1X  ONCE IVP  Last administered on 12/30/19at 

10:34;  Start 12/30/19 at 10:30;  Stop 12/30/19 at 10:31;  Status DC


Famotidine (Pepcid Vial) 20 mg 1X  ONCE IVP  Last administered on 12/30/19at 

10:34;  Start 12/30/19 at 10:30;  Stop 12/30/19 at 10:31;  Status DC


Vancomycin HCl 2 gm/Sodium Chloride 500 ml @  250 mls/hr 1X  ONCE IV  Last 

administered on 12/30/19at 10:34;  Start 12/30/19 at 10:45;  Stop 12/30/19 at 

12:44;  Status DC


Ondansetron HCl (Zofran) 4 mg PRN Q8HRS  PRN IV NAUSEA/VOMITING Last 

administered on 12/31/19at 08:56;  Start 12/30/19 at 10:45;  Stop 12/31/19 at 

10:44;  Status DC


Fentanyl Citrate (Fentanyl 2ml Vial) 50 mcg PRN Q2HRS  PRN IV PAIN;  Start 

12/30/19 at 10:45


Acetaminophen (Tylenol) 650 mg PRN Q4HRS  PRN PO FEVER;  Start 12/30/19 at 10:4

5;  Stop 12/31/19 at 10:44;  Status DC


Insulin Human Lispro (HumaLOG) 0-5 UNITS TIDWMEALS SQ  Last administered on 

1/1/20at 13:11;  Start 12/30/19 at 12:00


Dextrose (Dextrose 50%-Water Syringe) 12.5 gm PRN Q15MIN  PRN IV SEE COMMENTS;  

Start 12/30/19 at 10:45


Dextrose (Iv Dextrose 5%) 250 ml PRN Q15MIN  PRN IV SEE COMMENTS;  Start 

12/30/19 at 10:45


Sodium Chloride 500 ml @  500 mls/hr 1X  ONCE IV  Last administered on 

12/30/19at 12:13;  Start 12/30/19 at 12:15;  Stop 12/30/19 at 13:14;  Status DC


Magnesium Sulfate 50 ml @ 25 mls/hr 1X  ONCE IV  Last administered on 12/30/19at

14:49;  Start 12/30/19 at 14:00;  Stop 12/30/19 at 15:59;  Status DC


Aspirin (Children'S Aspirin) 81 mg DAILYWBKFT PO  Last administered on 1/1/20at 

08:38;  Start 12/31/19 at 08:00


Furosemide (Lasix) 80 mg DAILY PO  Last administered on 1/1/20at 08:38;  Start 

12/31/19 at 09:00


Loperamide HCl (Imodium) 2 mg PRN Q15MIN  PRN PO DIARRHEA;  Start 12/30/19 at 

15:30


Pantoprazole Sodium (Protonix) 40 mg DAILYAC PO  Last administered on 1/1/20at 

08:37;  Start 12/31/19 at 07:30


Potassium Chloride (Klor-Con) 20 meq DAILY PO ;  Start 12/31/19 at 09:00;  Stop 

12/31/19 at 14:20;  Status DC


Promethazine HCl (Phenergan) 12.5 mg PRN Q6HRS  PRN PO NAUSEA/VOMITING, 1ST 

CHOICE Last administered on 1/1/20at 15:48;  Start 12/30/19 at 15:30


Sacubitril/ Valsartan (Entresto 24 Mg-26 Mg) 1 tab BID PO  Last administered on 

1/1/20at 08:37;  Start 12/30/19 at 21:00


Metolazone (Zaroxolyn) 5 mg DAILY PO  Last administered on 1/1/20at 08:36;  

Start 12/31/19 at 09:00


Potassium Chloride (Klor-Con) 20 meq 1X  ONCE PO  Last administered on 

12/30/19at 16:57;  Start 12/30/19 at 16:15;  Stop 12/30/19 at 16:26;  Status DC


Potassium Chloride (Klor-Con) 20 meq DAILYWBKFT PO  Last administered on 

1/1/20at 08:38;  Start 12/31/19 at 08:00;  Stop 1/1/20 at 15:14;  Status DC


Sodium Chloride 500 ml @  500 mls/hr 1X  ONCE IV  Last administered on 

12/31/19at 01:11;  Start 12/31/19 at 01:00;  Stop 12/31/19 at 01:59;  Status DC


Insulin Human Lispro (HumaLOG) 10 units 1X  ONCE SQ ;  Start 12/31/19 at 11:15; 

Stop 12/31/19 at 11:16;  Status DC


Meropenem 500 mg/ Sodium Chloride 50 ml @  100 mls/hr Q12HR IV  Last administ

ered on 1/1/20at 10:11;  Start 12/31/19 at 15:00


Linezolid/Dextrose 300 ml @  300 mls/hr Q12HR IV  Last administered on 1/1/20at 

08:57;  Start 12/31/19 at 21:00;  Stop 1/1/20 at 13:49;  Status DC


Micafungin Sodium 100 mg/Dextrose 100 ml @  100 mls/hr Q24H IV  Last 

administered on 1/1/20at 16:34;  Start 12/31/19 at 15:30


Non-Formulary Medication 2 ea TIDWMEALS PO  Last administered on 1/1/20at 13:06;

 Start 12/31/19 at 17:00


Non-Formulary Medication 30 ea DAILY SQ  Last administered on 1/1/20at 08:39;  

Start 1/1/20 at 09:00


Propofol 20 ml @ As Directed STK-MED ONCE IV ;  Start 12/31/19 at 17:45;  Stop 

12/31/19 at 17:45;  Status DC


Lidocaine HCl (Lidocaine Pf 2% Vial) 5 ml STK-MED ONCE .ROUTE ;  Start 12/31/19 

at 17:45;  Stop 12/31/19 at 17:45;  Status DC


Dexamethasone Sodium Phosphate (Decadron) 4 mg STK-MED ONCE .ROUTE ;  Start 

12/31/19 at 17:45;  Stop 12/31/19 at 17:45;  Status DC


Ondansetron HCl (Zofran) 4 mg STK-MED ONCE .ROUTE ;  Start 12/31/19 at 17:45;  

Stop 12/31/19 at 17:45;  Status DC


Fentanyl Citrate (Fentanyl 2ml Vial) 100 mcg STK-MED ONCE .ROUTE ;  Start 

12/31/19 at 17:46;  Stop 12/31/19 at 17:46;  Status DC


Famotidine (Pepcid Vial) 20 mg STK-MED ONCE .ROUTE ;  Start 12/31/19 at 18:44;  

Stop 12/31/19 at 18:45;  Status DC


Citric Acid/ Sodium Citrate (Bicitra) 30 ml 1X  STAT PO  Last administered on 

12/31/19at 18:44;  Start 12/31/19 at 18:44;  Stop 12/31/19 at 18:53;  Status DC


Famotidine (Pepcid Vial) 20 mg 1X  ONCE IVP  Last administered on 12/31/19at 

18:48;  Start 12/31/19 at 19:00;  Stop 12/31/19 at 19:01;  Status DC


Succinylcholine Chloride (Anectine) 200 mg STK-MED ONCE .ROUTE ;  Start 12/31/19

at 20:20;  Stop 12/31/19 at 20:20;  Status DC


Phenylephrine HCl (PHENYLEPHRINE in 0.9% NACL PF) 1 mg STK-MED ONCE IV ;  Start 

12/31/19 at 20:22;  Stop 12/31/19 at 20:22;  Status DC


Sevoflurane (Ultane) 30 ml STK-MED ONCE IH ;  Start 12/31/19 at 21:01;  Stop 

12/31/19 at 21:01;  Status DC


Magnesium Sulfate 50 ml @ 25 mls/hr 1X  ONCE IV  Last administered on 1/1/20at 

11:40;  Start 1/1/20 at 10:00;  Stop 1/1/20 at 11:59;  Status DC


Insulin Human Lispro (HumaLOG) 12 units 1X SQ ;  Start 1/1/20 at 09:45;  Stop 

1/1/20 at 10:32;  Status DC


Insulin Human Lispro (HumaLOG) 12 units 1X  ONCE SQ  Last administered on 

1/1/20at 10:33;  Start 1/1/20 at 10:45;  Stop 1/1/20 at 10:46;  Status DC


Daptomycin 500 mg/ Sodium Chloride 50 ml @  100 mls/hr Q48H IV ;  Start 1/1/20 

at 16:00;  Stop 1/1/20 at 17:18;  Status DC


Potassium Chloride (Klor-Con) 20 meq BIDWMEALS PO ;  Start 1/1/20 at 17:00


Daptomycin 500 mg/ Sodium Chloride 50 ml @  100 mls/hr Q48H IV ;  Start 1/1/20 

at 21:00





Active Scripts


Active


Ssd (Silver Sulfadiazine) 25 Gm Cream..g. 1 Carley TP DAILY 10 Days


Mupirocin Ointment (Mupirocin) 22 Gm Oint...g. 1 Carley TP BID 10 Days


Humalog (Insulin Lispro) 100 Unit/1 Ml Insuln.pen 10 Units SQ TIDWMEALS 30 Days


Lantus Solostar (Insulin Glargine,Hum.rec.anlog) 100 Unit/1 Ml Insuln.pen 30 

Units SQ QHS 30 Days


Metoclopramide Hcl 5 Mg Tablet 5 Mg PO PRN BFRMEALHC PRN


Culturelle (Lactobacillus Rhamnosus Gg) 1 Each Cap.sprink 1 Cap PO BID 14 Days


Loperamide (Loperamide Hcl) 2 Mg Capsule 2 Mg PO PRN Q15MIN PRN 10 Days


Tramadol Hcl 50 Mg Tablet 50 Mg PO PRN Q6HRS PRN


Baclofen 10 Mg Tablet 10 Mg PO PRN TID PRN 14 Days


Nystatin 100,000 Unit/1 Ml Oral.susp 5 Ml SWSW [id number] 30 Days


Promethazine Hcl 12.5 Mg Tablet 12.5 Mg PO PRN Q6HRS PRN 30 Days


Reported


Entresto 24 mg-26 mg Tablet (Sacubitril/Valsartan) 1 Each Tablet 1 Each PO DAILY


Furosemide 80 Mg Tablet 1 Tab PO DAILY


Klor-Con M20 (Potassium Chloride) 20 Meq Tab.er.prt 1 Tab PO DAILY 30 Days


Pantoprazole Sodium  ** (Pantoprazole Sodium) 40 Mg Tablet.dr 40 Mg PO DAILYAC


Alison-Aime Tablet (Folic Acid/Vitamin B Comp W-C) 0.8 Mg Tablet 1 Tab PO DAILY 30

Days


Metolazone 5 Mg Tablet 5 Mg PO DAILY


Vitamin D3 (Cholecalciferol (Vitamin D3)) 5,000 Unit Tablet 1 Tab PO DAILY


Calcium Acetate 667 Mg Tablet 667 Mg PO TIDWMEALS


Renal Caps Softgel (Folic Acid/Vitamin B Comp W-C) 1 Mg Capsule 1 Cap PO DAILY


Tresiba Flextouch U-100 (Insulin Degludec) 100 Unit/1 Ml Insuln.pen 100 Unit SQ 


Furosemide 40 Mg Tablet 1 Tab PO DAILY


Aspirin 81 Mg Tab.chew 81 Mg PO DAILYWBKFT





Allergies


Allergies:  


Coded Allergies:  


     No Known Drug Allergies (Unverified , 6/22/15)





ROS


General:  YES: Fatigue


Respiratory:  YES: Shortness of breath, SOB with excertion





Physical Exam


General:  mild distress


HEENT:  Atraumatic


Lungs:  Other (early decreased breath sounds)


Heart:  Regular rate


Abdomen:  Normal bowel sounds





Vitals


VITALS





Vital Signs








  Date Time  Temp Pulse Resp B/P (MAP) Pulse Ox O2 Delivery O2 Flow Rate FiO2


 


1/1/20 15:00 98.9 104 20 83/50 (61) 93 Room Air  





 98.9       











Labs


Labs





Laboratory Tests








Test


 12/31/19


04:00 12/31/19


04:10 12/31/19


08:13 12/31/19


10:48


 


Sodium Level


 127 mmol/L


(136-145) 


 


 





 


Potassium Level


 3.2 mmol/L


(3.5-5.1) 


 


 





 


Chloride Level


 91 mmol/L


() 


 


 





 


Carbon Dioxide Level


 26 mmol/L


(21-32) 


 


 





 


Anion Gap 10 (6-14)    


 


Blood Urea Nitrogen


 44 mg/dL


(8-26) 


 


 





 


Creatinine


 8.8 mg/dL


(0.7-1.3) 


 


 





 


Estimated GFR


(Cockcroft-Gault) 7.7 


 


 


 





 


Glucose Level


 452 mg/dL


(70-99) 


 


 





 


Hemoglobin A1c


 8.0 %


(4.8-5.6) 


 


 





 


Calcium Level


 7.1 mg/dL


(8.5-10.1) 


 


 





 


Magnesium Level


 1.6 mg/dL


(1.8-2.4) 


 


 





 


 Antigen


 


 39.5 U/mL (Not


Estab.) 


 





 


Glucose (Fingerstick)


 


 


 398 mg/dL


(70-99) 465 mg/dL


(70-99)


 


Test


 12/31/19


14:00 12/31/19


16:37 12/31/19


21:08 1/1/20


04:25


 


Glucose (Fingerstick)


 425 mg/dL


(70-99) 431 mg/dL


(70-99) 346 mg/dL


(70-99) 





 


Sodium Level


 


 


 


 124 mmol/L


(136-145)


 


Potassium Level


 


 


 


 3.3 mmol/L


(3.5-5.1)


 


Chloride Level


 


 


 


 89 mmol/L


()


 


Carbon Dioxide Level


 


 


 


 23 mmol/L


(21-32)


 


Anion Gap    12 (6-14) 


 


Blood Urea Nitrogen


 


 


 


 45 mg/dL


(8-26)


 


Creatinine


 


 


 


 8.8 mg/dL


(0.7-1.3)


 


Estimated GFR


(Cockcroft-Gault) 


 


 


 7.7 





 


Glucose Level


 


 


 


 335 mg/dL


(70-99)


 


Calcium Level


 


 


 


 7.1 mg/dL


(8.5-10.1)


 


Magnesium Level


 


 


 


 1.6 mg/dL


(1.8-2.4)


 


Test


 1/1/20


06:39 1/1/20


09:36 1/1/20


11:26 1/1/20


16:59


 


Glucose (Fingerstick)


 350 mg/dL


(70-99) 322 mg/dL


(70-99) 279 mg/dL


(70-99) 54 mg/dL


(70-99)


 


Test


 1/1/20


17:15 


 


 





 


Glucose (Fingerstick)


 62 mg/dL


(70-99) 


 


 











Laboratory Tests








Test


 12/31/19


21:08 1/1/20


04:25 1/1/20


06:39 1/1/20


09:36


 


Glucose (Fingerstick)


 346 mg/dL


(70-99) 


 350 mg/dL


(70-99) 322 mg/dL


(70-99)


 


Sodium Level


 


 124 mmol/L


(136-145) 


 





 


Potassium Level


 


 3.3 mmol/L


(3.5-5.1) 


 





 


Chloride Level


 


 89 mmol/L


() 


 





 


Carbon Dioxide Level


 


 23 mmol/L


(21-32) 


 





 


Anion Gap  12 (6-14)   


 


Blood Urea Nitrogen


 


 45 mg/dL


(8-26) 


 





 


Creatinine


 


 8.8 mg/dL


(0.7-1.3) 


 





 


Estimated GFR


(Cockcroft-Gault) 


 7.7 


 


 





 


Glucose Level


 


 335 mg/dL


(70-99) 


 





 


Calcium Level


 


 7.1 mg/dL


(8.5-10.1) 


 





 


Magnesium Level


 


 1.6 mg/dL


(1.8-2.4) 


 





 


Test


 1/1/20


11:26 1/1/20


16:59 1/1/20


17:15 





 


Glucose (Fingerstick)


 279 mg/dL


(70-99) 54 mg/dL


(70-99) 62 mg/dL


(70-99) 














Images


Images


As above.





Assessment/Plan


Assessment/Plan


1. Nonhealing right foot wound. Being treated by infectious disease with 

antibiotics. Also followed by the orthopedic service with amputation of the 

patient's toes and possible future amputation in the near future.





2. Severe cardiomyopathy. Ejection fraction of 15%. Would continue present 

treatment. Fluid management largely through his dialysis. We'll continue to 

monitor.





3. Implantable defibrillator. Will interrogate. Continue on telemetry.





4. End-stage renal disease. Dialysis as per the renal service.





5. Hypertension. Continuing present treatment.





6. Diabetes mellitus. As per the primary service.





7. Hyperlipidemia. We'll recheck lab in continue medications.





Thank you for allowing us to participate in the care of your patient.











CHRIS JOSE MD             Jan 1, 2020 17:33

## 2020-01-01 NOTE — EKG
[facility]

               [address] [place] [address]

Test Date:    2020               Test Time:    05:42:07

Pat Name:     BLAISE DOWNING            Department:   

Patient ID:   [id number]           Room:         6 1

Gender:       M                        Technician:   SHAYNE

:          [id number]               Requested By: WELLINGTON DALY

Order Number: [id number]           Reading MD:     

                                 Measurements

Intervals                              Axis          

Rate:         105                      P:            -6

KY:           160                      QRS:          -30

QRSD:         146                      T:            117

QT:           380                                    

QTc:          507                                    

                           Interpretive Statements

SINUS TACHYCARDIA

COMPLEX(ES) WITH ABERRANT INTRAVENTRICULAR CONDUCTION

VENTRICULAR PREMATURE COMPLEX(ES)

ABNORMAL LEFT AXIS DEVIATION

NON SPECIFIC INTRAVENTRICULAR BLOCK

QRS(T) CONTOUR ABNORMALITY

CONSISTENT WITH ANTEROSEPTAL INFARCT

AGE UNDETERMINED

ABNORMAL ECG

RI6.01

Compared to ECG 10/14/2019 13:52:28

Left-axis deviation now present

Myocardial infarct finding now present

Ventricular premature complex(es) no longer present

Intraventricular conduction delay no longer present

## 2020-01-01 NOTE — NUR
Pt with noted spitting and vomiting clear yellow sputum. Pt not wanting to take antiemetic 
during the noc pt states not wanting any medication. Pt did not want to finish his 
antibiotic however only had approximately 5 min left. Pt at shift change agreed to take 
phernegan PO this am. Pt with noted NSVT during the noc. Pt asymptomatic and resting with 
eyes closed did notify Physician during the noc, orders received.

## 2020-01-01 NOTE — PDOC
Infectious Disease Note


Subjective:


Subjective


Pt says feels ok


no F/C/N/V/D/SOB


postop pain is under control





Vital Signs:


Vital Signs





Vital Signs








  Date Time  Temp Pulse Resp B/P (MAP) Pulse Ox O2 Delivery O2 Flow Rate FiO2


 


1/1/20 11:00 98.6 76 22 79/50 (60) 95 Room Air  





 98.6       











Physical Exam:


PHYSICAL EXAM


HEENT:  Normocephalic, atraumatic.  Anicteric.  No thrush.  Oral mucosa moist.


NECK:  Supple.  No JVD.


LUNGS:  Clear bilaterally.  No wheezing.


HEART:  S1, S2.


ABDOMEN:  Soft, nontender, nondistended.


EXTREMITIES:  Right lower extremity dressing in place.  Pictures of the wound


noted in the chart.  Dry skin.  Numerous calluses present over the left lower


extremity with some abrasions.  No signs of infection noted in the left lower


extremity.


DERMATOLOGIC:  Warm, dry.  No generalized rash.


CENTRAL NERVOUS SYSTEM:  Alert and oriented x 3, grossly nonfocal.


PSYCHIATRIC:  Cooperative, appropriate mood and affect.





Medications:


Inpatient Meds:





Current Medications








 Medications


  (Trade)  Dose


 Ordered  Sig/Ivonne  Start Time


 Stop Time Status Last Admin


Dose Admin


 


 Acetaminophen


  (Tylenol)  650 mg  PRN Q4HRS  PRN  12/30/19 10:45


 12/31/19 10:44 DC  





 


 Aspirin


  (Children'S


 Aspirin)  81 mg  DAILYWBKFT  12/31/19 08:00


    1/1/20 08:38





 


 Citric Acid/


 Sodium Citrate


  (Bicitra)  30 ml  1X  STAT  12/31/19 18:44


 12/31/19 18:53 DC 12/31/19 18:44





 


 Clindamycin


 Phosphate  50 ml @ 


 100 mls/hr  1X  ONCE  12/30/19 09:30


 12/30/19 09:59 DC 12/30/19 09:53





 


 Daptomycin 500 mg/


 Sodium Chloride  50 ml @ 


 100 mls/hr  Q48H  1/1/20 16:00


     





 


 Dexamethasone


 Sodium Phosphate


  (Decadron)  4 mg  STK-MED ONCE  12/31/19 17:45


 12/31/19 17:45 DC  





 


 Dextrose


  (Dextrose


 50%-Water Syringe)  12.5 gm  PRN Q15MIN  PRN  12/30/19 10:45


     





 


 Dextrose


  (Iv Dextrose 5%)  250 ml  PRN Q15MIN  PRN  12/30/19 10:45


     





 


 Famotidine


  (Pepcid Vial)  20 mg  1X  ONCE  12/31/19 19:00


 12/31/19 19:01 DC 12/31/19 18:48





 


 Fentanyl Citrate


  (Fentanyl 2ml


 Vial)  100 mcg  STK-MED ONCE  12/31/19 17:46


 12/31/19 17:46 DC  





 


 Furosemide


  (Lasix)  80 mg  DAILY  12/31/19 09:00


    1/1/20 08:38





 


 Insulin Human


 Lispro


  (HumaLOG)  12 units  1X  ONCE  1/1/20 10:45


 1/1/20 10:46 DC 1/1/20 10:33





 


 Lidocaine HCl


  (Lidocaine Pf 2%


 Vial)  5 ml  STK-MED ONCE  12/31/19 17:45


 12/31/19 17:45 DC  





 


 Linezolid/Dextrose  300 ml @ 


 300 mls/hr  Q12HR  12/31/19 21:00


 1/1/20 13:49 DC 1/1/20 08:57





 


 Loperamide HCl


  (Imodium)  2 mg  PRN Q15MIN  PRN  12/30/19 15:30


     





 


 Magnesium Sulfate  50 ml @ 25


 mls/hr  1X  ONCE  1/1/20 10:00


 1/1/20 11:59 DC 1/1/20 11:40





 


 Meropenem 500 mg/


 Sodium Chloride  50 ml @ 


 100 mls/hr  Q12HR  12/31/19 15:00


    1/1/20 10:11





 


 Metolazone


  (Zaroxolyn)  5 mg  DAILY  12/31/19 09:00


    1/1/20 08:36





 


 Micafungin Sodium


 100 mg/Dextrose  100 ml @ 


 100 mls/hr  Q24H  12/31/19 15:30


    12/31/19 16:48





 


 Non-Formulary


 Medication  30 ea  DAILY  1/1/20 09:00


    1/1/20 08:39





 


 Ondansetron HCl


  (Zofran)  4 mg  STK-MED ONCE  12/31/19 17:45


 12/31/19 17:45 DC  





 


 Pantoprazole


 Sodium


  (Protonix)  40 mg  DAILYAC  12/31/19 07:30


    1/1/20 08:37





 


 Phenylephrine HCl


  (PHENYLEPHRINE


 in 0.9% NACL PF)  1 mg  STK-MED ONCE  12/31/19 20:22


 12/31/19 20:22 DC  





 


 Potassium Chloride


  (Klor-Con)  20 meq  DAILYWBKFT  12/31/19 08:00


    1/1/20 08:38





 


 Promethazine HCl


  (Phenergan)  12.5 mg  PRN Q6HRS  PRN  12/30/19 15:30


    1/1/20 07:06





 


 Propofol  20 ml @ As


 Directed  STK-MED ONCE  12/31/19 17:45


 12/31/19 17:45 DC  





 


 Sacubitril/


 Valsartan


  (Entresto 24


 Mg-26 Mg)  1 tab  BID  12/30/19 21:00


    1/1/20 08:37





 


 Sevoflurane


  (Ultane)  30 ml  STK-MED ONCE  12/31/19 21:01


 12/31/19 21:01 DC  





 


 Sodium Chloride  500 ml @ 


 500 mls/hr  1X  ONCE  12/31/19 01:00


 12/31/19 01:59 DC 12/31/19 01:11





 


 Succinylcholine


 Chloride


  (Anectine)  200 mg  STK-MED ONCE  12/31/19 20:20


 12/31/19 20:20 DC  





 


 Vancomycin HCl 2


 gm/Sodium Chloride  500 ml @ 


 250 mls/hr  1X  ONCE  12/30/19 10:45


 12/30/19 12:44 DC 12/30/19 10:34














Labs:


Lab





Laboratory Tests








Test


 12/31/19


16:37 12/31/19


21:08 1/1/20


04:25 1/1/20


06:39


 


Glucose (Fingerstick)


 431 mg/dL


(70-99) 346 mg/dL


(70-99) 


 350 mg/dL


(70-99)


 


Sodium Level


 


 


 124 mmol/L


(136-145) 





 


Potassium Level


 


 


 3.3 mmol/L


(3.5-5.1) 





 


Chloride Level


 


 


 89 mmol/L


() 





 


Carbon Dioxide Level


 


 


 23 mmol/L


(21-32) 





 


Anion Gap   12 (6-14)  


 


Blood Urea Nitrogen


 


 


 45 mg/dL


(8-26) 





 


Creatinine


 


 


 8.8 mg/dL


(0.7-1.3) 





 


Estimated GFR


(Cockcroft-Gault) 


 


 7.7 


 





 


Glucose Level


 


 


 335 mg/dL


(70-99) 





 


Calcium Level


 


 


 7.1 mg/dL


(8.5-10.1) 





 


Magnesium Level


 


 


 1.6 mg/dL


(1.8-2.4) 





 


Test


 1/1/20


09:36 1/1/20


11:26 


 





 


Glucose (Fingerstick)


 322 mg/dL


(70-99) 279 mg/dL


(70-99) 


 














Objective:


Assessment:


 


1.  Right foot deep tissue infection with Chronic nonhealing right diabetic foot

ulcers. 


S/P amputation of RT 5TH AND GREAT TOE 12/31 AND ID OF THE OM    


2. GPC BACTEREMIA


3.  Leucocytosis


4   Diabetes mellitus with neuropathy poorly controlled


5.  End-stage renal disease, on peritoneal dialysis.


6.  Hypertension.


7.  Congestive heart failure. Pacemaker in place.





Plan:


Plan of Care


 DC ZYVOX


start dapto


f/u GPC in BC 


Repeat BC


Wound care per ortho


cont merrem and micafungin


optimal dm control


d/w LEONIDES LAMB MD            Jan 1, 2020 14:05

## 2020-01-01 NOTE — CONS
DATE OF CONSULTATION:  12/31/2019



REFERRING PHYSICIAN:  Gloria Vigil DO



REASON FOR CONSULTATION:  Antibiotic management.



HISTORY OF PRESENT ILLNESS:  A 54-year-old male with history of CKD, on PD for

the last 5 years, diabetes mellitus and hyperlipidemia, presented to the ER on

12/30/2019 with complaints of nonhealing right foot wound.  He had noticed it

sometime in October, tried to take care of it on his own.  He denied being on

any antibiotics.  It gradually started getting worse.  So, he presented to the

ER.  He had leukocytosis of 31,000, normal lactate.  He underwent a foot x-ray,

which showed significant soft tissue gas involving the right foot.  Exam of the

soft tissue gas into the distal calf region, full involvement is not included on

this exam.  There is concern about necrotizing fasciitis, possibly osteomyelitis

involving the fifth tarsal bone distally is also raised.  Assessment of bony

destruction is limited due to soft tissue gas.  He had chest x-ray done, which

showed no acute cardiopulmonary process, mild cardiomegaly.  The patient was

given a dose of vancomycin and clindamycin.  He is admitted to the Medical

floor.  He underwent orthopedic evaluation.  He is awaiting I and D of the toes

and/or possible BKA due to deep tissue infection later today.



The patient denies any fevers, chills, nausea, vomiting, diarrhea or abdominal

pain.



PAST MEDICAL HISTORY:  Diabetes; end-stage renal disease, on PD; anemia;

hyperlipidemia; history of CHF; hyperparathyroidism; osteoarthritis; pacemaker.



SOCIAL HISTORY:  No smoking, alcohol or illicit drug use.  Lives with wife.  Has

one dog, healthy.



ALLERGIES:  No known drug allergies.



FAMILY HISTORY:  As per HPI.



PHYSICAL EXAMINATION:

VITAL SIGNS:  Temperature 98.8, pulse 94, respiratory rate 16, blood pressure

86/57, oxygen saturation 99% on room air.

GENERAL:  Alert and oriented x 3 male, pleasant, in no acute distress, lying

comfortably in bed.

HEENT:  Normocephalic, atraumatic.  Anicteric.  No thrush.  Oral mucosa moist.

NECK:  Supple.  No JVD.

LUNGS:  Clear bilaterally.  No wheezing.

HEART:  S1, S2.

ABDOMEN:  Soft, nontender, nondistended.

EXTREMITIES:  Right lower extremity dressing in place.  Pictures of the wound

noted in the chart.  Dry skin.  Numerous calluses present over the left lower

extremity with some abrasions.  No signs of infection noted in the left lower

extremity.

DERMATOLOGIC:  Warm, dry.  No generalized rash.

CENTRAL NERVOUS SYSTEM:  Alert and oriented x 3, grossly nonfocal.

PSYCHIATRIC:  Cooperative, appropriate mood and affect.



LABORATORY DATA:  WBC 31.1, hemoglobin 11.3, hematocrit 34.8, platelets 365. 

ESR 73.  Sodium 127, potassium 3.2, chloride 91, bicarbonate 26, BUN 44,

creatinine 8.8, glucose 452, lactate 1.6, calcium 7.1.



MICRO:  Blood culture pending at this time.



RADIOLOGY:  Foot x-ray, as above.  Chest x-ray, as above.



IMPRESSION:

1.  Right foot deep tissue infection with Chronic nonhealing right diabetic foot
ulcers.

2. Leucocytosis

3.  Diabetes mellitus.

4.  End-stage renal disease, on peritoneal dialysis.

5.  Hypertension.

6.  Congestive heart failure.

7.  Pacemaker in place.



RECOMMENDATIONS:

1.  Start Merrem and Zyvox.

2.  Add micafungin.

3.  The patient is awaiting surgery today, please send intraoperative tissue for

cultures.

4.  Optimal diabetes control.

5.  Follow up labs and cultures.

6.  Continue supportive care.



Thank you for consulting Infectious Disease to participate in this patient's

care.  If you have any questions, do not hesitate to contact me.

 



______________________________

LEONIDES HARDWICK MD



DR:  ADALBERTO/leonor  JOB#:  [id number] / [id number]

DD:  12/31/2019 14:27  DT:  01/01/2020 01:22

JOSEP

## 2020-01-01 NOTE — RAD
Right lower extremity arterial Doppler ultrasound

 

HISTORY: Right foot gangrene

 

TECHNIQUE: Color Doppler, grayscale and duplex analysis performed of the 

right lower extremity arterial structures, from the common femoral artery 

through the runoff vessels.

 

COMPARISON: None are available

 

Findings:

All velocity measurements are in centimeters per second.

 

Monophasic waveforms are identified throughout. No critical segmental 

velocity elevation is identified to suggest a focal high-grade stenosis. 

No evidence of focal occlusion. Peroneal artery cannot be visualized. 

Pedis artery cannot be accessed due to foot wrapping.

 

IMPRESSION: Diffuse monophasic waveforms compatible with generalized 

atherosclerotic disease. Peroneal artery is not visualized, but otherwise 

no evidence of focal occlusion.

 

Electronically signed by: Todd Watson MD (1/1/2020 9:21 PM) [facility]

## 2020-01-01 NOTE — PDOC
Melanie calling to inquire of her MS provider if they think she should go in.   She recognizes with having MS that she could be more susceptible to infections.   States she has a very bad cold and cough and feels miserable.   Her mother  of pneumonia so she is hesitant to not do anything, but also doesn't want to just run to the doctor if it isn't necessary.  Please contact her to discuss symptoms and make suggestion.   She can be reached at [phone].  (she also has a message in to Dr Perez/PCP)   [employer] PROGRESS NOTE


Chief Complaint


Chief Complaint


Post-op day 1 of amputation of R great toe w/extensive debridement of lateral 

aspect of R foot


Necrotizing Fasciitis of RLE


CHF


Diabetes-Type II


High Cholesterol


Heart Disease


Renal Failure





History of Present Illness


History of Present Illness


1/1/20


Pt seen and examined


DW pt


SOUTH RN


Reviewed pt's chart





12/31/19


Pt seen and examined


Pt was sitting up in bed, eating breakfast in NAD


DW pt


SOUTH RN


Reviewed pt's chart





Vitals/I&O


Vitals/I&O:





                                   Vital Signs








  Date Time  Temp Pulse Resp B/P (MAP) Pulse Ox O2 Delivery O2 Flow Rate FiO2


 


1/1/20 08:37  108  103/70    


 


1/1/20 08:00      Room Air  


 


1/1/20 07:00 97.9  28  95   





 97.9       














                                    I & O   


 


 12/31/19 12/31/19 1/1/20





 15:00 23:00 07:00


 


Intake Total   200 ml


 


Output Total  10 ml 


 


Balance  -10 ml 200 ml











Physical Exam


General:  Alert, Cooperative, No acute distress


Heart:  Regular rate, Normal S1, Normal S2


Lungs:  Clear


Abdomen:  Normal bowel sounds, Soft, No tenderness


Extremities:  No clubbing, No edema, Other (clean, dry, intact bandage of the 

RLE)


Skin:  Other (clean, dry, intact bandage of the RLE. There is a bad odor.)





Labs


Labs:





Laboratory Tests








Test


 12/31/19


14:00 12/31/19


16:37 12/31/19


21:08 1/1/20


04:25


 


Glucose (Fingerstick)


 425 mg/dL


(70-99) 431 mg/dL


(70-99) 346 mg/dL


(70-99) 





 


Sodium Level


 


 


 


 124 mmol/L


(136-145)


 


Potassium Level


 


 


 


 3.3 mmol/L


(3.5-5.1)


 


Chloride Level


 


 


 


 89 mmol/L


()


 


Carbon Dioxide Level


 


 


 


 23 mmol/L


(21-32)


 


Anion Gap    12 (6-14) 


 


Blood Urea Nitrogen


 


 


 


 45 mg/dL


(8-26)


 


Creatinine


 


 


 


 8.8 mg/dL


(0.7-1.3)


 


Estimated GFR


(Cockcroft-Gault) 


 


 


 7.7 





 


Glucose Level


 


 


 


 335 mg/dL


(70-99)


 


Calcium Level


 


 


 


 7.1 mg/dL


(8.5-10.1)


 


Magnesium Level


 


 


 


 1.6 mg/dL


(1.8-2.4)


 


Test


 1/1/20


06:39 1/1/20


09:36 


 





 


Glucose (Fingerstick)


 350 mg/dL


(70-99) 322 mg/dL


(70-99) 


 














Review of Systems


Review of Systems:


No c/o headaches


No c/o N/V/D





Assessment and Plan


Assessmemt and Plan


Problems


Medical Problems:


(1) CHF (congestive heart failure)


Status: Acute  





(2) Chronic kidney disease


Status: Acute  





(3) Diabetes


Status: Acute  





(4) Sepsis


Status: Acute  





(5) Severe sepsis


Status: Acute  





Assessment 


Post-op day 1 of amputation of R great toe w/extensive debridement of lateral 

aspect of R foot


Necrotizing Fasciitis of RLE


CHF


Diabetes-Type II


High Cholesterol


Heart Disease


Renal Failure





Plan


IV Abx and anti-fungals


Replace Mg


Wound Care


Insulin


DVT Prophylaxis


PT/OT


Labs


Home meds


Full code


Will possibly require a BKA


Appreciate subspecialist input








Comment


Review of Relevant


I have reviewed the following items sean (where applicable) has been applied.


Medications:





Current Medications








 Medications


  (Trade)  Dose


 Ordered  Sig/Ivonne


 Route


 PRN Reason  Start Time


 Stop Time Status Last Admin


Dose Admin


 


 Meropenem 500 mg/


 Sodium Chloride  50 ml @ 


 100 mls/hr  Q12HR


 IV


   12/31/19 15:00


    1/1/20 10:11





 


 Linezolid/Dextrose  300 ml @ 


 300 mls/hr  Q12HR


 IV


   12/31/19 21:00


    1/1/20 08:57





 


 Micafungin Sodium


 100 mg/Dextrose  100 ml @ 


 100 mls/hr  Q24H


 IV


   12/31/19 15:30


    12/31/19 16:48





 


 Non-Formulary


 Medication  2 ea  TIDWMEALS


 PO


   12/31/19 17:00


    1/1/20 08:38





 


 Non-Formulary


 Medication  30 ea  DAILY


 SQ


   1/1/20 09:00


    1/1/20 08:39





 


 Citric Acid/


 Sodium Citrate


  (Bicitra)  30 ml  1X  STAT


 PO


   12/31/19 18:44


 12/31/19 18:53 DC 12/31/19 18:44





 


 Famotidine


  (Pepcid Vial)  20 mg  1X  ONCE


 IVP


   12/31/19 19:00


 12/31/19 19:01 DC 12/31/19 18:48





 


 Insulin Human


 Lispro


  (HumaLOG)  12 units  1X  ONCE


 SQ


   1/1/20 10:45


 1/1/20 10:46 DC 1/1/20 10:33




















PRINCESS MACARIO III DO            Jan 1, 2020 10:57

## 2020-01-01 NOTE — PDOC
Renal-Progress Notes


Subjective Notes


Notes


NO NEW COMPLAINTS





History of Present Illness


Hx of present illness


STABLE





Vitals


Vitals





Vital Signs








  Date Time  Temp Pulse Resp B/P (MAP) Pulse Ox O2 Delivery O2 Flow Rate FiO2


 


1/1/20 11:00 98.6 76 22 79/50 (60) 95 Room Air  





 98.6       








Weight


Weight [ ]





I.O.


Intake and Output











Intake and Output 


 


 1/1/20





 07:00


 


Intake Total 200 ml


 


Output Total 10 ml


 


Balance 190 ml


 


 


 


Intake Oral 200 ml


 


Output Estimated Blood Loss 10 ml


 


# Voids 3











Labs


Labs





Laboratory Tests








Test


 12/31/19


16:37 12/31/19


21:08 1/1/20


04:25 1/1/20


06:39


 


Glucose (Fingerstick)


 431 mg/dL


(70-99) 346 mg/dL


(70-99) 


 350 mg/dL


(70-99)


 


Sodium Level


 


 


 124 mmol/L


(136-145) 





 


Potassium Level


 


 


 3.3 mmol/L


(3.5-5.1) 





 


Chloride Level


 


 


 89 mmol/L


() 





 


Carbon Dioxide Level


 


 


 23 mmol/L


(21-32) 





 


Anion Gap   12 (6-14)  


 


Blood Urea Nitrogen


 


 


 45 mg/dL


(8-26) 





 


Creatinine


 


 


 8.8 mg/dL


(0.7-1.3) 





 


Estimated GFR


(Cockcroft-Gault) 


 


 7.7 


 





 


Glucose Level


 


 


 335 mg/dL


(70-99) 





 


Calcium Level


 


 


 7.1 mg/dL


(8.5-10.1) 





 


Magnesium Level


 


 


 1.6 mg/dL


(1.8-2.4) 





 


Test


 1/1/20


09:36 1/1/20


11:26 


 





 


Glucose (Fingerstick)


 322 mg/dL


(70-99) 279 mg/dL


(70-99) 


 














Micro


Micro





Microbiology


12/30/19 Blood Culture - Preliminary, Resulted


           NO GROWTH AFTER 2 DAYS





Review of Systems


Constitutional:  yes: weakness, alert, oriented


Ears/Nose/Throat:  Yes: no symptom reported


Eyes:  Yes: no symptom reported


Pulmonary:  Yes no symptom reported


Cardiovascular:  Yes no symptom reported


Gastrointestional:  Yes: no symptom reported


Genitourinary:  Yes: no symptom reported


Musculoskeletal:  Yes: no symptom reported


Skin:  Yes no symptom reported


Psychiatric/Neurological:  Yes: no symptom reported


Endocrine:  Yes: no symptom reported





Physical Exam


General Appearance:  no apparent distress


Skin:  warm, dry


Respiratory:  bilateral CTA


Heart:  S1S2


Abdomen:  soft, bowel sounds present


Genitourinary:  bladder flat


Extremities:  pulses present


Neurology:  alert


Musculoskeletal:  Osteoarthritis





Assessment


Assessment


IMP


RIGHT FOOT WOUND CONCERNING FOR NECROTIZING FASCIITIS


POORLY CONTROLLED DM II


ANEMIA


HYPOKALEMIA-BETTER


HYPONATREMIA-STABLE


ESRD


HTN HX


HX OF CM AND CHF








PLAN





REPLACE K DAILY


ANTIBIOTICS


WOUND CARE


ORTHO EVALUATION AND TX


PROB NEEDS AMPUTATION


APD DAILY TONIGHT


WILL ADD INSULIN TO PD BAGS 


CHECK TSH


WILL FOLLOW











CONSTANTIN GOLDSMITH MD                  Jan 1, 2020 15:13

## 2020-01-01 NOTE — PDOC
PROGRESS NOTES


Subjective


Subjective


No complaints. Feeling a bit better after surgery on the foot.





Objective


Vital Signs





Vital Signs








  Date Time  Temp Pulse Resp B/P (MAP) Pulse Ox O2 Delivery O2 Flow Rate FiO2


 


1/1/20 11:00 98.6 76 22 79/50 (60) 95 Room Air  





 98.6       








Physical Exam


Dressing is intact on the right foot. No active drainage. There is still a bad 

odor.


Labs





Laboratory Tests








Test


 12/30/19


16:45 12/31/19


04:00 12/31/19


04:10 12/31/19


08:13


 


Glucose (Fingerstick)


 106 mg/dL


(70-99) 


 


 398 mg/dL


(70-99)


 


Sodium Level


 


 127 mmol/L


(136-145) 


 





 


Potassium Level


 


 3.2 mmol/L


(3.5-5.1) 


 





 


Chloride Level


 


 91 mmol/L


() 


 





 


Carbon Dioxide Level


 


 26 mmol/L


(21-32) 


 





 


Anion Gap  10 (6-14)   


 


Blood Urea Nitrogen


 


 44 mg/dL


(8-26) 


 





 


Creatinine


 


 8.8 mg/dL


(0.7-1.3) 


 





 


Estimated GFR


(Cockcroft-Gault) 


 7.7 


 


 





 


Glucose Level


 


 452 mg/dL


(70-99) 


 





 


Hemoglobin A1c


 


 8.0 %


(4.8-5.6) 


 





 


Calcium Level


 


 7.1 mg/dL


(8.5-10.1) 


 





 


Magnesium Level


 


 1.6 mg/dL


(1.8-2.4) 


 





 


 Antigen


 


 


 39.5 U/mL (Not


Estab.) 





 


Test


 12/31/19


10:48 12/31/19


14:00 12/31/19


16:37 12/31/19


21:08


 


Glucose (Fingerstick)


 465 mg/dL


(70-99) 425 mg/dL


(70-99) 431 mg/dL


(70-99) 346 mg/dL


(70-99)


 


Test


 1/1/20


04:25 1/1/20


06:39 1/1/20


09:36 1/1/20


11:26


 


Sodium Level


 124 mmol/L


(136-145) 


 


 





 


Potassium Level


 3.3 mmol/L


(3.5-5.1) 


 


 





 


Chloride Level


 89 mmol/L


() 


 


 





 


Carbon Dioxide Level


 23 mmol/L


(21-32) 


 


 





 


Anion Gap 12 (6-14)    


 


Blood Urea Nitrogen


 45 mg/dL


(8-26) 


 


 





 


Creatinine


 8.8 mg/dL


(0.7-1.3) 


 


 





 


Estimated GFR


(Cockcroft-Gault) 7.7 


 


 


 





 


Glucose Level


 335 mg/dL


(70-99) 


 


 





 


Calcium Level


 7.1 mg/dL


(8.5-10.1) 


 


 





 


Magnesium Level


 1.6 mg/dL


(1.8-2.4) 


 


 





 


Glucose (Fingerstick)


 


 350 mg/dL


(70-99) 322 mg/dL


(70-99) 279 mg/dL


(70-99)








Laboratory Tests








Test


 12/31/19


16:37 12/31/19


21:08 1/1/20


04:25 1/1/20


06:39


 


Glucose (Fingerstick)


 431 mg/dL


(70-99) 346 mg/dL


(70-99) 


 350 mg/dL


(70-99)


 


Sodium Level


 


 


 124 mmol/L


(136-145) 





 


Potassium Level


 


 


 3.3 mmol/L


(3.5-5.1) 





 


Chloride Level


 


 


 89 mmol/L


() 





 


Carbon Dioxide Level


 


 


 23 mmol/L


(21-32) 





 


Anion Gap   12 (6-14)  


 


Blood Urea Nitrogen


 


 


 45 mg/dL


(8-26) 





 


Creatinine


 


 


 8.8 mg/dL


(0.7-1.3) 





 


Estimated GFR


(Cockcroft-Gault) 


 


 7.7 


 





 


Glucose Level


 


 


 335 mg/dL


(70-99) 





 


Calcium Level


 


 


 7.1 mg/dL


(8.5-10.1) 





 


Magnesium Level


 


 


 1.6 mg/dL


(1.8-2.4) 





 


Test


 1/1/20


09:36 1/1/20


11:26 


 





 


Glucose (Fingerstick)


 322 mg/dL


(70-99) 279 mg/dL


(70-99) 


 











Imaging


I reviewed his imaging reports that he had left lower extremity vascular studies

in the past but never on the right.





Assessment


Assessment


Postoperative day 1 after right foot debridement and amputation of the distal 

portion of the great toe and fifth metatarsal with toe amputation. There is 

discolored tissue on the plantar aspect of the foot with questionable viability.

He will require further debridement and wound care, vs BKA.  I did recommend 

below-knee amputation to him yesterday, but he refuses at this time. I don't 

know if the foot will be salvageable.  I will ask wound care to be involved, and

I will order vascular studies to see if there is a reversible vascular issue in 

the right lower extremity.





Plan


Plan of Care


Postoperative day 1 after right foot debridement and amputation of the distal 

portion of the great toe and fifth metatarsal with toe amputation. There is 

discolored tissue on the plantar aspect of the foot with questionable viability.

He will require further debridement and wound care, vs BKA.  I did recommend 

below-knee amputation to him yesterday, but he refuses at this time. I don't 

know if the foot will be salvageable.  I will ask wound care to be involved, and

I will order vascular studies to see if there is a reversible vascular issue in 

the right lower extremity.











NICOLA HANEY MD                  Jan 1, 2020 14:37

## 2020-01-02 VITALS — DIASTOLIC BLOOD PRESSURE: 45 MMHG | SYSTOLIC BLOOD PRESSURE: 77 MMHG

## 2020-01-02 VITALS — SYSTOLIC BLOOD PRESSURE: 83 MMHG | DIASTOLIC BLOOD PRESSURE: 57 MMHG

## 2020-01-02 VITALS — SYSTOLIC BLOOD PRESSURE: 69 MMHG | DIASTOLIC BLOOD PRESSURE: 51 MMHG

## 2020-01-02 VITALS — DIASTOLIC BLOOD PRESSURE: 100 MMHG | SYSTOLIC BLOOD PRESSURE: 140 MMHG

## 2020-01-02 VITALS — DIASTOLIC BLOOD PRESSURE: 50 MMHG | SYSTOLIC BLOOD PRESSURE: 77 MMHG

## 2020-01-02 VITALS — DIASTOLIC BLOOD PRESSURE: 41 MMHG | SYSTOLIC BLOOD PRESSURE: 81 MMHG

## 2020-01-02 LAB
ANION GAP SERPL CALC-SCNC: 8 MMOL/L (ref 6–14)
BASOPHILS # BLD AUTO: 0.3 X10^3/UL (ref 0–0.2)
BASOPHILS NFR BLD: 1 % (ref 0–3)
BUN SERPL-MCNC: 42 MG/DL (ref 8–26)
CALCIUM SERPL-MCNC: 6.8 MG/DL (ref 8.5–10.1)
CHLORIDE SERPL-SCNC: 93 MMOL/L (ref 98–107)
CO2 SERPL-SCNC: 27 MMOL/L (ref 21–32)
CREAT SERPL-MCNC: 8.6 MG/DL (ref 0.7–1.3)
EOSINOPHIL NFR BLD: 0.2 X10^3/UL (ref 0–0.7)
EOSINOPHIL NFR BLD: 1 % (ref 0–3)
ERYTHROCYTE [DISTWIDTH] IN BLOOD BY AUTOMATED COUNT: 15.4 % (ref 11.5–14.5)
GFR SERPLBLD BASED ON 1.73 SQ M-ARVRAT: 7.9 ML/MIN
GLUCOSE SERPL-MCNC: 63 MG/DL (ref 70–99)
HCT VFR BLD CALC: 33.9 % (ref 39–53)
HGB BLD-MCNC: 11.1 G/DL (ref 13–17.5)
LYMPHOCYTES # BLD: 0.7 X10^3/UL (ref 1–4.8)
LYMPHOCYTES NFR BLD AUTO: 3 % (ref 24–48)
MAGNESIUM SERPL-MCNC: 1.8 MG/DL (ref 1.8–2.4)
MCH RBC QN AUTO: 27 PG (ref 25–35)
MCHC RBC AUTO-ENTMCNC: 33 G/DL (ref 31–37)
MCV RBC AUTO: 83 FL (ref 79–100)
MONO #: 1.3 X10^3/UL (ref 0–1.1)
MONOCYTES NFR BLD: 5 % (ref 0–9)
NEUT #: 23.9 X10^3/UL (ref 1.8–7.7)
NEUTROPHILS NFR BLD AUTO: 90 % (ref 31–73)
PLATELET # BLD AUTO: 358 X10^3/UL (ref 140–400)
POTASSIUM SERPL-SCNC: 3.3 MMOL/L (ref 3.5–5.1)
RBC # BLD AUTO: 4.11 X10^6/UL (ref 4.3–5.7)
SODIUM SERPL-SCNC: 128 MMOL/L (ref 136–145)
WBC # BLD AUTO: 26.4 X10^3/UL (ref 4–11)

## 2020-01-02 RX ADMIN — BACITRACIN SCH MLS/HR: 5000 INJECTION, POWDER, FOR SOLUTION INTRAMUSCULAR at 20:03

## 2020-01-02 RX ADMIN — ATORVASTATIN CALCIUM SCH MG: 10 TABLET, FILM COATED ORAL at 21:00

## 2020-01-02 RX ADMIN — Medication SCH EA: at 17:00

## 2020-01-02 RX ADMIN — LOPERAMIDE HYDROCHLORIDE PRN MG: 2 CAPSULE ORAL at 08:45

## 2020-01-02 RX ADMIN — INSULIN LISPRO SCH UNITS: 100 INJECTION, SOLUTION INTRAVENOUS; SUBCUTANEOUS at 12:00

## 2020-01-02 RX ADMIN — SACUBITRIL AND VALSARTAN SCH TAB: 24; 26 TABLET, FILM COATED ORAL at 09:00

## 2020-01-02 RX ADMIN — INSULIN LISPRO SCH UNITS: 100 INJECTION, SOLUTION INTRAVENOUS; SUBCUTANEOUS at 17:00

## 2020-01-02 RX ADMIN — Medication SCH EA: at 09:00

## 2020-01-02 RX ADMIN — PANTOPRAZOLE SODIUM SCH MG: 40 TABLET, DELAYED RELEASE ORAL at 06:30

## 2020-01-02 RX ADMIN — SACUBITRIL AND VALSARTAN SCH TAB: 24; 26 TABLET, FILM COATED ORAL at 21:00

## 2020-01-02 RX ADMIN — MEROPENEM SCH MLS/HR: 500 INJECTION, POWDER, FOR SOLUTION INTRAVENOUS at 23:10

## 2020-01-02 RX ADMIN — MEROPENEM SCH MLS/HR: 500 INJECTION, POWDER, FOR SOLUTION INTRAVENOUS at 08:48

## 2020-01-02 RX ADMIN — ASPIRIN 81 MG SCH MG: 81 TABLET ORAL at 08:45

## 2020-01-02 RX ADMIN — PROMETHAZINE HYDROCHLORIDE PRN MG: 12.5 TABLET ORAL at 20:03

## 2020-01-02 RX ADMIN — BACITRACIN SCH MLS/HR: 5000 INJECTION, POWDER, FOR SOLUTION INTRAMUSCULAR at 22:05

## 2020-01-02 RX ADMIN — POTASSIUM CHLORIDE SCH MEQ: 1500 TABLET, EXTENDED RELEASE ORAL at 16:09

## 2020-01-02 RX ADMIN — Medication SCH EA: at 08:47

## 2020-01-02 RX ADMIN — Medication SCH EA: at 12:00

## 2020-01-02 RX ADMIN — PROMETHAZINE HYDROCHLORIDE PRN MG: 12.5 TABLET ORAL at 06:29

## 2020-01-02 RX ADMIN — DEXTROSE SCH MLS/HR: 50 INJECTION, SOLUTION INTRAVENOUS at 16:05

## 2020-01-02 RX ADMIN — PROMETHAZINE HYDROCHLORIDE PRN MG: 12.5 TABLET ORAL at 12:46

## 2020-01-02 RX ADMIN — FUROSEMIDE SCH MG: 80 TABLET ORAL at 09:00

## 2020-01-02 RX ADMIN — INSULIN LISPRO SCH UNITS: 100 INJECTION, SOLUTION INTRAVENOUS; SUBCUTANEOUS at 08:00

## 2020-01-02 RX ADMIN — POTASSIUM CHLORIDE SCH MEQ: 1500 TABLET, EXTENDED RELEASE ORAL at 08:46

## 2020-01-02 NOTE — NUR
Wound Care:

Follow up to apply veraflow wound vac to R foot DF s/p I&D by Dr. Romero on 12/31/19. Wound 
pictured and measured, also R great toe closed amputation noted. Plantar/lateral wound 
malodorous, with dark red foul drainage. Wound bed blackened from cautery, as well as 
red/non-granulated tissue. Bone, tendon, muscle exposed within wound. Dark red, soft, 
necrotic tissue to plantar foot. Ostomy ring applied to wound margins, silver waffle foam 
used to wound bed and eschar, solid foam over the top. Total of 12 pieces of foam used to 
fill cavity. Bridged to R lateral lower leg. Veraflow settings are Dakins 1/4 strength to 
instill 30cc for 5 minutes every 4 hours.

Pt educated on wound vac use and what to expect, as well as importance of NWB to R foot, and 
changing position to avoid skin breakdown. Pt able to turn independently. Follow up Monday 1/6/20

## 2020-01-02 NOTE — PDOC
Renal-Progress Notes


Subjective Notes


Notes


HAD DIZZINESS AND WEAKNESS WITH LOW BP





History of Present Illness


Hx of present illness


STABLE





Vitals


Vitals





Vital Signs








  Date Time  Temp Pulse Resp B/P (MAP) Pulse Ox O2 Delivery O2 Flow Rate FiO2


 


1/2/20 11:00 97.8 99 28 77/50 (59) 95 Room Air  





 97.8       








Weight


Weight [ ]





I.O.


Intake and Output











Intake and Output 


 


 1/2/20





 07:00


 


Intake Total 970 ml


 


Balance 970 ml


 


 


 


Intake Oral 970 ml











Labs


Labs





Laboratory Tests








Test


 1/1/20


16:59 1/1/20


17:15 1/1/20


18:16 1/1/20


21:01


 


Glucose (Fingerstick)


 54 mg/dL


(70-99) 62 mg/dL


(70-99) 93 mg/dL


(70-99) 104 mg/dL


(70-99)


 


Test


 1/2/20


03:02 1/2/20


06:00 1/2/20


07:19 1/2/20


07:38


 


Glucose (Fingerstick)


 120 mg/dL


(70-99) 


 55 mg/dL


(70-99) 94 mg/dL


(70-99)


 


White Blood Count


 


 26.4 x10^3/uL


(4.0-11.0) 


 





 


Red Blood Count


 


 4.11 x10^6/uL


(4.30-5.70) 


 





 


Hemoglobin


 


 11.1 g/dL


(13.0-17.5) 


 





 


Hematocrit


 


 33.9 %


(39.0-53.0) 


 





 


Mean Corpuscular Volume  83 fL ()   


 


Mean Corpuscular Hemoglobin  27 pg (25-35)   


 


Mean Corpuscular Hemoglobin


Concent 


 33 g/dL


(31-37) 


 





 


Red Cell Distribution Width


 


 15.4 %


(11.5-14.5) 


 





 


Platelet Count


 


 358 x10^3/uL


(140-400) 


 





 


Neutrophils (%) (Auto)  90 % (31-73)   


 


Lymphocytes (%) (Auto)  3 % (24-48)   


 


Monocytes (%) (Auto)  5 % (0-9)   


 


Eosinophils (%) (Auto)  1 % (0-3)   


 


Basophils (%) (Auto)  1 % (0-3)   


 


Neutrophils # (Auto)


 


 23.9 x10^3/uL


(1.8-7.7) 


 





 


Lymphocytes # (Auto)


 


 0.7 x10^3/uL


(1.0-4.8) 


 





 


Monocytes # (Auto)


 


 1.3 x10^3/uL


(0.0-1.1) 


 





 


Eosinophils # (Auto)


 


 0.2 x10^3/uL


(0.0-0.7) 


 





 


Basophils # (Auto)


 


 0.3 x10^3/uL


(0.0-0.2) 


 





 


Sodium Level


 


 128 mmol/L


(136-145) 


 





 


Potassium Level


 


 3.3 mmol/L


(3.5-5.1) 


 





 


Chloride Level


 


 93 mmol/L


() 


 





 


Carbon Dioxide Level


 


 27 mmol/L


(21-32) 


 





 


Anion Gap  8 (6-14)   


 


Blood Urea Nitrogen


 


 42 mg/dL


(8-26) 


 





 


Creatinine


 


 8.6 mg/dL


(0.7-1.3) 


 





 


Estimated GFR


(Cockcroft-Gault) 


 7.9 


 


 





 


Glucose Level


 


 63 mg/dL


(70-99) 


 





 


Calcium Level


 


 6.8 mg/dL


(8.5-10.1) 


 





 


Magnesium Level


 


 1.8 mg/dL


(1.8-2.4) 


 





 


Thyroid Stimulating Hormone


(TSH) 


 5.933 uIU/mL


(0.358-3.74) 


 





 


Test


 1/2/20


11:16 


 


 





 


Glucose (Fingerstick)


 71 mg/dL


(70-99) 


 


 














Micro


Micro





Microbiology


12/30/19 Blood Culture - Preliminary, Resulted


           NO GROWTH AFTER 3 DAYS





Review of Systems


Constitutional:  yes: weakness, alert, oriented


Ears/Nose/Throat:  Yes: no symptom reported


Eyes:  Yes: no symptom reported


Pulmonary:  Yes no symptom reported


Cardiovascular:  Yes no symptom reported


Gastrointestional:  Yes: no symptom reported


Genitourinary:  Yes: no symptom reported


Musculoskeletal:  Yes: no symptom reported


Skin:  Yes no symptom reported


Psychiatric/Neurological:  Yes: no symptom reported


Endocrine:  Yes: no symptom reported





Physical Exam


General Appearance:  no apparent distress


Skin:  warm, dry


Respiratory:  bilateral CTA


Heart:  S1S2


Abdomen:  soft, bowel sounds present


Genitourinary:  bladder flat


Extremities:  pulses present, other (FOUL SMELLING DEEP TISSUE WOUND RLE)


Neurology:  alert


Musculoskeletal:  Osteoarthritis





Assessment


Assessment


IMP


RIGHT FOOT WOUND CONCERNING FOR NECROTIZING FASCIITIS


POORLY CONTROLLED DM II


ANEMIA


HYPOKALEMIA-BETTER


HYPONATREMIA-STABLE


ESRD


HTN HX


HX OF CM AND CHF


NSVT


HYPONATREMIA


HYPOTHYROIDISM


HYPOVOLEMIA








PLAN





REPLACE K DAILY


ANTIBIOTICS


WOUND CARE


ORTHO EVALUATION AND TX


PROB NEEDS AMPUTATION


APD DAILY TONIGHT-WILL CHANGE TO 1.5%


ALSO START SALINE


WILL CONT INSULIN TO PD BAGS 


START SYNTHROID


WILL FOLLOW











CONSTANTIN GOLDSMITH MD                  Jan 2, 2020 13:12

## 2020-01-02 NOTE — PDOC
CARDIO Progress Notes


Date and Time


Date of Service


1/2/20


Time of Evaluation


1210





Subjective


Subjective:  No Chest Pain, No shortness of breath





Vitals


Vitals





Vital Signs








  Date Time  Temp Pulse Resp B/P (MAP) Pulse Ox O2 Delivery O2 Flow Rate FiO2


 


1/2/20 11:00 97.8 99 28 77/50 (59) 95 Room Air  





 97.8       








Weight


Weight [ ]





Input and Output


Intake and Output











Intake and Output 


 


 1/2/20





 07:00


 


Intake Total 970 ml


 


Balance 970 ml


 


 


 


Intake Oral 970 ml











Laboratory


Labs





Laboratory Tests








Test


 1/1/20


16:59 1/1/20


17:15 1/1/20


18:16 1/1/20


21:01


 


Glucose (Fingerstick)


 54 mg/dL


(70-99) 62 mg/dL


(70-99) 93 mg/dL


(70-99) 104 mg/dL


(70-99)


 


Test


 1/2/20


03:02 1/2/20


06:00 1/2/20


07:19 1/2/20


07:38


 


Glucose (Fingerstick)


 120 mg/dL


(70-99) 


 55 mg/dL


(70-99) 94 mg/dL


(70-99)


 


White Blood Count


 


 26.4 x10^3/uL


(4.0-11.0) 


 





 


Red Blood Count


 


 4.11 x10^6/uL


(4.30-5.70) 


 





 


Hemoglobin


 


 11.1 g/dL


(13.0-17.5) 


 





 


Hematocrit


 


 33.9 %


(39.0-53.0) 


 





 


Mean Corpuscular Volume  83 fL ()   


 


Mean Corpuscular Hemoglobin  27 pg (25-35)   


 


Mean Corpuscular Hemoglobin


Concent 


 33 g/dL


(31-37) 


 





 


Red Cell Distribution Width


 


 15.4 %


(11.5-14.5) 


 





 


Platelet Count


 


 358 x10^3/uL


(140-400) 


 





 


Neutrophils (%) (Auto)  90 % (31-73)   


 


Lymphocytes (%) (Auto)  3 % (24-48)   


 


Monocytes (%) (Auto)  5 % (0-9)   


 


Eosinophils (%) (Auto)  1 % (0-3)   


 


Basophils (%) (Auto)  1 % (0-3)   


 


Neutrophils # (Auto)


 


 23.9 x10^3/uL


(1.8-7.7) 


 





 


Lymphocytes # (Auto)


 


 0.7 x10^3/uL


(1.0-4.8) 


 





 


Monocytes # (Auto)


 


 1.3 x10^3/uL


(0.0-1.1) 


 





 


Eosinophils # (Auto)


 


 0.2 x10^3/uL


(0.0-0.7) 


 





 


Basophils # (Auto)


 


 0.3 x10^3/uL


(0.0-0.2) 


 





 


Sodium Level


 


 128 mmol/L


(136-145) 


 





 


Potassium Level


 


 3.3 mmol/L


(3.5-5.1) 


 





 


Chloride Level


 


 93 mmol/L


() 


 





 


Carbon Dioxide Level


 


 27 mmol/L


(21-32) 


 





 


Anion Gap  8 (6-14)   


 


Blood Urea Nitrogen


 


 42 mg/dL


(8-26) 


 





 


Creatinine


 


 8.6 mg/dL


(0.7-1.3) 


 





 


Estimated GFR


(Cockcroft-Gault) 


 7.9 


 


 





 


Glucose Level


 


 63 mg/dL


(70-99) 


 





 


Calcium Level


 


 6.8 mg/dL


(8.5-10.1) 


 





 


Magnesium Level


 


 1.8 mg/dL


(1.8-2.4) 


 





 


Thyroid Stimulating Hormone


(TSH) 


 5.933 uIU/mL


(0.358-3.74) 


 





 


Test


 1/2/20


11:16 


 


 





 


Glucose (Fingerstick)


 71 mg/dL


(70-99) 


 


 














Microbiology


Micro





Microbiology


12/30/19 Blood Culture - Preliminary, Resulted


           NO GROWTH AFTER 3 DAYS





Review of Systems


Constitutional:  yes: weakness, alert, oriented


Ears/Nose/Throat:  Yes: no symptom reported


Eyes:  Yes: no symptom reported


Pulmonary:  Yes no symptom reported


Cardiovascular:  Yes no symptom reported


Gastrointestional:  Yes: no symptom reported


Genitourinary:  Yes: no symptom reported


Musculoskeletal:  Yes: no symptom reported


Skin:  Yes no symptom reported


Psychiatric/Neurological:  Yes: no symptom reported


Endocrine:  Yes: no symptom reported





Physical Exam


HEENT:  Neck Supple W Full Motion


Chest:  Symmetric


LUNGS:  Other (diminished bases)


Heart:  S1S2, RRR


Abdomen:  Soft N/T


Extremities:  Other (trace bilateral LE edema. Drsg intact to left foot )


Neurology:  alert, oriented, follow commands





Assessment


Assessment


1.  Right foot wound, non-healing. S/p right foot debridement with amputation of

the distal portion of the great toe and amputation of fifth metatarsal. BKA 

discussed, but patient refusing. Is malodorous 


2.  PAD; LE US with diffused disease, no focal stenosis identified. 


2.  Arrhythmia; having occasional PVCs and brief NSVT


3.  Chronic systolic HF; compensated clinically 


4.  CMP s/p AICD ([facility]). LVEF 15% 


5.  ESRD on PD


6.  Hypertension; controlled 


7.  Hyperlipidemia; not on statin 


8.  Diabetes, II


9.  Chronic LBBB











Recommendations





Device interrogation


Continue Entresto as BP allows. 


ASA therapy, add statin 


Local wound care


Follow ortho recs


Supportive care











BIN MCINTOSH            Jan 2, 2020 12:52

## 2020-01-02 NOTE — PDOC
Infectious Disease Note


Subjective:


Subjective


Pt says feels ok


no F/C/N/V/D/SOB


postop pain is under control





Vital Signs:


Vital Signs





Vital Signs








  Date Time  Temp Pulse Resp B/P (MAP) Pulse Ox O2 Delivery O2 Flow Rate FiO2


 


1/2/20 11:00 97.8 99 28 77/50 (59) 95 Room Air  





 97.8       











Physical Exam:


PHYSICAL EXAM


HEENT:  Normocephalic, atraumatic.  Anicteric.  No thrush.  Oral mucosa moist.


NECK:  Supple.  No JVD.


LUNGS:  Clear bilaterally.  No wheezing.


HEART:  S1, S2.


ABDOMEN:  Soft, nontender, nondistended.


EXTREMITIES:  Right lower extremity dressing in place.  Pictures of the wound


noted in the chart.  Dry skin.  Numerous calluses present over the left lower


extremity with some abrasions.  No signs of infection noted in the left lower


extremity.


DERMATOLOGIC:  Warm, dry.  No generalized rash.


CENTRAL NERVOUS SYSTEM:  Alert and oriented x 3, grossly nonfocal.


PSYCHIATRIC:  Cooperative, appropriate mood and affect.





Medications:


Inpatient Meds:





Current Medications








 Medications


  (Trade)  Dose


 Ordered  Sig/Ivonne  Start Time


 Stop Time Status Last Admin


Dose Admin


 


 Acetaminophen


  (Tylenol)  650 mg  PRN Q4HRS  PRN  12/30/19 10:45


 12/31/19 10:44 DC  





 


 Aspirin


  (Children'S


 Aspirin)  81 mg  [id number]  12/31/19 08:00


    1/2/20 08:45


81 MG


 


 Citric Acid/


 Sodium Citrate


  (Bicitra)  30 ml  1X  STAT  12/31/19 18:44


 12/31/19 18:53 DC 12/31/19 18:44


30 ML


 


 Clindamycin


 Phosphate  50 ml @ 


 100 mls/hr  1X  ONCE  12/30/19 09:30


 12/30/19 09:59 DC 12/30/19 09:53


100 MLS/HR


 


 Daptomycin 500 mg/


 Sodium Chloride  50 ml @ 


 100 mls/hr  Q48H  1/1/20 21:00


    1/1/20 21:43


100 MLS/HR


 


 Dexamethasone


 Sodium Phosphate


  (Decadron)  4 mg  STK-MED ONCE  12/31/19 17:45


 12/31/19 17:45 DC  





 


 Dextrose


  (Dextrose


 50%-Water Syringe)  12.5 gm  PRN Q15MIN  PRN  12/30/19 10:45


    1/1/20 17:48


12.5 GM


 


 Dextrose


  (Iv Dextrose 5%)  250 ml  PRN Q15MIN  PRN  12/30/19 10:45


     





 


 Famotidine


  (Pepcid Vial)  20 mg  1X  ONCE  12/31/19 19:00


 12/31/19 19:01 DC 12/31/19 18:48


20 MG


 


 Fentanyl Citrate


  (Fentanyl 2ml


 Vial)  100 mcg  STK-MED ONCE  12/31/19 17:46


 12/31/19 17:46 DC  





 


 Furosemide


  (Lasix)  80 mg  DAILY  12/31/19 09:00


    1/1/20 08:38


80 MG


 


 Insulin Human


 Lispro


  (HumaLOG)  12 units  1X  ONCE  1/1/20 10:45


 1/1/20 10:46 DC 1/1/20 10:33


12 UNITS


 


 Lidocaine HCl


  (Lidocaine Pf 2%


 Vial)  5 ml  STK-MED ONCE  12/31/19 17:45


 12/31/19 17:45 DC  





 


 Linezolid/Dextrose  300 ml @ 


 300 mls/hr  Q12HR  12/31/19 21:00


 1/1/20 13:49 DC 1/1/20 08:57


300 MLS/HR


 


 Loperamide HCl


  (Imodium)  2 mg  PRN Q15MIN  PRN  12/30/19 15:30


    1/2/20 08:45


2 MG


 


 Magnesium Sulfate  50 ml @ 25


 mls/hr  1X  ONCE  1/1/20 10:00


 1/1/20 11:59 DC 1/1/20 11:40


25 MLS/HR


 


 Meropenem 500 mg/


 Sodium Chloride  50 ml @ 


 100 mls/hr  Q12HR  12/31/19 15:00


    1/2/20 08:48


100 MLS/HR


 


 Metolazone


  (Zaroxolyn)  5 mg  DAILY  12/31/19 09:00


    1/1/20 08:36


5 MG


 


 Micafungin Sodium


 100 mg/Dextrose  100 ml @ 


 100 mls/hr  Q24H  12/31/19 15:30


    1/1/20 16:34


100 MLS/HR


 


 Non-Formulary


 Medication  30 ea  DAILY  1/1/20 09:00


    1/1/20 08:39


30 EA


 


 Ondansetron HCl


  (Zofran)  4 mg  STK-MED ONCE  12/31/19 17:45


 12/31/19 17:45 DC  





 


 Pantoprazole


 Sodium


  (Protonix)  40 mg  DAILYAC  12/31/19 07:30


    1/2/20 06:30


40 MG


 


 Phenylephrine HCl


  (PHENYLEPHRINE


 in 0.9% NACL PF)  1 mg  STK-MED ONCE  12/31/19 20:22


 12/31/19 20:22 DC  





 


 Potassium Chloride


  (Klor-Con)  20 meq  BIDWMEALS  1/1/20 17:00


    1/2/20 08:46


20 MEQ


 


 Promethazine HCl


  (Phenergan)  12.5 mg  PRN Q6HRS  PRN  12/30/19 15:30


    1/2/20 06:29


12.5 MG


 


 Propofol  20 ml @ As


 Directed  STK-MED ONCE  12/31/19 17:45


 12/31/19 17:45 DC  





 


 Sacubitril/


 Valsartan


  (Entresto 24


 Mg-26 Mg)  1 tab  BID  12/30/19 21:00


    1/1/20 08:37


1 TAB


 


 Sevoflurane


  (Ultane)  30 ml  STK-MED ONCE  12/31/19 21:01


 12/31/19 21:01 DC  





 


 Sodium Chloride  1,000 ml @ 


 75 mls/hr  [id number]  1/2/20 08:45


     





 


 Succinylcholine


 Chloride


  (Anectine)  200 mg  STK-MED ONCE  12/31/19 20:20


 12/31/19 20:20 DC  





 


 Vancomycin HCl 2


 gm/Sodium Chloride  500 ml @ 


 250 mls/hr  1X  ONCE  12/30/19 10:45


 12/30/19 12:44 DC 12/30/19 10:34


250 MLS/HR











Labs:


Lab





Laboratory Tests








Test


 1/1/20


16:59 1/1/20


17:15 1/1/20


18:16 1/1/20


21:01


 


Glucose (Fingerstick)


 54 mg/dL


(70-99) 62 mg/dL


(70-99) 93 mg/dL


(70-99) 104 mg/dL


(70-99)


 


Test


 1/2/20


03:02 1/2/20


06:00 1/2/20


07:19 1/2/20


07:38


 


Glucose (Fingerstick)


 120 mg/dL


(70-99) 


 55 mg/dL


(70-99) 94 mg/dL


(70-99)


 


White Blood Count


 


 26.4 x10^3/uL


(4.0-11.0) 


 





 


Red Blood Count


 


 4.11 x10^6/uL


(4.30-5.70) 


 





 


Hemoglobin


 


 11.1 g/dL


(13.0-17.5) 


 





 


Hematocrit


 


 33.9 %


(39.0-53.0) 


 





 


Mean Corpuscular Volume  83 fL ()   


 


Mean Corpuscular Hemoglobin  27 pg (25-35)   


 


Mean Corpuscular Hemoglobin


Concent 


 33 g/dL


(31-37) 


 





 


Red Cell Distribution Width


 


 15.4 %


(11.5-14.5) 


 





 


Platelet Count


 


 358 x10^3/uL


(140-400) 


 





 


Neutrophils (%) (Auto)  90 % (31-73)   


 


Lymphocytes (%) (Auto)  3 % (24-48)   


 


Monocytes (%) (Auto)  5 % (0-9)   


 


Eosinophils (%) (Auto)  1 % (0-3)   


 


Basophils (%) (Auto)  1 % (0-3)   


 


Neutrophils # (Auto)


 


 23.9 x10^3/uL


(1.8-7.7) 


 





 


Lymphocytes # (Auto)


 


 0.7 x10^3/uL


(1.0-4.8) 


 





 


Monocytes # (Auto)


 


 1.3 x10^3/uL


(0.0-1.1) 


 





 


Eosinophils # (Auto)


 


 0.2 x10^3/uL


(0.0-0.7) 


 





 


Basophils # (Auto)


 


 0.3 x10^3/uL


(0.0-0.2) 


 





 


Sodium Level


 


 128 mmol/L


(136-145) 


 





 


Potassium Level


 


 3.3 mmol/L


(3.5-5.1) 


 





 


Chloride Level


 


 93 mmol/L


() 


 





 


Carbon Dioxide Level


 


 27 mmol/L


(21-32) 


 





 


Anion Gap  8 (6-14)   


 


Blood Urea Nitrogen


 


 42 mg/dL


(8-26) 


 





 


Creatinine


 


 8.6 mg/dL


(0.7-1.3) 


 





 


Estimated GFR


(Cockcroft-Gault) 


 7.9 


 


 





 


Glucose Level


 


 63 mg/dL


(70-99) 


 





 


Calcium Level


 


 6.8 mg/dL


(8.5-10.1) 


 





 


Magnesium Level


 


 1.8 mg/dL


(1.8-2.4) 


 





 


Thyroid Stimulating Hormone


(TSH) 


 5.933 uIU/mL


(0.358-3.74) 


 





 


Test


 1/2/20


11:16 


 


 





 


Glucose (Fingerstick)


 71 mg/dL


(70-99) 


 


 














Objective:


Assessment:


 


1.  Right foot deep tissue infection with Chronic nonhealing right diabetic foot

ulcers. 


S/P amputation of RT 5TH AND GREAT TOE 12/31 AND ID OF THE OM    


2. GPC BACTEREMIA POA source RT foot infection


3.  Leucocytosis


4   Diabetes mellitus with neuropathy poorly controlled


5.  End-stage renal disease, on peritoneal dialysis.


6.  Hypertension.


7.  Congestive heart failure. Pacemaker in place.





Plan:


Plan of Care


cont  dapto,merrem and micafungin


f/u GPC in BC 


F/U Repeat BC 1/2/2020


Wound care per ortho


optimal dm control


d/w RN











LEONIDES HARDWICK MD            Jan 2, 2020 11:53

## 2020-01-02 NOTE — PDOC
[facility] PROGRESS NOTE


Chief Complaint


Chief Complaint


Post-op day 2 of amputation of R great toe w/extensive debridement of lateral 

aspect of R foot


Necrotizing Fasciitis of RLE


CHF


Diabetes-Type II


High Cholesterol


Heart Disease


Renal Failure





History of Present Illness


History of Present Illness


1/2/20


Pt seen and examined


Pt reported being lethargic today


DW pt about care


Reviewed pt's chart





1/1/20


Pt seen and examined


DW pt


SOUTH RN


Reviewed pt's chart





12/31/19


Pt seen and examined


Pt was sitting up in bed, eating breakfast in NAD


DW pt


SOUTH RN


Reviewed pt's chart





Vitals/I&O


Vitals/I&O:





                                   Vital Signs








  Date Time  Temp Pulse Resp B/P (MAP) Pulse Ox O2 Delivery O2 Flow Rate FiO2


 


1/2/20 11:00 97.8 99 28 77/50 (59) 95 Room Air  





 97.8       














                                    I & O   


 


 1/1/20 1/1/20 1/2/20





 15:00 23:00 07:00


 


Intake Total 400 ml 400 ml 170 ml


 


Balance 400 ml 400 ml 170 ml











Physical Exam


Physical Exam:


HEENT:  Normocephalic, atraumatic.  Anicteric.  No thrush.  Oral mucosa moist.


NECK:  Supple.  No JVD.


LUNGS:  Clear bilaterally.  No wheezing.


HEART:  S1, S2.


ABDOMEN:  Soft, nontender, nondistended.


EXTREMITIES:  Right lower extremity dressing in place.  Pictures of the wound


noted in the chart.  Dry skin.  Numerous calluses present over the left lower


extremity with some abrasions.  No signs of infection noted in the left lower


extremity.


DERMATOLOGIC:  Warm, dry.  No generalized rash.


CENTRAL NERVOUS SYSTEM:  Alert and oriented x 3, grossly nonfocal.


PSYCHIATRIC:  Cooperative, appropriate mood and affect.


General:  mild distress


Heart:  Regular rate


Lungs:  Clear


Abdomen:  Normal bowel sounds


Extremities:  No clubbing, No edema, Other (clean, dry, intact bandage of the 

RLE)


Skin:  Other (clean, dry, intact bandage of the RLE. There is a bad odor.)





Labs


Labs:





Laboratory Tests








Test


 1/1/20


16:59 1/1/20


17:15 1/1/20


18:16 1/1/20


21:01


 


Glucose (Fingerstick)


 54 mg/dL


(70-99) 62 mg/dL


(70-99) 93 mg/dL


(70-99) 104 mg/dL


(70-99)


 


Test


 1/2/20


03:02 1/2/20


06:00 1/2/20


07:19 1/2/20


07:38


 


Glucose (Fingerstick)


 120 mg/dL


(70-99) 


 55 mg/dL


(70-99) 94 mg/dL


(70-99)


 


White Blood Count


 


 26.4 x10^3/uL


(4.0-11.0) 


 





 


Red Blood Count


 


 4.11 x10^6/uL


(4.30-5.70) 


 





 


Hemoglobin


 


 11.1 g/dL


(13.0-17.5) 


 





 


Hematocrit


 


 33.9 %


(39.0-53.0) 


 





 


Mean Corpuscular Volume  83 fL ()   


 


Mean Corpuscular Hemoglobin  27 pg (25-35)   


 


Mean Corpuscular Hemoglobin


Concent 


 33 g/dL


(31-37) 


 





 


Red Cell Distribution Width


 


 15.4 %


(11.5-14.5) 


 





 


Platelet Count


 


 358 x10^3/uL


(140-400) 


 





 


Neutrophils (%) (Auto)  90 % (31-73)   


 


Lymphocytes (%) (Auto)  3 % (24-48)   


 


Monocytes (%) (Auto)  5 % (0-9)   


 


Eosinophils (%) (Auto)  1 % (0-3)   


 


Basophils (%) (Auto)  1 % (0-3)   


 


Neutrophils # (Auto)


 


 23.9 x10^3/uL


(1.8-7.7) 


 





 


Lymphocytes # (Auto)


 


 0.7 x10^3/uL


(1.0-4.8) 


 





 


Monocytes # (Auto)


 


 1.3 x10^3/uL


(0.0-1.1) 


 





 


Eosinophils # (Auto)


 


 0.2 x10^3/uL


(0.0-0.7) 


 





 


Basophils # (Auto)


 


 0.3 x10^3/uL


(0.0-0.2) 


 





 


Sodium Level


 


 128 mmol/L


(136-145) 


 





 


Potassium Level


 


 3.3 mmol/L


(3.5-5.1) 


 





 


Chloride Level


 


 93 mmol/L


() 


 





 


Carbon Dioxide Level


 


 27 mmol/L


(21-32) 


 





 


Anion Gap  8 (6-14)   


 


Blood Urea Nitrogen


 


 42 mg/dL


(8-26) 


 





 


Creatinine


 


 8.6 mg/dL


(0.7-1.3) 


 





 


Estimated GFR


(Cockcroft-Gault) 


 7.9 


 


 





 


Glucose Level


 


 63 mg/dL


(70-99) 


 





 


Calcium Level


 


 6.8 mg/dL


(8.5-10.1) 


 





 


Magnesium Level


 


 1.8 mg/dL


(1.8-2.4) 


 





 


Thyroid Stimulating Hormone


(TSH) 


 5.933 uIU/mL


(0.358-3.74) 


 





 


Test


 1/2/20


11:16 


 


 





 


Glucose (Fingerstick)


 71 mg/dL


(70-99) 


 


 














Review of Systems


Review of Systems:


No c/o dizziness


No c/o N/V/D





Assessment and Plan


Assessmemt and Plan


Problems


Medical Problems:


(1) CHF (congestive heart failure)


Status: Acute  





(2) Chronic kidney disease


Status: Acute  





(3) Diabetes


Status: Acute  





(4) Sepsis


Status: Acute  





(5) Severe sepsis


Status: Acute  





Assessment 


Post-op day 2 of amputation of R great toe w/extensive debridement of lateral 

aspect of R foot


Necrotizing Fasciitis of RLE


CHF


Diabetes-Type II


High Cholesterol


Heart Disease


Renal Failure





Plan


IV Abx and anti-fungals


Replace Mg


Wound Care


Insulin


DVT Prophylaxis


PT/OT


Labs


Home meds


Full code


Will possibly require a BKA


Appreciate subspecialist input








Comment


Review of Relevant


I have reviewed the following items sean (where applicable) has been applied.


Medications:





Current Medications








 Medications


  (Trade)  Dose


 Ordered  Sig/Ivonne


 Route


 PRN Reason  Start Time


 Stop Time Status Last Admin


Dose Admin


 


 Potassium Chloride


  (Klor-Con)  20 meq  BIDWMEALS


 PO


   1/1/20 17:00


    1/2/20 08:46





 


 Daptomycin 500 mg/


 Sodium Chloride  50 ml @ 


 100 mls/hr  Q48H


 IV


   1/1/20 21:00


    1/1/20 21:43





 


 Sodium Chloride  500 ml @ 


 500 mls/hr  1X  ONCE


 IV


   1/2/20 08:45


 1/2/20 09:44 DC 1/2/20 08:41




















PRINCESS MACARIO III DO            Jan 2, 2020 11:48

## 2020-01-03 VITALS — SYSTOLIC BLOOD PRESSURE: 85 MMHG | DIASTOLIC BLOOD PRESSURE: 50 MMHG

## 2020-01-03 VITALS — SYSTOLIC BLOOD PRESSURE: 86 MMHG | DIASTOLIC BLOOD PRESSURE: 62 MMHG

## 2020-01-03 VITALS — SYSTOLIC BLOOD PRESSURE: 75 MMHG | DIASTOLIC BLOOD PRESSURE: 43 MMHG

## 2020-01-03 VITALS — SYSTOLIC BLOOD PRESSURE: 88 MMHG | DIASTOLIC BLOOD PRESSURE: 55 MMHG

## 2020-01-03 VITALS — SYSTOLIC BLOOD PRESSURE: 81 MMHG | DIASTOLIC BLOOD PRESSURE: 52 MMHG

## 2020-01-03 VITALS — DIASTOLIC BLOOD PRESSURE: 57 MMHG | SYSTOLIC BLOOD PRESSURE: 78 MMHG

## 2020-01-03 VITALS — DIASTOLIC BLOOD PRESSURE: 51 MMHG | SYSTOLIC BLOOD PRESSURE: 79 MMHG

## 2020-01-03 LAB
ANION GAP SERPL CALC-SCNC: 10 MMOL/L (ref 6–14)
BASOPHILS # BLD AUTO: 0.2 X10^3/UL (ref 0–0.2)
BASOPHILS NFR BLD: 1 % (ref 0–3)
BUN SERPL-MCNC: 42 MG/DL (ref 8–26)
CALCIUM SERPL-MCNC: 6.6 MG/DL (ref 8.5–10.1)
CHLORIDE SERPL-SCNC: 94 MMOL/L (ref 98–107)
CO2 SERPL-SCNC: 26 MMOL/L (ref 21–32)
CREAT SERPL-MCNC: 8.9 MG/DL (ref 0.7–1.3)
EOSINOPHIL NFR BLD: 0.2 X10^3/UL (ref 0–0.7)
EOSINOPHIL NFR BLD: 1 % (ref 0–3)
ERYTHROCYTE [DISTWIDTH] IN BLOOD BY AUTOMATED COUNT: 15.8 % (ref 11.5–14.5)
GFR SERPLBLD BASED ON 1.73 SQ M-ARVRAT: 7.6 ML/MIN
GLUCOSE SERPL-MCNC: 182 MG/DL (ref 70–99)
HCT VFR BLD CALC: 34.3 % (ref 39–53)
HGB BLD-MCNC: 11 G/DL (ref 13–17.5)
LYMPHOCYTES # BLD: 0.8 X10^3/UL (ref 1–4.8)
LYMPHOCYTES NFR BLD AUTO: 3 % (ref 24–48)
MCH RBC QN AUTO: 27 PG (ref 25–35)
MCHC RBC AUTO-ENTMCNC: 32 G/DL (ref 31–37)
MCV RBC AUTO: 83 FL (ref 79–100)
MONO #: 1.4 X10^3/UL (ref 0–1.1)
MONOCYTES NFR BLD: 6 % (ref 0–9)
NEUT #: 21.6 X10^3/UL (ref 1.8–7.7)
NEUTROPHILS NFR BLD AUTO: 89 % (ref 31–73)
PLATELET # BLD AUTO: 372 X10^3/UL (ref 140–400)
POTASSIUM SERPL-SCNC: 3.7 MMOL/L (ref 3.5–5.1)
RBC # BLD AUTO: 4.12 X10^6/UL (ref 4.3–5.7)
SODIUM SERPL-SCNC: 130 MMOL/L (ref 136–145)
WBC # BLD AUTO: 24.3 X10^3/UL (ref 4–11)

## 2020-01-03 RX ADMIN — Medication SCH EA: at 12:25

## 2020-01-03 RX ADMIN — LINEZOLID SCH MG: 600 TABLET, FILM COATED ORAL at 22:13

## 2020-01-03 RX ADMIN — Medication SCH EA: at 08:40

## 2020-01-03 RX ADMIN — Medication SCH CAP: at 22:13

## 2020-01-03 RX ADMIN — ATORVASTATIN CALCIUM SCH MG: 10 TABLET, FILM COATED ORAL at 22:17

## 2020-01-03 RX ADMIN — MEROPENEM SCH MLS/HR: 500 INJECTION, POWDER, FOR SOLUTION INTRAVENOUS at 22:13

## 2020-01-03 RX ADMIN — POTASSIUM CHLORIDE SCH MEQ: 1500 TABLET, EXTENDED RELEASE ORAL at 16:59

## 2020-01-03 RX ADMIN — ASPIRIN 81 MG SCH MG: 81 TABLET ORAL at 22:13

## 2020-01-03 RX ADMIN — PANTOPRAZOLE SODIUM SCH MG: 40 TABLET, DELAYED RELEASE ORAL at 08:38

## 2020-01-03 RX ADMIN — INSULIN LISPRO SCH UNITS: 100 INJECTION, SOLUTION INTRAVENOUS; SUBCUTANEOUS at 12:00

## 2020-01-03 RX ADMIN — LEVOTHYROXINE SODIUM SCH MCG: 25 TABLET ORAL at 06:00

## 2020-01-03 RX ADMIN — Medication SCH EA: at 08:39

## 2020-01-03 RX ADMIN — SACUBITRIL AND VALSARTAN SCH TAB: 24; 26 TABLET, FILM COATED ORAL at 08:39

## 2020-01-03 RX ADMIN — DAPTOMYCIN SCH MLS/HR: 500 INJECTION, POWDER, LYOPHILIZED, FOR SOLUTION INTRAVENOUS at 22:12

## 2020-01-03 RX ADMIN — Medication SCH EA: at 12:24

## 2020-01-03 RX ADMIN — POTASSIUM CHLORIDE SCH MEQ: 1500 TABLET, EXTENDED RELEASE ORAL at 08:38

## 2020-01-03 RX ADMIN — BACITRACIN SCH MLS/HR: 5000 INJECTION, POWDER, FOR SOLUTION INTRAMUSCULAR at 12:24

## 2020-01-03 RX ADMIN — MEROPENEM SCH MLS/HR: 500 INJECTION, POWDER, FOR SOLUTION INTRAVENOUS at 08:39

## 2020-01-03 RX ADMIN — INSULIN LISPRO SCH UNITS: 100 INJECTION, SOLUTION INTRAVENOUS; SUBCUTANEOUS at 08:00

## 2020-01-03 RX ADMIN — INSULIN LISPRO SCH UNITS: 100 INJECTION, SOLUTION INTRAVENOUS; SUBCUTANEOUS at 18:01

## 2020-01-03 RX ADMIN — LOPERAMIDE HYDROCHLORIDE PRN MG: 2 CAPSULE ORAL at 14:04

## 2020-01-03 RX ADMIN — DEXTROSE SCH MLS/HR: 50 INJECTION, SOLUTION INTRAVENOUS at 16:58

## 2020-01-03 RX ADMIN — ASPIRIN 81 MG SCH MG: 81 TABLET ORAL at 08:38

## 2020-01-03 RX ADMIN — ONDANSETRON PRN MG: 2 INJECTION INTRAMUSCULAR; INTRAVENOUS at 22:13

## 2020-01-03 RX ADMIN — SACUBITRIL AND VALSARTAN SCH TAB: 24; 26 TABLET, FILM COATED ORAL at 21:00

## 2020-01-03 RX ADMIN — ALUMINUM HYDROXIDE, MAGNESIUM HYDROXIDE, AND DIMETHICONE PRN ML: 400; 400; 40 SUSPENSION ORAL at 02:12

## 2020-01-03 RX ADMIN — Medication SCH EA: at 16:59

## 2020-01-03 NOTE — PDOC
Infectious Disease Note


Subjective:


Subjective


pt says feels ok


no fevers


bp runs on the lower side


awaiting fluid bolus per rn


no F/C/N/V/D/SOB


postop pain is under control





Vital Signs:


Vital Signs





Vital Signs








  Date Time  Temp Pulse Resp B/P (MAP) Pulse Ox O2 Delivery O2 Flow Rate FiO2


 


1/3/20 11:28 98.7 101 18 81/52 (62) 98 Room Air  





 98.7       











Physical Exam:


PHYSICAL EXAM


HEENT:  Normocephalic, atraumatic.  Anicteric.  No thrush.  Oral mucosa moist.


NECK:  Supple.  No JVD.


LUNGS:  Clear bilaterally.  No wheezing.


HEART:  S1, S2.


ABDOMEN:  Soft, nontender, nondistended.


EXTREMITIES:  Right lower extremity  big toe amputation site clean


Lateral aspect wound vac in place, edema +


DERMATOLOGIC:  Warm, dry.  No generalized rash.


CENTRAL NERVOUS SYSTEM:  Alert and oriented x 3, grossly nonfocal.


PSYCHIATRIC:  Cooperative, appropriate mood and affect.





Medications:


Inpatient Meds:





Current Medications








 Medications


  (Trade)  Dose


 Ordered  Sig/Ivonne  Start Time


 Stop Time Status Last Admin


Dose Admin


 


 Acetaminophen


  (Tylenol)  650 mg  PRN Q4HRS  PRN  19 10:45


 19 10:44 DC  





 


 Al Hydroxide/Mg


 Hydroxide


  (Mylanta Plus Xs)  30 ml  PRN Q8HRS  PRN  1/3/20 00:30


    1/3/20 02:12


30 ML


 


 Aspirin


  (Children'S


 Aspirin)  81 mg  DAILYWBKFT  19 08:00


    1/3/20 08:38


81 MG


 


 Atorvastatin


 Calcium


  (Lipitor)  10 mg  QHS  20 21:00


     





 


 Citric Acid/


 Sodium Citrate


  (Bicitra)  30 ml  1X  STAT  19 18:44


 19 18:53 DC 19 18:44


30 ML


 


 Clindamycin


 Phosphate  50 ml @ 


 100 mls/hr  1X  ONCE  19 09:30


 19 09:59 DC 19 09:53


100 MLS/HR


 


 Daptomycin 500 mg/


 Sodium Chloride  50 ml @ 


 100 mls/hr  Q48H  20 21:00


    20 21:43


100 MLS/HR


 


 Dexamethasone


 Sodium Phosphate


  (Decadron)  4 mg  STK-MED ONCE  19 17:45


 19 17:45 DC  





 


 Dextrose


  (Dextrose


 50%-Water Syringe)  12.5 gm  PRN Q15MIN  PRN  19 10:45


    20 17:48


12.5 GM


 


 Dextrose


  (Iv Dextrose 5%)  250 ml  PRN Q15MIN  PRN  19 10:45


     





 


 Famotidine


  (Pepcid Vial)  20 mg  1X  ONCE  19 19:00


 19 19:01 DC 19 18:48


20 MG


 


 Fentanyl Citrate


  (Fentanyl 2ml


 Vial)  100 mcg  STK-MED ONCE  19 17:46


 19 17:46 DC  





 


 Furosemide


  (Lasix)  80 mg  DAILY  19 09:00


 1/3/20 08:09 DC 20 08:38


80 MG


 


 Insulin Human


 Lispro


  (HumaLOG)  12 units  1X  ONCE  20 10:45


 20 10:46 DC 20 10:33


12 UNITS


 


 Levothyroxine


 Sodium


  (Synthroid)  25 mcg  DAILY06  1/3/20 06:00


     





 


 Lidocaine HCl


  (Lidocaine Pf 2%


 Vial)  5 ml  STK-MED ONCE  19 17:45


 19 17:45 DC  





 


 Linezolid/Dextrose  300 ml @ 


 300 mls/hr  Q12HR  19 21:00


 20 13:49 DC 20 08:57


300 MLS/HR


 


 Loperamide HCl


  (Imodium)  2 mg  PRN Q15MIN  PRN  19 15:30


    20 08:45


2 MG


 


 Magnesium Sulfate  50 ml @ 25


 mls/hr  1X  ONCE  20 10:00


 20 11:59 DC 20 11:40


25 MLS/HR


 


 Meropenem 500 mg/


 Sodium Chloride  50 ml @ 


 100 mls/hr  Q12HR  19 15:00


    1/3/20 08:39


100 MLS/HR


 


 Metolazone


  (Zaroxolyn)  5 mg  DAILY  19 09:00


 1/3/20 08:09 DC 20 08:36


5 MG


 


 Micafungin Sodium


 100 mg/Dextrose  100 ml @ 


 100 mls/hr  Q24H  19 15:30


    20 16:05


100 MLS/HR


 


 Non-Formulary


 Medication  30 ea  DAILY  20 09:00


    1/3/20 12:25


30 EA


 


 Ondansetron HCl


  (Zofran)  4 mg  STK-MED ONCE  19 17:45


 19 17:45 DC  





 


 Pantoprazole


 Sodium


  (Protonix)  40 mg  DAILYAC  19 07:30


    1/3/20 08:38


40 MG


 


 Phenylephrine HCl


  (PHENYLEPHRINE


 in 0.9% NACL PF)  1 mg  STK-MED ONCE  19 20:22


 19 20:22 DC  





 


 Potassium Chloride


  (Klor-Con)  20 meq  BIDWMEALS  20 17:00


    1/3/20 08:38


20 MEQ


 


 Promethazine HCl


  (Phenergan)  12.5 mg  PRN Q6HRS  PRN  19 15:30


    20 20:03


12.5 MG


 


 Propofol  20 ml @ As


 Directed  STK-MED ONCE  19 17:45


 19 17:45 DC  





 


 Sacubitril/


 Valsartan


  (Entresto 24


 Mg-26 Mg)  1 tab  BID  19 21:00


    20 08:37


1 TAB


 


 Sevoflurane


  (Ultane)  30 ml  STK-MED ONCE  19 21:01


 19 21:01 DC  





 


 Sodium


 Hypochlorite


  (Dakin'S 1/4


 Strength)  1 justo  PRN DAILY  PRN  20 17:00


     





 


 Sodium Chloride  500 ml @ 


 500 mls/hr  1X  ONCE  1/3/20 11:30


 1/3/20 12:29 DC 1/3/20 11:27


500 MLS/HR


 


 Succinylcholine


 Chloride


  (Anectine)  200 mg  STK-MED ONCE  19 20:20


 19 20:20 DC  





 


 Vancomycin HCl 2


 gm/Sodium Chloride  500 ml @ 


 250 mls/hr  1X  ONCE  19 10:45


 19 12:44 DC 19 10:34


250 MLS/HR











Labs:


Lab





Laboratory Tests








Test


 20


16:53 20


21:15 1/3/20


07:43 1/3/20


08:45


 


Glucose (Fingerstick)


 106 mg/dL


(70-99) 144 mg/dL


(70-99) 171 mg/dL


(70-99) 





 


White Blood Count


 


 


 


 24.3 x10^3/uL


(4.0-11.0)


 


Red Blood Count


 


 


 


 4.12 x10^6/uL


(4.30-5.70)


 


Hemoglobin


 


 


 


 11.0 g/dL


(13.0-17.5)


 


Hematocrit


 


 


 


 34.3 %


(39.0-53.0)


 


Mean Corpuscular Volume    83 fL () 


 


Mean Corpuscular Hemoglobin    27 pg (25-35) 


 


Mean Corpuscular Hemoglobin


Concent 


 


 


 32 g/dL


(31-37)


 


Red Cell Distribution Width


 


 


 


 15.8 %


(11.5-14.5)


 


Platelet Count


 


 


 


 372 x10^3/uL


(140-400)


 


Neutrophils (%) (Auto)    89 % (31-73) 


 


Lymphocytes (%) (Auto)    3 % (24-48) 


 


Monocytes (%) (Auto)    6 % (0-9) 


 


Eosinophils (%) (Auto)    1 % (0-3) 


 


Basophils (%) (Auto)    1 % (0-3) 


 


Neutrophils # (Auto)


 


 


 


 21.6 x10^3/uL


(1.8-7.7)


 


Lymphocytes # (Auto)


 


 


 


 0.8 x10^3/uL


(1.0-4.8)


 


Monocytes # (Auto)


 


 


 


 1.4 x10^3/uL


(0.0-1.1)


 


Eosinophils # (Auto)


 


 


 


 0.2 x10^3/uL


(0.0-0.7)


 


Basophils # (Auto)


 


 


 


 0.2 x10^3/uL


(0.0-0.2)


 


Sodium Level


 


 


 


 130 mmol/L


(136-145)


 


Potassium Level


 


 


 


 3.7 mmol/L


(3.5-5.1)


 


Chloride Level


 


 


 


 94 mmol/L


()


 


Carbon Dioxide Level


 


 


 


 26 mmol/L


(21-32)


 


Anion Gap    10 (6-14) 


 


Blood Urea Nitrogen


 


 


 


 42 mg/dL


(8-26)


 


Creatinine


 


 


 


 8.9 mg/dL


(0.7-1.3)


 


Estimated GFR


(Cockcroft-Gault) 


 


 


 7.6 





 


Glucose Level


 


 


 


 182 mg/dL


(70-99)


 


Calcium Level


 


 


 


 6.6 mg/dL


(8.5-10.1)


 


Test


 1/3/20


10:39 


 


 





 


Glucose (Fingerstick)


 252 mg/dL


(70-99) 


 


 











Micro





--------------------------------------------------------------------

------------------------





RUN DATE: 20                  [facility] *LIVE*               

  PAGE 1   


RUN TIME: 1022                            Specimen Inquiry                    


----------------------------------------------------------

----------------------------------





PATIENT: BLAISE DOWNING                   ACCT: [id number] LOC:  [address] [place] [address]      

U: [id number]


                                       AGE/SX: 54/M         ROOM: [facility]        

RE19


REG DR:  MARY DALY MD          :    1965   BED:  1          

DIS:         


                                       STATUS: ADM IN       TLOC:           


 

--------------------------------------------------------------------------------


------------








SPEC #: [id number]       EMILY:      STATUS:  COMP           REQ 

#: [id number]


                            RECD:      [facility] DR: ADALI DURAN DO 

          


SOURCE: BLOOD               ENTR:      [facility] DR: ANKUR CRUZ MD 

          


[facility]:                                                                         

          


ORDERED:  BCULT                                                                 

 


 

--------------------------------------------------------------------------------


------------





  Procedure                         Result                                      

         


---------------------------------

-----------------------------------------------------------





  BLOOD CULTURE  Final  





        GRAM POSITIVE COCCI IN CLUSTERS,


        SUGGESTIVE OF STAPH,


        IN 1 OF 4 BOTTLES, TWO SETS DRAWN.


        CALLED TO RANJANA MCINTOSH RN ON 6S


        AT 10:20 ON 20 DW MT


        


        SENT TO [facility] FOR FURTHER WORKUP.





--------------------------------------------------------

------------------------------------








 

--------------------------------------------------------------------------------


------------





RUN DATE: 20                  [facility] *LIVE*               

  PAGE 1   


RUN TIME: 1610                            Specimen Inquiry                    


-------------------------------------------------------------------------------

-------------





PATIENT: BLAISE DOWNING                   ACCT: [id number] LOC:  [address] [place] [address]      

U: [id number]


                                       AGE/SX: 54/M         ROOM: [facility]        

RE19


REG DR:  MARY DALY MD          :    1965   BED:  1          

DIS:         


                                       STATUS: ADM IN       TLOC:           


-----------------

---------------------------------------------------------------------------








SPEC #: [id number]       EMILY:      STATUS:  RES            REQ 

#: [id number]


                            RECD:      [facility] DR: MARY DALY MD          


SOURCE: FOOT                ENTR:      OT DR: LEONIDES HARDWICK MD          


SPDESC: RIGHT                                                ALL COLLINS MD, TIMOTHY J MD NAIR, VENU S MD SIMMONS, TERRY A MD


ORDERED:  KAVIN/GERALD/JANNETTE                                                        

 


COMMENTS: RT LATERAL FOOT WOUND                                       


----------------------------------------------------------

----------------------------------





  Procedure                         Result                                      

         


-

--------------------------------------------------------------------------------


-----------





  ANAEROBIC-AEROBIC CULTURE  


                                PENDING





  ANAEROBIC RES 1  


                                PENDING





  AEROBIC CULT  


                                PENDING





  AEROBIC RES 1  


                                PENDING





  GRAM STAIN  Final  


        Final report





  GRAM STAIN RES 1  Final  


        Comment





        Many gram positive rods.





  GRAM STAIN RES 2  Final  


        Comment





        Many gram negative rods.





  GRAM STAIN RES 3  Final  


        Comment





        Few gram positive cocci





  GRAM STAIN RES 4  Final  


        Comment





        No white blood cells seen.


        Performed at:  [facility] [place] [id number] [facility] [address], [place], [place]  [id number]


        : SAMUEL Plascencia MD, Phone:  [phone]





--------------------------------------

------------------------------------------------------





Objective:


Assessment:


 


1.  Right foot deep tissue infection with Chronic nonhealing right diabetic foot

ulcers. 


S/P amputation of RT 5TH AND GREAT TOE  AND ID OF THE OM    


2. GPC BACTEREMIA POA source RT foot infection 


3.  Leucocytosis


4   Diabetes mellitus with neuropathy poorly controlled


5.  End-stage renal disease, on peritoneal dialysis.


6.  Hypertension.


7.  Pacemaker in place.


8. Hypotension s/p fluid bolus





Plan:


Plan of Care


cont  dapto,merrem and micafungin


f/u GPC in BC 


F/U Repeat BC 2020,neg so far


Wound care per ortho


Pt continues to have ,hypotension, persistent leucocytosis despite broad 

spectrum antibiotics, may need repeat surgery


Ortho input noted, Leg may not be salvageable


add zyvox for now


optimal dm control


d/w RN











LEONIDES HARDWICK MD            Red 3, 2020 12:40

## 2020-01-03 NOTE — NUR
Following surgery, new PT order and weight bearing restrictions are required before resuming 
therapy.  thank you

## 2020-01-03 NOTE — PDOC
PROGRESS NOTES


Subjective


Subjective


Patient seen and examined





Objective


Objective





Vital Signs








  Date Time  Temp Pulse Resp B/P (MAP) Pulse Ox O2 Delivery O2 Flow Rate FiO2


 


1/3/20 14:44 98.4 122 18 75/43 (54) 92 Room Air  





 98.4       














Intake and Output 


 


 1/3/20





 07:00


 


Intake Total 1430 ml


 


Balance 1430 ml


 


 


 


Intake Oral 1430 ml


 


# Bowel Movements 3











Physical Exam


Abdomen:  Normal bowel sounds


Heart:  Regular rate


General:  mild distress


Lungs:  Other (slightly decreased breath sounds)





Assessment


Assessment


Problems


Medical Problems:


(1) CHF (congestive heart failure)


Status: Acute  





(2) Chronic kidney disease


Status: Acute  





(3) Diabetes


Status: Acute  





(4) Sepsis


Status: Acute  





(5) Severe sepsis


Status: Acute  





Nonhealing right foot wound. Status post debridement with toe amputation. Being 

followed by the ID service and surgery.


Severe peripheral arterial disease. As noted above.


Arrhythmias. Occasional PVCs and brief nonsustained VT. Continue present 

medications and telemetry. Patient has an ICD in place


Severe cardiomyopathy. Ejection fraction of 15%. AICD in place.


End-stage renal disease. On PD and followed by the renal service.


Controlled hypertension.


Diabetes mellitus.





Comment


Review of Relevant


I have reviewed the following items sean (where applicable) has been applied.


Labs





Laboratory Tests








Test


 1/1/20


16:59 1/1/20


17:15 1/1/20


18:16 1/1/20


21:01


 


Glucose (Fingerstick)


 54 mg/dL


(70-99) 62 mg/dL


(70-99) 93 mg/dL


(70-99) 104 mg/dL


(70-99)


 


Test


 1/2/20


03:02 1/2/20


06:00 1/2/20


07:19 1/2/20


07:38


 


Glucose (Fingerstick)


 120 mg/dL


(70-99) 


 55 mg/dL


(70-99) 94 mg/dL


(70-99)


 


White Blood Count


 


 26.4 x10^3/uL


(4.0-11.0) 


 





 


Red Blood Count


 


 4.11 x10^6/uL


(4.30-5.70) 


 





 


Hemoglobin


 


 11.1 g/dL


(13.0-17.5) 


 





 


Hematocrit


 


 33.9 %


(39.0-53.0) 


 





 


Mean Corpuscular Volume  83 fL ()   


 


Mean Corpuscular Hemoglobin  27 pg (25-35)   


 


Mean Corpuscular Hemoglobin


Concent 


 33 g/dL


(31-37) 


 





 


Red Cell Distribution Width


 


 15.4 %


(11.5-14.5) 


 





 


Platelet Count


 


 358 x10^3/uL


(140-400) 


 





 


Neutrophils (%) (Auto)  90 % (31-73)   


 


Lymphocytes (%) (Auto)  3 % (24-48)   


 


Monocytes (%) (Auto)  5 % (0-9)   


 


Eosinophils (%) (Auto)  1 % (0-3)   


 


Basophils (%) (Auto)  1 % (0-3)   


 


Neutrophils # (Auto)


 


 23.9 x10^3/uL


(1.8-7.7) 


 





 


Lymphocytes # (Auto)


 


 0.7 x10^3/uL


(1.0-4.8) 


 





 


Monocytes # (Auto)


 


 1.3 x10^3/uL


(0.0-1.1) 


 





 


Eosinophils # (Auto)


 


 0.2 x10^3/uL


(0.0-0.7) 


 





 


Basophils # (Auto)


 


 0.3 x10^3/uL


(0.0-0.2) 


 





 


Sodium Level


 


 128 mmol/L


(136-145) 


 





 


Potassium Level


 


 3.3 mmol/L


(3.5-5.1) 


 





 


Chloride Level


 


 93 mmol/L


() 


 





 


Carbon Dioxide Level


 


 27 mmol/L


(21-32) 


 





 


Anion Gap  8 (6-14)   


 


Blood Urea Nitrogen


 


 42 mg/dL


(8-26) 


 





 


Creatinine


 


 8.6 mg/dL


(0.7-1.3) 


 





 


Estimated GFR


(Cockcroft-Gault) 


 7.9 


 


 





 


Glucose Level


 


 63 mg/dL


(70-99) 


 





 


Calcium Level


 


 6.8 mg/dL


(8.5-10.1) 


 





 


Magnesium Level


 


 1.8 mg/dL


(1.8-2.4) 


 





 


Thyroid Stimulating Hormone


(TSH) 


 5.933 uIU/mL


(0.358-3.74) 


 





 


Test


 1/2/20


11:16 1/2/20


16:53 1/2/20


21:15 1/3/20


07:43


 


Glucose (Fingerstick)


 71 mg/dL


(70-99) 106 mg/dL


(70-99) 144 mg/dL


(70-99) 171 mg/dL


(70-99)


 


Test


 1/3/20


08:45 1/3/20


10:39 


 





 


White Blood Count


 24.3 x10^3/uL


(4.0-11.0) 


 


 





 


Red Blood Count


 4.12 x10^6/uL


(4.30-5.70) 


 


 





 


Hemoglobin


 11.0 g/dL


(13.0-17.5) 


 


 





 


Hematocrit


 34.3 %


(39.0-53.0) 


 


 





 


Mean Corpuscular Volume 83 fL ()    


 


Mean Corpuscular Hemoglobin 27 pg (25-35)    


 


Mean Corpuscular Hemoglobin


Concent 32 g/dL


(31-37) 


 


 





 


Red Cell Distribution Width


 15.8 %


(11.5-14.5) 


 


 





 


Platelet Count


 372 x10^3/uL


(140-400) 


 


 





 


Neutrophils (%) (Auto) 89 % (31-73)    


 


Lymphocytes (%) (Auto) 3 % (24-48)    


 


Monocytes (%) (Auto) 6 % (0-9)    


 


Eosinophils (%) (Auto) 1 % (0-3)    


 


Basophils (%) (Auto) 1 % (0-3)    


 


Neutrophils # (Auto)


 21.6 x10^3/uL


(1.8-7.7) 


 


 





 


Lymphocytes # (Auto)


 0.8 x10^3/uL


(1.0-4.8) 


 


 





 


Monocytes # (Auto)


 1.4 x10^3/uL


(0.0-1.1) 


 


 





 


Eosinophils # (Auto)


 0.2 x10^3/uL


(0.0-0.7) 


 


 





 


Basophils # (Auto)


 0.2 x10^3/uL


(0.0-0.2) 


 


 





 


Sodium Level


 130 mmol/L


(136-145) 


 


 





 


Potassium Level


 3.7 mmol/L


(3.5-5.1) 


 


 





 


Chloride Level


 94 mmol/L


() 


 


 





 


Carbon Dioxide Level


 26 mmol/L


(21-32) 


 


 





 


Anion Gap 10 (6-14)    


 


Blood Urea Nitrogen


 42 mg/dL


(8-26) 


 


 





 


Creatinine


 8.9 mg/dL


(0.7-1.3) 


 


 





 


Estimated GFR


(Cockcroft-Gault) 7.6 


 


 


 





 


Glucose Level


 182 mg/dL


(70-99) 


 


 





 


Calcium Level


 6.6 mg/dL


(8.5-10.1) 


 


 





 


Glucose (Fingerstick)


 


 252 mg/dL


(70-99) 


 











Laboratory Tests








Test


 1/2/20


16:53 1/2/20


21:15 1/3/20


07:43 1/3/20


08:45


 


Glucose (Fingerstick)


 106 mg/dL


(70-99) 144 mg/dL


(70-99) 171 mg/dL


(70-99) 





 


White Blood Count


 


 


 


 24.3 x10^3/uL


(4.0-11.0)


 


Red Blood Count


 


 


 


 4.12 x10^6/uL


(4.30-5.70)


 


Hemoglobin


 


 


 


 11.0 g/dL


(13.0-17.5)


 


Hematocrit


 


 


 


 34.3 %


(39.0-53.0)


 


Mean Corpuscular Volume    83 fL () 


 


Mean Corpuscular Hemoglobin    27 pg (25-35) 


 


Mean Corpuscular Hemoglobin


Concent 


 


 


 32 g/dL


(31-37)


 


Red Cell Distribution Width


 


 


 


 15.8 %


(11.5-14.5)


 


Platelet Count


 


 


 


 372 x10^3/uL


(140-400)


 


Neutrophils (%) (Auto)    89 % (31-73) 


 


Lymphocytes (%) (Auto)    3 % (24-48) 


 


Monocytes (%) (Auto)    6 % (0-9) 


 


Eosinophils (%) (Auto)    1 % (0-3) 


 


Basophils (%) (Auto)    1 % (0-3) 


 


Neutrophils # (Auto)


 


 


 


 21.6 x10^3/uL


(1.8-7.7)


 


Lymphocytes # (Auto)


 


 


 


 0.8 x10^3/uL


(1.0-4.8)


 


Monocytes # (Auto)


 


 


 


 1.4 x10^3/uL


(0.0-1.1)


 


Eosinophils # (Auto)


 


 


 


 0.2 x10^3/uL


(0.0-0.7)


 


Basophils # (Auto)


 


 


 


 0.2 x10^3/uL


(0.0-0.2)


 


Sodium Level


 


 


 


 130 mmol/L


(136-145)


 


Potassium Level


 


 


 


 3.7 mmol/L


(3.5-5.1)


 


Chloride Level


 


 


 


 94 mmol/L


()


 


Carbon Dioxide Level


 


 


 


 26 mmol/L


(21-32)


 


Anion Gap    10 (6-14) 


 


Blood Urea Nitrogen


 


 


 


 42 mg/dL


(8-26)


 


Creatinine


 


 


 


 8.9 mg/dL


(0.7-1.3)


 


Estimated GFR


(Cockcroft-Gault) 


 


 


 7.6 





 


Glucose Level


 


 


 


 182 mg/dL


(70-99)


 


Calcium Level


 


 


 


 6.6 mg/dL


(8.5-10.1)


 


Test


 1/3/20


10:39 


 


 





 


Glucose (Fingerstick)


 252 mg/dL


(70-99) 


 


 











Microbiology


1/2/20 Blood Culture - Preliminary, Resulted


         NO GROWTH AFTER 1 DAY


12/31/19 Anaerobic/Aerobic Culture, Resulted


           Pending


12/31/19 Anaerobic Culture Result 1 (ANTHONY), Resulted


           Pending


12/31/19 Aerobic Culture, Resulted


           Pending


12/31/19 Aerobic Culture Result 1 (ANTHONY), Resulted


           Pending


12/31/19 Gram Stain - Final, Resulted


           


12/31/19 Gram Stain Result 1 (ANTHONY) - Final, Resulted


           


12/31/19 Gram Stain Result 2 (ANTHONY) - Final, Resulted


           


12/31/19 Gram Stain Result 3 (ANTHONY) - Final, Resulted


           


12/31/19 Gram Stain Result 4 (ANTHONY) - Final, Resulted


Medications





Current Medications


Clindamycin Phosphate 50 ml @  100 mls/hr 1X  ONCE IV  Last administered on 

12/30/19at 09:53;  Start 12/30/19 at 09:30;  Stop 12/30/19 at 09:59;  Status DC


Sodium Chloride 500 ml @  500 mls/hr 1X  ONCE IV  Last administered on 

12/30/19at 10:11;  Start 12/30/19 at 10:15;  Stop 12/30/19 at 11:14;  Status DC


Ondansetron HCl (Zofran) 4 mg 1X  ONCE IVP  Last administered on 12/30/19at 

10:34;  Start 12/30/19 at 10:30;  Stop 12/30/19 at 10:31;  Status DC


Famotidine (Pepcid Vial) 20 mg 1X  ONCE IVP  Last administered on 12/30/19at 

10:34;  Start 12/30/19 at 10:30;  Stop 12/30/19 at 10:31;  Status DC


Vancomycin HCl 2 gm/Sodium Chloride 500 ml @  250 mls/hr 1X  ONCE IV  Last 

administered on 12/30/19at 10:34;  Start 12/30/19 at 10:45;  Stop 12/30/19 at 

12:44;  Status DC


Ondansetron HCl (Zofran) 4 mg PRN Q8HRS  PRN IV NAUSEA/VOMITING Last 

administered on 12/31/19at 08:56;  Start 12/30/19 at 10:45;  Stop 12/31/19 at 

10:44;  Status DC


Fentanyl Citrate (Fentanyl 2ml Vial) 50 mcg PRN Q2HRS  PRN IV PAIN;  Start 

12/30/19 at 10:45


Acetaminophen (Tylenol) 650 mg PRN Q4HRS  PRN PO FEVER;  Start 12/30/19 at 

10:45;  Stop 12/31/19 at 10:44;  Status DC


Insulin Human Lispro (HumaLOG) 0-5 UNITS TIDWMEALS SQ  Last administered on 

1/1/20at 13:11;  Start 12/30/19 at 12:00


Dextrose (Dextrose 50%-Water Syringe) 12.5 gm PRN Q15MIN  PRN IV SEE COMMENTS 

Last administered on 1/1/20at 17:48;  Start 12/30/19 at 10:45


Dextrose (Iv Dextrose 5%) 250 ml PRN Q15MIN  PRN IV SEE COMMENTS;  Start 

12/30/19 at 10:45


Sodium Chloride 500 ml @  500 mls/hr 1X  ONCE IV  Last administered on 

12/30/19at 12:13;  Start 12/30/19 at 12:15;  Stop 12/30/19 at 13:14;  Status DC


Magnesium Sulfate 50 ml @ 25 mls/hr 1X  ONCE IV  Last administered on 12/30/19at

14:49;  Start 12/30/19 at 14:00;  Stop 12/30/19 at 15:59;  Status DC


Aspirin (Children'S Aspirin) 81 mg DAILYWBKFT PO  Last administered on 1/3/20at 

08:38;  Start 12/31/19 at 08:00


Furosemide (Lasix) 80 mg DAILY PO  Last administered on 1/1/20at 08:38;  Start 

12/31/19 at 09:00;  Stop 1/3/20 at 08:09;  Status DC


Loperamide HCl (Imodium) 2 mg PRN Q15MIN  PRN PO DIARRHEA Last administered on 

1/3/20at 14:04;  Start 12/30/19 at 15:30


Pantoprazole Sodium (Protonix) 40 mg DAILYAC PO  Last administered on 1/3/20at 

08:38;  Start 12/31/19 at 07:30


Potassium Chloride (Klor-Con) 20 meq DAILY PO ;  Start 12/31/19 at 09:00;  Stop 

12/31/19 at 14:20;  Status DC


Promethazine HCl (Phenergan) 12.5 mg PRN Q6HRS  PRN PO NAUSEA/VOMITING, 1ST 

CHOICE Last administered on 1/2/20at 20:03;  Start 12/30/19 at 15:30


Sacubitril/ Valsartan (Entresto 24 Mg-26 Mg) 1 tab BID PO  Last administered on 

1/1/20at 08:37;  Start 12/30/19 at 21:00


Metolazone (Zaroxolyn) 5 mg DAILY PO  Last administered on 1/1/20at 08:36;  

Start 12/31/19 at 09:00;  Stop 1/3/20 at 08:09;  Status DC


Potassium Chloride (Klor-Con) 20 meq 1X  ONCE PO  Last administered on 

12/30/19at 16:57;  Start 12/30/19 at 16:15;  Stop 12/30/19 at 16:26;  Status DC


Potassium Chloride (Klor-Con) 20 meq DAILYWBKFT PO  Last administered on 

1/1/20at 08:38;  Start 12/31/19 at 08:00;  Stop 1/1/20 at 15:14;  Status DC


Sodium Chloride 500 ml @  500 mls/hr 1X  ONCE IV  Last administered on 

12/31/19at 01:11;  Start 12/31/19 at 01:00;  Stop 12/31/19 at 01:59;  Status DC


Insulin Human Lispro (HumaLOG) 10 units 1X  ONCE SQ ;  Start 12/31/19 at 11:15; 

Stop 12/31/19 at 11:16;  Status DC


Meropenem 500 mg/ Sodium Chloride 50 ml @  100 mls/hr Q12HR IV  Last administ

ered on 1/3/20at 08:39;  Start 12/31/19 at 15:00


Linezolid/Dextrose 300 ml @  300 mls/hr Q12HR IV  Last administered on 1/1/20at 

08:57;  Start 12/31/19 at 21:00;  Stop 1/1/20 at 13:49;  Status DC


Micafungin Sodium 100 mg/Dextrose 100 ml @  100 mls/hr Q24H IV  Last 

administered on 1/2/20at 16:05;  Start 12/31/19 at 15:30


Non-Formulary Medication 2 ea TIDWMEALS PO  Last administered on 1/3/20at 12:24;

 Start 12/31/19 at 17:00


Non-Formulary Medication 30 ea DAILY SQ  Last administered on 1/3/20at 12:25;  

Start 1/1/20 at 09:00


Propofol 20 ml @ As Directed STK-MED ONCE IV ;  Start 12/31/19 at 17:45;  Stop 

12/31/19 at 17:45;  Status DC


Lidocaine HCl (Lidocaine Pf 2% Vial) 5 ml STK-MED ONCE .ROUTE ;  Start 12/31/19 

at 17:45;  Stop 12/31/19 at 17:45;  Status DC


Dexamethasone Sodium Phosphate (Decadron) 4 mg STK-MED ONCE .ROUTE ;  Start 

12/31/19 at 17:45;  Stop 12/31/19 at 17:45;  Status DC


Ondansetron HCl (Zofran) 4 mg STK-MED ONCE .ROUTE ;  Start 12/31/19 at 17:45;  

Stop 12/31/19 at 17:45;  Status DC


Fentanyl Citrate (Fentanyl 2ml Vial) 100 mcg STK-MED ONCE .ROUTE ;  Start 

12/31/19 at 17:46;  Stop 12/31/19 at 17:46;  Status DC


Famotidine (Pepcid Vial) 20 mg STK-MED ONCE .ROUTE ;  Start 12/31/19 at 18:44;  

Stop 12/31/19 at 18:45;  Status DC


Citric Acid/ Sodium Citrate (Bicitra) 30 ml 1X  STAT PO  Last administered on 

12/31/19at 18:44;  Start 12/31/19 at 18:44;  Stop 12/31/19 at 18:53;  Status DC


Famotidine (Pepcid Vial) 20 mg 1X  ONCE IVP  Last administered on 12/31/19at 

18:48;  Start 12/31/19 at 19:00;  Stop 12/31/19 at 19:01;  Status DC


Succinylcholine Chloride (Anectine) 200 mg STK-MED ONCE .ROUTE ;  Start 12/31/19

at 20:20;  Stop 12/31/19 at 20:20;  Status DC


Phenylephrine HCl (PHENYLEPHRINE in 0.9% NACL PF) 1 mg STK-MED ONCE IV ;  Start 

12/31/19 at 20:22;  Stop 12/31/19 at 20:22;  Status DC


Sevoflurane (Ultane) 30 ml STK-MED ONCE IH ;  Start 12/31/19 at 21:01;  Stop 

12/31/19 at 21:01;  Status DC


Magnesium Sulfate 50 ml @ 25 mls/hr 1X  ONCE IV  Last administered on 1/1/20at 

11:40;  Start 1/1/20 at 10:00;  Stop 1/1/20 at 11:59;  Status DC


Insulin Human Lispro (HumaLOG) 12 units 1X SQ ;  Start 1/1/20 at 09:45;  Stop 

1/1/20 at 10:32;  Status DC


Insulin Human Lispro (HumaLOG) 12 units 1X  ONCE SQ  Last administered on 

1/1/20at 10:33;  Start 1/1/20 at 10:45;  Stop 1/1/20 at 10:46;  Status DC


Daptomycin 500 mg/ Sodium Chloride 50 ml @  100 mls/hr Q48H IV ;  Start 1/1/20 

at 16:00;  Stop 1/1/20 at 17:18;  Status DC


Potassium Chloride (Klor-Con) 20 meq BIDWMEALS PO  Last administered on 1/3/20at

08:38;  Start 1/1/20 at 17:00


Daptomycin 500 mg/ Sodium Chloride 50 ml @  100 mls/hr Q48H IV  Last 

administered on 1/1/20at 21:43;  Start 1/1/20 at 21:00


Sodium Chloride 500 ml @  500 mls/hr 1X  ONCE IV  Last administered on 1/2/20at 

08:41;  Start 1/2/20 at 08:45;  Stop 1/2/20 at 09:44;  Status DC


Sodium Chloride 1,000 ml @  75 mls/hr [id number] IV  Last administered on 1/3/20at 

12:24;  Start 1/2/20 at 08:45


Levothyroxine Sodium (Synthroid) 25 mcg DAILY06 PO ;  Start 1/3/20 at 06:00


Atorvastatin Calcium (Lipitor) 10 mg QHS PO ;  Start 1/2/20 at 21:00


Sodium Hypochlorite (Dakin'S 1/4 Strength) 1 carley PRN DAILY  PRN TP SEE COMMENTS;

 Start 1/2/20 at 17:00


Al Hydroxide/Mg Hydroxide (Mylanta Plus Xs) 30 ml PRN Q8HRS  PRN PO HEARTBURN / 

GAS Last administered on 1/3/20at 02:12;  Start 1/3/20 at 00:30


Sodium Chloride 500 ml @  500 mls/hr 1X  ONCE IV  Last administered on 1/3/20at 

11:27;  Start 1/3/20 at 11:30;  Stop 1/3/20 at 12:29;  Status DC


Linezolid (Zyvox) 600 mg BID PO ;  Start 1/3/20 at 21:00


Lactobacillus Rhamnosus (Culturelle) 1 cap BID PO ;  Start 1/3/20 at 21:00





Active Scripts


Active


Ssd (Silver Sulfadiazine) 25 Gm Cream..g. 1 Carley TP DAILY 10 Days


Mupirocin Ointment (Mupirocin) 22 Gm Oint...g. 1 Carley TP BID 10 Days


Humalog (Insulin Lispro) 100 Unit/1 Ml Insuln.pen 10 Units SQ TIDWMEALS 30 Days


Lantus Solostar (Insulin Glargine,Hum.rec.anlog) 100 Unit/1 Ml Insuln.pen 30 

Units SQ QHS 30 Days


Metoclopramide Hcl 5 Mg Tablet 5 Mg PO PRN BFRMEALHC PRN


Culturelle (Lactobacillus Rhamnosus Gg) 1 Each Cap.sprink 1 Cap PO BID 14 Days


Loperamide (Loperamide Hcl) 2 Mg Capsule 2 Mg PO PRN Q15MIN PRN 10 Days


Tramadol Hcl 50 Mg Tablet 50 Mg PO PRN Q6HRS PRN


Baclofen 10 Mg Tablet 10 Mg PO PRN TID PRN 14 Days


Nystatin 100,000 Unit/1 Ml Oral.susp 5 Ml SWSW [id number] 30 Days


Promethazine Hcl 12.5 Mg Tablet 12.5 Mg PO PRN Q6HRS PRN 30 Days


Reported


Entresto 24 mg-26 mg Tablet (Sacubitril/Valsartan) 1 Each Tablet 1 Each PO DAILY


Furosemide 80 Mg Tablet 1 Tab PO DAILY


Klor-Con M20 (Potassium Chloride) 20 Meq Tab.er.prt 1 Tab PO DAILY 30 Days


Pantoprazole Sodium  ** (Pantoprazole Sodium) 40 Mg Tablet.dr 40 Mg PO DAILYAC


Alison-Aime Tablet (Folic Acid/Vitamin B Comp W-C) 0.8 Mg Tablet 1 Tab PO DAILY 30

Days


Metolazone 5 Mg Tablet 5 Mg PO DAILY


Vitamin D3 (Cholecalciferol (Vitamin D3)) 5,000 Unit Tablet 1 Tab PO DAILY


Calcium Acetate 667 Mg Tablet 667 Mg PO TIDWMEALS


Renal Caps Softgel (Folic Acid/Vitamin B Comp W-C) 1 Mg Capsule 1 Cap PO DAILY


Tresiba Flextouch U-100 (Insulin Degludec) 100 Unit/1 Ml Insuln.pen 100 Unit SQ 


Furosemide 40 Mg Tablet 1 Tab PO DAILY


Aspirin 81 Mg Tab.chew 81 Mg PO DAILYWBKFT


Vitals/I & O





Vital Sign - Last 24 Hours








 1/2/20 1/2/20 1/2/20 1/3/20





 19:00 20:00 23:45 03:59


 


Temp 98.1  98.6 98.2





 98.1  98.6 98.2


 


Pulse 100  102 100


 


Resp 16  16 16


 


B/P (MAP) 69/51 (57)  83/57 (66) 88/55 (66)


 


Pulse Ox 95  97 98


 


O2 Delivery Room Air Room Air Room Air Room Air


 


    





    





 1/3/20 1/3/20 1/3/20 1/3/20





 07:58 08:39 11:28 14:44


 


Temp 98.9  98.7 98.4





 98.9  98.7 98.4


 


Pulse 98 98 101 122


 


Resp 18  18 18


 


B/P (MAP) 85/50 (62) 85/50 81/52 (62) 75/43 (54)


 


Pulse Ox 96  98 92


 


O2 Delivery Room Air  Room Air Room Air














Intake and Output   


 


 1/2/20 1/2/20 1/3/20





 15:00 23:00 07:00


 


Intake Total 700 ml 400 ml 330 ml


 


Balance 700 ml 400 ml 330 ml

















CHRIS JOSE MD             Red 3, 2020 15:50

## 2020-01-03 NOTE — PDOC
[facility] PROGRESS NOTE


Chief Complaint


Chief Complaint


Post-op day 2 of amputation of R great toe w/extensive debridement of lateral 

aspect of R foot


Necrotizing Fasciitis of RLE


CHF


Diabetes-Type II


High Cholesterol


Heart Disease


Renal Failure





History of Present Illness


History of Present Illness


[id number]


Patient seen and examined


He now has a wound VAC on his right foot


Discussed with RN chart reviewed














1/2/20


Pt seen and examined


Pt reported being lethargic today


DW pt about care


Reviewed pt's chart





1/1/20


Pt seen and examined


DW pt


DW RN


Reviewed pt's chart





12/31/19


Pt seen and examined


Pt was sitting up in bed, eating breakfast in NAD


DW pt


DW RN


Reviewed pt's chart





Vitals/I&O


Vitals/I&O:





                                   Vital Signs








  Date Time  Temp Pulse Resp B/P (MAP) Pulse Ox O2 Delivery O2 Flow Rate FiO2


 


1/3/20 08:39  98  85/50    


 


1/3/20 07:58 98.9  18  96 Room Air  





 98.9       














                                    I & O   


 


 1/2/20 1/2/20 1/3/20





 15:00 23:00 07:00


 


Intake Total 700 ml 400 ml 330 ml


 


Balance 700 ml 400 ml 330 ml











Physical Exam


Physical Exam:


HEENT:  Normocephalic, atraumatic.  Anicteric.  No thrush.  Oral mucosa moist.


NECK:  Supple.  No JVD.


LUNGS:  Clear bilaterally.  No wheezing.


HEART:  S1, S2.


ABDOMEN:  Soft, nontender, nondistended.


EXTREMITIES:  Right lower extremity dressing in place.  Pictures of the wound


noted in the chart.  Dry skin.  Numerous calluses present over the left lower


extremity with some abrasions.  No signs of infection noted in the left lower


extremity.


DERMATOLOGIC:  Warm, dry.  No generalized rash.


CENTRAL NERVOUS SYSTEM:  Alert and oriented x 3, grossly nonfocal.


PSYCHIATRIC:  Cooperative, appropriate mood and affect.


General:  mild distress


Heart:  Regular rate


Lungs:  Clear


Abdomen:  Normal bowel sounds


Extremities:  No clubbing, No edema, Other (clean, dry, intact bandage of the 

RLE)


Skin:  Other (clean, dry, intact bandage of the RLE. There is a bad odor.)





Labs


Labs:





Laboratory Tests








Test


 1/2/20


16:53 1/2/20


21:15 1/3/20


07:43 1/3/20


08:45


 


Glucose (Fingerstick)


 106 mg/dL


(70-99) 144 mg/dL


(70-99) 171 mg/dL


(70-99) 





 


White Blood Count


 


 


 


 24.3 x10^3/uL


(4.0-11.0)


 


Red Blood Count


 


 


 


 4.12 x10^6/uL


(4.30-5.70)


 


Hemoglobin


 


 


 


 11.0 g/dL


(13.0-17.5)


 


Hematocrit


 


 


 


 34.3 %


(39.0-53.0)


 


Mean Corpuscular Volume    83 fL () 


 


Mean Corpuscular Hemoglobin    27 pg (25-35) 


 


Mean Corpuscular Hemoglobin


Concent 


 


 


 32 g/dL


(31-37)


 


Red Cell Distribution Width


 


 


 


 15.8 %


(11.5-14.5)


 


Platelet Count


 


 


 


 372 x10^3/uL


(140-400)


 


Neutrophils (%) (Auto)    89 % (31-73) 


 


Lymphocytes (%) (Auto)    3 % (24-48) 


 


Monocytes (%) (Auto)    6 % (0-9) 


 


Eosinophils (%) (Auto)    1 % (0-3) 


 


Basophils (%) (Auto)    1 % (0-3) 


 


Neutrophils # (Auto)


 


 


 


 21.6 x10^3/uL


(1.8-7.7)


 


Lymphocytes # (Auto)


 


 


 


 0.8 x10^3/uL


(1.0-4.8)


 


Monocytes # (Auto)


 


 


 


 1.4 x10^3/uL


(0.0-1.1)


 


Eosinophils # (Auto)


 


 


 


 0.2 x10^3/uL


(0.0-0.7)


 


Basophils # (Auto)


 


 


 


 0.2 x10^3/uL


(0.0-0.2)


 


Sodium Level


 


 


 


 130 mmol/L


(136-145)


 


Potassium Level


 


 


 


 3.7 mmol/L


(3.5-5.1)


 


Chloride Level


 


 


 


 94 mmol/L


()


 


Carbon Dioxide Level


 


 


 


 26 mmol/L


(21-32)


 


Anion Gap    10 (6-14) 


 


Blood Urea Nitrogen


 


 


 


 42 mg/dL


(8-26)


 


Creatinine


 


 


 


 8.9 mg/dL


(0.7-1.3)


 


Estimated GFR


(Cockcroft-Gault) 


 


 


 7.6 





 


Glucose Level


 


 


 


 182 mg/dL


(70-99)


 


Calcium Level


 


 


 


 6.6 mg/dL


(8.5-10.1)


 


Test


 1/3/20


10:39 


 


 





 


Glucose (Fingerstick)


 252 mg/dL


(70-99) 


 


 














Assessment and Plan


Assessmemt and Plan


Problems


Medical Problems:


(1) CHF (congestive heart failure)


Status: Acute  





(2) Chronic kidney disease


Status: Acute  





(3) Diabetes


Status: Acute  





(4) Sepsis


Status: Acute  





(5) Severe sepsis


Status: Acute  





Assessment 


Post-op day 3 of amputation of R great toe w/extensive debridement of lateral 

aspect of R foot


Necrotizing Fasciitis of RLE


CHF


Diabetes-Type II


High Cholesterol


Heart Disease


Renal Failure





Plan


IV Abx and anti-fungals


Replace Mg


Wound Care


Wound VAC


Insulin


DVT Prophylaxis


PT/OT


Labs


Home meds


Full code


Will possibly require a BKA if the wound VAC doesn't help


Appreciate subspecialist input


I think he would be a good candidate for select specialty





Comment


Review of Relevant


I have reviewed the following items sean (where applicable) has been applied.


Medications:





Current Medications








 Medications


  (Trade)  Dose


 Ordered  Sig/Ivonne


 Route


 PRN Reason  Start Time


 Stop Time Status Last Admin


Dose Admin


 


 Al Hydroxide/Mg


 Hydroxide


  (Mylanta Plus Xs)  30 ml  PRN Q8HRS  PRN


 PO


 HEARTBURN / GAS  1/3/20 00:30


    1/3/20 02:12




















PRINCESS MACARIO III DO            Red 3, 2020 11:30

## 2020-01-03 NOTE — NUR
Following surgery, new PT order and weightbearing status are required before resuming 
therapy, please.

## 2020-01-03 NOTE — NUR
RAGHU faxed referral to [facility] Acceptance pending. [facility] does not have beds today and 
insurance auth is needed pending acceptance.

## 2020-01-03 NOTE — PDOC
ORTHO PROGRESS NOTES


Subjective


RIGHT foot gangrene


Post-op Day:  3


Procedure


I&D RIGHT foot infection with wound vac medial foot.


Vitals





Vital Signs








  Date Time  Temp Pulse Resp B/P (MAP) Pulse Ox O2 Delivery O2 Flow Rate FiO2


 


1/3/20 14:44 98.4 122 18 75/43 (54) 92 Room Air  





 98.4       








Labs





Laboratory Tests








Test


 20


16:59 20


17:15 20


18:16 20


21:01


 


Glucose (Fingerstick)


 54 mg/dL


(70-99) 62 mg/dL


(70-99) 93 mg/dL


(70-99) 104 mg/dL


(70-99)


 


Test


 20


03:02 20


06:00 20


07:19 20


07:38


 


Glucose (Fingerstick)


 120 mg/dL


(70-99) 


 55 mg/dL


(70-99) 94 mg/dL


(70-99)


 


White Blood Count


 


 26.4 x10^3/uL


(4.0-11.0) 


 





 


Red Blood Count


 


 4.11 x10^6/uL


(4.30-5.70) 


 





 


Hemoglobin


 


 11.1 g/dL


(13.0-17.5) 


 





 


Hematocrit


 


 33.9 %


(39.0-53.0) 


 





 


Mean Corpuscular Volume  83 fL ()   


 


Mean Corpuscular Hemoglobin  27 pg (25-35)   


 


Mean Corpuscular Hemoglobin


Concent 


 33 g/dL


(31-37) 


 





 


Red Cell Distribution Width


 


 15.4 %


(11.5-14.5) 


 





 


Platelet Count


 


 358 x10^3/uL


(140-400) 


 





 


Neutrophils (%) (Auto)  90 % (31-73)   


 


Lymphocytes (%) (Auto)  3 % (24-48)   


 


Monocytes (%) (Auto)  5 % (0-9)   


 


Eosinophils (%) (Auto)  1 % (0-3)   


 


Basophils (%) (Auto)  1 % (0-3)   


 


Neutrophils # (Auto)


 


 23.9 x10^3/uL


(1.8-7.7) 


 





 


Lymphocytes # (Auto)


 


 0.7 x10^3/uL


(1.0-4.8) 


 





 


Monocytes # (Auto)


 


 1.3 x10^3/uL


(0.0-1.1) 


 





 


Eosinophils # (Auto)


 


 0.2 x10^3/uL


(0.0-0.7) 


 





 


Basophils # (Auto)


 


 0.3 x10^3/uL


(0.0-0.2) 


 





 


Sodium Level


 


 128 mmol/L


(136-145) 


 





 


Potassium Level


 


 3.3 mmol/L


(3.5-5.1) 


 





 


Chloride Level


 


 93 mmol/L


() 


 





 


Carbon Dioxide Level


 


 27 mmol/L


(21-32) 


 





 


Anion Gap  8 (6-14)   


 


Blood Urea Nitrogen


 


 42 mg/dL


(8-26) 


 





 


Creatinine


 


 8.6 mg/dL


(0.7-1.3) 


 





 


Estimated GFR


(Cockcroft-Gault) 


 7.9 


 


 





 


Glucose Level


 


 63 mg/dL


(70-99) 


 





 


Calcium Level


 


 6.8 mg/dL


(8.5-10.1) 


 





 


Magnesium Level


 


 1.8 mg/dL


(1.8-2.4) 


 





 


Thyroid Stimulating Hormone


(TSH) 


 5.933 uIU/mL


(0.358-3.74) 


 





 


Test


 20


11:16 20


16:53 20


21:15 1/3/20


07:43


 


Glucose (Fingerstick)


 71 mg/dL


(70-99) 106 mg/dL


(70-99) 144 mg/dL


(70-99) 171 mg/dL


(70-99)


 


Test


 1/3/20


08:45 1/3/20


10:39 


 





 


White Blood Count


 24.3 x10^3/uL


(4.0-11.0) 


 


 





 


Red Blood Count


 4.12 x10^6/uL


(4.30-5.70) 


 


 





 


Hemoglobin


 11.0 g/dL


(13.0-17.5) 


 


 





 


Hematocrit


 34.3 %


(39.0-53.0) 


 


 





 


Mean Corpuscular Volume 83 fL ()    


 


Mean Corpuscular Hemoglobin 27 pg (25-35)    


 


Mean Corpuscular Hemoglobin


Concent 32 g/dL


(31-37) 


 


 





 


Red Cell Distribution Width


 15.8 %


(11.5-14.5) 


 


 





 


Platelet Count


 372 x10^3/uL


(140-400) 


 


 





 


Neutrophils (%) (Auto) 89 % (31-73)    


 


Lymphocytes (%) (Auto) 3 % (24-48)    


 


Monocytes (%) (Auto) 6 % (0-9)    


 


Eosinophils (%) (Auto) 1 % (0-3)    


 


Basophils (%) (Auto) 1 % (0-3)    


 


Neutrophils # (Auto)


 21.6 x10^3/uL


(1.8-7.7) 


 


 





 


Lymphocytes # (Auto)


 0.8 x10^3/uL


(1.0-4.8) 


 


 





 


Monocytes # (Auto)


 1.4 x10^3/uL


(0.0-1.1) 


 


 





 


Eosinophils # (Auto)


 0.2 x10^3/uL


(0.0-0.7) 


 


 





 


Basophils # (Auto)


 0.2 x10^3/uL


(0.0-0.2) 


 


 





 


Sodium Level


 130 mmol/L


(136-145) 


 


 





 


Potassium Level


 3.7 mmol/L


(3.5-5.1) 


 


 





 


Chloride Level


 94 mmol/L


() 


 


 





 


Carbon Dioxide Level


 26 mmol/L


(21-32) 


 


 





 


Anion Gap 10 (6-14)    


 


Blood Urea Nitrogen


 42 mg/dL


(8-26) 


 


 





 


Creatinine


 8.9 mg/dL


(0.7-1.3) 


 


 





 


Estimated GFR


(Cockcroft-Gault) 7.6 


 


 


 





 


Glucose Level


 182 mg/dL


(70-99) 


 


 





 


Calcium Level


 6.6 mg/dL


(8.5-10.1) 


 


 





 


Glucose (Fingerstick)


 


 252 mg/dL


(70-99) 


 











Laboratory Tests








Test


 20


16:53 20


21:15 1/3/20


07:43 1/3/20


08:45


 


Glucose (Fingerstick)


 106 mg/dL


(70-99) 144 mg/dL


(70-99) 171 mg/dL


(70-99) 





 


White Blood Count


 


 


 


 24.3 x10^3/uL


(4.0-11.0)


 


Red Blood Count


 


 


 


 4.12 x10^6/uL


(4.30-5.70)


 


Hemoglobin


 


 


 


 11.0 g/dL


(13.0-17.5)


 


Hematocrit


 


 


 


 34.3 %


(39.0-53.0)


 


Mean Corpuscular Volume    83 fL () 


 


Mean Corpuscular Hemoglobin    27 pg (25-35) 


 


Mean Corpuscular Hemoglobin


Concent 


 


 


 32 g/dL


(31-37)


 


Red Cell Distribution Width


 


 


 


 15.8 %


(11.5-14.5)


 


Platelet Count


 


 


 


 372 x10^3/uL


(140-400)


 


Neutrophils (%) (Auto)    89 % (31-73) 


 


Lymphocytes (%) (Auto)    3 % (24-48) 


 


Monocytes (%) (Auto)    6 % (0-9) 


 


Eosinophils (%) (Auto)    1 % (0-3) 


 


Basophils (%) (Auto)    1 % (0-3) 


 


Neutrophils # (Auto)


 


 


 


 21.6 x10^3/uL


(1.8-7.7)


 


Lymphocytes # (Auto)


 


 


 


 0.8 x10^3/uL


(1.0-4.8)


 


Monocytes # (Auto)


 


 


 


 1.4 x10^3/uL


(0.0-1.1)


 


Eosinophils # (Auto)


 


 


 


 0.2 x10^3/uL


(0.0-0.7)


 


Basophils # (Auto)


 


 


 


 0.2 x10^3/uL


(0.0-0.2)


 


Sodium Level


 


 


 


 130 mmol/L


(136-145)


 


Potassium Level


 


 


 


 3.7 mmol/L


(3.5-5.1)


 


Chloride Level


 


 


 


 94 mmol/L


()


 


Carbon Dioxide Level


 


 


 


 26 mmol/L


(21-32)


 


Anion Gap    10 (6-14) 


 


Blood Urea Nitrogen


 


 


 


 42 mg/dL


(8-26)


 


Creatinine


 


 


 


 8.9 mg/dL


(0.7-1.3)


 


Estimated GFR


(Cockcroft-Gault) 


 


 


 7.6 





 


Glucose Level


 


 


 


 182 mg/dL


(70-99)


 


Calcium Level


 


 


 


 6.6 mg/dL


(8.5-10.1)


 


Test


 1/3/20


10:39 


 


 





 


Glucose (Fingerstick)


 252 mg/dL


(70-99) 


 


 











X-Rays





PATIENT: BLAISE DOWNING   ACCOUNT: [id number]   MRN#: [id number]


: 1965   LOCATION: ER   AGE: 54


SEX: M   EXAM DT: 19   ACCESSION#: [id number]


STATUS: REG ER   ORD. PHYSICIAN: ADALI DURAN DO   


REASON: cough


PROCEDURE: FOOT RIGHT 3V





Examination: FOOT RIGHT 3V


 


History: Nonhealing wound for past 2 months.


 


Comparison/Correlation: None


 


Findings: Total 3 images the right foot were obtained. The distal first 


and second digits are not included on the oblique view. Overlying bandages


at the lateral aspect of the midfoot and forefoot may limit evaluation for


fine detail.


 


Extensive soft tissue gas is present involving the left foot. This is 


primarily evident at the midfoot and metatarsophalangeal region. Soft 


tissue gas is evident at the plantar aspect of the hindfoot and midfoot. 


Soft tissue gas is also evident at the posterior distal calf and posterior


to the ankle. The superior extent of soft tissue gas involving the calf is


not included on this exam.


 


Deformity of the second and third metatarsal heads noted. There are 


metatarsophalangeal joint narrowing present. Suspicion of lytic 


involvement of the fifth metatarsal bone distally is raised. Overlying 


soft tissue gas limits assessment.


 


Impression:


Significant soft tissue gas involving the right foot. Examination of soft 


tissue gas into the distal calf region. Full extent of involvement is not 


included on this exam.


Findings of significant concern for necrotizing fasciitis. Possibly loss 


myelitis involving the fifth tarsal bone distally is also raised. 


Assessment for bony destruction is limited with soft tissue gas present.


 


Electronically signed by: Lucas Jaramillo MD (2019 10:40 AM) [facility]














DICTATED and SIGNED BY:     LUCAS JARAMILLO MD


DATE:     19 1040


Notes


Patient alert and oriented to time, place, person, awake and verbally 

communicative when I entered the room. He states that he discussed the 

possibility of below knee amputation with Dr Romero earlier this week and they 

decided to I&D the foot wound to see if it could be salvaged, undergoing that 

procedure on . He indicates he has reconciled proceeding with amputation 

if that is recommened by the surgeon. He is resting comfortably. 


Heart rate and rhythm are regular without murmur. Lungs CTA bilaterally with 

symmetric inspiration, no adventitiouis sounds. Abdomen is soft, nontender with 

normal bowel sounds x 4 quadrants.Distal pedal pulses are diminished more so in 

the RIGHT foot with 1/4 compared to the LEFT foot 2/4. Sensation to pressure in 

the LEFT foot present but diminished. No sensation in the RIGHT toes, dminished 

sensation in the dorsal foot, plantar foot. Patient has a soft non tender calf 

bilterally. No palpable cords. No erythema or induration in the calf either side

or thigh or warmth. No crepitus subcutaneously in the ankles/legs or calves.





RIGHT FOOT: Wound vac medially is draining and patient has developing blistering

on the lateral aspect of the foot, early ulcerations plantar lateral foot. There

is minimal bleeding in the distal foot/toes. Distinct odor of infection present.

Examined in a sterile manner.


Problems:  


(1) Gas gangrene


(2) Chronic osteomyelitis of right foot with draining sinus


(3) Osteomyelitis


(4) Diabetes


(5) CHF (congestive heart failure)


(6) Chronic kidney disease


Assessment and Plan


Dr Romero plans to proceed with likely BKA tomorrow AM.


Patient has indicated this was previously discussed and reconciled that it is 

the way he should proceed.


NPO per his protocol for tomorrow or after midnight.





Problem Qualifiers





(1) Osteomyelitis:  


Osteomyelitis type:  unspecified type  Osteomyelitis location:  foot  

Laterality:  right  Qualified Codes:  M86.9 - Osteomyelitis, unspecified


(2) Diabetes:  


Diabetes mellitus type:  type 2  Diabetes mellitus long term insulin use:  

unspecified long term insulin use status  Diabetes mellitus complication status:

 with kidney complications  Diabetes mellitus complication detail:  with chronic

kidney disease  Chronic kidney disease stage:  on chronic dialysis  Qualified 

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 

N18.6 - End stage renal disease; Z99.2 - Dependence on renal dialysis


(3) CHF (congestive heart failure):  


Heart failure type:  unspecified  Heart failure chronicity:  chronic  Qualified 

Codes:  I50.9 - Heart failure, unspecified


(4) Chronic kidney disease:  


Chronic kidney disease stage:  on chronic dialysis  Qualified Codes:  N18.6 - 

End stage renal disease; Z99.2 - Dependence on renal dialysis








ADAM MARCIAL Jr. PAC        Red 3, 2020 4:35 pm

## 2020-01-03 NOTE — PDOC
Renal-Progress Notes


Subjective Notes


Notes


HAD SOME DIZZINESS WITH LOW BP BUT BETTER NOW





History of Present Illness


Hx of present illness


STABLE





Vitals


Vitals





Vital Signs








  Date Time  Temp Pulse Resp B/P (MAP) Pulse Ox O2 Delivery O2 Flow Rate FiO2


 


1/3/20 11:28 98.7 101 18 81/52 (62) 98 Room Air  





 98.7       








Weight


Weight [ ]





I.O.


Intake and Output











Intake and Output 


 


 1/3/20





 07:00


 


Intake Total 1430 ml


 


Balance 1430 ml


 


 


 


Intake Oral 1430 ml


 


# Bowel Movements 3











Labs


Labs





Laboratory Tests








Test


 1/2/20


16:53 1/2/20


21:15 1/3/20


07:43 1/3/20


08:45


 


Glucose (Fingerstick)


 106 mg/dL


(70-99) 144 mg/dL


(70-99) 171 mg/dL


(70-99) 





 


White Blood Count


 


 


 


 24.3 x10^3/uL


(4.0-11.0)


 


Red Blood Count


 


 


 


 4.12 x10^6/uL


(4.30-5.70)


 


Hemoglobin


 


 


 


 11.0 g/dL


(13.0-17.5)


 


Hematocrit


 


 


 


 34.3 %


(39.0-53.0)


 


Mean Corpuscular Volume    83 fL () 


 


Mean Corpuscular Hemoglobin    27 pg (25-35) 


 


Mean Corpuscular Hemoglobin


Concent 


 


 


 32 g/dL


(31-37)


 


Red Cell Distribution Width


 


 


 


 15.8 %


(11.5-14.5)


 


Platelet Count


 


 


 


 372 x10^3/uL


(140-400)


 


Neutrophils (%) (Auto)    89 % (31-73) 


 


Lymphocytes (%) (Auto)    3 % (24-48) 


 


Monocytes (%) (Auto)    6 % (0-9) 


 


Eosinophils (%) (Auto)    1 % (0-3) 


 


Basophils (%) (Auto)    1 % (0-3) 


 


Neutrophils # (Auto)


 


 


 


 21.6 x10^3/uL


(1.8-7.7)


 


Lymphocytes # (Auto)


 


 


 


 0.8 x10^3/uL


(1.0-4.8)


 


Monocytes # (Auto)


 


 


 


 1.4 x10^3/uL


(0.0-1.1)


 


Eosinophils # (Auto)


 


 


 


 0.2 x10^3/uL


(0.0-0.7)


 


Basophils # (Auto)


 


 


 


 0.2 x10^3/uL


(0.0-0.2)


 


Sodium Level


 


 


 


 130 mmol/L


(136-145)


 


Potassium Level


 


 


 


 3.7 mmol/L


(3.5-5.1)


 


Chloride Level


 


 


 


 94 mmol/L


()


 


Carbon Dioxide Level


 


 


 


 26 mmol/L


(21-32)


 


Anion Gap    10 (6-14) 


 


Blood Urea Nitrogen


 


 


 


 42 mg/dL


(8-26)


 


Creatinine


 


 


 


 8.9 mg/dL


(0.7-1.3)


 


Estimated GFR


(Cockcroft-Gault) 


 


 


 7.6 





 


Glucose Level


 


 


 


 182 mg/dL


(70-99)


 


Calcium Level


 


 


 


 6.6 mg/dL


(8.5-10.1)


 


Test


 1/3/20


10:39 


 


 





 


Glucose (Fingerstick)


 252 mg/dL


(70-99) 


 


 














Micro


Micro





Microbiology


1/2/20 Blood Culture - Preliminary, Resulted


         NO GROWTH AFTER 1 DAY


12/31/19 Anaerobic/Aerobic Culture, Resulted


           Pending


12/31/19 Anaerobic Culture Result 1 (ANTHONY), Resulted


           Pending


12/31/19 Aerobic Culture, Resulted


           Pending


12/31/19 Aerobic Culture Result 1 (ANTHONY), Resulted


           Pending


12/31/19 Gram Stain - Final, Resulted


           


12/31/19 Gram Stain Result 1 (ANTHONY) - Final, Resulted


           


12/31/19 Gram Stain Result 2 (ANTOHNY) - Final, Resulted


           


12/31/19 Gram Stain Result 3 (ANTHONY) - Final, Resulted


           


12/31/19 Gram Stain Result 4 (ANTHONY) - Final, Resulted





Review of Systems


Constitutional:  yes: weakness, alert, oriented


Ears/Nose/Throat:  Yes: no symptom reported


Eyes:  Yes: no symptom reported


Pulmonary:  Yes no symptom reported


Cardiovascular:  Yes no symptom reported


Gastrointestional:  Yes: no symptom reported


Genitourinary:  Yes: no symptom reported


Musculoskeletal:  Yes: no symptom reported


Skin:  Yes no symptom reported


Psychiatric/Neurological:  Yes: no symptom reported


Endocrine:  Yes: no symptom reported





Physical Exam


General Appearance:  no apparent distress


Skin:  warm, dry


Respiratory:  bilateral CTA


Heart:  S1S2


Abdomen:  soft, bowel sounds present


Genitourinary:  bladder flat


Extremities:  pulses present, other (FOUL SMELLING DEEP TISSUE WOUND RLE)


Neurology:  alert, oriented, follow commands


Musculoskeletal:  Osteoarthritis





Assessment


Assessment


IMP


RIGHT FOOT WOUND CONCERNING FOR NECROTIZING FASCIITIS


POORLY CONTROLLED DM II


ANEMIA


HYPOKALEMIA-BETTER


HYPONATREMIA-STABLE


ESRD


HTN HX


HX OF CM AND CHF


NSVT


HYPONATREMIA


HYPOTHYROIDISM


HYPOVOLEMIA


HYPOTENSION


SIRS








PLAN





REPLACE K DAILY


ANTIBIOTICS


WOUND CARE


ORTHO EVALUATION AND TX


PROB NEEDS MORE AMPUTATION


APD DAILY TONIGHT-1.5%


ALSO START SALINE


WILL CONT INSULIN TO PD BAGS 


STARTED SYNTHROID


MAY NEED TO STOP HIS ENTRESTO


WILL FOLLOW











CONSTANTIN GOLDSMITH MD                  Red 3, 2020 13:05

## 2020-01-04 VITALS — DIASTOLIC BLOOD PRESSURE: 51 MMHG | SYSTOLIC BLOOD PRESSURE: 81 MMHG

## 2020-01-04 VITALS — DIASTOLIC BLOOD PRESSURE: 56 MMHG | SYSTOLIC BLOOD PRESSURE: 85 MMHG

## 2020-01-04 VITALS — SYSTOLIC BLOOD PRESSURE: 92 MMHG | DIASTOLIC BLOOD PRESSURE: 55 MMHG

## 2020-01-04 VITALS — DIASTOLIC BLOOD PRESSURE: 52 MMHG | SYSTOLIC BLOOD PRESSURE: 73 MMHG

## 2020-01-04 VITALS — SYSTOLIC BLOOD PRESSURE: 84 MMHG | DIASTOLIC BLOOD PRESSURE: 54 MMHG

## 2020-01-04 VITALS — DIASTOLIC BLOOD PRESSURE: 57 MMHG | SYSTOLIC BLOOD PRESSURE: 81 MMHG

## 2020-01-04 VITALS — SYSTOLIC BLOOD PRESSURE: 72 MMHG | DIASTOLIC BLOOD PRESSURE: 51 MMHG

## 2020-01-04 LAB
ANION GAP SERPL CALC-SCNC: 9 MMOL/L (ref 6–14)
BASOPHILS # BLD AUTO: 0.1 X10^3/UL (ref 0–0.2)
BASOPHILS NFR BLD: 1 % (ref 0–3)
BUN SERPL-MCNC: 46 MG/DL (ref 8–26)
CALCIUM SERPL-MCNC: 6.3 MG/DL (ref 8.5–10.1)
CHLORIDE SERPL-SCNC: 96 MMOL/L (ref 98–107)
CO2 SERPL-SCNC: 24 MMOL/L (ref 21–32)
CREAT SERPL-MCNC: 8.7 MG/DL (ref 0.7–1.3)
EOSINOPHIL NFR BLD: 0.2 X10^3/UL (ref 0–0.7)
EOSINOPHIL NFR BLD: 1 % (ref 0–3)
ERYTHROCYTE [DISTWIDTH] IN BLOOD BY AUTOMATED COUNT: 15.3 % (ref 11.5–14.5)
GFR SERPLBLD BASED ON 1.73 SQ M-ARVRAT: 7.8 ML/MIN
GLUCOSE SERPL-MCNC: 253 MG/DL (ref 70–99)
HCT VFR BLD CALC: 33.6 % (ref 39–53)
HGB BLD-MCNC: 10.8 G/DL (ref 13–17.5)
LYMPHOCYTES # BLD: 0.7 X10^3/UL (ref 1–4.8)
LYMPHOCYTES NFR BLD AUTO: 4 % (ref 24–48)
MCH RBC QN AUTO: 27 PG (ref 25–35)
MCHC RBC AUTO-ENTMCNC: 32 G/DL (ref 31–37)
MCV RBC AUTO: 83 FL (ref 79–100)
MONO #: 1.4 X10^3/UL (ref 0–1.1)
MONOCYTES NFR BLD: 6 % (ref 0–9)
NEUT #: 18.9 X10^3/UL (ref 1.8–7.7)
NEUTROPHILS NFR BLD AUTO: 89 % (ref 31–73)
PLATELET # BLD AUTO: 383 X10^3/UL (ref 140–400)
POTASSIUM SERPL-SCNC: 3.7 MMOL/L (ref 3.5–5.1)
RBC # BLD AUTO: 4.04 X10^6/UL (ref 4.3–5.7)
SODIUM SERPL-SCNC: 129 MMOL/L (ref 136–145)
WBC # BLD AUTO: 21.3 X10^3/UL (ref 4–11)

## 2020-01-04 RX ADMIN — LINEZOLID SCH MG: 600 TABLET, FILM COATED ORAL at 20:17

## 2020-01-04 RX ADMIN — MEROPENEM SCH MLS/HR: 500 INJECTION, POWDER, FOR SOLUTION INTRAVENOUS at 09:29

## 2020-01-04 RX ADMIN — ALUMINUM HYDROXIDE, MAGNESIUM HYDROXIDE, AND DIMETHICONE PRN ML: 400; 400; 40 SUSPENSION ORAL at 20:16

## 2020-01-04 RX ADMIN — DEXTROSE SCH MLS/HR: 50 INJECTION, SOLUTION INTRAVENOUS at 15:40

## 2020-01-04 RX ADMIN — BACITRACIN SCH MLS/HR: 5000 INJECTION, POWDER, FOR SOLUTION INTRAMUSCULAR at 21:10

## 2020-01-04 RX ADMIN — SACUBITRIL AND VALSARTAN SCH TAB: 24; 26 TABLET, FILM COATED ORAL at 09:00

## 2020-01-04 RX ADMIN — ATORVASTATIN CALCIUM SCH MG: 10 TABLET, FILM COATED ORAL at 20:17

## 2020-01-04 RX ADMIN — Medication SCH EA: at 09:28

## 2020-01-04 RX ADMIN — SACUBITRIL AND VALSARTAN SCH TAB: 24; 26 TABLET, FILM COATED ORAL at 21:00

## 2020-01-04 RX ADMIN — BACITRACIN SCH MLS/HR: 5000 INJECTION, POWDER, FOR SOLUTION INTRAMUSCULAR at 07:42

## 2020-01-04 RX ADMIN — MEROPENEM SCH MLS/HR: 500 INJECTION, POWDER, FOR SOLUTION INTRAVENOUS at 21:09

## 2020-01-04 RX ADMIN — INSULIN LISPRO SCH UNITS: 100 INJECTION, SOLUTION INTRAVENOUS; SUBCUTANEOUS at 12:35

## 2020-01-04 RX ADMIN — BACITRACIN SCH MLS/HR: 5000 INJECTION, POWDER, FOR SOLUTION INTRAMUSCULAR at 12:45

## 2020-01-04 RX ADMIN — Medication SCH EA: at 17:20

## 2020-01-04 RX ADMIN — POTASSIUM CHLORIDE SCH MEQ: 1500 TABLET, EXTENDED RELEASE ORAL at 17:20

## 2020-01-04 RX ADMIN — Medication SCH EA: at 09:21

## 2020-01-04 RX ADMIN — INSULIN LISPRO SCH UNITS: 100 INJECTION, SOLUTION INTRAVENOUS; SUBCUTANEOUS at 17:00

## 2020-01-04 RX ADMIN — POTASSIUM CHLORIDE SCH MEQ: 1500 TABLET, EXTENDED RELEASE ORAL at 09:20

## 2020-01-04 RX ADMIN — PANTOPRAZOLE SODIUM SCH MG: 40 TABLET, DELAYED RELEASE ORAL at 09:20

## 2020-01-04 RX ADMIN — Medication SCH CAP: at 09:20

## 2020-01-04 RX ADMIN — LEVOTHYROXINE SODIUM SCH MCG: 25 TABLET ORAL at 07:25

## 2020-01-04 RX ADMIN — LINEZOLID SCH MG: 600 TABLET, FILM COATED ORAL at 09:20

## 2020-01-04 RX ADMIN — ONDANSETRON PRN MG: 2 INJECTION INTRAMUSCULAR; INTRAVENOUS at 22:32

## 2020-01-04 RX ADMIN — Medication SCH EA: at 12:30

## 2020-01-04 RX ADMIN — Medication SCH CAP: at 20:17

## 2020-01-04 NOTE — PDOC
PROGRESS NOTES


Subjective


Subjective


The patient was eating today and said he was not instructed to be NPO.  I had 

otherwise scheduled surgery for today at 10:00 AM.  Although his blood pressure 

has been low the entire admission he does not appear ill, and said he feels 

well.  He said his usual blood pressure is closer to 120 systolic, but it's been

average 87 this admission and he said he's not had any symptoms from low BP.





Objective


Vital Signs





Vital Signs








  Date Time  Temp Pulse Resp B/P (MAP) Pulse Ox O2 Delivery O2 Flow Rate FiO2


 


1/4/20 09:38 99.2 106 16 73/52 (59) 98 Room Air  





 99.2       








Physical Exam


The wound VAC is in place. There is still a bad odor. He looks well, and has no 

complaints, and does not appear septic.


Labs





Laboratory Tests








Test


 1/2/20


11:16 1/2/20


16:53 1/2/20


21:15 1/3/20


07:43


 


Glucose (Fingerstick)


 71 mg/dL


(70-99) 106 mg/dL


(70-99) 144 mg/dL


(70-99) 171 mg/dL


(70-99)


 


Test


 1/3/20


08:45 1/3/20


10:39 1/3/20


16:30 1/3/20


20:34


 


White Blood Count


 24.3 x10^3/uL


(4.0-11.0) 


 


 





 


Red Blood Count


 4.12 x10^6/uL


(4.30-5.70) 


 


 





 


Hemoglobin


 11.0 g/dL


(13.0-17.5) 


 


 





 


Hematocrit


 34.3 %


(39.0-53.0) 


 


 





 


Mean Corpuscular Volume 83 fL ()    


 


Mean Corpuscular Hemoglobin 27 pg (25-35)    


 


Mean Corpuscular Hemoglobin


Concent 32 g/dL


(31-37) 


 


 





 


Red Cell Distribution Width


 15.8 %


(11.5-14.5) 


 


 





 


Platelet Count


 372 x10^3/uL


(140-400) 


 


 





 


Neutrophils (%) (Auto) 89 % (31-73)    


 


Lymphocytes (%) (Auto) 3 % (24-48)    


 


Monocytes (%) (Auto) 6 % (0-9)    


 


Eosinophils (%) (Auto) 1 % (0-3)    


 


Basophils (%) (Auto) 1 % (0-3)    


 


Neutrophils # (Auto)


 21.6 x10^3/uL


(1.8-7.7) 


 


 





 


Lymphocytes # (Auto)


 0.8 x10^3/uL


(1.0-4.8) 


 


 





 


Monocytes # (Auto)


 1.4 x10^3/uL


(0.0-1.1) 


 


 





 


Eosinophils # (Auto)


 0.2 x10^3/uL


(0.0-0.7) 


 


 





 


Basophils # (Auto)


 0.2 x10^3/uL


(0.0-0.2) 


 


 





 


Sodium Level


 130 mmol/L


(136-145) 


 


 





 


Potassium Level


 3.7 mmol/L


(3.5-5.1) 


 


 





 


Chloride Level


 94 mmol/L


() 


 


 





 


Carbon Dioxide Level


 26 mmol/L


(21-32) 


 


 





 


Anion Gap 10 (6-14)    


 


Blood Urea Nitrogen


 42 mg/dL


(8-26) 


 


 





 


Creatinine


 8.9 mg/dL


(0.7-1.3) 


 


 





 


Estimated GFR


(Cockcroft-Gault) 7.6 


 


 


 





 


Glucose Level


 182 mg/dL


(70-99) 


 


 





 


Calcium Level


 6.6 mg/dL


(8.5-10.1) 


 


 





 


Glucose (Fingerstick)


 


 252 mg/dL


(70-99) 283 mg/dL


(70-99) 286 mg/dL


(70-99)


 


Test


 1/4/20


07:48 1/4/20


08:54 


 





 


Glucose (Fingerstick)


 263 mg/dL


(70-99) 272 mg/dL


(70-99) 


 











Laboratory Tests








Test


 1/3/20


10:39 1/3/20


16:30 1/3/20


20:34 1/4/20


07:48


 


Glucose (Fingerstick)


 252 mg/dL


(70-99) 283 mg/dL


(70-99) 286 mg/dL


(70-99) 263 mg/dL


(70-99)


 


Test


 1/4/20


08:54 


 


 





 


Glucose (Fingerstick)


 272 mg/dL


(70-99) 


 


 














Assessment


Assessment


s/p I&D foot wounds.  Wound VAC in place. Despite antibiotics, infection 

persists.  Hypotension prompted sepsis protocol, and I would continue treatment 

of hypotension with IVF and PO intake.  Foot is not salvageable, and the patient

agrees to below-knee amputation. We discussed this in detail along with the 

risks of wound healing problems, and we discussed the long-term fitting of the p

rosthesis once the incision is well healed. I reviewed his ultrasound arterial 

results, and he does not have any treatable stenosis.





I discussed cancellation of surgery today with Dr. Rosen, and possible surgery 

for tomorrow.  In light of patients low EF 15% and need to turn off ICD for 

surgery, it would be better to have surgery on Monday for the patient's safety 

when OR is fully staffed.  The patient does not appear acutely ill or septic, so

I agree, we can delay until Monday.  Discussed with patient and he agrees.  

Would reconsider if he worsens clinically.











NICOLA HANEY MD                  Jan 4, 2020 09:56

## 2020-01-04 NOTE — PDOC
Dialysis Progress Note


Dialysis Note


Dialysis Note


Follow-up for ESRD on peritoneal dialysis





Patient underwent CCPD overnight. Tolerated well. Denies new complaints








 


General Appearance:          


Awake:          


Alert Oriented  x      3


Neck:    No JVD or JVP


Chest:    CTA Kelvin


Heart:    S1 S2


Abdomen -    Soft NTND


Extremities -    No Edema











ESRD:   CCP Dialysis as per home dialysis regimen. Patient does not feel that he

has excess fluid at this time does not want to change current regimen





Vitals


Vital Signs





Vital Signs








  Date Time  Temp Pulse Resp B/P (MAP) Pulse Ox O2 Delivery O2 Flow Rate FiO2


 


1/4/20 11:11 98.0 99 18 81/57 (65) 100 Room Air  





 98.0       











Labs


Last Labs





Laboratory Tests








Test


 1/2/20


16:53 1/2/20


21:15 1/3/20


07:43 1/3/20


08:45


 


Glucose (Fingerstick)


 106 mg/dL


(70-99) 144 mg/dL


(70-99) 171 mg/dL


(70-99) 





 


White Blood Count


 


 


 


 24.3 x10^3/uL


(4.0-11.0)


 


Red Blood Count


 


 


 


 4.12 x10^6/uL


(4.30-5.70)


 


Hemoglobin


 


 


 


 11.0 g/dL


(13.0-17.5)


 


Hematocrit


 


 


 


 34.3 %


(39.0-53.0)


 


Mean Corpuscular Volume    83 fL () 


 


Mean Corpuscular Hemoglobin    27 pg (25-35) 


 


Mean Corpuscular Hemoglobin


Concent 


 


 


 32 g/dL


(31-37)


 


Red Cell Distribution Width


 


 


 


 15.8 %


(11.5-14.5)


 


Platelet Count


 


 


 


 372 x10^3/uL


(140-400)


 


Neutrophils (%) (Auto)    89 % (31-73) 


 


Lymphocytes (%) (Auto)    3 % (24-48) 


 


Monocytes (%) (Auto)    6 % (0-9) 


 


Eosinophils (%) (Auto)    1 % (0-3) 


 


Basophils (%) (Auto)    1 % (0-3) 


 


Neutrophils # (Auto)


 


 


 


 21.6 x10^3/uL


(1.8-7.7)


 


Lymphocytes # (Auto)


 


 


 


 0.8 x10^3/uL


(1.0-4.8)


 


Monocytes # (Auto)


 


 


 


 1.4 x10^3/uL


(0.0-1.1)


 


Eosinophils # (Auto)


 


 


 


 0.2 x10^3/uL


(0.0-0.7)


 


Basophils # (Auto)


 


 


 


 0.2 x10^3/uL


(0.0-0.2)


 


Sodium Level


 


 


 


 130 mmol/L


(136-145)


 


Potassium Level


 


 


 


 3.7 mmol/L


(3.5-5.1)


 


Chloride Level


 


 


 


 94 mmol/L


()


 


Carbon Dioxide Level


 


 


 


 26 mmol/L


(21-32)


 


Anion Gap    10 (6-14) 


 


Blood Urea Nitrogen


 


 


 


 42 mg/dL


(8-26)


 


Creatinine


 


 


 


 8.9 mg/dL


(0.7-1.3)


 


Estimated GFR


(Cockcroft-Gault) 


 


 


 7.6 





 


Glucose Level


 


 


 


 182 mg/dL


(70-99)


 


Calcium Level


 


 


 


 6.6 mg/dL


(8.5-10.1)


 


Test


 1/3/20


10:39 1/3/20


16:30 1/3/20


20:34 1/4/20


07:48


 


Glucose (Fingerstick)


 252 mg/dL


(70-99) 283 mg/dL


(70-99) 286 mg/dL


(70-99) 263 mg/dL


(70-99)


 


Test


 1/4/20


08:54 1/4/20


11:00 1/4/20


11:49 





 


Glucose (Fingerstick)


 272 mg/dL


(70-99) 


 209 mg/dL


(70-99) 





 


White Blood Count


 


 21.3 x10^3/uL


(4.0-11.0) 


 





 


Red Blood Count


 


 4.04 x10^6/uL


(4.30-5.70) 


 





 


Hemoglobin


 


 10.8 g/dL


(13.0-17.5) 


 





 


Hematocrit


 


 33.6 %


(39.0-53.0) 


 





 


Mean Corpuscular Volume  83 fL ()   


 


Mean Corpuscular Hemoglobin  27 pg (25-35)   


 


Mean Corpuscular Hemoglobin


Concent 


 32 g/dL


(31-37) 


 





 


Red Cell Distribution Width


 


 15.3 %


(11.5-14.5) 


 





 


Platelet Count


 


 383 x10^3/uL


(140-400) 


 





 


Neutrophils (%) (Auto)  89 % (31-73)   


 


Lymphocytes (%) (Auto)  4 % (24-48)   


 


Monocytes (%) (Auto)  6 % (0-9)   


 


Eosinophils (%) (Auto)  1 % (0-3)   


 


Basophils (%) (Auto)  1 % (0-3)   


 


Neutrophils # (Auto)


 


 18.9 x10^3/uL


(1.8-7.7) 


 





 


Lymphocytes # (Auto)


 


 0.7 x10^3/uL


(1.0-4.8) 


 





 


Monocytes # (Auto)


 


 1.4 x10^3/uL


(0.0-1.1) 


 





 


Eosinophils # (Auto)


 


 0.2 x10^3/uL


(0.0-0.7) 


 





 


Basophils # (Auto)


 


 0.1 x10^3/uL


(0.0-0.2) 


 





 


Sodium Level


 


 129 mmol/L


(136-145) 


 





 


Potassium Level


 


 3.7 mmol/L


(3.5-5.1) 


 





 


Chloride Level


 


 96 mmol/L


() 


 





 


Carbon Dioxide Level


 


 24 mmol/L


(21-32) 


 





 


Anion Gap  9 (6-14)   


 


Blood Urea Nitrogen


 


 46 mg/dL


(8-26) 


 





 


Creatinine


 


 8.7 mg/dL


(0.7-1.3) 


 





 


Estimated GFR


(Cockcroft-Gault) 


 7.8 


 


 





 


Glucose Level


 


 253 mg/dL


(70-99) 


 





 


Calcium Level


 


 6.3 mg/dL


(8.5-10.1) 


 











Laboratory Tests








Test


 1/3/20


16:30 1/3/20


20:34 1/4/20


07:48 1/4/20


08:54


 


Glucose (Fingerstick)


 283 mg/dL


(70-99) 286 mg/dL


(70-99) 263 mg/dL


(70-99) 272 mg/dL


(70-99)


 


Test


 1/4/20


11:00 1/4/20


11:49 


 





 


White Blood Count


 21.3 x10^3/uL


(4.0-11.0) 


 


 





 


Red Blood Count


 4.04 x10^6/uL


(4.30-5.70) 


 


 





 


Hemoglobin


 10.8 g/dL


(13.0-17.5) 


 


 





 


Hematocrit


 33.6 %


(39.0-53.0) 


 


 





 


Mean Corpuscular Volume 83 fL ()    


 


Mean Corpuscular Hemoglobin 27 pg (25-35)    


 


Mean Corpuscular Hemoglobin


Concent 32 g/dL


(31-37) 


 


 





 


Red Cell Distribution Width


 15.3 %


(11.5-14.5) 


 


 





 


Platelet Count


 383 x10^3/uL


(140-400) 


 


 





 


Neutrophils (%) (Auto) 89 % (31-73)    


 


Lymphocytes (%) (Auto) 4 % (24-48)    


 


Monocytes (%) (Auto) 6 % (0-9)    


 


Eosinophils (%) (Auto) 1 % (0-3)    


 


Basophils (%) (Auto) 1 % (0-3)    


 


Neutrophils # (Auto)


 18.9 x10^3/uL


(1.8-7.7) 


 


 





 


Lymphocytes # (Auto)


 0.7 x10^3/uL


(1.0-4.8) 


 


 





 


Monocytes # (Auto)


 1.4 x10^3/uL


(0.0-1.1) 


 


 





 


Eosinophils # (Auto)


 0.2 x10^3/uL


(0.0-0.7) 


 


 





 


Basophils # (Auto)


 0.1 x10^3/uL


(0.0-0.2) 


 


 





 


Sodium Level


 129 mmol/L


(136-145) 


 


 





 


Potassium Level


 3.7 mmol/L


(3.5-5.1) 


 


 





 


Chloride Level


 96 mmol/L


() 


 


 





 


Carbon Dioxide Level


 24 mmol/L


(21-32) 


 


 





 


Anion Gap 9 (6-14)    


 


Blood Urea Nitrogen


 46 mg/dL


(8-26) 


 


 





 


Creatinine


 8.7 mg/dL


(0.7-1.3) 


 


 





 


Estimated GFR


(Cockcroft-Gault) 7.8 


 


 


 





 


Glucose Level


 253 mg/dL


(70-99) 


 


 





 


Calcium Level


 6.3 mg/dL


(8.5-10.1) 


 


 





 


Glucose (Fingerstick)


 


 209 mg/dL


(70-99) 


 














Assessment


Assessment


Problems


Medical Problems:


(1) CHF (congestive heart failure)


Status: Acute  





(2) Chronic kidney disease


Status: Acute  





(3) Diabetes


Status: Acute  





(4) Sepsis


Status: Acute  





(5) Severe sepsis


Status: Acute  











Plan


Plan of Care


Problems


Medical Problems:


(1) CHF (congestive heart failure)


Status: Acute  





(2) Chronic kidney disease


Status: Acute  





(3) Diabetes


Status: Acute  





(4) Sepsis


Status: Acute  





(5) Severe sepsis


Status: Acute  

















AME HARDWICK MD                Jan 4, 2020 14:22

## 2020-01-04 NOTE — PDOC
[facility] [facility] PROGRESS NOTE


Chief Complaint


Chief Complaint


Post-op day 4 of amputation of R great toe w/extensive debridement of lateral 

aspect of R foot


Necrotizing Fasciitis of RLE


CHF


Diabetes-Type II


High Cholesterol


Heart Disease


Renal Failure





History of Present Illness


History of Present Illness


1/4/20


Pt seen and examined


DW pt about upcoming R BKA


Pt states he feels fine 


Pt doesn't appear septic despite low BP and tachycardia


DW RN


Reviewed pt's chart





[id number]


Patient seen and examined


He now has a wound VAC on his right foot


Discussed with RN chart reviewed





1/2/20


Pt seen and examined


Pt reported being lethargic today


DW pt about care


Reviewed pt's chart





1/1/20


Pt seen and examined


DW pt


DW RN


Reviewed pt's chart





12/31/19


Pt seen and examined


Pt was sitting up in bed, eating breakfast in NAD


DW pt


DW RN


Reviewed pt's chart





Vitals/I&O


Vitals/I&O:





                                   Vital Signs








  Date Time  Temp Pulse Resp B/P (MAP) Pulse Ox O2 Delivery O2 Flow Rate FiO2


 


1/4/20 11:11 98.0 99 18 81/57 (65) 100 Room Air  





 98.0       














                                    I & O   


 


 1/3/20 1/3/20 1/4/20





 15:00 23:00 07:00


 


Intake Total 200 ml 30 ml 30 ml


 


Balance 200 ml 30 ml 30 ml











Physical Exam


Physical Exam:





General:  Alert, Oriented X3, Cooperative, mild distress


Heart:  Regular rate, Normal S1, Normal S2


Lungs:  Clear


Abdomen:  Normal bowel sounds


Extremities:  No clubbing, No edema, Other ( Right big toe amputation site 

approx w/ sutures. Wound vac  in place on lateral aspect of R foot)


Skin:  No rashes, Other (Wound vac in place on lateral aspect of R foot. There 

is a bad odor.)





Labs


Labs:





Laboratory Tests








Test


 1/3/20


16:30 1/3/20


20:34 1/4/20


07:48 1/4/20


08:54


 


Glucose (Fingerstick)


 283 mg/dL


(70-99) 286 mg/dL


(70-99) 263 mg/dL


(70-99) 272 mg/dL


(70-99)











Review of Systems


Review of Systems:


No c/o headaches


No c/o CP





Assessment and Plan


Assessmemt and Plan


Problems


Medical Problems:


(1) CHF (congestive heart failure)


Status: Acute  





(2) Chronic kidney disease


Status: Acute  





(3) Diabetes


Status: Acute  





(4) Sepsis


Status: Acute  





(5) Severe sepsis


Status: Acute  





Assessment 


Post-op day 4 of amputation of R great toe w/extensive debridement of lateral 

aspect of R foot


Necrotizing Fasciitis of RLE


CHF


Diabetes-Type II


High Cholesterol


Heart Disease


Renal Failure





Plan


R BKA on Monday 1/6


IV fluids


IV Abx and anti-fungals


Replace Mg


Wound Care


Wound VAC


Insulin


DVT Prophylaxis


PT/OT


Labs


Home meds


Full code


Appreciate subspecialist input








Comment


Review of Relevant


I have reviewed the following items sean (where applicable) has been applied.


Medications:





Current Medications








 Medications


  (Trade)  Dose


 Ordered  Sig/Ivonne


 Route


 PRN Reason  Start Time


 Stop Time Status Last Admin


Dose Admin


 


 Sodium Chloride  500 ml @ 


 500 mls/hr  1X  ONCE


 IV


   1/3/20 11:30


 1/3/20 12:29 DC 1/3/20 11:27





 


 Linezolid


  (Zyvox)  600 mg  BID


 PO


   1/3/20 21:00


    1/4/20 09:20





 


 Lactobacillus


 Rhamnosus


  (Culturelle)  1 cap  BID


 PO


   1/3/20 21:00


    1/4/20 09:20





 


 Ondansetron HCl


  (Zofran)  4 mg  PRN Q6HRS  PRN


 IVP


 NAUSEA/VOMITING  1/3/20 20:30


    1/3/20 22:13




















PRINCESS MACARIO III DO            Jan 4, 2020 11:23

## 2020-01-04 NOTE — PDOC
Infectious Disease Note


Subjective


Subjective


Comfortable, pain controlled


Chronic diarrhea, no worse


Denies N/V/F/C/SOA





ROS


ROS


per HPI





Vital Sign


Vital Signs





Vital Signs








  Date Time  Temp Pulse Resp B/P (MAP) Pulse Ox O2 Delivery O2 Flow Rate FiO2


 


1/4/20 09:38 99.2 106 16 73/52 (59) 98 Room Air  





 99.2       











Physical Exam


PHYSICAL EXAM


GENERAL: Propped up in bed, alert, relaxed appearance, watching TV 


HEENT:  Oral mucosa moist. No thrush 


NECK:  Supple.  No JVD.


LUNGS:  Clear bilaterally.  No wheezing.


HEART:  S1, S2.


ABDOMEN:  Soft, nontender. PDC 


EXTREMITIES:  Right big toe amputation site approx w/ sutures. 


Lateral aspect wound vac in place 


DERMATOLOGIC:  Warm, dry.  No generalized rash.


CENTRAL NERVOUS SYSTEM:  Alert and oriented x 3, grossly nonfocal.


PIV





Labs


Lab





Laboratory Tests








Test


 1/3/20


16:30 1/3/20


20:34 1/4/20


07:48 1/4/20


08:54


 


Glucose (Fingerstick)


 283 mg/dL


(70-99) 286 mg/dL


(70-99) 263 mg/dL


(70-99) 272 mg/dL


(70-99)








Micro





1/2/20 Blood Culture - Preliminary, Resulted


         NO GROWTH AFTER 2 DAYS





12/30. BLOOD CULTURE  Final  





        GRAM POSITIVE COCCI IN CLUSTERS,


        SUGGESTIVE OF STAPH,


        IN 1 OF 4 BOTTLES, TWO SETS DRAWN.











Right foot








  ANAEROBIC RES 1  


                                PENDING





  AEROBIC CULT  


                                PENDING





  AEROBIC RES 1  


                                PENDING





  GRAM STAIN  Final  


        Final report





  GRAM STAIN RES 1  Final  


        Comment





        Many gram positive rods.





  GRAM STAIN RES 2  Final  


        Comment





        Many gram negative rods.





  GRAM STAIN RES 3  Final  


        Comment





        Few gram positive cocci





Objective


Assessment


Right foot deep tissue infection/osteo with chronic nonhealing right diabetic 

foot ulcers. 


   -s/p amputation of right 5th and great toe, 12/31. GPC, GNR, GPR on gram 

stain 


GPC bacteremia POA source right foot infection 


Leucocytosis - trending down


Diabetes mellitus with neuropathy poorly controlled


End-stage renal disease, on peritoneal dialysis.


Hypertension.


Pacemaker in place.


Hypotension s/p fluid bolus





Plan


Plan of Care


cont  dapto, merrem, micafungin and Zyvox (1/3)


Probiotics 


f/u cultures 


Repeat BC 1/2/2020, neg so far


Wound care per ortho


Leg not salvageable, BKA planned for Monday per ortho 


Optimal dm control


Attending Co-Sign


The patient was seen and interviewed as well as examined at the bedside. The 

chart was reviewed. The case was discussed. Agree with the plan of care.











BAN VERONICA         Jan 4, 2020 11:16


MACHO HARDWICK MD                Jan 4, 2020 16:22

## 2020-01-04 NOTE — PATHOLOGY
[facility] Accession Number: [id number]

.                                                                01

Material submitted:                                        .

toe - RIGHT GREAT TOE AMPUTATION. Modifiers: right, great

.                                                                01

Clinical history:                                          .

Right foot wound/osteomyelitis.

.                                                                02

**********************************************************************

Diagnosis:

Right great toe amputation:

- Gangrenous necrosis of distal toe with acute cellulitis and acute

osteomyelitis.

(JPM:bassem; 01/03/2020)

[facility]  01/03/2020  1448 Local

**********************************************************************

.                                                                02

Electronically signed:                                     .

Fortunato Day MD, Pathologist

NPI- [id number]

.                                                                01

Gross description:                                         .

Received in formalin labeled "Calderon Rebolledo, right great toe amputation"

is a 3.7 x 3.0 x 2.7 cm toe disarticulation specimen.  The proximal skin

and soft tissue margin is smooth and the proximal bone surface is a

concave cartilaginous covered disarticulation.  A tan-gray possible

toenail or nail bed is present measuring 1.7 x 1.2 x 0.3 cm.  Distal to

this area is an ulcerated gray-brown lesion measuring 2.1 x 1.7 x 1.2 cm.

The lesion is located 0.3 cm from the closest skin and soft tissue margin

and 2.0 cm from the disarticulation.  The margin is inked black.  Upon

sectioning, the underlying bone is grossly involved.  Representative cross

sections of the specimen are submitted after decalcification in cassettes

A1-A2. ([facility] [place]; 1/2/2020)

[facility]  01/02/2020  1635 Local

.                                                                02

Pathologist provided ICD-10:

M86.171, L03.031

.                                                                02

CPT                                                        .

984247, [id number]

Specimen Comment: A courtesy copy of this report has been sent to [phone], [phone]

Specimen Comment: [id number], [phone]

Specimen Comment: Report sent to ,DR DALY / DR CRUZ

***Performed at:  01

   [address], [place]  [id number]

   MD Nate Madrid MD Phone:  [phone]

***Performed at:  02

   [facility]

   [address], [place]  [id number]

   MD Fortunato Day MD Phone:  [phone]

## 2020-01-05 VITALS — DIASTOLIC BLOOD PRESSURE: 62 MMHG | SYSTOLIC BLOOD PRESSURE: 94 MMHG

## 2020-01-05 VITALS — DIASTOLIC BLOOD PRESSURE: 62 MMHG | SYSTOLIC BLOOD PRESSURE: 88 MMHG

## 2020-01-05 VITALS — SYSTOLIC BLOOD PRESSURE: 83 MMHG | DIASTOLIC BLOOD PRESSURE: 58 MMHG

## 2020-01-05 VITALS — SYSTOLIC BLOOD PRESSURE: 82 MMHG | DIASTOLIC BLOOD PRESSURE: 55 MMHG

## 2020-01-05 VITALS — SYSTOLIC BLOOD PRESSURE: 86 MMHG | DIASTOLIC BLOOD PRESSURE: 53 MMHG

## 2020-01-05 VITALS — DIASTOLIC BLOOD PRESSURE: 52 MMHG | SYSTOLIC BLOOD PRESSURE: 84 MMHG

## 2020-01-05 LAB
ANION GAP SERPL CALC-SCNC: 10 MMOL/L (ref 6–14)
BASOPHILS # BLD AUTO: 0.1 X10^3/UL (ref 0–0.2)
BASOPHILS NFR BLD: 0 % (ref 0–3)
BUN SERPL-MCNC: 48 MG/DL (ref 8–26)
CALCIUM SERPL-MCNC: 6.6 MG/DL (ref 8.5–10.1)
CHLORIDE SERPL-SCNC: 96 MMOL/L (ref 98–107)
CO2 SERPL-SCNC: 23 MMOL/L (ref 21–32)
CREAT SERPL-MCNC: 8.7 MG/DL (ref 0.7–1.3)
EOSINOPHIL NFR BLD: 0.2 X10^3/UL (ref 0–0.7)
EOSINOPHIL NFR BLD: 1 % (ref 0–3)
ERYTHROCYTE [DISTWIDTH] IN BLOOD BY AUTOMATED COUNT: 15.6 % (ref 11.5–14.5)
GFR SERPLBLD BASED ON 1.73 SQ M-ARVRAT: 7.8 ML/MIN
GLUCOSE SERPL-MCNC: 179 MG/DL (ref 70–99)
HCT VFR BLD CALC: 37.2 % (ref 39–53)
HGB BLD-MCNC: 11.8 G/DL (ref 13–17.5)
LYMPHOCYTES # BLD: 1.2 X10^3/UL (ref 1–4.8)
LYMPHOCYTES NFR BLD AUTO: 5 % (ref 24–48)
MCH RBC QN AUTO: 27 PG (ref 25–35)
MCHC RBC AUTO-ENTMCNC: 32 G/DL (ref 31–37)
MCV RBC AUTO: 84 FL (ref 79–100)
MONO #: 1.8 X10^3/UL (ref 0–1.1)
MONOCYTES NFR BLD: 7 % (ref 0–9)
NEUT #: 21.9 X10^3/UL (ref 1.8–7.7)
NEUTROPHILS NFR BLD AUTO: 87 % (ref 31–73)
PLATELET # BLD AUTO: 435 X10^3/UL (ref 140–400)
POTASSIUM SERPL-SCNC: 4.1 MMOL/L (ref 3.5–5.1)
RBC # BLD AUTO: 4.41 X10^6/UL (ref 4.3–5.7)
SODIUM SERPL-SCNC: 129 MMOL/L (ref 136–145)
WBC # BLD AUTO: 25.2 X10^3/UL (ref 4–11)

## 2020-01-05 RX ADMIN — Medication SCH EA: at 10:14

## 2020-01-05 RX ADMIN — LEVOTHYROXINE SODIUM SCH MCG: 25 TABLET ORAL at 05:35

## 2020-01-05 RX ADMIN — BACITRACIN SCH MLS/HR: 5000 INJECTION, POWDER, FOR SOLUTION INTRAMUSCULAR at 15:25

## 2020-01-05 RX ADMIN — Medication SCH CAP: at 10:08

## 2020-01-05 RX ADMIN — Medication SCH EA: at 10:12

## 2020-01-05 RX ADMIN — MEROPENEM SCH MLS/HR: 500 INJECTION, POWDER, FOR SOLUTION INTRAVENOUS at 10:10

## 2020-01-05 RX ADMIN — INSULIN LISPRO SCH UNITS: 100 INJECTION, SOLUTION INTRAVENOUS; SUBCUTANEOUS at 17:00

## 2020-01-05 RX ADMIN — SACUBITRIL AND VALSARTAN SCH TAB: 24; 26 TABLET, FILM COATED ORAL at 21:00

## 2020-01-05 RX ADMIN — MEROPENEM SCH MLS/HR: 500 INJECTION, POWDER, FOR SOLUTION INTRAVENOUS at 20:53

## 2020-01-05 RX ADMIN — LINEZOLID SCH MG: 600 TABLET, FILM COATED ORAL at 10:12

## 2020-01-05 RX ADMIN — INSULIN LISPRO SCH UNITS: 100 INJECTION, SOLUTION INTRAVENOUS; SUBCUTANEOUS at 08:00

## 2020-01-05 RX ADMIN — ASPIRIN 81 MG SCH MG: 81 TABLET ORAL at 10:08

## 2020-01-05 RX ADMIN — Medication SCH EA: at 08:00

## 2020-01-05 RX ADMIN — PROMETHAZINE HYDROCHLORIDE PRN MG: 12.5 TABLET ORAL at 10:16

## 2020-01-05 RX ADMIN — POTASSIUM CHLORIDE SCH MEQ: 1500 TABLET, EXTENDED RELEASE ORAL at 10:09

## 2020-01-05 RX ADMIN — BACITRACIN SCH MLS/HR: 5000 INJECTION, POWDER, FOR SOLUTION INTRAMUSCULAR at 02:05

## 2020-01-05 RX ADMIN — ONDANSETRON PRN MG: 2 INJECTION INTRAMUSCULAR; INTRAVENOUS at 05:34

## 2020-01-05 RX ADMIN — LINEZOLID SCH MG: 600 TABLET, FILM COATED ORAL at 20:53

## 2020-01-05 RX ADMIN — INSULIN LISPRO SCH UNITS: 100 INJECTION, SOLUTION INTRAVENOUS; SUBCUTANEOUS at 12:00

## 2020-01-05 RX ADMIN — ATORVASTATIN CALCIUM SCH MG: 10 TABLET, FILM COATED ORAL at 20:53

## 2020-01-05 RX ADMIN — Medication SCH EA: at 09:00

## 2020-01-05 RX ADMIN — DAPTOMYCIN SCH MLS/HR: 500 INJECTION, POWDER, LYOPHILIZED, FOR SOLUTION INTRAVENOUS at 20:53

## 2020-01-05 RX ADMIN — BACITRACIN SCH MLS/HR: 5000 INJECTION, POWDER, FOR SOLUTION INTRAMUSCULAR at 10:11

## 2020-01-05 RX ADMIN — PANTOPRAZOLE SODIUM SCH MG: 40 TABLET, DELAYED RELEASE ORAL at 10:08

## 2020-01-05 RX ADMIN — POTASSIUM CHLORIDE SCH MEQ: 1500 TABLET, EXTENDED RELEASE ORAL at 18:24

## 2020-01-05 RX ADMIN — DEXTROSE SCH MLS/HR: 50 INJECTION, SOLUTION INTRAVENOUS at 15:38

## 2020-01-05 RX ADMIN — Medication SCH EA: at 12:07

## 2020-01-05 RX ADMIN — SACUBITRIL AND VALSARTAN SCH TAB: 24; 26 TABLET, FILM COATED ORAL at 09:00

## 2020-01-05 RX ADMIN — PROMETHAZINE HYDROCHLORIDE PRN MG: 12.5 TABLET ORAL at 18:23

## 2020-01-05 RX ADMIN — Medication SCH EA: at 18:24

## 2020-01-05 RX ADMIN — BACITRACIN SCH MLS/HR: 5000 INJECTION, POWDER, FOR SOLUTION INTRAMUSCULAR at 22:05

## 2020-01-05 RX ADMIN — Medication SCH CAP: at 20:53

## 2020-01-05 NOTE — PDOC
Dialysis Progress Note


Dialysis Note


Dialysis Note


Follow-up for ESRD on peritoneal dialysis





Patient underwent CCPD overnight. Tolerated well. He he claims he did not like 

how he felt when he had hypoglycemia (sugar documented was 68) when he had 

insulin in his bag








 


General Appearance:          


Awake:          


Alert Oriented  x      3


Neck:    No JVD or JVP


Chest:    CTA Kelvin


Heart:    S1 S2


Abdomen -    Soft NTND


Extremities -    No Edema











ESRD:   CCP Dialysis as per home dialysis regimen. Patient does not feel that he

has excess fluid at this time does not want to change current regimen. Will 

discontinue insulin from peritoneal dialysis fluid and defer diabetes management

to primary team





Vitals


Vital Signs





Vital Signs








  Date Time  Temp Pulse Resp B/P (MAP) Pulse Ox O2 Delivery O2 Flow Rate FiO2


 


1/5/20 09:00  62  84/52    


 


1/5/20 08:00      Room Air  


 


1/5/20 07:45 98.8  16  97   





 98.8       











Labs


Last Labs





Laboratory Tests








Test


 1/3/20


16:30 1/3/20


20:34 1/4/20


07:48 1/4/20


08:54


 


Glucose (Fingerstick)


 283 mg/dL


(70-99) 286 mg/dL


(70-99) 263 mg/dL


(70-99) 272 mg/dL


(70-99)


 


Test


 1/4/20


11:00 1/4/20


11:49 1/4/20


16:55 1/4/20


21:08


 


White Blood Count


 21.3 x10^3/uL


(4.0-11.0) 


 


 





 


Red Blood Count


 4.04 x10^6/uL


(4.30-5.70) 


 


 





 


Hemoglobin


 10.8 g/dL


(13.0-17.5) 


 


 





 


Hematocrit


 33.6 %


(39.0-53.0) 


 


 





 


Mean Corpuscular Volume 83 fL ()    


 


Mean Corpuscular Hemoglobin 27 pg (25-35)    


 


Mean Corpuscular Hemoglobin


Concent 32 g/dL


(31-37) 


 


 





 


Red Cell Distribution Width


 15.3 %


(11.5-14.5) 


 


 





 


Platelet Count


 383 x10^3/uL


(140-400) 


 


 





 


Neutrophils (%) (Auto) 89 % (31-73)    


 


Lymphocytes (%) (Auto) 4 % (24-48)    


 


Monocytes (%) (Auto) 6 % (0-9)    


 


Eosinophils (%) (Auto) 1 % (0-3)    


 


Basophils (%) (Auto) 1 % (0-3)    


 


Neutrophils # (Auto)


 18.9 x10^3/uL


(1.8-7.7) 


 


 





 


Lymphocytes # (Auto)


 0.7 x10^3/uL


(1.0-4.8) 


 


 





 


Monocytes # (Auto)


 1.4 x10^3/uL


(0.0-1.1) 


 


 





 


Eosinophils # (Auto)


 0.2 x10^3/uL


(0.0-0.7) 


 


 





 


Basophils # (Auto)


 0.1 x10^3/uL


(0.0-0.2) 


 


 





 


Sodium Level


 129 mmol/L


(136-145) 


 


 





 


Potassium Level


 3.7 mmol/L


(3.5-5.1) 


 


 





 


Chloride Level


 96 mmol/L


() 


 


 





 


Carbon Dioxide Level


 24 mmol/L


(21-32) 


 


 





 


Anion Gap 9 (6-14)    


 


Blood Urea Nitrogen


 46 mg/dL


(8-26) 


 


 





 


Creatinine


 8.7 mg/dL


(0.7-1.3) 


 


 





 


Estimated GFR


(Cockcroft-Gault) 7.8 


 


 


 





 


Glucose Level


 253 mg/dL


(70-99) 


 


 





 


Calcium Level


 6.3 mg/dL


(8.5-10.1) 


 


 





 


Glucose (Fingerstick)


 


 209 mg/dL


(70-99) 144 mg/dL


(70-99) 134 mg/dL


(70-99)


 


Test


 1/4/20


22:25 1/4/20


23:01 1/5/20


00:07 1/5/20


00:59


 


Glucose (Fingerstick)


 103 mg/dL


(70-99) 93 mg/dL


(70-99) 75 mg/dL


(70-99) 68 mg/dL


(70-99)


 


Test


 1/5/20


01:29 1/5/20


03:19 1/5/20


07:33 





 


Glucose (Fingerstick)


 111 mg/dL


(70-99) 114 mg/dL


(70-99) 153 mg/dL


(70-99) 











Laboratory Tests








Test


 1/4/20


11:00 1/4/20


11:49 1/4/20


16:55 1/4/20


21:08


 


White Blood Count


 21.3 x10^3/uL


(4.0-11.0) 


 


 





 


Red Blood Count


 4.04 x10^6/uL


(4.30-5.70) 


 


 





 


Hemoglobin


 10.8 g/dL


(13.0-17.5) 


 


 





 


Hematocrit


 33.6 %


(39.0-53.0) 


 


 





 


Mean Corpuscular Volume 83 fL ()    


 


Mean Corpuscular Hemoglobin 27 pg (25-35)    


 


Mean Corpuscular Hemoglobin


Concent 32 g/dL


(31-37) 


 


 





 


Red Cell Distribution Width


 15.3 %


(11.5-14.5) 


 


 





 


Platelet Count


 383 x10^3/uL


(140-400) 


 


 





 


Neutrophils (%) (Auto) 89 % (31-73)    


 


Lymphocytes (%) (Auto) 4 % (24-48)    


 


Monocytes (%) (Auto) 6 % (0-9)    


 


Eosinophils (%) (Auto) 1 % (0-3)    


 


Basophils (%) (Auto) 1 % (0-3)    


 


Neutrophils # (Auto)


 18.9 x10^3/uL


(1.8-7.7) 


 


 





 


Lymphocytes # (Auto)


 0.7 x10^3/uL


(1.0-4.8) 


 


 





 


Monocytes # (Auto)


 1.4 x10^3/uL


(0.0-1.1) 


 


 





 


Eosinophils # (Auto)


 0.2 x10^3/uL


(0.0-0.7) 


 


 





 


Basophils # (Auto)


 0.1 x10^3/uL


(0.0-0.2) 


 


 





 


Sodium Level


 129 mmol/L


(136-145) 


 


 





 


Potassium Level


 3.7 mmol/L


(3.5-5.1) 


 


 





 


Chloride Level


 96 mmol/L


() 


 


 





 


Carbon Dioxide Level


 24 mmol/L


(21-32) 


 


 





 


Anion Gap 9 (6-14)    


 


Blood Urea Nitrogen


 46 mg/dL


(8-26) 


 


 





 


Creatinine


 8.7 mg/dL


(0.7-1.3) 


 


 





 


Estimated GFR


(Cockcroft-Gault) 7.8 


 


 


 





 


Glucose Level


 253 mg/dL


(70-99) 


 


 





 


Calcium Level


 6.3 mg/dL


(8.5-10.1) 


 


 





 


Glucose (Fingerstick)


 


 209 mg/dL


(70-99) 144 mg/dL


(70-99) 134 mg/dL


(70-99)


 


Test


 1/4/20


22:25 1/4/20


23:01 1/5/20


00:07 1/5/20


00:59


 


Glucose (Fingerstick)


 103 mg/dL


(70-99) 93 mg/dL


(70-99) 75 mg/dL


(70-99) 68 mg/dL


(70-99)


 


Test


 1/5/20


01:29 1/5/20


03:19 1/5/20


07:33 





 


Glucose (Fingerstick)


 111 mg/dL


(70-99) 114 mg/dL


(70-99) 153 mg/dL


(70-99) 














Assessment


Assessment


Problems


Medical Problems:


(1) CHF (congestive heart failure)


Status: Acute  





(2) Chronic kidney disease


Status: Acute  





(3) Diabetes


Status: Acute  





(4) Sepsis


Status: Acute  





(5) Severe sepsis


Status: Acute  











Plan


Plan of Care


Problems


Medical Problems:


(1) CHF (congestive heart failure)


Status: Acute  





(2) Chronic kidney disease


Status: Acute  





(3) Diabetes


Status: Acute  





(4) Sepsis


Status: Acute  





(5) Severe sepsis


Status: Acute  














AME HARDWICK MD                Jan 5, 2020 10:47

## 2020-01-05 NOTE — PDOC
[facility] [facility] PROGRESS NOTE


Chief Complaint


Chief Complaint


Post-op day 5 of amputation of R great toe w/extensive debridement of lateral 

aspect of R foot


Improving sepsis


Necrotizing Fasciitis of RLE


CHF


Diabetes-Type II


High Cholesterol


Heart Disease


Renal Failure





History of Present Illness


History of Present Illness


1/5/20


Pt seen and examined


Pt states he feels well and is ready for surgery tomorrow


Pt's vitals have improved


DW RN


Reviewed pt's chart





1/4/20


Pt seen and examined


DW pt about upcoming R BKA


Pt states he feels fine 


Pt doesn't appear septic despite low BP and tachycardia


DW RN


Reviewed pt's chart





[id number]


Patient seen and examined


He now has a wound VAC on his right foot


Discussed with RN chart reviewed





1/2/20


Pt seen and examined


Pt reported being lethargic today


DW pt about care


Reviewed pt's chart





1/1/20


Pt seen and examined


DW pt


SOUTH RN


Reviewed pt's chart





12/31/19


Pt seen and examined


Pt was sitting up in bed, eating breakfast in NAD


DW pt


SOUTH RN


Reviewed pt's chart





Vitals/I&O


Vitals/I&O:





                                   Vital Signs








  Date Time  Temp Pulse Resp B/P (MAP) Pulse Ox O2 Delivery O2 Flow Rate FiO2


 


1/5/20 09:00  62  84/52    


 


1/5/20 07:45 98.8  16  97 Room Air  





 98.8       














                                    I & O   


 


 1/4/20 1/4/20 1/5/20





 15:00 23:00 07:00


 


Intake Total 250 ml 500 ml 400 ml


 


Balance 250 ml 500 ml 400 ml











Physical Exam


Physical Exam:





General:  Alert, Oriented X3, Cooperative, mild distress


Heart:  Regular rate, Normal S1, Normal S2


Lungs:  Clear


Abdomen:  Normal bowel sounds


Extremities:  No clubbing, No edema, Other ( Right big toe amputation site 

approx w/ sutures. Wound vac  in place on lateral aspect of R foot)


Skin:  No rashes, Other (Wound vac in place on lateral aspect of R foot. There 

is a bad odor.)





Labs


Labs:





Laboratory Tests








Test


 1/4/20


11:00 1/4/20


11:49 1/4/20


16:55 1/4/20


21:08


 


White Blood Count


 21.3 x10^3/uL


(4.0-11.0) 


 


 





 


Red Blood Count


 4.04 x10^6/uL


(4.30-5.70) 


 


 





 


Hemoglobin


 10.8 g/dL


(13.0-17.5) 


 


 





 


Hematocrit


 33.6 %


(39.0-53.0) 


 


 





 


Mean Corpuscular Volume 83 fL ()    


 


Mean Corpuscular Hemoglobin 27 pg (25-35)    


 


Mean Corpuscular Hemoglobin


Concent 32 g/dL


(31-37) 


 


 





 


Red Cell Distribution Width


 15.3 %


(11.5-14.5) 


 


 





 


Platelet Count


 383 x10^3/uL


(140-400) 


 


 





 


Neutrophils (%) (Auto) 89 % (31-73)    


 


Lymphocytes (%) (Auto) 4 % (24-48)    


 


Monocytes (%) (Auto) 6 % (0-9)    


 


Eosinophils (%) (Auto) 1 % (0-3)    


 


Basophils (%) (Auto) 1 % (0-3)    


 


Neutrophils # (Auto)


 18.9 x10^3/uL


(1.8-7.7) 


 


 





 


Lymphocytes # (Auto)


 0.7 x10^3/uL


(1.0-4.8) 


 


 





 


Monocytes # (Auto)


 1.4 x10^3/uL


(0.0-1.1) 


 


 





 


Eosinophils # (Auto)


 0.2 x10^3/uL


(0.0-0.7) 


 


 





 


Basophils # (Auto)


 0.1 x10^3/uL


(0.0-0.2) 


 


 





 


Sodium Level


 129 mmol/L


(136-145) 


 


 





 


Potassium Level


 3.7 mmol/L


(3.5-5.1) 


 


 





 


Chloride Level


 96 mmol/L


() 


 


 





 


Carbon Dioxide Level


 24 mmol/L


(21-32) 


 


 





 


Anion Gap 9 (6-14)    


 


Blood Urea Nitrogen


 46 mg/dL


(8-26) 


 


 





 


Creatinine


 8.7 mg/dL


(0.7-1.3) 


 


 





 


Estimated GFR


(Cockcroft-Gault) 7.8 


 


 


 





 


Glucose Level


 253 mg/dL


(70-99) 


 


 





 


Calcium Level


 6.3 mg/dL


(8.5-10.1) 


 


 





 


Glucose (Fingerstick)


 


 209 mg/dL


(70-99) 144 mg/dL


(70-99) 134 mg/dL


(70-99)


 


Test


 1/4/20


22:25 1/4/20


23:01 1/5/20


00:07 1/5/20


00:59


 


Glucose (Fingerstick)


 103 mg/dL


(70-99) 93 mg/dL


(70-99) 75 mg/dL


(70-99) 68 mg/dL


(70-99)


 


Test


 1/5/20


01:29 1/5/20


03:19 1/5/20


07:33 





 


Glucose (Fingerstick)


 111 mg/dL


(70-99) 114 mg/dL


(70-99) 153 mg/dL


(70-99) 














Review of Systems


Review of Systems:


No c/o dizziness


No c/o N/V/D





Assessment and Plan


Assessmemt and Plan


Problems


Medical Problems:


(1) CHF (congestive heart failure)


Status: Acute  





(2) Chronic kidney disease


Status: Acute  





(3) Diabetes


Status: Acute  





(4) Sepsis


Status: Acute  





(5) Severe sepsis


Status: Acute  





Assessment 


Post-op day 5 of amputation of R great toe w/extensive debridement of lateral 

aspect of R foot


Improving Sepsis


Necrotizing Fasciitis of RLE


CHF


Diabetes-Type II


High Cholesterol


Heart Disease


Renal Failure





Plan


R BKA on Monday 1/6


IV fluids


IV Abx and anti-fungals


Replace Mg


Wound Care


Wound VAC


Insulin


DVT Prophylaxis


PT/OT


Labs


Home meds


Full code


Appreciate subspecialist input








Comment


Review of Relevant


I have reviewed the following items sean (where applicable) has been applied.


Medications:





Current Medications








 Medications


  (Trade)  Dose


 Ordered  Sig/Ivonne


 Route


 PRN Reason  Start Time


 Stop Time Status Last Admin


Dose Admin


 


 Insulin Human


 Lispro


  (HumaLOG)  0-7 UNITS  TIDWMEALS


 SQ


   1/4/20 12:00


    1/4/20 12:35





 


 Sodium Chloride  1,000 ml @ 


 150 mls/hr  [id number]


 IV


   1/4/20 12:45


    1/5/20 10:11





 


 Sodium Chloride  500 ml @ 


 500 mls/hr  1X  ONCE


 IV


   1/4/20 12:45


 1/4/20 13:44 DC 1/4/20 10:00




















PRINCESS MACARIO III DO            Jan 5, 2020 10:38

## 2020-01-05 NOTE — PDOC
Infectious Disease Note


Subjective


Subjective


Comfortable, pain controlled


Chronic diarrhea, same


Denies N/V/F/C/SOA /aches





ROS


ROS


per HPI





Vital Sign


Vital Signs





Vital Signs








  Date Time  Temp Pulse Resp B/P (MAP) Pulse Ox O2 Delivery O2 Flow Rate FiO2


 


1/5/20 11:55 99.3 72 18 94/62 (73) 93 Room Air  





 99.3       











Physical Exam


PHYSICAL EXAM


GENERAL: Propped up in bed, alert, relaxed appearance, watching TV 


HEENT:  Oral mucosa moist. No thrush 


NECK:  Supple.  No JVD.


LUNGS:  Clear bilaterally.  No wheezing.


HEART:  S1, S2.


ABDOMEN:  Soft, nontender. PDC 


EXTREMITIES:  Right big toe amputation site approx w/ sutures. 


Lateral aspect wound vac in place 


DERMATOLOGIC:  Warm, dry.  No generalized rash.


CENTRAL NERVOUS SYSTEM:  Alert and oriented x 3, grossly nonfocal.


PIV





Labs


Lab





Laboratory Tests








Test


 1/4/20


16:55 1/4/20


21:08 1/4/20


22:25 1/4/20


23:01


 


Glucose (Fingerstick)


 144 mg/dL


(70-99) 134 mg/dL


(70-99) 103 mg/dL


(70-99) 93 mg/dL


(70-99)


 


Test


 1/5/20


00:07 1/5/20


00:59 1/5/20


01:29 1/5/20


03:19


 


Glucose (Fingerstick)


 75 mg/dL


(70-99) 68 mg/dL


(70-99) 111 mg/dL


(70-99) 114 mg/dL


(70-99)


 


Test


 1/5/20


07:33 1/5/20


12:05 


 





 


Glucose (Fingerstick)


 153 mg/dL


(70-99) 135 mg/dL


(70-99) 


 











Micro





1/2/20 Blood Culture - Preliminary, Resulted


         NO GROWTH AFTER 2 DAYS





12/30. BLOOD CULTURE  Final  





        GRAM POSITIVE COCCI IN CLUSTERS,


        SUGGESTIVE OF STAPH,


        IN 1 OF 4 BOTTLES, TWO SETS DRAWN.











Right foot





  ANAEROBIC RES 1  


             PENDING





  AEROBIC CULT  Final  


        Preliminary report


        Final report





  AEROBIC RES 1  Final  


        Comment





        No growth in 36 - 48 hours.


        No growth in 56 - 72 hours.





 





  GRAM STAIN  Final  


        Final report





  GRAM STAIN RES 1  Final  


        Comment





        Many gram positive rods.





  GRAM STAIN RES 2  Final  


        Comment





        Many gram negative rods.





  GRAM STAIN RES 3  Final  


        Comment





        Few gram positive cocci





Objective


Assessment


Right foot deep tissue infection/osteo with chronic nonhealing right diabetic 

foot ulcers. 


   -s/p amputation of right 5th and great toe, 12/31. GPC, GNR, GPR on gram 

stain. culture no growth so far


GPC bacteremia POA source right foot infection 


Leucocytosis - trending down


Diabetes mellitus with neuropathy poorly controlled


End-stage renal disease, on peritoneal dialysis.


Hypertension.


Pacemaker in place.


Hypotension s/p fluid bolus





Plan


Plan of Care


cont  dapto, merrem, micafungin and Zyvox (1/3)


Probiotics 


f/u cultures 


Repeat BC 1/2/2020, neg so far


Wound care per ortho


Leg not salvageable, BKA planned for Monday per ortho 


Optimal dm control


Attending Co-Sign


The patient was seen and interviewed as well as examined at the bedside. The 

chart was reviewed. The case was discussed. Agree with the plan of care.











BAN VERONICA         Jan 5, 2020 13:58


MACHO HARDWICK MD                Jan 5, 2020 14:20

## 2020-01-06 VITALS — DIASTOLIC BLOOD PRESSURE: 40 MMHG | SYSTOLIC BLOOD PRESSURE: 80 MMHG

## 2020-01-06 VITALS — DIASTOLIC BLOOD PRESSURE: 50 MMHG | SYSTOLIC BLOOD PRESSURE: 95 MMHG

## 2020-01-06 VITALS — SYSTOLIC BLOOD PRESSURE: 82 MMHG | DIASTOLIC BLOOD PRESSURE: 44 MMHG

## 2020-01-06 VITALS — DIASTOLIC BLOOD PRESSURE: 59 MMHG | SYSTOLIC BLOOD PRESSURE: 87 MMHG

## 2020-01-06 VITALS — SYSTOLIC BLOOD PRESSURE: 76 MMHG | DIASTOLIC BLOOD PRESSURE: 35 MMHG

## 2020-01-06 VITALS — DIASTOLIC BLOOD PRESSURE: 71 MMHG | SYSTOLIC BLOOD PRESSURE: 106 MMHG

## 2020-01-06 VITALS — DIASTOLIC BLOOD PRESSURE: 48 MMHG | SYSTOLIC BLOOD PRESSURE: 80 MMHG

## 2020-01-06 VITALS — DIASTOLIC BLOOD PRESSURE: 49 MMHG | SYSTOLIC BLOOD PRESSURE: 83 MMHG

## 2020-01-06 VITALS — DIASTOLIC BLOOD PRESSURE: 54 MMHG | SYSTOLIC BLOOD PRESSURE: 112 MMHG

## 2020-01-06 VITALS — DIASTOLIC BLOOD PRESSURE: 57 MMHG | SYSTOLIC BLOOD PRESSURE: 88 MMHG

## 2020-01-06 LAB
ANION GAP SERPL CALC-SCNC: 10 MMOL/L (ref 6–14)
BASOPHILS # BLD AUTO: 0.1 X10^3/UL (ref 0–0.2)
BASOPHILS NFR BLD: 1 % (ref 0–3)
BUN SERPL-MCNC: 45 MG/DL (ref 8–26)
CALCIUM SERPL-MCNC: 6.6 MG/DL (ref 8.5–10.1)
CHLORIDE SERPL-SCNC: 97 MMOL/L (ref 98–107)
CO2 SERPL-SCNC: 23 MMOL/L (ref 21–32)
CREAT SERPL-MCNC: 8.6 MG/DL (ref 0.7–1.3)
EOSINOPHIL NFR BLD: 0.2 X10^3/UL (ref 0–0.7)
EOSINOPHIL NFR BLD: 1 % (ref 0–3)
ERYTHROCYTE [DISTWIDTH] IN BLOOD BY AUTOMATED COUNT: 15.3 % (ref 11.5–14.5)
ERYTHROCYTE [DISTWIDTH] IN BLOOD BY AUTOMATED COUNT: 15.6 % (ref 11.5–14.5)
GFR SERPLBLD BASED ON 1.73 SQ M-ARVRAT: 7.9 ML/MIN
GLUCOSE SERPL-MCNC: 247 MG/DL (ref 70–99)
HCT VFR BLD CALC: 23.8 % (ref 39–53)
HCT VFR BLD CALC: 37 % (ref 39–53)
HGB BLD-MCNC: 11.6 G/DL (ref 13–17.5)
HGB BLD-MCNC: 7.7 G/DL (ref 13–17.5)
LYMPHOCYTES # BLD: 0.8 X10^3/UL (ref 1–4.8)
LYMPHOCYTES NFR BLD AUTO: 4 % (ref 24–48)
MCH RBC QN AUTO: 26 PG (ref 25–35)
MCH RBC QN AUTO: 27 PG (ref 25–35)
MCHC RBC AUTO-ENTMCNC: 31 G/DL (ref 31–37)
MCHC RBC AUTO-ENTMCNC: 32 G/DL (ref 31–37)
MCV RBC AUTO: 83 FL (ref 79–100)
MCV RBC AUTO: 84 FL (ref 79–100)
MONO #: 1.2 X10^3/UL (ref 0–1.1)
MONOCYTES NFR BLD: 6 % (ref 0–9)
NEUT #: 18.2 X10^3/UL (ref 1.8–7.7)
NEUTROPHILS NFR BLD AUTO: 89 % (ref 31–73)
PLATELET # BLD AUTO: 375 X10^3/UL (ref 140–400)
PLATELET # BLD AUTO: 458 X10^3/UL (ref 140–400)
POTASSIUM SERPL-SCNC: 4.2 MMOL/L (ref 3.5–5.1)
RBC # BLD AUTO: 2.87 X10^6/UL (ref 4.3–5.7)
RBC # BLD AUTO: 4.4 X10^6/UL (ref 4.3–5.7)
SODIUM SERPL-SCNC: 130 MMOL/L (ref 136–145)
WBC # BLD AUTO: 20.6 X10^3/UL (ref 4–11)
WBC # BLD AUTO: 23.2 X10^3/UL (ref 4–11)

## 2020-01-06 PROCEDURE — 0Y6H0Z1 DETACHMENT AT RIGHT LOWER LEG, HIGH, OPEN APPROACH: ICD-10-PCS | Performed by: ORTHOPAEDIC SURGERY

## 2020-01-06 RX ADMIN — INSULIN LISPRO SCH UNITS: 100 INJECTION, SOLUTION INTRAVENOUS; SUBCUTANEOUS at 08:00

## 2020-01-06 RX ADMIN — Medication SCH EA: at 09:00

## 2020-01-06 RX ADMIN — POTASSIUM CHLORIDE SCH MEQ: 1500 TABLET, EXTENDED RELEASE ORAL at 08:00

## 2020-01-06 RX ADMIN — BACITRACIN SCH MLS/HR: 5000 INJECTION, POWDER, FOR SOLUTION INTRAMUSCULAR at 15:59

## 2020-01-06 RX ADMIN — Medication SCH CAP: at 20:53

## 2020-01-06 RX ADMIN — ONDANSETRON PRN MG: 2 INJECTION INTRAMUSCULAR; INTRAVENOUS at 00:33

## 2020-01-06 RX ADMIN — INSULIN LISPRO PRN UNIT: 100 INJECTION, SOLUTION INTRAVENOUS; SUBCUTANEOUS at 15:24

## 2020-01-06 RX ADMIN — SACUBITRIL AND VALSARTAN SCH TAB: 24; 26 TABLET, FILM COATED ORAL at 09:00

## 2020-01-06 RX ADMIN — INSULIN LISPRO SCH UNITS: 100 INJECTION, SOLUTION INTRAVENOUS; SUBCUTANEOUS at 12:00

## 2020-01-06 RX ADMIN — BACITRACIN SCH MLS/HR: 5000 INJECTION, POWDER, FOR SOLUTION INTRAMUSCULAR at 18:05

## 2020-01-06 RX ADMIN — LINEZOLID SCH MG: 600 TABLET, FILM COATED ORAL at 09:00

## 2020-01-06 RX ADMIN — MEROPENEM SCH MLS/HR: 500 INJECTION, POWDER, FOR SOLUTION INTRAVENOUS at 09:58

## 2020-01-06 RX ADMIN — SACUBITRIL AND VALSARTAN SCH TAB: 24; 26 TABLET, FILM COATED ORAL at 21:00

## 2020-01-06 RX ADMIN — LINEZOLID SCH MG: 600 TABLET, FILM COATED ORAL at 20:55

## 2020-01-06 RX ADMIN — PANTOPRAZOLE SODIUM SCH MG: 40 TABLET, DELAYED RELEASE ORAL at 07:30

## 2020-01-06 RX ADMIN — Medication SCH EA: at 12:00

## 2020-01-06 RX ADMIN — Medication SCH CAP: at 09:00

## 2020-01-06 RX ADMIN — DEXTROSE SCH MLS/HR: 50 INJECTION, SOLUTION INTRAVENOUS at 18:19

## 2020-01-06 RX ADMIN — MEROPENEM SCH MLS/HR: 500 INJECTION, POWDER, FOR SOLUTION INTRAVENOUS at 20:53

## 2020-01-06 RX ADMIN — ASPIRIN 81 MG SCH MG: 81 TABLET ORAL at 08:00

## 2020-01-06 RX ADMIN — INSULIN LISPRO SCH UNITS: 100 INJECTION, SOLUTION INTRAVENOUS; SUBCUTANEOUS at 17:00

## 2020-01-06 RX ADMIN — INSULIN LISPRO PRN UNIT: 100 INJECTION, SOLUTION INTRAVENOUS; SUBCUTANEOUS at 17:15

## 2020-01-06 RX ADMIN — BACITRACIN SCH MLS/HR: 5000 INJECTION, POWDER, FOR SOLUTION INTRAMUSCULAR at 04:45

## 2020-01-06 RX ADMIN — Medication SCH EA: at 17:00

## 2020-01-06 RX ADMIN — LEVOTHYROXINE SODIUM SCH MCG: 25 TABLET ORAL at 06:00

## 2020-01-06 RX ADMIN — ATORVASTATIN CALCIUM SCH MG: 10 TABLET, FILM COATED ORAL at 20:55

## 2020-01-06 RX ADMIN — Medication SCH EA: at 08:00

## 2020-01-06 RX ADMIN — POTASSIUM CHLORIDE SCH MEQ: 1500 TABLET, EXTENDED RELEASE ORAL at 17:00

## 2020-01-06 NOTE — PDOC
[facility] PROGRESS NOTE


Chief Complaint


Chief Complaint


Post-op day 6 of amputation of R great toe w/extensive debridement of lateral 

aspect of R foot


Improving sepsis


Necrotizing Fasciitis of RLE


CHF


Diabetes-Type II


High Cholesterol


Heart Disease


Renal Failure





History of Present Illness


History of Present Illness


1-6-20


Pt seen and examined


DW RN


Hes going for R BKA today





1/5/20


Pt seen and examined


Pt states he feels well and is ready for surgery tomorrow


Pt's vitals have improved


SOUTH RN


Reviewed pt's chart





1/4/20


Pt seen and examined


DW pt about upcoming R BKA


Pt states he feels fine 


Pt doesn't appear septic despite low BP and tachycardia


DW RN


Reviewed pt's chart





[id number]


Patient seen and examined


He now has a wound VAC on his right foot


Discussed with RN chart reviewed





1/2/20


Pt seen and examined


Pt reported being lethargic today


DW pt about care


Reviewed pt's chart





1/1/20


Pt seen and examined


DW pt


SOUTH RN


Reviewed pt's chart





12/31/19


Pt seen and examined


Pt was sitting up in bed, eating breakfast in NAD


DW pt


SOUTH RN


Reviewed pt's chart





Vitals/I&O


Vitals/I&O:





                                   Vital Signs








  Date Time  Temp Pulse Resp B/P (MAP) Pulse Ox O2 Delivery O2 Flow Rate FiO2


 


1/6/20 11:27 97.8 103 16 93/63 97 Room Air  





 97.8       














                                    I & O   


 


 1/5/20 1/5/20 1/6/20





 15:00 23:00 07:00


 


Intake Total 0 ml 200 ml 310 ml


 


Balance 0 ml 200 ml 310 ml











Physical Exam


General:  Alert, Oriented X3, Cooperative, mild distress


Heart:  Regular rate, Normal S1, Normal S2


Lungs:  Clear


Abdomen:  Normal bowel sounds


Extremities:  No clubbing, No edema, Other ( Right big toe amputation site 

approx w/ sutures. Wound vac  in place on lateral aspect of R foot)


Skin:  No rashes, Other (Wound vac in place on lateral aspect of R foot. There 

is a bad odor.)





Labs


Labs:





Laboratory Tests








Test


 1/5/20


12:05 1/5/20


16:49 1/5/20


19:10 1/5/20


20:33


 


Glucose (Fingerstick)


 135 mg/dL


(70-99) 133 mg/dL


(70-99) 


 174 mg/dL


(70-99)


 


White Blood Count


 


 


 25.2 x10^3/uL


(4.0-11.0) 





 


Red Blood Count


 


 


 4.41 x10^6/uL


(4.30-5.70) 





 


Hemoglobin


 


 


 11.8 g/dL


(13.0-17.5) 





 


Hematocrit


 


 


 37.2 %


(39.0-53.0) 





 


Mean Corpuscular Volume   84 fL ()  


 


Mean Corpuscular Hemoglobin   27 pg (25-35)  


 


Mean Corpuscular Hemoglobin


Concent 


 


 32 g/dL


(31-37) 





 


Red Cell Distribution Width


 


 


 15.6 %


(11.5-14.5) 





 


Platelet Count


 


 


 435 x10^3/uL


(140-400) 





 


Neutrophils (%) (Auto)   87 % (31-73)  


 


Lymphocytes (%) (Auto)   5 % (24-48)  


 


Monocytes (%) (Auto)   7 % (0-9)  


 


Eosinophils (%) (Auto)   1 % (0-3)  


 


Basophils (%) (Auto)   0 % (0-3)  


 


Neutrophils # (Auto)


 


 


 21.9 x10^3/uL


(1.8-7.7) 





 


Lymphocytes # (Auto)


 


 


 1.2 x10^3/uL


(1.0-4.8) 





 


Monocytes # (Auto)


 


 


 1.8 x10^3/uL


(0.0-1.1) 





 


Eosinophils # (Auto)


 


 


 0.2 x10^3/uL


(0.0-0.7) 





 


Basophils # (Auto)


 


 


 0.1 x10^3/uL


(0.0-0.2) 





 


Sodium Level


 


 


 129 mmol/L


(136-145) 





 


Potassium Level


 


 


 4.1 mmol/L


(3.5-5.1) 





 


Chloride Level


 


 


 96 mmol/L


() 





 


Carbon Dioxide Level


 


 


 23 mmol/L


(21-32) 





 


Anion Gap   10 (6-14)  


 


Blood Urea Nitrogen


 


 


 48 mg/dL


(8-26) 





 


Creatinine


 


 


 8.7 mg/dL


(0.7-1.3) 





 


Estimated GFR


(Cockcroft-Gault) 


 


 7.8 


 





 


Glucose Level


 


 


 179 mg/dL


(70-99) 





 


Calcium Level


 


 


 6.6 mg/dL


(8.5-10.1) 





 


Test


 1/6/20


07:50 1/6/20


08:03 


 





 


White Blood Count


 20.6 x10^3/uL


(4.0-11.0) 


 


 





 


Red Blood Count


 4.40 x10^6/uL


(4.30-5.70) 


 


 





 


Hemoglobin


 11.6 g/dL


(13.0-17.5) 


 


 





 


Hematocrit


 37.0 %


(39.0-53.0) 


 


 





 


Mean Corpuscular Volume 84 fL ()    


 


Mean Corpuscular Hemoglobin 26 pg (25-35)    


 


Mean Corpuscular Hemoglobin


Concent 31 g/dL


(31-37) 


 


 





 


Red Cell Distribution Width


 15.6 %


(11.5-14.5) 


 


 





 


Platelet Count


 458 x10^3/uL


(140-400) 


 


 





 


Neutrophils (%) (Auto) 89 % (31-73)    


 


Lymphocytes (%) (Auto) 4 % (24-48)    


 


Monocytes (%) (Auto) 6 % (0-9)    


 


Eosinophils (%) (Auto) 1 % (0-3)    


 


Basophils (%) (Auto) 1 % (0-3)    


 


Neutrophils # (Auto)


 18.2 x10^3/uL


(1.8-7.7) 


 


 





 


Lymphocytes # (Auto)


 0.8 x10^3/uL


(1.0-4.8) 


 


 





 


Monocytes # (Auto)


 1.2 x10^3/uL


(0.0-1.1) 


 


 





 


Eosinophils # (Auto)


 0.2 x10^3/uL


(0.0-0.7) 


 


 





 


Basophils # (Auto)


 0.1 x10^3/uL


(0.0-0.2) 


 


 





 


Sodium Level


 130 mmol/L


(136-145) 


 


 





 


Potassium Level


 4.2 mmol/L


(3.5-5.1) 


 


 





 


Chloride Level


 97 mmol/L


() 


 


 





 


Carbon Dioxide Level


 23 mmol/L


(21-32) 


 


 





 


Anion Gap 10 (6-14)    


 


Blood Urea Nitrogen


 45 mg/dL


(8-26) 


 


 





 


Creatinine


 8.6 mg/dL


(0.7-1.3) 


 


 





 


Estimated GFR


(Cockcroft-Gault) 7.9 


 


 


 





 


Glucose Level


 247 mg/dL


(70-99) 


 


 





 


Calcium Level


 6.6 mg/dL


(8.5-10.1) 


 


 





 


Glucose (Fingerstick)


 


 190 mg/dL


(70-99) 


 














Review of Systems


Review of Systems:


co pain


co hunger





Assessment and Plan


Assessmemt and Plan


Problems


Medical Problems:


(1) CHF (congestive heart failure)


Status: Acute  





(2) Chronic kidney disease


Status: Acute  





(3) Diabetes


Status: Acute  





(4) Sepsis


Status: Acute  





(5) Severe sepsis


Status: Acute 





Post-op day 6 of amputation of R great toe w/extensive debridement of lateral 

aspect of R foot


Improving sepsis


Necrotizing Fasciitis of RLE


CHF


Diabetes-Type II


High Cholesterol


Heart Disease


Renal Failure








Plan


R BKA today


IV fluids


PD


IV Abx and anti-fungals


Replace Mg


Wound Care


Wound VAC


Insulin


DVT Prophylaxis


PT/OT


Labs


Home meds


Full code


Appreciate subspecialist input





Comment


Review of Relevant


I have reviewed the following items sean (where applicable) has been applied.











PRINCESS MACARIO III DO            Jan 6, 2020 12:05

## 2020-01-06 NOTE — PDOC
Renal-Progress Notes


Subjective Notes


Notes


COMFORTABLE





History of Present Illness


Hx of present illness


STABLE





Vitals


Vitals





Vital Signs








  Date Time  Temp Pulse Resp B/P (MAP) Pulse Ox O2 Delivery O2 Flow Rate FiO2


 


1/6/20 11:27 97.8 103 16 93/63 97 Room Air  





 97.8       








Weight


Weight [ ]





I.O.


Intake and Output











Intake and Output 


 


 1/6/20





 07:00


 


Intake Total 510 ml


 


Balance 510 ml


 


 


 


Intake Oral 510 ml


 


# Bowel Movements 1











Labs


Labs





Laboratory Tests








Test


 1/5/20


12:05 1/5/20


16:49 1/5/20


19:10 1/5/20


20:33


 


Glucose (Fingerstick)


 135 mg/dL


(70-99) 133 mg/dL


(70-99) 


 174 mg/dL


(70-99)


 


White Blood Count


 


 


 25.2 x10^3/uL


(4.0-11.0) 





 


Red Blood Count


 


 


 4.41 x10^6/uL


(4.30-5.70) 





 


Hemoglobin


 


 


 11.8 g/dL


(13.0-17.5) 





 


Hematocrit


 


 


 37.2 %


(39.0-53.0) 





 


Mean Corpuscular Volume   84 fL ()  


 


Mean Corpuscular Hemoglobin   27 pg (25-35)  


 


Mean Corpuscular Hemoglobin


Concent 


 


 32 g/dL


(31-37) 





 


Red Cell Distribution Width


 


 


 15.6 %


(11.5-14.5) 





 


Platelet Count


 


 


 435 x10^3/uL


(140-400) 





 


Neutrophils (%) (Auto)   87 % (31-73)  


 


Lymphocytes (%) (Auto)   5 % (24-48)  


 


Monocytes (%) (Auto)   7 % (0-9)  


 


Eosinophils (%) (Auto)   1 % (0-3)  


 


Basophils (%) (Auto)   0 % (0-3)  


 


Neutrophils # (Auto)


 


 


 21.9 x10^3/uL


(1.8-7.7) 





 


Lymphocytes # (Auto)


 


 


 1.2 x10^3/uL


(1.0-4.8) 





 


Monocytes # (Auto)


 


 


 1.8 x10^3/uL


(0.0-1.1) 





 


Eosinophils # (Auto)


 


 


 0.2 x10^3/uL


(0.0-0.7) 





 


Basophils # (Auto)


 


 


 0.1 x10^3/uL


(0.0-0.2) 





 


Sodium Level


 


 


 129 mmol/L


(136-145) 





 


Potassium Level


 


 


 4.1 mmol/L


(3.5-5.1) 





 


Chloride Level


 


 


 96 mmol/L


() 





 


Carbon Dioxide Level


 


 


 23 mmol/L


(21-32) 





 


Anion Gap   10 (6-14)  


 


Blood Urea Nitrogen


 


 


 48 mg/dL


(8-26) 





 


Creatinine


 


 


 8.7 mg/dL


(0.7-1.3) 





 


Estimated GFR


(Cockcroft-Gault) 


 


 7.8 


 





 


Glucose Level


 


 


 179 mg/dL


(70-99) 





 


Calcium Level


 


 


 6.6 mg/dL


(8.5-10.1) 





 


Test


 1/6/20


07:50 1/6/20


08:03 


 





 


White Blood Count


 20.6 x10^3/uL


(4.0-11.0) 


 


 





 


Red Blood Count


 4.40 x10^6/uL


(4.30-5.70) 


 


 





 


Hemoglobin


 11.6 g/dL


(13.0-17.5) 


 


 





 


Hematocrit


 37.0 %


(39.0-53.0) 


 


 





 


Mean Corpuscular Volume 84 fL ()    


 


Mean Corpuscular Hemoglobin 26 pg (25-35)    


 


Mean Corpuscular Hemoglobin


Concent 31 g/dL


(31-37) 


 


 





 


Red Cell Distribution Width


 15.6 %


(11.5-14.5) 


 


 





 


Platelet Count


 458 x10^3/uL


(140-400) 


 


 





 


Neutrophils (%) (Auto) 89 % (31-73)    


 


Lymphocytes (%) (Auto) 4 % (24-48)    


 


Monocytes (%) (Auto) 6 % (0-9)    


 


Eosinophils (%) (Auto) 1 % (0-3)    


 


Basophils (%) (Auto) 1 % (0-3)    


 


Neutrophils # (Auto)


 18.2 x10^3/uL


(1.8-7.7) 


 


 





 


Lymphocytes # (Auto)


 0.8 x10^3/uL


(1.0-4.8) 


 


 





 


Monocytes # (Auto)


 1.2 x10^3/uL


(0.0-1.1) 


 


 





 


Eosinophils # (Auto)


 0.2 x10^3/uL


(0.0-0.7) 


 


 





 


Basophils # (Auto)


 0.1 x10^3/uL


(0.0-0.2) 


 


 





 


Sodium Level


 130 mmol/L


(136-145) 


 


 





 


Potassium Level


 4.2 mmol/L


(3.5-5.1) 


 


 





 


Chloride Level


 97 mmol/L


() 


 


 





 


Carbon Dioxide Level


 23 mmol/L


(21-32) 


 


 





 


Anion Gap 10 (6-14)    


 


Blood Urea Nitrogen


 45 mg/dL


(8-26) 


 


 





 


Creatinine


 8.6 mg/dL


(0.7-1.3) 


 


 





 


Estimated GFR


(Cockcroft-Gault) 7.9 


 


 


 





 


Glucose Level


 247 mg/dL


(70-99) 


 


 





 


Calcium Level


 6.6 mg/dL


(8.5-10.1) 


 


 





 


Glucose (Fingerstick)


 


 190 mg/dL


(70-99) 


 














Micro


Micro





Microbiology


1/4/20 Blood Culture - Preliminary, Resulted


         NO GROWTH AFTER 2 DAYS


12/31/19 Anaerobic/Aerobic Culture, Resulted


           Pending


12/31/19 Anaerobic Culture Result 1 (ANTHONY), Resulted


           Pending


12/31/19 Aerobic Culture - Final, Resulted


           


12/31/19 Aerobic Culture Result 1 (ANTHONY) - Final, Resulted


           


12/31/19 Gram Stain - Final, Resulted


           


12/31/19 Gram Stain Result 1 (ANTHONY) - Final, Resulted


           


12/31/19 Gram Stain Result 2 (ANTHONY) - Final, Resulted


           


12/31/19 Gram Stain Result 3 (ANTHONY) - Final, Resulted


           


12/31/19 Gram Stain Result 4 (ANTHONY) - Final, Resulted





Review of Systems


Constitutional:  yes: weakness, alert, oriented


Ears/Nose/Throat:  Yes: no symptom reported


Eyes:  Yes: no symptom reported


Pulmonary:  Yes no symptom reported


Cardiovascular:  Yes no symptom reported


Gastrointestional:  Yes: no symptom reported


Genitourinary:  Yes: no symptom reported


Musculoskeletal:  Yes: no symptom reported


Skin:  Yes no symptom reported


Psychiatric/Neurological:  Yes: no symptom reported


Endocrine:  Yes: no symptom reported





Physical Exam


General Appearance:  no apparent distress


Skin:  warm, dry


Respiratory:  bilateral CTA


Heart:  S1S2


Abdomen:  soft, bowel sounds present


Genitourinary:  bladder flat


Extremities:  pulses present, other (FOUL SMELLING DEEP TISSUE WOUND RLE)


Neurology:  alert, oriented, follow commands


Musculoskeletal:  Osteoarthritis





Assessment


Assessment


IMP


RIGHT FOOT WOUND INFECTION


LEUCOCYTOSIS


POORLY CONTROLLED DM II


ANEMIA


HYPOKALEMIA-BETTER


HYPONATREMIA-STABLE


ESRD


HTN HX


HX OF CM AND CHF


NSVT


HYPONATREMIA


HYPOTHYROIDISM


HYPOVOLEMIA


HYPOTENSION


SIRS








PLAN





REPLACE K DAILY


ANTIBIOTICS


WOUND CARE


ORTHO EVALUATION AND TX


PROB NEEDS MORE AMPUTATION


APD DAILY TONIGHT-1.5%


ALSO START SALINE


WILL CONT INSULIN TO PD BAGS 


STARTED SYNTHROID


WILL CONSIDER ADDING MIDODDRINE


WILL FOLLOW











CONSTANTIN GOLDSMITH MD                  Jan 6, 2020 12:00

## 2020-01-06 NOTE — NUR
Swelling noted in right arm. IV flushing without difficultly. Pt reports swelling started 
yesterday. Does not appear to be from IV site. Discussed with Dr Vigil. Will continue to 
monitor.

## 2020-01-06 NOTE — PDOC4
Operative Note


Operative Note


Date of Procedure:   January 6, 2020


Pre-Op Diagnosis:   Right foot osteomyelitis and open wounds


Post-Op Diagnosis:   Same


Procedure:      Right below-knee amputation


Surgeon:       Nicola Haney MD


Assistant:      Floyd DYKES


Anesthesia:        General


EBL:       500 mL


Specimens Obtained:     Right below knee amputation specimen


Complications:        none


Drains:       none


Tourniquet time:      22 minutes





Findings:  Venous tourniquet caused excessive blood loss until the tourniquet 

was released intraoperatively.  Bleeding decreased when the tourniquet was 

released.








Indications for Procedure: This is a 54-year-old gentleman with Type 1 diabetes,

severe vascular disease, and multiple prior surgeries on the right foot presents

with right foot open wound, osteomyelitis, unresponsive to wound care. Arterial 

workup shows no treatable stenoses. I recommend a transtibial amputation, also 

called below-knee amputation. We talked about the potential risks of bleeding,

difficulty walking, infection, need for additional amputations or other 

potential surgical or anesthetic complications.  The patient and I discussed the

risks, benefits and alternatives of surgery. All of his questions about surgery 

were answered and he desired to proceed. 





Procedure in Detail: The patient was identified in the preoperative holding 

area.  The correct right lower extremity was marked by me.  The patient was 

taken to the operating room where general anesthesia was used. The patient was 

positioned supine on the operating table. The limb was first scrubbed with 

chlorhexidine scrub brushes and dried. The limb was prepared in sterile fashion 

with ChloraPrep. The patient remains on scheduled antibiotics, but Ancef was 

given for better gram positive coverage.  A timeout procedure was performed.  

Sterile drapes were applied.  An impervious stockinette was placed over the foot

up to the level of the ankle. A Coban was used to secure the stockinette. An 

Esmarch bandage was used to exsanguinate the calf and the thigh, and the 

tourniquet was inflated to 350 mmHg.





Landmarks were noted such as the tibial tubercle. Anterior and posterior 

incisions were drawn with a skin marker, 15 cm distal to the knee joint line, 

and 10 cm distal to the tibial tubercle. Rounded fishmouth type flaps were made 

with the posterior incision one third of the total circumference and the 

anterior incision two thirds of the total circumference.





Skin incisions were made with a #10 scalpel. Bovie electrocautery was used for 

hemostasis in the subcutaneous tissues. A circumferential skin incision was made

at the planned flap incisions, continuing with electrocautery for hemostasis. 

The fascia was divided circumferentially. Muscle dissection was performed with 

electrocautery. Vessels were identified and ligated with #0 silk free ties and 

#0 silk stick ties on arteries. Unfortunately there was still copious bleeding, 

probably venous bleeding, and hemostats and electrocautery were used along with 

additional stick ties and silk ties. The tourniquet was released, and the active

bleeding stopped.  Nerves were transected sharply with a scalpel after injecting

with local anesthetic. The tibia was exposed and the periosteum was elevated 

proximally using a Acevedo elevator. The fibular periosteum was also dissected 

proximally. A power oscillating saw was now used to transect the tibia 10 cm 

below the tibial tubercle, with a beveled edge. The fibula was transected 1 

centimeter proximal to the tibia with an oblique saw cut. Further dissection was

now performed on the posterior aspect of the tibia and fibula, and the specimen 

was removed.





The end of the tibia was smoothed with the saw to a rounded surface. Saline 

irrigation was used. The tourniquet was released. Silk ties were again used for 

bleeding. Bovie electrocautery was used to final time for hemostasis. The 

posterior flap fascia was repaired to the anterior tibial periosteum using #1 

PDS figure-of-eight sutures. A secure fascial repair was obtained. Next the 

subcutaneous continues tissues were closed first with #2-0 PDS by me and my 

assistant. I then had the assistant close the skin edges with staples and place 

a sterile dressing. Needle and sponge counts were correct. There were no 

apparent complications.











NICOLA HANEY MD                  Jan 6, 2020 13:52

## 2020-01-06 NOTE — NUR
pt had ran vtach < 6sec, ran to pt room to check pt, pt was asleep and noted shaking with 
his arm crossed over his chest, this nurse woke him up and asked if he is feeling ok, he 
said he is fine and was asleep, i told pt he was shaking, he stated " i sometimes shivers 
when get cold at night". took v/s and all were all ok Bp 106/71, , O2sat 98% RA temp 
98.2. Will keep monitoring pt.

## 2020-01-06 NOTE — NUR
SS following up with discharge planning. SS discussed with [facility], 
[phone]; fax [phone]. Pt does have REV codes for [facility] and is accepted. SS phoned 
and faxed clinical updates to [facility]. Pt having right knee amputation today. SS will 
continue to follow for discharge planning.

## 2020-01-06 NOTE — NUR
pt arrived via bed from pacu with artline intact. Pt denies pain at this time. Dressing on 
BKA intact with no drainage noted. Lungs CTA. Peritoneal dialysis cath in LLQ. Pt currently 
on 2L NC. Pt requesting food. Dinner tray given to pt at this time. Wife Aziza at bedside. 
oriented to room and unit. Call light within reach. Will continue to monitor.

## 2020-01-06 NOTE — NUR
Pt experiencing hypotensive episodes with map in the 40's with 60's- 70's diastolic. 
Notified cardiology provider at [phone], cardiology returned page [phone]. Provider updated on pt 
status, vital signs, EBL during surgery and interventions to correct EBL effects. Provider 
acknowledged above information and ordered 250 mL bolus of NS, and to talk to renal since 
the pt is on PD. Renal paged. Renal paged back. Renal updated on all the listed above and 
orders received for pt to be type and crossed, nurse to infuse 1u PRBS'c, additional 250 mL 
bolus and IV infusion to run at 75mL/ hr. Renal advised this RN would be calling 
hospitalist. Hospitalist paged, hospital returned page. Provider updated on all the above, 
and provider acknowledged all orders placed by consults, as well as gave this RN PRN order 
of albumin. Hospitalist advised if pressures do not improve after these interventions, to 
please call the Hospitalist back. Will continue to assess and monitor, and pass along 
information to day shift RN assuming care of pt.

## 2020-01-07 VITALS — DIASTOLIC BLOOD PRESSURE: 46 MMHG | SYSTOLIC BLOOD PRESSURE: 80 MMHG

## 2020-01-07 VITALS — SYSTOLIC BLOOD PRESSURE: 88 MMHG | DIASTOLIC BLOOD PRESSURE: 70 MMHG

## 2020-01-07 VITALS — DIASTOLIC BLOOD PRESSURE: 60 MMHG | SYSTOLIC BLOOD PRESSURE: 74 MMHG

## 2020-01-07 VITALS — DIASTOLIC BLOOD PRESSURE: 57 MMHG | SYSTOLIC BLOOD PRESSURE: 99 MMHG

## 2020-01-07 VITALS — SYSTOLIC BLOOD PRESSURE: 58 MMHG | DIASTOLIC BLOOD PRESSURE: 44 MMHG

## 2020-01-07 VITALS — DIASTOLIC BLOOD PRESSURE: 40 MMHG | SYSTOLIC BLOOD PRESSURE: 80 MMHG

## 2020-01-07 VITALS — SYSTOLIC BLOOD PRESSURE: 72 MMHG | DIASTOLIC BLOOD PRESSURE: 52 MMHG

## 2020-01-07 VITALS — SYSTOLIC BLOOD PRESSURE: 88 MMHG | DIASTOLIC BLOOD PRESSURE: 64 MMHG

## 2020-01-07 VITALS — DIASTOLIC BLOOD PRESSURE: 49 MMHG | SYSTOLIC BLOOD PRESSURE: 82 MMHG

## 2020-01-07 VITALS — SYSTOLIC BLOOD PRESSURE: 74 MMHG | DIASTOLIC BLOOD PRESSURE: 48 MMHG

## 2020-01-07 VITALS — DIASTOLIC BLOOD PRESSURE: 47 MMHG | SYSTOLIC BLOOD PRESSURE: 92 MMHG

## 2020-01-07 VITALS — SYSTOLIC BLOOD PRESSURE: 60 MMHG | DIASTOLIC BLOOD PRESSURE: 54 MMHG

## 2020-01-07 VITALS — DIASTOLIC BLOOD PRESSURE: 52 MMHG | SYSTOLIC BLOOD PRESSURE: 72 MMHG

## 2020-01-07 VITALS — DIASTOLIC BLOOD PRESSURE: 60 MMHG | SYSTOLIC BLOOD PRESSURE: 80 MMHG

## 2020-01-07 VITALS — DIASTOLIC BLOOD PRESSURE: 64 MMHG | SYSTOLIC BLOOD PRESSURE: 78 MMHG

## 2020-01-07 VITALS — DIASTOLIC BLOOD PRESSURE: 56 MMHG | SYSTOLIC BLOOD PRESSURE: 98 MMHG

## 2020-01-07 VITALS — DIASTOLIC BLOOD PRESSURE: 52 MMHG | SYSTOLIC BLOOD PRESSURE: 74 MMHG

## 2020-01-07 VITALS — DIASTOLIC BLOOD PRESSURE: 42 MMHG | SYSTOLIC BLOOD PRESSURE: 78 MMHG

## 2020-01-07 VITALS — SYSTOLIC BLOOD PRESSURE: 70 MMHG | DIASTOLIC BLOOD PRESSURE: 52 MMHG

## 2020-01-07 VITALS — SYSTOLIC BLOOD PRESSURE: 80 MMHG | DIASTOLIC BLOOD PRESSURE: 46 MMHG

## 2020-01-07 VITALS — SYSTOLIC BLOOD PRESSURE: 62 MMHG | DIASTOLIC BLOOD PRESSURE: 52 MMHG

## 2020-01-07 VITALS — DIASTOLIC BLOOD PRESSURE: 46 MMHG | SYSTOLIC BLOOD PRESSURE: 98 MMHG

## 2020-01-07 VITALS — DIASTOLIC BLOOD PRESSURE: 72 MMHG | SYSTOLIC BLOOD PRESSURE: 78 MMHG

## 2020-01-07 VITALS — SYSTOLIC BLOOD PRESSURE: 52 MMHG | DIASTOLIC BLOOD PRESSURE: 42 MMHG

## 2020-01-07 LAB
% LYMPHS: 9 % (ref 24–48)
% MONOS: 5 % (ref 0–10)
% SEGS: 86 % (ref 35–66)
ANION GAP SERPL CALC-SCNC: 10 MMOL/L (ref 6–14)
ANION GAP SERPL CALC-SCNC: 11 MMOL/L (ref 6–14)
BASOPHILS # BLD AUTO: 0 X10^3/UL (ref 0–0.2)
BASOPHILS NFR BLD: 0 % (ref 0–3)
BUN SERPL-MCNC: 45 MG/DL (ref 8–26)
BUN SERPL-MCNC: 46 MG/DL (ref 8–26)
CALCIUM SERPL-MCNC: 6 MG/DL (ref 8.5–10.1)
CALCIUM SERPL-MCNC: 6.1 MG/DL (ref 8.5–10.1)
CHLORIDE SERPL-SCNC: 100 MMOL/L (ref 98–107)
CHLORIDE SERPL-SCNC: 100 MMOL/L (ref 98–107)
CO2 SERPL-SCNC: 19 MMOL/L (ref 21–32)
CO2 SERPL-SCNC: 21 MMOL/L (ref 21–32)
CREAT SERPL-MCNC: 7.9 MG/DL (ref 0.7–1.3)
CREAT SERPL-MCNC: 8 MG/DL (ref 0.7–1.3)
EOSINOPHIL NFR BLD: 0 % (ref 0–3)
EOSINOPHIL NFR BLD: 0 X10^3/UL (ref 0–0.7)
ERYTHROCYTE [DISTWIDTH] IN BLOOD BY AUTOMATED COUNT: 15 % (ref 11.5–14.5)
GFR SERPLBLD BASED ON 1.73 SQ M-ARVRAT: 8.6 ML/MIN
GFR SERPLBLD BASED ON 1.73 SQ M-ARVRAT: 8.7 ML/MIN
GLUCOSE SERPL-MCNC: 158 MG/DL (ref 70–99)
GLUCOSE SERPL-MCNC: 287 MG/DL (ref 70–99)
HCT VFR BLD CALC: 27.4 % (ref 39–53)
HGB BLD-MCNC: 8.9 G/DL (ref 13–17.5)
LYMPHOCYTES # BLD: 0.8 X10^3/UL (ref 1–4.8)
LYMPHOCYTES NFR BLD AUTO: 4 % (ref 24–48)
MAGNESIUM SERPL-MCNC: 1.7 MG/DL (ref 1.8–2.4)
MCH RBC QN AUTO: 27 PG (ref 25–35)
MCHC RBC AUTO-ENTMCNC: 32 G/DL (ref 31–37)
MCV RBC AUTO: 84 FL (ref 79–100)
MONO #: 1 X10^3/UL (ref 0–1.1)
MONOCYTES NFR BLD: 5 % (ref 0–9)
NEUT #: 19.4 X10^3/UL (ref 1.8–7.7)
NEUTROPHILS NFR BLD AUTO: 91 % (ref 31–73)
PLATELET # BLD AUTO: 373 X10^3/UL (ref 140–400)
PLATELET # BLD EST: ADEQUATE 10*3/UL
POTASSIUM SERPL-SCNC: 3.9 MMOL/L (ref 3.5–5.1)
POTASSIUM SERPL-SCNC: 4.2 MMOL/L (ref 3.5–5.1)
RBC # BLD AUTO: 3.26 X10^6/UL (ref 4.3–5.7)
SODIUM SERPL-SCNC: 130 MMOL/L (ref 136–145)
SODIUM SERPL-SCNC: 131 MMOL/L (ref 136–145)
WBC # BLD AUTO: 21.3 X10^3/UL (ref 4–11)

## 2020-01-07 RX ADMIN — INSULIN LISPRO SCH UNITS: 100 INJECTION, SOLUTION INTRAVENOUS; SUBCUTANEOUS at 08:39

## 2020-01-07 RX ADMIN — BACITRACIN SCH MLS/HR: 5000 INJECTION, POWDER, FOR SOLUTION INTRAMUSCULAR at 08:36

## 2020-01-07 RX ADMIN — MEROPENEM SCH MLS/HR: 500 INJECTION, POWDER, FOR SOLUTION INTRAVENOUS at 08:38

## 2020-01-07 RX ADMIN — INSULIN LISPRO SCH UNITS: 100 INJECTION, SOLUTION INTRAVENOUS; SUBCUTANEOUS at 12:17

## 2020-01-07 RX ADMIN — Medication SCH EA: at 08:39

## 2020-01-07 RX ADMIN — FENTANYL CITRATE PRN MCG: 50 INJECTION INTRAMUSCULAR; INTRAVENOUS at 12:15

## 2020-01-07 RX ADMIN — SACUBITRIL AND VALSARTAN SCH TAB: 24; 26 TABLET, FILM COATED ORAL at 21:00

## 2020-01-07 RX ADMIN — FENTANYL CITRATE PRN MCG: 50 INJECTION INTRAMUSCULAR; INTRAVENOUS at 02:30

## 2020-01-07 RX ADMIN — ASPIRIN 81 MG SCH MG: 81 TABLET ORAL at 08:37

## 2020-01-07 RX ADMIN — LINEZOLID SCH MG: 600 TABLET, FILM COATED ORAL at 21:01

## 2020-01-07 RX ADMIN — PANTOPRAZOLE SODIUM SCH MG: 40 TABLET, DELAYED RELEASE ORAL at 08:36

## 2020-01-07 RX ADMIN — LEVOTHYROXINE SODIUM SCH MCG: 25 TABLET ORAL at 06:26

## 2020-01-07 RX ADMIN — INSULIN LISPRO SCH UNITS: 100 INJECTION, SOLUTION INTRAVENOUS; SUBCUTANEOUS at 17:00

## 2020-01-07 RX ADMIN — BACITRACIN SCH MLS/HR: 5000 INJECTION, POWDER, FOR SOLUTION INTRAMUSCULAR at 07:25

## 2020-01-07 RX ADMIN — LINEZOLID SCH MG: 600 TABLET, FILM COATED ORAL at 09:22

## 2020-01-07 RX ADMIN — POTASSIUM CHLORIDE SCH MEQ: 1500 TABLET, EXTENDED RELEASE ORAL at 18:06

## 2020-01-07 RX ADMIN — SACUBITRIL AND VALSARTAN SCH TAB: 24; 26 TABLET, FILM COATED ORAL at 08:38

## 2020-01-07 RX ADMIN — MEROPENEM SCH MLS/HR: 500 INJECTION, POWDER, FOR SOLUTION INTRAVENOUS at 21:03

## 2020-01-07 RX ADMIN — POTASSIUM CHLORIDE SCH MEQ: 1500 TABLET, EXTENDED RELEASE ORAL at 08:37

## 2020-01-07 RX ADMIN — BACITRACIN SCH MLS/HR: 5000 INJECTION, POWDER, FOR SOLUTION INTRAMUSCULAR at 14:05

## 2020-01-07 RX ADMIN — Medication SCH CAP: at 08:38

## 2020-01-07 RX ADMIN — Medication SCH EA: at 18:06

## 2020-01-07 RX ADMIN — Medication SCH EA: at 08:37

## 2020-01-07 RX ADMIN — AMIODARONE HYDROCHLORIDE PRN MLS/HR: 50 INJECTION, SOLUTION INTRAVENOUS at 20:30

## 2020-01-07 RX ADMIN — Medication SCH CAP: at 21:01

## 2020-01-07 RX ADMIN — BACITRACIN SCH MLS/HR: 5000 INJECTION, POWDER, FOR SOLUTION INTRAMUSCULAR at 21:03

## 2020-01-07 RX ADMIN — Medication SCH EA: at 12:15

## 2020-01-07 RX ADMIN — ATORVASTATIN CALCIUM SCH MG: 10 TABLET, FILM COATED ORAL at 21:01

## 2020-01-07 NOTE — PDOC
Renal-Progress Notes


Subjective Notes


Notes


NO NEW COMPLAINTS, ACTUALLY FEELING BETTER AFTER SURGERY





History of Present Illness


Hx of present illness


STABLE





Vitals


Vitals





Vital Signs








  Date Time  Temp Pulse Resp B/P (MAP) Pulse Ox O2 Delivery O2 Flow Rate FiO2


 


1/7/20 11:00  86  72/52 (59) 100 Nasal Cannula 2.0 


 


1/7/20 08:00 96.9       





 96.9       


 


1/7/20 06:00   20     








Weight


Weight [ ]





I.O.


Intake and Output











Intake and Output 


 


 1/7/20





 07:00


 


Intake Total 1510 ml


 


Output Total 1200 ml


 


Balance 310 ml


 


 


 


Intake Oral 260 ml


 


IV Total 750 ml


 


Blood Product IV Normal Saline Flush 500 ml


 


Estimated Blood Loss 1200 ml


 


# Bowel Movements 2











Labs


Labs





Laboratory Tests








Test


 1/6/20


15:14 1/6/20


17:10 1/6/20


18:10 1/6/20


19:43


 


Glucose (Fingerstick)


 236 mg/dL


(70-99) 214 mg/dL


(70-99) 


 150 mg/dL


(70-99)


 


White Blood Count


 


 


 23.2 x10^3/uL


(4.0-11.0) 





 


Red Blood Count


 


 


 2.87 x10^6/uL


(4.30-5.70) 





 


Hemoglobin


 


 


 7.7 g/dL


(13.0-17.5) 





 


Hematocrit


 


 


 23.8 %


(39.0-53.0) 





 


Mean Corpuscular Volume   83 fL ()  


 


Mean Corpuscular Hemoglobin   27 pg (25-35)  


 


Mean Corpuscular Hemoglobin


Concent 


 


 32 g/dL


(31-37) 





 


Red Cell Distribution Width


 


 


 15.3 %


(11.5-14.5) 





 


Platelet Count


 


 


 375 x10^3/uL


(140-400) 





 


Test


 1/7/20


06:00 1/7/20


08:35 


 





 


White Blood Count


 21.3 x10^3/uL


(4.0-11.0) 


 


 





 


Red Blood Count


 3.26 x10^6/uL


(4.30-5.70) 


 


 





 


Hemoglobin


 8.9 g/dL


(13.0-17.5) 


 


 





 


Hematocrit


 27.4 %


(39.0-53.0) 


 


 





 


Mean Corpuscular Volume 84 fL ()    


 


Mean Corpuscular Hemoglobin 27 pg (25-35)    


 


Mean Corpuscular Hemoglobin


Concent 32 g/dL


(31-37) 


 


 





 


Red Cell Distribution Width


 15.0 %


(11.5-14.5) 


 


 





 


Platelet Count


 373 x10^3/uL


(140-400) 


 


 





 


Neutrophils (%) (Auto) 91 % (31-73)    


 


Lymphocytes (%) (Auto) 4 % (24-48)    


 


Monocytes (%) (Auto) 5 % (0-9)    


 


Eosinophils (%) (Auto) 0 % (0-3)    


 


Basophils (%) (Auto) 0 % (0-3)    


 


Neutrophils # (Auto)


 19.4 x10^3/uL


(1.8-7.7) 


 


 





 


Lymphocytes # (Auto)


 0.8 x10^3/uL


(1.0-4.8) 


 


 





 


Monocytes # (Auto)


 1.0 x10^3/uL


(0.0-1.1) 


 


 





 


Eosinophils # (Auto)


 0.0 x10^3/uL


(0.0-0.7) 


 


 





 


Basophils # (Auto)


 0.0 x10^3/uL


(0.0-0.2) 


 


 





 


Sodium Level


 131 mmol/L


(136-145) 


 


 





 


Potassium Level


 4.2 mmol/L


(3.5-5.1) 


 


 





 


Chloride Level


 100 mmol/L


() 


 


 





 


Carbon Dioxide Level


 21 mmol/L


(21-32) 


 


 





 


Anion Gap 10 (6-14)    


 


Blood Urea Nitrogen


 46 mg/dL


(8-26) 


 


 





 


Creatinine


 7.9 mg/dL


(0.7-1.3) 


 


 





 


Estimated GFR


(Cockcroft-Gault) 8.7 


 


 


 





 


Glucose Level


 287 mg/dL


(70-99) 


 


 





 


Calcium Level


 6.1 mg/dL


(8.5-10.1) 


 


 





 


Glucose (Fingerstick)


 


 256 mg/dL


(70-99) 


 














Micro


Micro





Microbiology


1/4/20 Blood Culture - Preliminary, Resulted


         NO GROWTH AFTER 3 DAYS


12/31/19 Anaerobic/Aerobic Culture - Final, Complete


           


12/31/19 Anaerobic Culture Result 1 (ANTHONY) - Final, Complete


           


12/31/19 Aerobic Culture - Final, Complete


           


12/31/19 Aerobic Culture Result 1 (ANTHONY) - Final, Complete


           


12/31/19 Gram Stain - Final, Complete


           


12/31/19 Gram Stain Result 1 (ANTHONY) - Final, Complete


           


12/31/19 Gram Stain Result 2 (ANTHONY) - Final, Complete


           


12/31/19 Gram Stain Result 3 (ANTHONY) - Final, Complete


           


12/31/19 Gram Stain Result 4 (ANTHONY) - Final, Complete





Review of Systems


Constitutional:  yes: weakness, alert, oriented


Ears/Nose/Throat:  Yes: no symptom reported


Eyes:  Yes: no symptom reported


Pulmonary:  Yes no symptom reported


Cardiovascular:  Yes no symptom reported


Gastrointestional:  Yes: no symptom reported


Genitourinary:  Yes: no symptom reported


Musculoskeletal:  Yes: no symptom reported


Skin:  Yes no symptom reported


Psychiatric/Neurological:  Yes: no symptom reported


Endocrine:  Yes: no symptom reported





Physical Exam


General Appearance:  no apparent distress


Skin:  warm, dry


Respiratory:  bilateral CTA


Heart:  S1S2


Abdomen:  soft, bowel sounds present


Genitourinary:  bladder flat


Extremities:  pulses present, other (FOUL SMELLING DEEP TISSUE WOUND RLE)


Neurology:  alert, oriented, follow commands


Musculoskeletal:  Osteoarthritis





Assessment


Assessment


IMP


RIGHT FOOT WOUND INFECTION


S/P RIGHT BKA


LEUCOCYTOSIS


POORLY CONTROLLED DM II


ANEMIA


HYPOKALEMIA-BETTER


HYPONATREMIA-STABLE


ESRD


HTN HX


HX OF CM AND CHF


NSVT


HYPONATREMIA


HYPOTHYROIDISM


HYPOVOLEMIA


HYPOTENSION


SIRS








PLAN





ANTIBIOTICS


WOUND CARE


APD DAILY TONIGHT-1.5%


CONT SALINE


WILL CONT INSULIN TO PD BAGS 


CONT SYNTHROID


WILL CONSIDER ADDING MIDODDRINE


WILL FOLLOW











CONSTANTIN GOLDSMITH MD                  Jan 7, 2020 11:59

## 2020-01-07 NOTE — PDOC
PROGRESS NOTES


Chief Complaint


Chief Complaint











Post-op day 7 of amputation of R great toe w/extensive debridement of lateral 

aspect of R foot


Right foot deep tissue infection/osteo with chronic nonhealing right diabetic 

foot ulcers. 


   -s/p amputation of right 5th and great toe, 12/31. GPC, GNR, GPR on gram 

stain. culture no growth so far,,, now BKA 


Improving sepsis


Necrotizing Fasciitis of RLE


CHF


Diabetes-Type II


High Cholesterol


Heart Disease


Renal Failure


HYPOTENSION








32 MIN CC TIME





History of Present Illness


History of Present Illness


1-7-20


Pt seen and examined


DW RN


PRN PRESSORS





1/5/20


Pt seen and examined


Pt states he feels well and is ready for surgery tomorrow


Pt's vitals have improved


DW RN


Reviewed pt's chart





1/4/20


Pt seen and examined


DW pt about upcoming R BKA


Pt states he feels fine 


Pt doesn't appear septic despite low BP and tachycardia


DW RN


Reviewed pt's chart





[id number]


Patient seen and examined


He now has a wound VAC on his right foot


Discussed with RN chart reviewed





1/2/20


Pt seen and examined


Pt reported being lethargic today


DW pt about care


Reviewed pt's chart





1/1/20


Pt seen and examined


DW pt


DW RN


Reviewed pt's chart





12/31/19


Pt seen and examined


Pt was sitting up in bed, eating breakfast in NAD


DW pt


DW RN


Reviewed pt's chart





Vitals


Vitals





Vital Signs








  Date Time  Temp Pulse Resp B/P (MAP) Pulse Ox O2 Delivery O2 Flow Rate FiO2


 


1/7/20 06:00  82 20 80/46 (57) 100 Nasal Cannula 2.0 


 


1/7/20 04:00 98.9       





 98.9       











Physical Exam


Physical Exam


GENERAL: Propped up in bed, alert, relaxed appearance, watching TV 


HEENT:  Oral mucosa moist. No thrush 


NECK:  Supple.  No JVD.


LUNGS:  Clear bilaterally.  No wheezing.


HEART:  S1, S2.


ABDOMEN:  Soft, nontender. PDC 


EXTREMITIES:  Right big toe amputation site approx w/ sutures. Rt BKA


Lateral aspect wound vac in place 


DERMATOLOGIC:  Warm, dry.  No generalized rash.


CENTRAL NERVOUS SYSTEM:  Alert and oriented x 3, grossly nonfocal.


PIV


General:  Alert, Oriented X3, Cooperative, mild distress


Heart:  Regular rate, Normal S1, Normal S2


Lungs:  Clear


Abdomen:  Normal bowel sounds


Extremities:  No clubbing, No edema, Other ( Right big toe amputation site 

approx w/ sutures. Wound vac  in place on lateral aspect of R foot)


Skin:  No rashes, Other (Wound vac in place on lateral aspect of R foot. There 

is a bad odor.)





Labs


LABS


                                                             CONSTANTIN GOLDSMITH MD, TERRY A MD


ORDERED:  ANAER/AEROB/GS                                                        

 


COMMENTS: RT LATERAL FOOT WOUND                                       


----------------------------------------------------------------------------

----------------





  Procedure                         Result                                      

         


-------------------

-------------------------------------------------------------------------





  ANAEROBIC-AEROBIC CULTURE  Final  


        Final report





  ANAEROBIC RES 1  Final  


        Prevotella buccae





        1+


        Beta lactamase positive.





  AEROBIC CULT  Final  


        Preliminary report


        Final report





  AEROBIC RES 1  Final  


        Comment





        No growth in 36 - 48 hours.


        No growth in 56 - 72 hours.





  GRAM STAIN  Final  


        Final report





  GRAM STAIN RES 1  Final  


        Comment





        Many gram positive rods.





  GRAM STAIN RES 2  Final  


        Comment





        Many gram negative rods.





  GRAM STAIN RES 3  Final  


        Comment





Examination: FOOT RIGHT 3V


 


History: Nonhealing wound for past 2 months.


 


Comparison/Correlation: None


 


Findings: Total 3 images the right foot were obtained. The distal first 


and second digits are not included on the oblique view. Overlying bandages


at the lateral aspect of the midfoot and forefoot may limit evaluation for


fine detail.


 


Extensive soft tissue gas is present involving the left foot. This is 


primarily evident at the midfoot and metatarsophalangeal region. Soft 


tissue gas is evident at the plantar aspect of the hindfoot and midfoot. 


Soft tissue gas is also evident at the posterior distal calf and posterior


to the ankle. The superior extent of soft tissue gas involving the calf is


not included on this exam.


 


Deformity of the second and third metatarsal heads noted. There are 


metatarsophalangeal joint narrowing present. Suspicion of lytic 


involvement of the fifth metatarsal bone distally is raised. Overlying 


soft tissue gas limits assessment.


 


Impression:


Significant soft tissue gas involving the right foot. Examination of soft 


tissue gas into the distal calf region. Full extent of involvement is not 


included on this exam.


Findings of significant concern for necrotizing fasciitis. Possibly loss 


myelitis involving the fifth tarsal bone distally is also raised. 


Assessment for bony destruction is limited with soft tissue gas present.


 


Electronically signed by: Lucas Jaramillo MD (12/30/2019 10:40 AM) [facility]














DICTATED and SIGNED BY:     LUCAS JARAMILLO MD


DATE:     12/30/19 1040


Laboratory Tests








Test


 1/6/20


15:14 1/6/20


17:10 1/6/20


18:10 1/6/20


19:43


 


Glucose (Fingerstick)


 236 mg/dL


(70-99) 214 mg/dL


(70-99) 


 150 mg/dL


(70-99)


 


White Blood Count


 


 


 23.2 x10^3/uL


(4.0-11.0) 





 


Red Blood Count


 


 


 2.87 x10^6/uL


(4.30-5.70) 





 


Hemoglobin


 


 


 7.7 g/dL


(13.0-17.5) 





 


Hematocrit


 


 


 23.8 %


(39.0-53.0) 





 


Mean Corpuscular Volume   83 fL ()  


 


Mean Corpuscular Hemoglobin   27 pg (25-35)  


 


Mean Corpuscular Hemoglobin


Concent 


 


 32 g/dL


(31-37) 





 


Red Cell Distribution Width


 


 


 15.3 %


(11.5-14.5) 





 


Platelet Count


 


 


 375 x10^3/uL


(140-400) 





 


Test


 1/7/20


06:00 1/7/20


08:35 


 





 


White Blood Count


 21.3 x10^3/uL


(4.0-11.0) 


 


 





 


Red Blood Count


 3.26 x10^6/uL


(4.30-5.70) 


 


 





 


Hemoglobin


 8.9 g/dL


(13.0-17.5) 


 


 





 


Hematocrit


 27.4 %


(39.0-53.0) 


 


 





 


Mean Corpuscular Volume 84 fL ()    


 


Mean Corpuscular Hemoglobin 27 pg (25-35)    


 


Mean Corpuscular Hemoglobin


Concent 32 g/dL


(31-37) 


 


 





 


Red Cell Distribution Width


 15.0 %


(11.5-14.5) 


 


 





 


Platelet Count


 373 x10^3/uL


(140-400) 


 


 





 


Neutrophils (%) (Auto) 91 % (31-73)    


 


Lymphocytes (%) (Auto) 4 % (24-48)    


 


Monocytes (%) (Auto) 5 % (0-9)    


 


Eosinophils (%) (Auto) 0 % (0-3)    


 


Basophils (%) (Auto) 0 % (0-3)    


 


Neutrophils # (Auto)


 19.4 x10^3/uL


(1.8-7.7) 


 


 





 


Lymphocytes # (Auto)


 0.8 x10^3/uL


(1.0-4.8) 


 


 





 


Monocytes # (Auto)


 1.0 x10^3/uL


(0.0-1.1) 


 


 





 


Eosinophils # (Auto)


 0.0 x10^3/uL


(0.0-0.7) 


 


 





 


Basophils # (Auto)


 0.0 x10^3/uL


(0.0-0.2) 


 


 





 


Sodium Level


 131 mmol/L


(136-145) 


 


 





 


Potassium Level


 4.2 mmol/L


(3.5-5.1) 


 


 





 


Chloride Level


 100 mmol/L


() 


 


 





 


Carbon Dioxide Level


 21 mmol/L


(21-32) 


 


 





 


Anion Gap 10 (6-14)    


 


Blood Urea Nitrogen


 46 mg/dL


(8-26) 


 


 





 


Creatinine


 7.9 mg/dL


(0.7-1.3) 


 


 





 


Estimated GFR


(Cockcroft-Gault) 8.7 


 


 


 





 


Glucose Level


 287 mg/dL


(70-99) 


 


 





 


Calcium Level


 6.1 mg/dL


(8.5-10.1) 


 


 





 


Glucose (Fingerstick)


 


 256 mg/dL


(70-99) 


 














Assessment and Plan


Assessmemt and Plan


Problems


Medical Problems:


(1) CHF (congestive heart failure)


Status: Acute  





(2) Chronic kidney disease


Status: Acute  





(3) Diabetes


Status: Acute  





(4) Sepsis


Status: Acute  





(5) Severe sepsis


Status: Acute  











Comment


Review of Relevant


I have reviewed the following items sean (where applicable) has been applied.


Labs





Laboratory Tests








Test


 1/5/20


12:05 1/5/20


16:49 1/5/20


19:10 1/5/20


20:33


 


Glucose (Fingerstick)


 135 mg/dL


(70-99) 133 mg/dL


(70-99) 


 174 mg/dL


(70-99)


 


White Blood Count


 


 


 25.2 x10^3/uL


(4.0-11.0) 





 


Red Blood Count


 


 


 4.41 x10^6/uL


(4.30-5.70) 





 


Hemoglobin


 


 


 11.8 g/dL


(13.0-17.5) 





 


Hematocrit


 


 


 37.2 %


(39.0-53.0) 





 


Mean Corpuscular Volume   84 fL ()  


 


Mean Corpuscular Hemoglobin   27 pg (25-35)  


 


Mean Corpuscular Hemoglobin


Concent 


 


 32 g/dL


(31-37) 





 


Red Cell Distribution Width


 


 


 15.6 %


(11.5-14.5) 





 


Platelet Count


 


 


 435 x10^3/uL


(140-400) 





 


Neutrophils (%) (Auto)   87 % (31-73)  


 


Lymphocytes (%) (Auto)   5 % (24-48)  


 


Monocytes (%) (Auto)   7 % (0-9)  


 


Eosinophils (%) (Auto)   1 % (0-3)  


 


Basophils (%) (Auto)   0 % (0-3)  


 


Neutrophils # (Auto)


 


 


 21.9 x10^3/uL


(1.8-7.7) 





 


Lymphocytes # (Auto)


 


 


 1.2 x10^3/uL


(1.0-4.8) 





 


Monocytes # (Auto)


 


 


 1.8 x10^3/uL


(0.0-1.1) 





 


Eosinophils # (Auto)


 


 


 0.2 x10^3/uL


(0.0-0.7) 





 


Basophils # (Auto)


 


 


 0.1 x10^3/uL


(0.0-0.2) 





 


Sodium Level


 


 


 129 mmol/L


(136-145) 





 


Potassium Level


 


 


 4.1 mmol/L


(3.5-5.1) 





 


Chloride Level


 


 


 96 mmol/L


() 





 


Carbon Dioxide Level


 


 


 23 mmol/L


(21-32) 





 


Anion Gap   10 (6-14)  


 


Blood Urea Nitrogen


 


 


 48 mg/dL


(8-26) 





 


Creatinine


 


 


 8.7 mg/dL


(0.7-1.3) 





 


Estimated GFR


(Cockcroft-Gault) 


 


 7.8 


 





 


Glucose Level


 


 


 179 mg/dL


(70-99) 





 


Calcium Level


 


 


 6.6 mg/dL


(8.5-10.1) 





 


Test


 1/6/20


07:50 1/6/20


08:03 1/6/20


15:14 1/6/20


17:10


 


White Blood Count


 20.6 x10^3/uL


(4.0-11.0) 


 


 





 


Red Blood Count


 4.40 x10^6/uL


(4.30-5.70) 


 


 





 


Hemoglobin


 11.6 g/dL


(13.0-17.5) 


 


 





 


Hematocrit


 37.0 %


(39.0-53.0) 


 


 





 


Mean Corpuscular Volume 84 fL ()    


 


Mean Corpuscular Hemoglobin 26 pg (25-35)    


 


Mean Corpuscular Hemoglobin


Concent 31 g/dL


(31-37) 


 


 





 


Red Cell Distribution Width


 15.6 %


(11.5-14.5) 


 


 





 


Platelet Count


 458 x10^3/uL


(140-400) 


 


 





 


Neutrophils (%) (Auto) 89 % (31-73)    


 


Lymphocytes (%) (Auto) 4 % (24-48)    


 


Monocytes (%) (Auto) 6 % (0-9)    


 


Eosinophils (%) (Auto) 1 % (0-3)    


 


Basophils (%) (Auto) 1 % (0-3)    


 


Neutrophils # (Auto)


 18.2 x10^3/uL


(1.8-7.7) 


 


 





 


Lymphocytes # (Auto)


 0.8 x10^3/uL


(1.0-4.8) 


 


 





 


Monocytes # (Auto)


 1.2 x10^3/uL


(0.0-1.1) 


 


 





 


Eosinophils # (Auto)


 0.2 x10^3/uL


(0.0-0.7) 


 


 





 


Basophils # (Auto)


 0.1 x10^3/uL


(0.0-0.2) 


 


 





 


Sodium Level


 130 mmol/L


(136-145) 


 


 





 


Potassium Level


 4.2 mmol/L


(3.5-5.1) 


 


 





 


Chloride Level


 97 mmol/L


() 


 


 





 


Carbon Dioxide Level


 23 mmol/L


(21-32) 


 


 





 


Anion Gap 10 (6-14)    


 


Blood Urea Nitrogen


 45 mg/dL


(8-26) 


 


 





 


Creatinine


 8.6 mg/dL


(0.7-1.3) 


 


 





 


Estimated GFR


(Cockcroft-Gault) 7.9 


 


 


 





 


Glucose Level


 247 mg/dL


(70-99) 


 


 





 


Calcium Level


 6.6 mg/dL


(8.5-10.1) 


 


 





 


Glucose (Fingerstick)


 


 190 mg/dL


(70-99) 236 mg/dL


(70-99) 214 mg/dL


(70-99)


 


Test


 1/6/20


18:10 1/6/20


19:43 1/7/20


06:00 1/7/20


08:35


 


White Blood Count


 23.2 x10^3/uL


(4.0-11.0) 


 21.3 x10^3/uL


(4.0-11.0) 





 


Red Blood Count


 2.87 x10^6/uL


(4.30-5.70) 


 3.26 x10^6/uL


(4.30-5.70) 





 


Hemoglobin


 7.7 g/dL


(13.0-17.5) 


 8.9 g/dL


(13.0-17.5) 





 


Hematocrit


 23.8 %


(39.0-53.0) 


 27.4 %


(39.0-53.0) 





 


Mean Corpuscular Volume 83 fL ()   84 fL ()  


 


Mean Corpuscular Hemoglobin 27 pg (25-35)   27 pg (25-35)  


 


Mean Corpuscular Hemoglobin


Concent 32 g/dL


(31-37) 


 32 g/dL


(31-37) 





 


Red Cell Distribution Width


 15.3 %


(11.5-14.5) 


 15.0 %


(11.5-14.5) 





 


Platelet Count


 375 x10^3/uL


(140-400) 


 373 x10^3/uL


(140-400) 





 


Glucose (Fingerstick)


 


 150 mg/dL


(70-99) 


 256 mg/dL


(70-99)


 


Neutrophils (%) (Auto)   91 % (31-73)  


 


Lymphocytes (%) (Auto)   4 % (24-48)  


 


Monocytes (%) (Auto)   5 % (0-9)  


 


Eosinophils (%) (Auto)   0 % (0-3)  


 


Basophils (%) (Auto)   0 % (0-3)  


 


Neutrophils # (Auto)


 


 


 19.4 x10^3/uL


(1.8-7.7) 





 


Lymphocytes # (Auto)


 


 


 0.8 x10^3/uL


(1.0-4.8) 





 


Monocytes # (Auto)


 


 


 1.0 x10^3/uL


(0.0-1.1) 





 


Eosinophils # (Auto)


 


 


 0.0 x10^3/uL


(0.0-0.7) 





 


Basophils # (Auto)


 


 


 0.0 x10^3/uL


(0.0-0.2) 





 


Sodium Level


 


 


 131 mmol/L


(136-145) 





 


Potassium Level


 


 


 4.2 mmol/L


(3.5-5.1) 





 


Chloride Level


 


 


 100 mmol/L


() 





 


Carbon Dioxide Level


 


 


 21 mmol/L


(21-32) 





 


Anion Gap   10 (6-14)  


 


Blood Urea Nitrogen


 


 


 46 mg/dL


(8-26) 





 


Creatinine


 


 


 7.9 mg/dL


(0.7-1.3) 





 


Estimated GFR


(Cockcroft-Gault) 


 


 8.7 


 





 


Glucose Level


 


 


 287 mg/dL


(70-99) 





 


Calcium Level


 


 


 6.1 mg/dL


(8.5-10.1) 











Laboratory Tests








Test


 1/6/20


15:14 1/6/20


17:10 1/6/20


18:10 1/6/20


19:43


 


Glucose (Fingerstick)


 236 mg/dL


(70-99) 214 mg/dL


(70-99) 


 150 mg/dL


(70-99)


 


White Blood Count


 


 


 23.2 x10^3/uL


(4.0-11.0) 





 


Red Blood Count


 


 


 2.87 x10^6/uL


(4.30-5.70) 





 


Hemoglobin


 


 


 7.7 g/dL


(13.0-17.5) 





 


Hematocrit


 


 


 23.8 %


(39.0-53.0) 





 


Mean Corpuscular Volume   83 fL ()  


 


Mean Corpuscular Hemoglobin   27 pg (25-35)  


 


Mean Corpuscular Hemoglobin


Concent 


 


 32 g/dL


(31-37) 





 


Red Cell Distribution Width


 


 


 15.3 %


(11.5-14.5) 





 


Platelet Count


 


 


 375 x10^3/uL


(140-400) 





 


Test


 1/7/20


06:00 1/7/20


08:35 


 





 


White Blood Count


 21.3 x10^3/uL


(4.0-11.0) 


 


 





 


Red Blood Count


 3.26 x10^6/uL


(4.30-5.70) 


 


 





 


Hemoglobin


 8.9 g/dL


(13.0-17.5) 


 


 





 


Hematocrit


 27.4 %


(39.0-53.0) 


 


 





 


Mean Corpuscular Volume 84 fL ()    


 


Mean Corpuscular Hemoglobin 27 pg (25-35)    


 


Mean Corpuscular Hemoglobin


Concent 32 g/dL


(31-37) 


 


 





 


Red Cell Distribution Width


 15.0 %


(11.5-14.5) 


 


 





 


Platelet Count


 373 x10^3/uL


(140-400) 


 


 





 


Neutrophils (%) (Auto) 91 % (31-73)    


 


Lymphocytes (%) (Auto) 4 % (24-48)    


 


Monocytes (%) (Auto) 5 % (0-9)    


 


Eosinophils (%) (Auto) 0 % (0-3)    


 


Basophils (%) (Auto) 0 % (0-3)    


 


Neutrophils # (Auto)


 19.4 x10^3/uL


(1.8-7.7) 


 


 





 


Lymphocytes # (Auto)


 0.8 x10^3/uL


(1.0-4.8) 


 


 





 


Monocytes # (Auto)


 1.0 x10^3/uL


(0.0-1.1) 


 


 





 


Eosinophils # (Auto)


 0.0 x10^3/uL


(0.0-0.7) 


 


 





 


Basophils # (Auto)


 0.0 x10^3/uL


(0.0-0.2) 


 


 





 


Sodium Level


 131 mmol/L


(136-145) 


 


 





 


Potassium Level


 4.2 mmol/L


(3.5-5.1) 


 


 





 


Chloride Level


 100 mmol/L


() 


 


 





 


Carbon Dioxide Level


 21 mmol/L


(21-32) 


 


 





 


Anion Gap 10 (6-14)    


 


Blood Urea Nitrogen


 46 mg/dL


(8-26) 


 


 





 


Creatinine


 7.9 mg/dL


(0.7-1.3) 


 


 





 


Estimated GFR


(Cockcroft-Gault) 8.7 


 


 


 





 


Glucose Level


 287 mg/dL


(70-99) 


 


 





 


Calcium Level


 6.1 mg/dL


(8.5-10.1) 


 


 





 


Glucose (Fingerstick)


 


 256 mg/dL


(70-99) 


 











Microbiology


1/4/20 Blood Culture - Preliminary, Resulted


         NO GROWTH AFTER 2 DAYS


12/31/19 Anaerobic/Aerobic Culture - Final, Complete


           


12/31/19 Anaerobic Culture Result 1 (ANTHONY) - Final, Complete


           


12/31/19 Aerobic Culture - Final, Complete


           


12/31/19 Aerobic Culture Result 1 (ANTHONY) - Final, Complete


           


12/31/19 Gram Stain - Final, Complete


           


12/31/19 Gram Stain Result 1 (ANTHONY) - Final, Complete


           


12/31/19 Gram Stain Result 2 (ANTHONY) - Final, Complete


           


12/31/19 Gram Stain Result 3 (ANTHONY) - Final, Complete


           


12/31/19 Gram Stain Result 4 (ANTHONY) - Final, Complete


Medications





Current Medications


Clindamycin Phosphate 50 ml @  100 mls/hr 1X  ONCE IV  Last administered on 

12/30/19at 09:53;  Start 12/30/19 at 09:30;  Stop 12/30/19 at 09:59;  Status DC


Sodium Chloride 500 ml @  500 mls/hr 1X  ONCE IV  Last administered on 

12/30/19at 10:11;  Start 12/30/19 at 10:15;  Stop 12/30/19 at 11:14;  Status DC


Ondansetron HCl (Zofran) 4 mg 1X  ONCE IVP  Last administered on 12/30/19at 

10:34;  Start 12/30/19 at 10:30;  Stop 12/30/19 at 10:31;  Status DC


Famotidine (Pepcid Vial) 20 mg 1X  ONCE IVP  Last administered on 12/30/19at 

10:34;  Start 12/30/19 at 10:30;  Stop 12/30/19 at 10:31;  Status DC


Vancomycin HCl 2 gm/Sodium Chloride 500 ml @  250 mls/hr 1X  ONCE IV  Last 

administered on 12/30/19at 10:34;  Start 12/30/19 at 10:45;  Stop 12/30/19 at 

12:44;  Status DC


Ondansetron HCl (Zofran) 4 mg PRN Q8HRS  PRN IV NAUSEA/VOMITING Last 

administered on 12/31/19at 08:56;  Start 12/30/19 at 10:45;  Stop 12/31/19 at 

10:44;  Status DC


Fentanyl Citrate (Fentanyl 2ml Vial) 50 mcg PRN Q2HRS  PRN IV PAIN Last 

administered on 1/7/20at 02:30;  Start 12/30/19 at 10:45


Acetaminophen (Tylenol) 650 mg PRN Q4HRS  PRN PO FEVER;  Start 12/30/19 at 

10:45;  Stop 12/31/19 at 10:44;  Status DC


Insulin Human Lispro (HumaLOG) 0-5 UNITS TIDWMEALS SQ  Last administered on 

1/3/20at 18:01;  Start 12/30/19 at 12:00;  Stop 1/4/20 at 09:18;  Status DC


Dextrose (Dextrose 50%-Water Syringe) 12.5 gm PRN Q15MIN  PRN IV SEE COMMENTS 

Last administered on 1/1/20at 17:48;  Start 12/30/19 at 10:45;  Stop 1/4/20 at 

09:17;  Status DC


Dextrose (Iv Dextrose 5%) 250 ml PRN Q15MIN  PRN IV SEE COMMENTS;  Start 

12/30/19 at 10:45;  Stop 1/5/20 at 00:47;  Status DC


Sodium Chloride 500 ml @  500 mls/hr 1X  ONCE IV  Last administered on 

12/30/19at 12:13;  Start 12/30/19 at 12:15;  Stop 12/30/19 at 13:14;  Status DC


Magnesium Sulfate 50 ml @ 25 mls/hr 1X  ONCE IV  Last administered on 12/30/19at

14:49;  Start 12/30/19 at 14:00;  Stop 12/30/19 at 15:59;  Status DC


Aspirin (Children'S Aspirin) 81 mg DAILYWBKFT PO  Last administered on 1/7/20at 

08:37;  Start 12/31/19 at 08:00


Furosemide (Lasix) 80 mg DAILY PO  Last administered on 1/1/20at 08:38;  Start 

12/31/19 at 09:00;  Stop 1/3/20 at 08:09;  Status DC


Loperamide HCl (Imodium) 2 mg PRN Q15MIN  PRN PO DIARRHEA Last administered on 

1/3/20at 14:04;  Start 12/30/19 at 15:30


Pantoprazole Sodium (Protonix) 40 mg DAILYAC PO  Last administered on 1/7/20at 

08:36;  Start 12/31/19 at 07:30


Potassium Chloride (Klor-Con) 20 meq DAILY PO ;  Start 12/31/19 at 09:00;  Stop 

12/31/19 at 14:20;  Status DC


Promethazine HCl (Phenergan) 12.5 mg PRN Q6HRS  PRN PO NAUSEA/VOMITING, 1ST 

CHOICE Last administered on 1/5/20at 18:23;  Start 12/30/19 at 15:30


Sacubitril/ Valsartan (Entresto 24 Mg-26 Mg) 1 tab BID PO  Last administered on 

1/1/20at 08:37;  Start 12/30/19 at 21:00


Metolazone (Zaroxolyn) 5 mg DAILY PO  Last administered on 1/1/20at 08:36;  

Start 12/31/19 at 09:00;  Stop 1/3/20 at 08:09;  Status DC


Potassium Chloride (Klor-Con) 20 meq 1X  ONCE PO  Last administered on 

12/30/19at 16:57;  Start 12/30/19 at 16:15;  Stop 12/30/19 at 16:26;  Status DC


Potassium Chloride (Klor-Con) 20 meq DAILYWBKFT PO  Last administered on 

1/1/20at 08:38;  Start 12/31/19 at 08:00;  Stop 1/1/20 at 15:14;  Status DC


Sodium Chloride 500 ml @  500 mls/hr 1X  ONCE IV  Last administered on 

12/31/19at 01:11;  Start 12/31/19 at 01:00;  Stop 12/31/19 at 01:59;  Status DC


Insulin Human Lispro (HumaLOG) 10 units 1X  ONCE SQ ;  Start 12/31/19 at 11:15; 

Stop 12/31/19 at 11:16;  Status DC


Meropenem 500 mg/ Sodium Chloride 50 ml @  100 mls/hr Q12HR IV  Last 

administered on 1/7/20at 08:38;  Start 12/31/19 at 15:00


Linezolid/Dextrose 300 ml @  300 mls/hr Q12HR IV  Last administered on 1/1/20at 

08:57;  Start 12/31/19 at 21:00;  Stop 1/1/20 at 13:49;  Status DC


Micafungin Sodium 100 mg/Dextrose 100 ml @  100 mls/hr Q24H IV  Last 

administered on 1/6/20at 18:19;  Start 12/31/19 at 15:30;  Stop 1/7/20 at 07:52;

 Status DC


Non-Formulary Medication 2 ea TIDWMEALS PO  Last administered on 1/7/20at 08:37;

 Start 12/31/19 at 17:00


Non-Formulary Medication 30 ea DAILY SQ  Last administered on 1/7/20at 08:39;  

Start 1/1/20 at 09:00


Propofol 20 ml @ As Directed STK-MED ONCE IV ;  Start 12/31/19 at 17:45;  Stop 

12/31/19 at 17:45;  Status DC


Lidocaine HCl (Lidocaine Pf 2% Vial) 5 ml STK-MED ONCE .ROUTE ;  Start 12/31/19 

at 17:45;  Stop 12/31/19 at 17:45;  Status DC


Dexamethasone Sodium Phosphate (Decadron) 4 mg STK-MED ONCE .ROUTE ;  Start 

12/31/19 at 17:45;  Stop 12/31/19 at 17:45;  Status DC


Ondansetron HCl (Zofran) 4 mg STK-MED ONCE .ROUTE ;  Start 12/31/19 at 17:45;  

Stop 12/31/19 at 17:45;  Status DC


Fentanyl Citrate (Fentanyl 2ml Vial) 100 mcg STK-MED ONCE .ROUTE ;  Start 

12/31/19 at 17:46;  Stop 12/31/19 at 17:46;  Status DC


Famotidine (Pepcid Vial) 20 mg STK-MED ONCE .ROUTE ;  Start 12/31/19 at 18:44;  

Stop 12/31/19 at 18:45;  Status DC


Citric Acid/ Sodium Citrate (Bicitra) 30 ml 1X  STAT PO  Last administered on 

12/31/19at 18:44;  Start 12/31/19 at 18:44;  Stop 12/31/19 at 18:53;  Status DC


Famotidine (Pepcid Vial) 20 mg 1X  ONCE IVP  Last administered on 12/31/19at 

18:48;  Start 12/31/19 at 19:00;  Stop 12/31/19 at 19:01;  Status DC


Succinylcholine Chloride (Anectine) 200 mg STK-MED ONCE .ROUTE ;  Start 12/31/19

at 20:20;  Stop 12/31/19 at 20:20;  Status DC


Phenylephrine HCl (PHENYLEPHRINE in 0.9% NACL PF) 1 mg STK-MED ONCE IV ;  Start 

12/31/19 at 20:22;  Stop 12/31/19 at 20:22;  Status DC


Sevoflurane (Ultane) 30 ml STK-MED ONCE IH ;  Start 12/31/19 at 21:01;  Stop 

12/31/19 at 21:01;  Status DC


Magnesium Sulfate 50 ml @ 25 mls/hr 1X  ONCE IV  Last administered on 1/1/20at 

11:40;  Start 1/1/20 at 10:00;  Stop 1/1/20 at 11:59;  Status DC


Insulin Human Lispro (HumaLOG) 12 units 1X SQ ;  Start 1/1/20 at 09:45;  Stop 

1/1/20 at 10:32;  Status DC


Insulin Human Lispro (HumaLOG) 12 units 1X  ONCE SQ  Last administered on 

1/1/20at 10:33;  Start 1/1/20 at 10:45;  Stop 1/1/20 at 10:46;  Status DC


Daptomycin 500 mg/ Sodium Chloride 50 ml @  100 mls/hr Q48H IV ;  Start 1/1/20 

at 16:00;  Stop 1/1/20 at 17:18;  Status DC


Potassium Chloride (Klor-Con) 20 meq BIDWMEALS PO  Last administered on 1/7/20at

08:37;  Start 1/1/20 at 17:00


Daptomycin 500 mg/ Sodium Chloride 50 ml @  100 mls/hr Q48H IV  Last 

administered on 1/5/20at 20:53;  Start 1/1/20 at 21:00;  Stop 1/7/20 at 07:52;  

Status DC


Sodium Chloride 500 ml @  500 mls/hr 1X  ONCE IV  Last administered on 1/2/20at 

08:41;  Start 1/2/20 at 08:45;  Stop 1/2/20 at 09:44;  Status DC


Sodium Chloride 1,000 ml @  75 mls/hr [id number] IV  Last administered on 1/4/20at 

07:42;  Start 1/2/20 at 08:45;  Stop 1/4/20 at 12:54;  Status DC


Levothyroxine Sodium (Synthroid) 25 mcg DAILY06 PO  Last administered on 

1/7/20at 06:26;  Start 1/3/20 at 06:00


Atorvastatin Calcium (Lipitor) 10 mg QHS PO  Last administered on 1/6/20at 

20:55;  Start 1/2/20 at 21:00


Sodium Hypochlorite (Dakin'S 1/4 Strength) 1 carley PRN DAILY  PRN TP SEE COMMENTS;

 Start 1/2/20 at 17:00


Al Hydroxide/Mg Hydroxide (Mylanta Plus Xs) 30 ml PRN Q8HRS  PRN PO HEARTBURN / 

GAS Last administered on 1/4/20at 20:16;  Start 1/3/20 at 00:30


Sodium Chloride 500 ml @  500 mls/hr 1X  ONCE IV  Last administered on 1/3/20at 

11:27;  Start 1/3/20 at 11:30;  Stop 1/3/20 at 12:29;  Status DC


Linezolid (Zyvox) 600 mg BID PO  Last administered on 1/7/20at 09:22;  Start 

1/3/20 at 21:00


Lactobacillus Rhamnosus (Culturelle) 1 cap BID PO  Last administered on 1/7/20at

08:38;  Start 1/3/20 at 21:00


Ondansetron HCl (Zofran) 4 mg PRN Q6HRS  PRN IVP NAUSEA/VOMITING Last 

administered on 1/6/20at 00:33;  Start 1/3/20 at 20:30


Ondansetron HCl (Zofran) 4 mg PRN Q6HRS  PRN IV NAUSEA/VOMITING;  Start 1/4/20 

at 08:45;  Stop 1/4/20 at 12:54;  Status DC


Fentanyl Citrate (Fentanyl 2ml Vial) 25 mcg PRN Q5MIN  PRN IV MILD PAIN 1-3;  

Start 1/4/20 at 08:45;  Stop 1/4/20 at 12:54;  Status DC


Fentanyl Citrate (Fentanyl 2ml Vial) 50 mcg PRN Q5MIN  PRN IV MODERATE TO SEVERE

PAIN;  Start 1/4/20 at 08:45;  Stop 1/4/20 at 12:54;  Status DC


Morphine Sulfate (Morphine Sulfate) 1 mg PRN Q10MIN  PRN IV SEVERE PAIN 7-10;  

Start 1/4/20 at 08:45;  Stop 1/4/20 at 12:54;  Status DC


Ringer's Solution 1,000 ml @  30 mls/hr Q24H IV ;  Start 1/4/20 at 08:33;  Stop 

1/4/20 at 12:54;  Status DC


Hydromorphone HCl (Dilaudid) 0.5 mg PRN Q10MIN  PRN IV SEV PAIN, Second choice; 

Start 1/4/20 at 08:45;  Stop 1/4/20 at 12:54;  Status DC


Prochlorperazine Edisylate (Compazine) 5 mg PACU PRN  PRN IV NAUSEA, MRX1;  

Start 1/4/20 at 08:45;  Stop 1/4/20 at 12:54;  Status DC


Insulin Human Lispro (HumaLOG VIAL for OP,RR ONLY) 0-10 units PRN Q1HR  PRN SQ 

PER PROTOCOL;  Start 1/4/20 at 08:45;  Stop 1/4/20 at 09:13;  Status DC


Insulin Human Lispro (HumaLOG VIAL for OP,RR ONLY) 8 unit 1X  ONCE SQ ;  Start 

1/4/20 at 08:45;  Stop 1/4/20 at 08:46;  Status DC


Levothyroxine Sodium (Synthroid) 50 mcg DAILY06 PO ;  Start 1/5/20 at 06:00;  

Status Cancel


Bupivacaine HCl/ Epinephrine Bitart (Sensorcaine-Epi 0.25%-1:094277 Mpf) 30 ml 

1X  ONCE INJ ;  Start 1/4/20 at 09:30;  Stop 1/4/20 at 09:31;  Status DC


Insulin Human Lispro (HumaLOG) 0-7 UNITS TIDWMEALS SQ  Last administered on 

1/7/20at 08:39;  Start 1/4/20 at 12:00


Dextrose (Dextrose 50%-Water Syringe) 12.5 gm PRN Q15MIN  PRN IV SEE COMMENTS;  

Start 1/4/20 at 09:15


Dextrose (Iv Dextrose 5%) 250 ml PRN Q15MIN  PRN IV SEE COMMENTS Last ad

ministered on 1/5/20at 01:11;  Start 1/4/20 at 09:15


Sodium Chloride 1,000 ml @  150 mls/hr Q6H40M IV  Last administered on 1/7/20at 

08:36;  Start 1/4/20 at 12:45


Sodium Chloride 500 ml @  500 mls/hr 1X  ONCE IV  Last administered on 1/4/20at 

10:00;  Start 1/4/20 at 12:45;  Stop 1/4/20 at 13:44;  Status DC


Prochlorperazine (Compazine) 25 mg PRN Q12HR  PRN OR NAUSEA/VOMITING 3RD CHOICE;

 Start 1/5/20 at 00:15


Cefazolin Sodium/ Dextrose 50 ml @  100 mls/hr 1X PREOP  PRN IV SEE COMMENTS;  

Start 1/5/20 at 15:15;  Stop 1/6/20 at 09:34;  Status DC


Dexamethasone Sodium Phosphate (Decadron) 4 mg STK-MED ONCE .ROUTE ;  Start 

1/6/20 at 10:33;  Stop 1/6/20 at 10:33;  Status DC


Famotidine (Pepcid Vial) 20 mg STK-MED ONCE .ROUTE ;  Start 1/6/20 at 10:33;  

Stop 1/6/20 at 10:33;  Status DC


Lidocaine HCl (Lidocaine Pf 2% Vial) 5 ml STK-MED ONCE .ROUTE ;  Start 1/6/20 at

10:33;  Stop 1/6/20 at 10:33;  Status DC


Ondansetron HCl (Zofran) 4 mg STK-MED ONCE .ROUTE ;  Start 1/6/20 at 10:33;  

Stop 1/6/20 at 10:33;  Status DC


Etomidate (Amidate) 20 mg STK-MED ONCE IV ;  Start 1/6/20 at 10:33;  Stop 1/6/20

at 10:33;  Status DC


Bupivacaine HCl/ Epinephrine Bitart (Sensorcaine-Epi 0.25%-1:183460 Mpf) 30 ml 

1X  ONCE INJ  Last administered on 1/6/20at 12:51;  Start 1/6/20 at 12:00;  Stop

1/6/20 at 12:01;  Status DC


Rocuronium Bromide (Zemuron) 50 mg STK-MED ONCE .ROUTE ;  Start 1/6/20 at 10:38;

 Stop 1/6/20 at 10:38;  Status DC


Fentanyl Citrate (Fentanyl 2ml Vial) 100 mcg STK-MED ONCE .ROUTE ;  Start 1/6/20

at 10:38;  Stop 1/6/20 at 10:39;  Status DC


Cefazolin Sodium/ Dextrose 50 ml @  100 mls/hr 1X PREOP  PRN IV PRIOR TO 

PROCEDURE;  Start 1/6/20 at 11:00;  Stop 1/6/20 at 19:00;  Status DC


Ketamine HCl (Ketamine) 50 mg STK-MED ONCE .ROUTE ;  Start 1/6/20 at 12:15;  

Stop 1/6/20 at 12:16;  Status DC


Vasopressin (Vasostrict) 20 unit STK-MED ONCE .ROUTE ;  Start 1/6/20 at 13:06;  

Stop 1/6/20 at 13:06;  Status DC


Sevoflurane (Ultane) 90 ml STK-MED ONCE IH ;  Start 1/6/20 at 13:41;  Stop 

1/6/20 at 13:41;  Status DC


Glycopyrrolate (Robinul) 1 mg STK-MED ONCE .ROUTE ;  Start 1/6/20 at 13:44;  

Stop 1/6/20 at 13:44;  Status DC


Albumin Human 500 ml @  As Directed STK-MED ONCE IV ;  Start 1/6/20 at 13:44;  

Stop 1/6/20 at 13:44;  Status DC


Neostigmine Bromide (Neostigmine Methylsulfate) 5 mg STK-MED ONCE .ROUTE ;  

Start 1/6/20 at 13:44;  Stop 1/6/20 at 13:45;  Status DC


Phenylephrine HCl (PHENYLEPHRINE in 0.9% NACL PF) 1 mg STK-MED ONCE IV ;  Start 

1/6/20 at 13:44;  Stop 1/6/20 at 13:45;  Status DC


Prochlorperazine Edisylate (Compazine) 10 mg STK-MED ONCE .ROUTE ;  Start 1/6/20

at 15:00;  Stop 1/6/20 at 15:00;  Status DC


Prochlorperazine Edisylate (Compazine) 5 mg 1X  ONCE IV  Last administered on 

1/6/20at 15:15;  Start 1/6/20 at 15:15;  Stop 1/6/20 at 15:16;  Status DC


Insulin Human Lispro (HumaLOG VIAL for OP,RR ONLY) 0-10 units PRN Q1HR  PRN SQ 

PER PROTOCOL Last administered on 1/6/20at 17:15;  Start 1/6/20 at 15:30;  Stop 

1/6/20 at 19:00;  Status DC


Peritoneal Dialysis Solution (Continuous Cycling Peritoneal Dialysis Pt) 1 each 

PRN DAILY  PRN MC SEE COMMENTS;  Start 1/6/20 at 15:45


Albumin Human 500 ml @  125 mls/hr PRN DAILY  PRN IV hypotension Last 

administered on 1/7/20at 06:27;  Start 1/6/20 at 22:00





Active Scripts


Active


Ssd (Silver Sulfadiazine) 25 Gm Cream..g. 1 Carley TP DAILY 10 Days


Mupirocin Ointment (Mupirocin) 22 Gm Oint...g. 1 Carley TP BID 10 Days


Humalog (Insulin Lispro) 100 Unit/1 Ml Insuln.pen 10 Units SQ TIDWMEALS 30 Days


Lantus Solostar (Insulin Glargine,Hum.rec.anlog) 100 Unit/1 Ml Insuln.pen 30 

Units SQ QHS 30 Days


Metoclopramide Hcl 5 Mg Tablet 5 Mg PO PRN BFRMEALHC PRN


Culturelle (Lactobacillus Rhamnosus Gg) 1 Each Cap.sprink 1 Cap PO BID 14 Days


Loperamide (Loperamide Hcl) 2 Mg Capsule 2 Mg PO PRN Q15MIN PRN 10 Days


Tramadol Hcl 50 Mg Tablet 50 Mg PO PRN Q6HRS PRN


Baclofen 10 Mg Tablet 10 Mg PO PRN TID PRN 14 Days


Nystatin 100,000 Unit/1 Ml Oral.susp 5 Ml SWSW [id number] 30 Days


Promethazine Hcl 12.5 Mg Tablet 12.5 Mg PO PRN Q6HRS PRN 30 Days


Reported


Entresto 24 mg-26 mg Tablet (Sacubitril/Valsartan) 1 Each Tablet 1 Each PO DAILY


Furosemide 80 Mg Tablet 1 Tab PO DAILY


Klor-Con M20 (Potassium Chloride) 20 Meq Tab.er.prt 1 Tab PO DAILY 30 Days


Pantoprazole Sodium  ** (Pantoprazole Sodium) 40 Mg Tablet.dr 40 Mg PO DAILYAC


Alison-Aime Tablet (Folic Acid/Vitamin B Comp W-C) 0.8 Mg Tablet 1 Tab PO DAILY 30

Days


Metolazone 5 Mg Tablet 5 Mg PO DAILY


Vitamin D3 (Cholecalciferol (Vitamin D3)) 5,000 Unit Tablet 1 Tab PO DAILY


Calcium Acetate 667 Mg Tablet 667 Mg PO TIDWMEALS


Renal Caps Softgel (Folic Acid/Vitamin B Comp W-C) 1 Mg Capsule 1 Cap PO DAILY


Tresiba Flextouch U-100 (Insulin Degludec) 100 Unit/1 Ml Insuln.pen 100 Unit SQ 


Furosemide 40 Mg Tablet 1 Tab PO DAILY


Aspirin 81 Mg Tab.chew 81 Mg PO DAILYWBKFT


Vitals/I & O





Vital Sign - Last 24 Hours








 1/6/20 1/6/20 1/6/20 1/6/20





 11:18 11:27 14:04 14:04


 


Temp 98.0 97.8 98.7 





 98.0 97.8 98.7 


 


Pulse 105 103 112 


 


Resp 16 16 18 


 


B/P (MAP) 88/57 (67) 93/63 109/70 


 


Pulse Ox 96 97 100 


 


O2 Delivery Room Air Room Air Simple Mask Mask


 


O2 Flow Rate   10 10


 


    





    





 1/6/20 1/6/20 1/6/20 1/6/20





 14:20 14:35 14:50 15:05


 


Temp 98.7 98.7 98.7 98.7





 98.7 98.7 98.7 98.7


 


Pulse 116 115 102 94


 


Resp 18 19 18 17


 


B/P (MAP) 128/58 117/56 112/52 88/44





  99/69 85/55 


 


Pulse Ox 100 100 99 99


 


O2 Delivery Simple Mask Simple Mask Nasal Cannula Nasal Cannula


 


O2 Flow Rate 10 10 2 2


 


    





    





 1/6/20 1/6/20 1/6/20 1/6/20





 15:20 15:35 15:50 16:05


 


Temp 98.7 98.7 98.7 98.7





 98.7 98.7 98.7 98.7


 


Pulse 96 94 92 90


 


Resp 18 16 17 18


 


B/P (MAP) 81/50 90/42 90/36 94/36





  80/48 78/49 79/44


 


Pulse Ox 99 99 99 99


 


O2 Delivery Nasal Cannula Nasal Cannula Nasal Cannula Nasal Cannula


 


O2 Flow Rate 2 2 2 2


 


    





    





 1/6/20 1/6/20 1/6/20 1/6/20





 16:20 16:35 16:50 17:05


 


Temp 98.7 98.7 98.7 97.7





 98.7 98.7 98.7 97.7


 


Pulse 82 88 90 92


 


Resp 17 18 18 17


 


B/P (MAP) 86/32 88/40 94/44 104/48





 72/43 83/51 78/53 79/55


 


Pulse Ox 99 99 99 100


 


O2 Delivery Nasal Cannula Nasal Cannula Nasal Cannula Nasal Cannula


 


O2 Flow Rate 2 2 2 2


 


    





    





 1/6/20 1/6/20 1/6/20 1/6/20





 17:20 17:45 18:15 20:00


 


Temp 97.7  99.5 





 97.7  99.5 


 


Pulse 88  93 


 


Resp 17  16 


 


B/P (MAP) 100/52  95/50 (65) 





 82/55   


 


Pulse Ox 100  94 


 


O2 Delivery Nasal Cannula Nasal Cannula Nasal Cannula Room Air


 


O2 Flow Rate 2 2 2.0 


 


    





    





 1/6/20 1/6/20 1/6/20 1/6/20





 20:00 21:00 21:00 22:00


 


Temp  96.4  





  96.4  


 


Pulse 90 88 93 88


 


Resp  18  24


 


B/P (MAP) 82/44 (57) 80/48 (59) 85/51 80/40 (53)


 


Pulse Ox  100  100


 


O2 Delivery  Nasal Cannula  Nasal Cannula


 


O2 Flow Rate  2.0  2.0


 


    





    





 1/6/20 1/6/20 1/6/20 1/7/20





 23:00 23:33 23:50 00:00


 


Temp  96.4 96.5 96.4





  96.4 96.5 96.4


 


Pulse 92 88 83 84


 


Resp 22 22 14 24


 


B/P (MAP) 112/54 (73) 83/49 76/35 80/44 (56)


 


Pulse Ox 100   100


 


O2 Delivery Nasal Cannula   Nasal Cannula


 


O2 Flow Rate 2.0   2.0


 


    





    





 1/7/20 1/7/20 1/7/20 1/7/20





 00:00 00:00 01:00 02:00


 


Pulse 84  80 74


 


Resp   18 22


 


B/P (MAP) 80/40 (53)  74/48 (57) 78/42 (54)


 


Pulse Ox   99 97


 


O2 Delivery  Nasal Cannula Nasal Cannula Nasal Cannula


 


O2 Flow Rate  2.0 2.0 2.0





 1/7/20 1/7/20 1/7/20 1/7/20





 02:30 03:00 03:05 04:00


 


Pulse  80  


 


Resp 22 18 22 


 


B/P (MAP)  98/56 (70)  


 


Pulse Ox 97 96 26 


 


O2 Delivery Nasal Cannula Nasal Cannula Nasal Cannula Nasal Cannula


 


O2 Flow Rate  2.0 2.0 2.0





 1/7/20 1/7/20 1/7/20 1/7/20





 04:00 04:00 05:00 06:00


 


Temp  98.9  





  98.9  


 


Pulse 76 76 81 82


 


Resp  22 22 20


 


B/P (MAP) 68/46 (53) 98/56 (70) 92/47 (62) 80/46 (57)


 


Pulse Ox  100 100 100


 


O2 Delivery  Nasal Cannula Nasal Cannula Nasal Cannula


 


O2 Flow Rate  2.0 2.0 2.0














Intake and Output   


 


 1/6/20 1/6/20 1/7/20





 15:00 23:00 07:00


 


Intake Total 550 ml 280 ml 680 ml


 


Output Total 1200 ml  


 


Balance -650 ml 280 ml 680 ml

















SCOTT RAYO MD           Jan 7, 2020 09:49

## 2020-01-07 NOTE — PDOC
Infectious Disease Note


Subjective


Subjective


Comfortable, feeling good says





ROS


ROS


no n/v/d/sob





Vital Sign


Vital Signs





Vital Signs








  Date Time  Temp Pulse Resp B/P (MAP) Pulse Ox O2 Delivery O2 Flow Rate FiO2


 


1/7/20 06:00  82 20 80/46 (57) 100 Nasal Cannula 2.0 


 


1/7/20 04:00 98.9       





 98.9       











Physical Exam


PHYSICAL EXAM


GENERAL: Propped up in bed, alert, relaxed appearance, watching TV 


HEENT:  Oral mucosa moist. No thrush 


NECK:  Supple.  No JVD.


LUNGS:  Clear bilaterally.  No wheezing.


HEART:  S1, S2.


ABDOMEN:  Soft, nontender. PDC 


EXTREMITIES:  Right big toe amputation site approx w/ sutures. Rt BKA


Lateral aspect wound vac in place 


DERMATOLOGIC:  Warm, dry.  No generalized rash.


CENTRAL NERVOUS SYSTEM:  Alert and oriented x 3, grossly nonfocal.


PIV





Labs


Lab





Laboratory Tests








Test


 1/6/20


07:50 1/6/20


08:03 1/6/20


15:14 1/6/20


17:10


 


White Blood Count


 20.6 x10^3/uL


(4.0-11.0) 


 


 





 


Red Blood Count


 4.40 x10^6/uL


(4.30-5.70) 


 


 





 


Hemoglobin


 11.6 g/dL


(13.0-17.5) 


 


 





 


Hematocrit


 37.0 %


(39.0-53.0) 


 


 





 


Mean Corpuscular Volume 84 fL ()    


 


Mean Corpuscular Hemoglobin 26 pg (25-35)    


 


Mean Corpuscular Hemoglobin


Concent 31 g/dL


(31-37) 


 


 





 


Red Cell Distribution Width


 15.6 %


(11.5-14.5) 


 


 





 


Platelet Count


 458 x10^3/uL


(140-400) 


 


 





 


Neutrophils (%) (Auto) 89 % (31-73)    


 


Lymphocytes (%) (Auto) 4 % (24-48)    


 


Monocytes (%) (Auto) 6 % (0-9)    


 


Eosinophils (%) (Auto) 1 % (0-3)    


 


Basophils (%) (Auto) 1 % (0-3)    


 


Neutrophils # (Auto)


 18.2 x10^3/uL


(1.8-7.7) 


 


 





 


Lymphocytes # (Auto)


 0.8 x10^3/uL


(1.0-4.8) 


 


 





 


Monocytes # (Auto)


 1.2 x10^3/uL


(0.0-1.1) 


 


 





 


Eosinophils # (Auto)


 0.2 x10^3/uL


(0.0-0.7) 


 


 





 


Basophils # (Auto)


 0.1 x10^3/uL


(0.0-0.2) 


 


 





 


Sodium Level


 130 mmol/L


(136-145) 


 


 





 


Potassium Level


 4.2 mmol/L


(3.5-5.1) 


 


 





 


Chloride Level


 97 mmol/L


() 


 


 





 


Carbon Dioxide Level


 23 mmol/L


(21-32) 


 


 





 


Anion Gap 10 (6-14)    


 


Blood Urea Nitrogen


 45 mg/dL


(8-26) 


 


 





 


Creatinine


 8.6 mg/dL


(0.7-1.3) 


 


 





 


Estimated GFR


(Cockcroft-Gault) 7.9 


 


 


 





 


Glucose Level


 247 mg/dL


(70-99) 


 


 





 


Calcium Level


 6.6 mg/dL


(8.5-10.1) 


 


 





 


Glucose (Fingerstick)


 


 190 mg/dL


(70-99) 236 mg/dL


(70-99) 214 mg/dL


(70-99)


 


Test


 1/6/20


18:10 1/6/20


19:43 1/7/20


06:00 





 


White Blood Count


 23.2 x10^3/uL


(4.0-11.0) 


 21.3 x10^3/uL


(4.0-11.0) 





 


Red Blood Count


 2.87 x10^6/uL


(4.30-5.70) 


 3.26 x10^6/uL


(4.30-5.70) 





 


Hemoglobin


 7.7 g/dL


(13.0-17.5) 


 8.9 g/dL


(13.0-17.5) 





 


Hematocrit


 23.8 %


(39.0-53.0) 


 27.4 %


(39.0-53.0) 





 


Mean Corpuscular Volume 83 fL ()   84 fL ()  


 


Mean Corpuscular Hemoglobin 27 pg (25-35)   27 pg (25-35)  


 


Mean Corpuscular Hemoglobin


Concent 32 g/dL


(31-37) 


 32 g/dL


(31-37) 





 


Red Cell Distribution Width


 15.3 %


(11.5-14.5) 


 15.0 %


(11.5-14.5) 





 


Platelet Count


 375 x10^3/uL


(140-400) 


 373 x10^3/uL


(140-400) 





 


Glucose (Fingerstick)


 


 150 mg/dL


(70-99) 


 





 


Neutrophils (%) (Auto)   91 % (31-73)  


 


Lymphocytes (%) (Auto)   4 % (24-48)  


 


Monocytes (%) (Auto)   5 % (0-9)  


 


Eosinophils (%) (Auto)   0 % (0-3)  


 


Basophils (%) (Auto)   0 % (0-3)  


 


Neutrophils # (Auto)


 


 


 19.4 x10^3/uL


(1.8-7.7) 





 


Lymphocytes # (Auto)


 


 


 0.8 x10^3/uL


(1.0-4.8) 





 


Monocytes # (Auto)


 


 


 1.0 x10^3/uL


(0.0-1.1) 





 


Eosinophils # (Auto)


 


 


 0.0 x10^3/uL


(0.0-0.7) 





 


Basophils # (Auto)


 


 


 0.0 x10^3/uL


(0.0-0.2) 





 


Sodium Level


 


 


 131 mmol/L


(136-145) 





 


Potassium Level


 


 


 4.2 mmol/L


(3.5-5.1) 





 


Chloride Level


 


 


 100 mmol/L


() 





 


Carbon Dioxide Level


 


 


 21 mmol/L


(21-32) 





 


Anion Gap   10 (6-14)  


 


Blood Urea Nitrogen


 


 


 46 mg/dL


(8-26) 





 


Creatinine


 


 


 7.9 mg/dL


(0.7-1.3) 





 


Estimated GFR


(Cockcroft-Gault) 


 


 8.7 


 





 


Glucose Level


 


 


 287 mg/dL


(70-99) 





 


Calcium Level


 


 


 6.1 mg/dL


(8.5-10.1) 











Micro





Microbiology


1/4/20 Blood Culture - Preliminary, Resulted


         NO GROWTH AFTER 2 DAYS


12/31/19 Anaerobic/Aerobic Culture - Final, Complete


           


12/31/19 Anaerobic Culture Result 1 (ANTHONY) - Final, Complete


           


12/31/19 Aerobic Culture - Final, Complete


           


12/31/19 Aerobic Culture Result 1 (ANTHONY) - Final, Complete


           


12/31/19 Gram Stain - Final, Complete


           


12/31/19 Gram Stain Result 1 (ANTHONY) - Final, Complete


           


12/31/19 Gram Stain Result 2 (ANTHONY) - Final, Complete


           


12/31/19 Gram Stain Result 3 (ANTHONY) - Final, Complete


           


12/31/19 Gram Stain Result 4 (ANTHONY) - Final, Complete





Objective


Assessment


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

oot ulcers. 


   -s/p amputation of right 5th and great toe, 12/31. GPC, GNR, GPR on gram 

stain. culture no growth so far,,, now BKA 


GPC bacteremia POA source right foot infection 


Leucocytosis - trending down


Diabetes mellitus with neuropathy poorly controlled


End-stage renal disease, on peritoneal dialysis.


Hypertension.


Pacemaker in place.


Hypotension s/p fluid bolus





Plan


Plan of Care


cont zyvox and wilfrid, d/c ashleigh and dapto


Probiotics 


f/u cultures 


Repeat BC 1/2/2020, neg so far








Optimal dm control











MACHO HARDWICK MD                Jan 7, 2020 07:52

## 2020-01-08 VITALS — SYSTOLIC BLOOD PRESSURE: 94 MMHG | DIASTOLIC BLOOD PRESSURE: 64 MMHG

## 2020-01-08 VITALS — DIASTOLIC BLOOD PRESSURE: 64 MMHG | SYSTOLIC BLOOD PRESSURE: 94 MMHG

## 2020-01-08 VITALS — SYSTOLIC BLOOD PRESSURE: 106 MMHG | DIASTOLIC BLOOD PRESSURE: 52 MMHG

## 2020-01-08 VITALS — DIASTOLIC BLOOD PRESSURE: 78 MMHG | SYSTOLIC BLOOD PRESSURE: 112 MMHG

## 2020-01-08 VITALS — SYSTOLIC BLOOD PRESSURE: 98 MMHG | DIASTOLIC BLOOD PRESSURE: 76 MMHG

## 2020-01-08 VITALS — DIASTOLIC BLOOD PRESSURE: 58 MMHG | SYSTOLIC BLOOD PRESSURE: 98 MMHG

## 2020-01-08 VITALS — SYSTOLIC BLOOD PRESSURE: 98 MMHG | DIASTOLIC BLOOD PRESSURE: 58 MMHG

## 2020-01-08 VITALS — SYSTOLIC BLOOD PRESSURE: 84 MMHG | DIASTOLIC BLOOD PRESSURE: 74 MMHG

## 2020-01-08 VITALS — DIASTOLIC BLOOD PRESSURE: 69 MMHG | SYSTOLIC BLOOD PRESSURE: 93 MMHG

## 2020-01-08 VITALS — SYSTOLIC BLOOD PRESSURE: 60 MMHG | DIASTOLIC BLOOD PRESSURE: 46 MMHG

## 2020-01-08 VITALS — DIASTOLIC BLOOD PRESSURE: 57 MMHG | SYSTOLIC BLOOD PRESSURE: 90 MMHG

## 2020-01-08 VITALS — DIASTOLIC BLOOD PRESSURE: 44 MMHG | SYSTOLIC BLOOD PRESSURE: 98 MMHG

## 2020-01-08 VITALS — SYSTOLIC BLOOD PRESSURE: 80 MMHG | DIASTOLIC BLOOD PRESSURE: 58 MMHG

## 2020-01-08 VITALS — DIASTOLIC BLOOD PRESSURE: 56 MMHG | SYSTOLIC BLOOD PRESSURE: 92 MMHG

## 2020-01-08 VITALS — DIASTOLIC BLOOD PRESSURE: 46 MMHG | SYSTOLIC BLOOD PRESSURE: 56 MMHG

## 2020-01-08 VITALS — SYSTOLIC BLOOD PRESSURE: 86 MMHG | DIASTOLIC BLOOD PRESSURE: 2 MMHG

## 2020-01-08 VITALS — DIASTOLIC BLOOD PRESSURE: 56 MMHG | SYSTOLIC BLOOD PRESSURE: 82 MMHG

## 2020-01-08 VITALS — SYSTOLIC BLOOD PRESSURE: 83 MMHG | DIASTOLIC BLOOD PRESSURE: 45 MMHG

## 2020-01-08 VITALS — SYSTOLIC BLOOD PRESSURE: 110 MMHG | DIASTOLIC BLOOD PRESSURE: 68 MMHG

## 2020-01-08 VITALS — SYSTOLIC BLOOD PRESSURE: 56 MMHG | DIASTOLIC BLOOD PRESSURE: 46 MMHG

## 2020-01-08 VITALS — SYSTOLIC BLOOD PRESSURE: 90 MMHG | DIASTOLIC BLOOD PRESSURE: 74 MMHG

## 2020-01-08 VITALS — SYSTOLIC BLOOD PRESSURE: 75 MMHG | DIASTOLIC BLOOD PRESSURE: 65 MMHG

## 2020-01-08 LAB
ANION GAP SERPL CALC-SCNC: 13 MMOL/L (ref 6–14)
BASOPHILS # BLD AUTO: 0.1 X10^3/UL (ref 0–0.2)
BASOPHILS NFR BLD: 0 % (ref 0–3)
BUN SERPL-MCNC: 37 MG/DL (ref 8–26)
CALCIUM SERPL-MCNC: 5.6 MG/DL (ref 8.5–10.1)
CHLORIDE SERPL-SCNC: 104 MMOL/L (ref 98–107)
CHOLEST SERPL-MCNC: 94 MG/DL (ref 0–200)
CHOLEST/HDLC SERPL: 2.9 {RATIO}
CO2 SERPL-SCNC: 16 MMOL/L (ref 21–32)
CREAT SERPL-MCNC: 7 MG/DL (ref 0.7–1.3)
EOSINOPHIL NFR BLD: 0.3 X10^3/UL (ref 0–0.7)
EOSINOPHIL NFR BLD: 2 % (ref 0–3)
ERYTHROCYTE [DISTWIDTH] IN BLOOD BY AUTOMATED COUNT: 15.1 % (ref 11.5–14.5)
GFR SERPLBLD BASED ON 1.73 SQ M-ARVRAT: 10 ML/MIN
GLUCOSE SERPL-MCNC: 139 MG/DL (ref 70–99)
HCT VFR BLD CALC: 31.3 % (ref 39–53)
HDLC SERPL-MCNC: 32 MG/DL (ref 40–60)
HGB BLD-MCNC: 10.2 G/DL (ref 13–17.5)
LDLC: 44 MG/DL (ref 0–100)
LYMPHOCYTES # BLD: 1.5 X10^3/UL (ref 1–4.8)
LYMPHOCYTES NFR BLD AUTO: 9 % (ref 24–48)
MAGNESIUM SERPL-MCNC: 1.8 MG/DL (ref 1.8–2.4)
MCH RBC QN AUTO: 27 PG (ref 25–35)
MCHC RBC AUTO-ENTMCNC: 33 G/DL (ref 31–37)
MCV RBC AUTO: 83 FL (ref 79–100)
MONO #: 1 X10^3/UL (ref 0–1.1)
MONOCYTES NFR BLD: 6 % (ref 0–9)
NEUT #: 14.4 X10^3/UL (ref 1.8–7.7)
NEUTROPHILS NFR BLD AUTO: 83 % (ref 31–73)
PLATELET # BLD AUTO: 577 X10^3/UL (ref 140–400)
POTASSIUM SERPL-SCNC: 3.4 MMOL/L (ref 3.5–5.1)
RBC # BLD AUTO: 3.76 X10^6/UL (ref 4.3–5.7)
SODIUM SERPL-SCNC: 133 MMOL/L (ref 136–145)
TRIGL SERPL-MCNC: 89 MG/DL (ref 0–150)
VLDLC: 18 MG/DL (ref 0–40)
WBC # BLD AUTO: 17.3 X10^3/UL (ref 4–11)

## 2020-01-08 RX ADMIN — ASPIRIN 81 MG SCH MG: 81 TABLET ORAL at 08:50

## 2020-01-08 RX ADMIN — INSULIN LISPRO SCH UNITS: 100 INJECTION, SOLUTION INTRAVENOUS; SUBCUTANEOUS at 12:00

## 2020-01-08 RX ADMIN — Medication SCH EA: at 08:50

## 2020-01-08 RX ADMIN — POTASSIUM CHLORIDE SCH MEQ: 1500 TABLET, EXTENDED RELEASE ORAL at 08:50

## 2020-01-08 RX ADMIN — MEROPENEM SCH MLS/HR: 500 INJECTION, POWDER, FOR SOLUTION INTRAVENOUS at 12:43

## 2020-01-08 RX ADMIN — ATORVASTATIN CALCIUM SCH MG: 10 TABLET, FILM COATED ORAL at 21:33

## 2020-01-08 RX ADMIN — LINEZOLID SCH MG: 600 TABLET, FILM COATED ORAL at 08:50

## 2020-01-08 RX ADMIN — BACITRACIN SCH MLS/HR: 5000 INJECTION, POWDER, FOR SOLUTION INTRAMUSCULAR at 06:50

## 2020-01-08 RX ADMIN — AMIODARONE HYDROCHLORIDE PRN MLS/HR: 50 INJECTION, SOLUTION INTRAVENOUS at 00:36

## 2020-01-08 RX ADMIN — Medication SCH EA: at 08:58

## 2020-01-08 RX ADMIN — AMIODARONE HYDROCHLORIDE PRN MLS/HR: 50 INJECTION, SOLUTION INTRAVENOUS at 20:23

## 2020-01-08 RX ADMIN — Medication SCH EA: at 12:42

## 2020-01-08 RX ADMIN — Medication SCH CAP: at 21:33

## 2020-01-08 RX ADMIN — BACITRACIN SCH MLS/HR: 5000 INJECTION, POWDER, FOR SOLUTION INTRAMUSCULAR at 08:51

## 2020-01-08 RX ADMIN — LINEZOLID SCH MG: 600 TABLET, FILM COATED ORAL at 21:33

## 2020-01-08 RX ADMIN — POTASSIUM CHLORIDE SCH MEQ: 1500 TABLET, EXTENDED RELEASE ORAL at 17:44

## 2020-01-08 RX ADMIN — MEROPENEM SCH MLS/HR: 500 INJECTION, POWDER, FOR SOLUTION INTRAVENOUS at 21:32

## 2020-01-08 RX ADMIN — LEVOTHYROXINE SODIUM SCH MCG: 25 TABLET ORAL at 06:49

## 2020-01-08 RX ADMIN — INSULIN LISPRO SCH UNITS: 100 INJECTION, SOLUTION INTRAVENOUS; SUBCUTANEOUS at 08:00

## 2020-01-08 RX ADMIN — PANTOPRAZOLE SODIUM SCH MG: 40 TABLET, DELAYED RELEASE ORAL at 08:49

## 2020-01-08 RX ADMIN — BACITRACIN SCH MLS/HR: 5000 INJECTION, POWDER, FOR SOLUTION INTRAMUSCULAR at 16:45

## 2020-01-08 RX ADMIN — Medication SCH EA: at 17:44

## 2020-01-08 RX ADMIN — INSULIN LISPRO SCH UNITS: 100 INJECTION, SOLUTION INTRAVENOUS; SUBCUTANEOUS at 17:00

## 2020-01-08 RX ADMIN — MIDODRINE HYDROCHLORIDE SCH MG: 2.5 TABLET ORAL at 13:22

## 2020-01-08 RX ADMIN — Medication SCH CAP: at 08:50

## 2020-01-08 RX ADMIN — MIDODRINE HYDROCHLORIDE SCH MG: 2.5 TABLET ORAL at 17:46

## 2020-01-08 NOTE — NUR
Pt  had an episode of V -Tach 17 beats, pt asymptomatic with this, denied chest discomfort 
or pain, was lying in bed awake watching tv during this episode. Pt had previously been 
throwing frequent pvc's, also had been hypotensive, started levophed after shift change, 
fluids already been infusing 150/hr. Checked pt 's bmp and mg and pt had critical calcium at 
6.0 and low mg 1.7.  Spoke to Dr Malone and gave an update on all of this, he ordered 2g 
mag sulfate, no other orders or changes. Orders noted, will continue to monitor pt.

## 2020-01-08 NOTE — EKG
[facility]

               [address] [place] [address]

Test Date:    2020               Test Time:    13:01:20

Pat Name:     BLAISE DOWNING            Department:   

Patient ID:   [id number]           Room:         109 1

Gender:       M                        Technician:   

:          [id number]               Requested By: JOSE PEREZ

Order Number: [id number]           Reading MD:     

                                 Measurements

Intervals                              Axis          

Rate:         89                       P:            -49

CT:           184                      QRS:          -31

QRSD:         118                      T:            114

QT:           416                                    

QTc:          514                                    

                           Interpretive Statements

SINUS RHYTHM

ABNORMAL LEFT AXIS DEVIATION

R-S TRANSITION ZONE IN V LEADS DISPLACED TO THE LEFT

LEFT ANTERIOR FASCICULAR BLOCK

QRS(T) CONTOUR ABNORMALITY

CONSIDER ANTEROSEPTAL MYOCARDIAL DAMAGE

T ABNORMALITY IN HIGH LATERAL LEADS

PROLONGED QT

ABNORMAL ECG

RI6.02

Compared to ECG 10/14/2019 13:52:28

Left-axis deviation now present

Left anterior fascicular block now present

T-wave abnormality now present

Prolonged QT interval now present

Sinus tachycardia no longer present

Ventricular premature complex(es) no longer present

Intraventricular conduction delay no longer present

## 2020-01-08 NOTE — PDOC
JOSE PEREZ APRN 1/8/20 1249:


CARDIO Progress Notes


Date and Time


Date of Service


1/8/2020


Time of Evaluation


1230





Subjective


Subjective:  No Chest Pain, No shortness of breath, No Palpitations, Other (had 

palpitations last night, feels swollen)





Vitals


Vitals





Vital Signs








  Date Time  Temp Pulse Resp B/P (MAP) Pulse Ox O2 Delivery O2 Flow Rate FiO2


 


1/8/20 12:00  86  93/68 (76) 100 Room Air  


 


1/8/20 12:00       2.0 


 


1/8/20 08:00 97.5       





 97.5       


 


1/8/20 02:00   16     








Weight


Weight [ ]





Input and Output


Intake and Output











Intake and Output 


 


 1/8/20





 07:00


 


Intake Total 3785 ml


 


Output Total 0 ml


 


Balance 3785 ml


 


 


 


Intake Oral 200 ml


 


IV Total 2607 ml


 


Blood Product IV Normal Saline Flush 978 ml


 


Output Urine Total 0 ml











Laboratory


Labs





Laboratory Tests








Test


 1/7/20


17:45 1/7/20


23:09 1/8/20


05:05 1/8/20


08:57


 


Glucose (Fingerstick)


 139 mg/dL


(70-99) 


 


 79 mg/dL


(70-99)


 


Sodium Level


 


 130 mmol/L


(136-145) 133 mmol/L


(136-145) 





 


Potassium Level


 


 3.9 mmol/L


(3.5-5.1) 3.4 mmol/L


(3.5-5.1) 





 


Chloride Level


 


 100 mmol/L


() 104 mmol/L


() 





 


Carbon Dioxide Level


 


 19 mmol/L


(21-32) 16 mmol/L


(21-32) 





 


Anion Gap  11 (6-14)  13 (6-14)  


 


Blood Urea Nitrogen


 


 45 mg/dL


(8-26) 37 mg/dL


(8-26) 





 


Creatinine


 


 8.0 mg/dL


(0.7-1.3) 7.0 mg/dL


(0.7-1.3) 





 


Estimated GFR


(Cockcroft-Gault) 


 8.6 


 10.0 


 





 


Glucose Level


 


 158 mg/dL


(70-99) 139 mg/dL


(70-99) 





 


Calcium Level


 


 6.0 mg/dL


(8.5-10.1) 5.6 mg/dL


(8.5-10.1) 





 


Magnesium Level


 


 1.7 mg/dL


(1.8-2.4) 1.8 mg/dL


(1.8-2.4) 





 


White Blood Count


 


 


 17.3 x10^3/uL


(4.0-11.0) 





 


Red Blood Count


 


 


 3.76 x10^6/uL


(4.30-5.70) 





 


Hemoglobin


 


 


 10.2 g/dL


(13.0-17.5) 





 


Hematocrit


 


 


 31.3 %


(39.0-53.0) 





 


Mean Corpuscular Volume   83 fL ()  


 


Mean Corpuscular Hemoglobin   27 pg (25-35)  


 


Mean Corpuscular Hemoglobin


Concent 


 


 33 g/dL


(31-37) 





 


Red Cell Distribution Width


 


 


 15.1 %


(11.5-14.5) 





 


Platelet Count


 


 


 577 x10^3/uL


(140-400) 





 


Neutrophils (%) (Auto)   83 % (31-73)  


 


Lymphocytes (%) (Auto)   9 % (24-48)  


 


Monocytes (%) (Auto)   6 % (0-9)  


 


Eosinophils (%) (Auto)   2 % (0-3)  


 


Basophils (%) (Auto)   0 % (0-3)  


 


Neutrophils # (Auto)


 


 


 14.4 x10^3/uL


(1.8-7.7) 





 


Lymphocytes # (Auto)


 


 


 1.5 x10^3/uL


(1.0-4.8) 





 


Monocytes # (Auto)


 


 


 1.0 x10^3/uL


(0.0-1.1) 





 


Eosinophils # (Auto)


 


 


 0.3 x10^3/uL


(0.0-0.7) 





 


Basophils # (Auto)


 


 


 0.1 x10^3/uL


(0.0-0.2) 





 


Troponin I Quantitative


 


 


 0.031 ng/mL


(0.000-0.055) 














Microbiology


Micro





Microbiology


1/4/20 Blood Culture - Preliminary, Resulted


         NO GROWTH AFTER 4 DAYS


12/31/19 Anaerobic/Aerobic Culture - Final, Complete


           


12/31/19 Anaerobic Culture Result 1 (ANTHONY) - Final, Complete


           


12/31/19 Aerobic Culture - Final, Complete


           


12/31/19 Aerobic Culture Result 1 (ANTHONY) - Final, Complete


           


12/31/19 Gram Stain - Final, Complete


           


12/31/19 Gram Stain Result 1 (ANTHONY) - Final, Complete


           


12/31/19 Gram Stain Result 2 (ANTHONY) - Final, Complete


           


12/31/19 Gram Stain Result 3 (ANTHONY) - Final, Complete


           


12/31/19 Gram Stain Result 4 (ANTHONY) - Final, Complete





Review of Systems


Constitutional:  yes: weakness, alert, oriented


Ears/Nose/Throat:  Yes: no symptom reported


Eyes:  Yes: no symptom reported


Pulmonary:  Yes no symptom reported


Cardiovascular:  Yes no symptom reported


Gastrointestional:  Yes: no symptom reported


Genitourinary:  Yes: no symptom reported


Musculoskeletal:  Yes: no symptom reported


Skin:  Yes no symptom reported


Psychiatric/Neurological:  Yes: no symptom reported


Endocrine:  Yes: no symptom reported





Physical Exam


HEENT:  Neck Supple W Full Motion


Chest:  Symmetric


LUNGS:  Other (diminished bases)


Heart:  S1S2, RRR (SR with PVCs)


Abdomen:  Other (anasarca)


Extremities:  Other (trace bilateral LE edema. Drsg intact to left foot )


Neurology:  alert, oriented, follow commands





Assessment


Assessment


1.  Right foot nonhealing wound with PAD/DM: S/P RBKA per ortho POD#2


2.  Arrhythmia; frequent bigeminy/brief NSVT in the setting of CM with metabolic

derangement


3.  Chronic systolic CHF: no SOA. 


4.  CMP s/p AICD ([facility]). LVEF 15% 


5.  ESRD with anasarca


6.  Hypotension: with hx of HTN. on midodrine  


7.  Hyperlipidemia; not on statin 


8.  Diabetes, II


9.  Chronic LBBB


10. Severe hypocalcemia: managed by nephrology


11. Severe protein malnutrition





Recommendations


1. PD per nephrology


2. Ca/Mg/K replacement. QTc 516, caution with QT prolonging agents. 


3. Continue with secondary prevention


4. Stop entresto for now with noted hypotension. Will need BB once BP is more 

adequate. 


5. Check lipids and note statin need. 


6. Dietitian consult





RENNY MURRAY MD 1/8/20 0001:


CARDIO Progress Notes


Assessment


Assessment


Patient seen and examined. Agree with NP's assessment and plan.


Frequent PVCs and NSVT subsided after replacing magnesium and calcium levels


Chronic systolic heart failure well compensated


Continue dialysis per nephrology team











JOSE PEREZ           Jan 8, 2020 12:49


RENNY MURRAY MD            Jan 8, 2020 17:09

## 2020-01-08 NOTE — PATHOLOGY
[facility] Accession Number: [id number]

.                                                                01

Material submitted:                                        .

leg - RIGHT LOWER LEG BELOW KNEE AMPUTATION. Modifiers: right, lower

.                                                                01

Clinical history:                                          .

Right foot wound

.                                                                02

**********************************************************************

Diagnosis:

Foot and lower leg, right below knee amputation:

- Gangrenous necrosis of lateral and plantar aspect of foot with acute

cellulitis and focal acute osteomyelitis.

- Skin and subcutaneous tissue of proximal amputation margin viable.

- Extensive calcification of tibial vasculature with focal marked luminal

stenosis.

- Status post amputation of right fifth toe and distal right great toe.

(JPM/db/mml; 1/08/2020)

[facility]  01/08/2020  1555 Local

**********************************************************************

.                                                                02

Electronically signed:                                     .

Fortunato Day MD, Pathologist

NPI- [id number]

.                                                                01

Gross description:                                         .

The specimen is received fresh in a red biohazard bag, labeled "Calderon Rebolledo, right lower leg below knee amputation".  Received is a right

below-the-knee amputation measuring 26.5 cm from the tip of the second toe

to heel, 22.5 cm from heel to skin margin, 35.9 cm from heel to tibial

bone margin, and 38.5 cm from heel to fibular bone margin.  The skin and

soft tissue margins appear grossly viable.  Toes one through four are

present and toe five has previously been amputated.  The distal aspect of

the great toe is also absent.  Along the lateral and plantar aspects of

the specimen, there is a necrotic lesion, with exposed underlying bone,

measuring 16.9 x 11.2 cm.  The remainder of the epidermal surface is

gray-brown and predominately flaky in appearance.  Sectioning through the

anterior and posterior tibial vasculatures reveals pinpoint to patent

lumens with a slight amount of calcification present in the posterior

vasculature.  The specimen is submitted representatively as follows:

.

A1   skin and soft tissue margin

A2   representative section of exposed bone from necrotic lesion on

lateral aspect of foot, following decalcification

A3   representative sections of lesion from plantar and lateral aspects of

foot

A4   anterior and posterior tibial vasculatures.

(CAA; 1/7/2020)

[facility]  01/07/2020  1458 Local

.                                                                02

Pathologist provided ICD-10:

I96, M86.10

.                                                                02

CPT                                                        .

847835, [id number]

Specimen Comment: A courtesy copy of this report has been sent to [phone], [phone]

Specimen Comment: [id number], [phone]

Specimen Comment: Report sent to ,DR DALY / DR CRUZ

***Performed at:  01

   [facility]

   [address], [place]  [id number]

   MD Nate Madrid MD Phone:  [phone]

***Performed at:  02

   [facility]

   [address] [place], [place]  [id number]

   MD Fortunato Day MD Phone:  [phone]

## 2020-01-08 NOTE — NUR
Wound care:



Patient seen per wound care consult. Patient has right BKA surgical incision. Dressing 
removed and wound cleansed, assessed, measured, and pictured. Redressed with ABD pad and 
kerlix with an ACE bandage. No other wounds noted upon complete head to toe assessment. 
Dressing change instructions left in room. Brief changed and patient repositioned. Patient 
on ICU bed at this time. Patient educated on turning to prevent pressure ulcers. Bed lowered 
and call light in reach. Will follow patient regarding wound care.

## 2020-01-08 NOTE — NUR
Pt continued to have frequent  PVC's and going into bigeminy after 2g of Mag sulfate, this 
am mg level 1.8, spoke to Dr Malone another 2g Mag sulfate ordered, also reported critical 
Ca 5.6, this am, no orders given for this. He said they will see pt this morning.  Levophed 
stopped at 0630, pt maintaining map >60.  Peritoneal dailysis completed early this am, Pt 
has not voided, per dialysis nurse approximately 900 ml pulled out.

## 2020-01-08 NOTE — PDOC
Infectious Disease Note


Subjective


Subjective


Comfortable, feeling not so good





ROS


ROS


no n/v/d/


did have a run of v tech and hypotension





Vital Sign


Vital Signs





Vital Signs








  Date Time  Temp Pulse Resp B/P (MAP) Pulse Ox O2 Delivery O2 Flow Rate FiO2


 


1/8/20 04:00 97.7 102  94/64 (74) 100 Room Air  





 97.7       


 


1/8/20 04:00       2.0 


 


1/8/20 02:00   16     











Physical Exam


PHYSICAL EXAM


GENERAL: Propped up in bed, alert, relaxed appearance, watching TV 


HEENT:  Oral mucosa moist. No thrush 


NECK:  Supple.  No JVD.


LUNGS:  Clear bilaterally.  No wheezing.


HEART:  S1, S2.


ABDOMEN:  Soft, nontender. PDC 


EXTREMITIES:  Right big toe amputation site approx w/ sutures. Rt BKA


Lateral aspect wound vac in place 


DERMATOLOGIC:  Warm, dry.  No generalized rash.


CENTRAL NERVOUS SYSTEM:  Alert and oriented x 3, grossly nonfocal.


PIV





Labs


Lab





Laboratory Tests








Test


 1/7/20


08:35 1/7/20


12:15 1/7/20


17:45 1/7/20


23:09


 


Glucose (Fingerstick)


 256 mg/dL


(70-99) 192 mg/dL


(70-99) 139 mg/dL


(70-99) 





 


Sodium Level


 


 


 


 130 mmol/L


(136-145)


 


Potassium Level


 


 


 


 3.9 mmol/L


(3.5-5.1)


 


Chloride Level


 


 


 


 100 mmol/L


()


 


Carbon Dioxide Level


 


 


 


 19 mmol/L


(21-32)


 


Anion Gap    11 (6-14) 


 


Blood Urea Nitrogen


 


 


 


 45 mg/dL


(8-26)


 


Creatinine


 


 


 


 8.0 mg/dL


(0.7-1.3)


 


Estimated GFR


(Cockcroft-Gault) 


 


 


 8.6 





 


Glucose Level


 


 


 


 158 mg/dL


(70-99)


 


Calcium Level


 


 


 


 6.0 mg/dL


(8.5-10.1)


 


Magnesium Level


 


 


 


 1.7 mg/dL


(1.8-2.4)


 


Test


 1/8/20


05:05 


 


 





 


White Blood Count


 17.3 x10^3/uL


(4.0-11.0) 


 


 





 


Red Blood Count


 3.76 x10^6/uL


(4.30-5.70) 


 


 





 


Hemoglobin


 10.2 g/dL


(13.0-17.5) 


 


 





 


Hematocrit


 31.3 %


(39.0-53.0) 


 


 





 


Mean Corpuscular Volume 83 fL ()    


 


Mean Corpuscular Hemoglobin 27 pg (25-35)    


 


Mean Corpuscular Hemoglobin


Concent 33 g/dL


(31-37) 


 


 





 


Red Cell Distribution Width


 15.1 %


(11.5-14.5) 


 


 





 


Platelet Count


 577 x10^3/uL


(140-400) 


 


 





 


Neutrophils (%) (Auto) 83 % (31-73)    


 


Lymphocytes (%) (Auto) 9 % (24-48)    


 


Monocytes (%) (Auto) 6 % (0-9)    


 


Eosinophils (%) (Auto) 2 % (0-3)    


 


Basophils (%) (Auto) 0 % (0-3)    


 


Neutrophils # (Auto)


 14.4 x10^3/uL


(1.8-7.7) 


 


 





 


Lymphocytes # (Auto)


 1.5 x10^3/uL


(1.0-4.8) 


 


 





 


Monocytes # (Auto)


 1.0 x10^3/uL


(0.0-1.1) 


 


 





 


Eosinophils # (Auto)


 0.3 x10^3/uL


(0.0-0.7) 


 


 





 


Basophils # (Auto)


 0.1 x10^3/uL


(0.0-0.2) 


 


 





 


Sodium Level


 133 mmol/L


(136-145) 


 


 





 


Potassium Level


 3.4 mmol/L


(3.5-5.1) 


 


 





 


Chloride Level


 104 mmol/L


() 


 


 





 


Carbon Dioxide Level


 16 mmol/L


(21-32) 


 


 





 


Anion Gap 13 (6-14)    


 


Blood Urea Nitrogen


 37 mg/dL


(8-26) 


 


 





 


Creatinine


 7.0 mg/dL


(0.7-1.3) 


 


 





 


Estimated GFR


(Cockcroft-Gault) 10.0 


 


 


 





 


Glucose Level


 139 mg/dL


(70-99) 


 


 





 


Calcium Level


 5.6 mg/dL


(8.5-10.1) 


 


 





 


Magnesium Level


 1.8 mg/dL


(1.8-2.4) 


 


 





 


Troponin I Quantitative


 0.031 ng/mL


(0.000-0.055) 


 


 














Objective


Assessment


Right foot deep tissue infection/osteo with chronic nonhealing right diabetic 

foot ulcers. 


   -s/p amputation of right 5th and great toe, 12/31. GPC, GNR, GPR on gram 

stain. culture no growth so far,,, now BKA 


GPC bacteremia POA source right foot infection 


Leucocytosis - trending down


Diabetes mellitus with neuropathy poorly controlled


End-stage renal disease, on peritoneal dialysis.


Hypertension.


Pacemaker in place.


Hypotension s/p fluid bolus





Plan


Plan of Care


cont zyvox and wilfrid, 


Probiotics 


f/u cultures 


Repeat BC 1/2/2020, neg so far








Optimal dm control











MACHO HARDWICK MD                Jan 8, 2020 07:40

## 2020-01-08 NOTE — PDOC
PROGRESS NOTES


Chief Complaint


Chief Complaint











Post-op day 8 of amputation of R great toe w/extensive debridement of lateral 

aspect of R foot


Right foot deep tissue infection/osteo with chronic nonhealing right diabetic 

foot ulcers. 


s/p amputation of right 5th and great toe, 12/31. GPC, GNR, GPR on gram stain. 

culture no growth so far,,, now BKA 


Improving sepsis


Necrotizing Fasciitis of RLE


CHF


Diabetes-Type II


High Cholesterol


Heart Disease


Renal Failure


HYPOTENSION


RUN OF V-TACH 1/7 PM


HYPOCALCEMIC





Accepted at [facility], but not stable for transfer 1/8, ca and mg replaced








33 MIN CC TIME





History of Present Illness


History of Present Illness


1-7-20


Pt seen and examined


SOUTH RN


PRN PRESSORS





1/5/20


Pt seen and examined


Pt states he feels well and is ready for surgery tomorrow


Pt's vitals have improved


DW RN


Reviewed pt's chart





1/4/20


Pt seen and examined


DW pt about upcoming R BKA


Pt states he feels fine 


Pt doesn't appear septic despite low BP and tachycardia


SOUTH RN


Reviewed pt's chart





[id number]


Patient seen and examined


He now has a wound VAC on his right foot


Discussed with RN chart reviewed





1/2/20


Pt seen and examined


Pt reported being lethargic today


DW pt about care


Reviewed pt's chart





1/1/20


Pt seen and examined


DW pt


SOUTH RN


Reviewed pt's chart





12/31/19


Pt seen and examined


Pt was sitting up in bed, eating breakfast in NAD


DW pt


SOUTH RN


Reviewed pt's chart





Vitals


Vitals





Vital Signs








  Date Time  Temp Pulse Resp B/P (MAP) Pulse Ox O2 Delivery O2 Flow Rate FiO2


 


1/8/20 06:00  98  98/44 (62) 96 Room Air  


 


1/8/20 04:00 97.7       





 97.7       


 


1/8/20 04:00       2.0 


 


1/8/20 02:00   16     











Physical Exam


Physical Exam


GENERAL: Propped up in bed, alert, relaxed appearance, watching TV 


HEENT:  Oral mucosa moist. No thrush 


NECK:  Supple.  No JVD.


LUNGS:  Clear bilaterally.  No wheezing.


HEART:  S1, S2.


ABDOMEN:  Soft, nontender. PDC 


EXTREMITIES:  Right big toe amputation site approx w/ sutures. Rt BKA


Lateral aspect wound vac in place 


DERMATOLOGIC:  Warm, dry.  No generalized rash.


CENTRAL NERVOUS SYSTEM:  Alert and oriented x 3, grossly nonfocal.


PIV


General:  Alert, Oriented X3, Cooperative, mild distress


Heart:  Regular rate, Normal S1, Normal S2


Lungs:  Clear


Abdomen:  Normal bowel sounds


Extremities:  No clubbing, No edema, Other ( Right big toe amputation site 

approx w/ sutures. Wound vac  in place on lateral aspect of R foot)


Skin:  No rashes, Other (Wound vac in place on lateral aspect of R foot. There 

is a bad odor.)





Labs


LABS





Laboratory Tests








Test


 1/7/20


12:15 1/7/20


17:45 1/7/20


23:09 1/8/20


05:05


 


Glucose (Fingerstick)


 192 mg/dL


(70-99) 139 mg/dL


(70-99) 


 





 


Sodium Level


 


 


 130 mmol/L


(136-145) 133 mmol/L


(136-145)


 


Potassium Level


 


 


 3.9 mmol/L


(3.5-5.1) 3.4 mmol/L


(3.5-5.1)


 


Chloride Level


 


 


 100 mmol/L


() 104 mmol/L


()


 


Carbon Dioxide Level


 


 


 19 mmol/L


(21-32) 16 mmol/L


(21-32)


 


Anion Gap   11 (6-14)  13 (6-14) 


 


Blood Urea Nitrogen


 


 


 45 mg/dL


(8-26) 37 mg/dL


(8-26)


 


Creatinine


 


 


 8.0 mg/dL


(0.7-1.3) 7.0 mg/dL


(0.7-1.3)


 


Estimated GFR


(Cockcroft-Gault) 


 


 8.6 


 10.0 





 


Glucose Level


 


 


 158 mg/dL


(70-99) 139 mg/dL


(70-99)


 


Calcium Level


 


 


 6.0 mg/dL


(8.5-10.1) 5.6 mg/dL


(8.5-10.1)


 


Magnesium Level


 


 


 1.7 mg/dL


(1.8-2.4) 1.8 mg/dL


(1.8-2.4)


 


White Blood Count


 


 


 


 17.3 x10^3/uL


(4.0-11.0)


 


Red Blood Count


 


 


 


 3.76 x10^6/uL


(4.30-5.70)


 


Hemoglobin


 


 


 


 10.2 g/dL


(13.0-17.5)


 


Hematocrit


 


 


 


 31.3 %


(39.0-53.0)


 


Mean Corpuscular Volume    83 fL () 


 


Mean Corpuscular Hemoglobin    27 pg (25-35) 


 


Mean Corpuscular Hemoglobin


Concent 


 


 


 33 g/dL


(31-37)


 


Red Cell Distribution Width


 


 


 


 15.1 %


(11.5-14.5)


 


Platelet Count


 


 


 


 577 x10^3/uL


(140-400)


 


Neutrophils (%) (Auto)    83 % (31-73) 


 


Lymphocytes (%) (Auto)    9 % (24-48) 


 


Monocytes (%) (Auto)    6 % (0-9) 


 


Eosinophils (%) (Auto)    2 % (0-3) 


 


Basophils (%) (Auto)    0 % (0-3) 


 


Neutrophils # (Auto)


 


 


 


 14.4 x10^3/uL


(1.8-7.7)


 


Lymphocytes # (Auto)


 


 


 


 1.5 x10^3/uL


(1.0-4.8)


 


Monocytes # (Auto)


 


 


 


 1.0 x10^3/uL


(0.0-1.1)


 


Eosinophils # (Auto)


 


 


 


 0.3 x10^3/uL


(0.0-0.7)


 


Basophils # (Auto)


 


 


 


 0.1 x10^3/uL


(0.0-0.2)


 


Troponin I Quantitative


 


 


 


 0.031 ng/mL


(0.000-0.055)











Assessment and Plan


Assessmemt and Plan


Problems


Medical Problems:


(1) CHF (congestive heart failure)


Status: Acute  





(2) Chronic kidney disease


Status: Acute  





(3) Diabetes


Status: Acute  





(4) Sepsis


Status: Acute  





(5) Severe sepsis


Status: Acute  











Comment


Review of Relevant


I have reviewed the following items sean (where applicable) has been applied.


Labs





Laboratory Tests








Test


 1/6/20


15:14 1/6/20


17:10 1/6/20


18:10 1/6/20


19:43


 


Glucose (Fingerstick)


 236 mg/dL


(70-99) 214 mg/dL


(70-99) 


 150 mg/dL


(70-99)


 


White Blood Count


 


 


 23.2 x10^3/uL


(4.0-11.0) 





 


Red Blood Count


 


 


 2.87 x10^6/uL


(4.30-5.70) 





 


Hemoglobin


 


 


 7.7 g/dL


(13.0-17.5) 





 


Hematocrit


 


 


 23.8 %


(39.0-53.0) 





 


Mean Corpuscular Volume   83 fL ()  


 


Mean Corpuscular Hemoglobin   27 pg (25-35)  


 


Mean Corpuscular Hemoglobin


Concent 


 


 32 g/dL


(31-37) 





 


Red Cell Distribution Width


 


 


 15.3 %


(11.5-14.5) 





 


Platelet Count


 


 


 375 x10^3/uL


(140-400) 





 


Test


 1/7/20


06:00 1/7/20


08:35 1/7/20


12:15 1/7/20


17:45


 


White Blood Count


 21.3 x10^3/uL


(4.0-11.0) 


 


 





 


Red Blood Count


 3.26 x10^6/uL


(4.30-5.70) 


 


 





 


Hemoglobin


 8.9 g/dL


(13.0-17.5) 


 


 





 


Hematocrit


 27.4 %


(39.0-53.0) 


 


 





 


Mean Corpuscular Volume 84 fL ()    


 


Mean Corpuscular Hemoglobin 27 pg (25-35)    


 


Mean Corpuscular Hemoglobin


Concent 32 g/dL


(31-37) 


 


 





 


Red Cell Distribution Width


 15.0 %


(11.5-14.5) 


 


 





 


Platelet Count


 373 x10^3/uL


(140-400) 


 


 





 


Neutrophils (%) (Auto) 91 % (31-73)    


 


Lymphocytes (%) (Auto) 4 % (24-48)    


 


Monocytes (%) (Auto) 5 % (0-9)    


 


Eosinophils (%) (Auto) 0 % (0-3)    


 


Basophils (%) (Auto) 0 % (0-3)    


 


Neutrophils # (Auto)


 19.4 x10^3/uL


(1.8-7.7) 


 


 





 


Lymphocytes # (Auto)


 0.8 x10^3/uL


(1.0-4.8) 


 


 





 


Monocytes # (Auto)


 1.0 x10^3/uL


(0.0-1.1) 


 


 





 


Eosinophils # (Auto)


 0.0 x10^3/uL


(0.0-0.7) 


 


 





 


Basophils # (Auto)


 0.0 x10^3/uL


(0.0-0.2) 


 


 





 


Segmented Neutrophils % 86 % (35-66)    


 


Lymphocytes % 9 % (24-48)    


 


Monocytes % 5 % (0-10)    


 


Platelet Estimate


 Adequate


(ADEQUATE) 


 


 





 


Sodium Level


 131 mmol/L


(136-145) 


 


 





 


Potassium Level


 4.2 mmol/L


(3.5-5.1) 


 


 





 


Chloride Level


 100 mmol/L


() 


 


 





 


Carbon Dioxide Level


 21 mmol/L


(21-32) 


 


 





 


Anion Gap 10 (6-14)    


 


Blood Urea Nitrogen


 46 mg/dL


(8-26) 


 


 





 


Creatinine


 7.9 mg/dL


(0.7-1.3) 


 


 





 


Estimated GFR


(Cockcroft-Gault) 8.7 


 


 


 





 


Glucose Level


 287 mg/dL


(70-99) 


 


 





 


Calcium Level


 6.1 mg/dL


(8.5-10.1) 


 


 





 


Glucose (Fingerstick)


 


 256 mg/dL


(70-99) 192 mg/dL


(70-99) 139 mg/dL


(70-99)


 


Test


 1/7/20


23:09 1/8/20


05:05 


 





 


Sodium Level


 130 mmol/L


(136-145) 133 mmol/L


(136-145) 


 





 


Potassium Level


 3.9 mmol/L


(3.5-5.1) 3.4 mmol/L


(3.5-5.1) 


 





 


Chloride Level


 100 mmol/L


() 104 mmol/L


() 


 





 


Carbon Dioxide Level


 19 mmol/L


(21-32) 16 mmol/L


(21-32) 


 





 


Anion Gap 11 (6-14)  13 (6-14)   


 


Blood Urea Nitrogen


 45 mg/dL


(8-26) 37 mg/dL


(8-26) 


 





 


Creatinine


 8.0 mg/dL


(0.7-1.3) 7.0 mg/dL


(0.7-1.3) 


 





 


Estimated GFR


(Cockcroft-Gault) 8.6 


 10.0 


 


 





 


Glucose Level


 158 mg/dL


(70-99) 139 mg/dL


(70-99) 


 





 


Calcium Level


 6.0 mg/dL


(8.5-10.1) 5.6 mg/dL


(8.5-10.1) 


 





 


Magnesium Level


 1.7 mg/dL


(1.8-2.4) 1.8 mg/dL


(1.8-2.4) 


 





 


White Blood Count


 


 17.3 x10^3/uL


(4.0-11.0) 


 





 


Red Blood Count


 


 3.76 x10^6/uL


(4.30-5.70) 


 





 


Hemoglobin


 


 10.2 g/dL


(13.0-17.5) 


 





 


Hematocrit


 


 31.3 %


(39.0-53.0) 


 





 


Mean Corpuscular Volume  83 fL ()   


 


Mean Corpuscular Hemoglobin  27 pg (25-35)   


 


Mean Corpuscular Hemoglobin


Concent 


 33 g/dL


(31-37) 


 





 


Red Cell Distribution Width


 


 15.1 %


(11.5-14.5) 


 





 


Platelet Count


 


 577 x10^3/uL


(140-400) 


 





 


Neutrophils (%) (Auto)  83 % (31-73)   


 


Lymphocytes (%) (Auto)  9 % (24-48)   


 


Monocytes (%) (Auto)  6 % (0-9)   


 


Eosinophils (%) (Auto)  2 % (0-3)   


 


Basophils (%) (Auto)  0 % (0-3)   


 


Neutrophils # (Auto)


 


 14.4 x10^3/uL


(1.8-7.7) 


 





 


Lymphocytes # (Auto)


 


 1.5 x10^3/uL


(1.0-4.8) 


 





 


Monocytes # (Auto)


 


 1.0 x10^3/uL


(0.0-1.1) 


 





 


Eosinophils # (Auto)


 


 0.3 x10^3/uL


(0.0-0.7) 


 





 


Basophils # (Auto)


 


 0.1 x10^3/uL


(0.0-0.2) 


 





 


Troponin I Quantitative


 


 0.031 ng/mL


(0.000-0.055) 


 











Laboratory Tests








Test


 1/7/20


12:15 1/7/20


17:45 1/7/20


23:09 1/8/20


05:05


 


Glucose (Fingerstick)


 192 mg/dL


(70-99) 139 mg/dL


(70-99) 


 





 


Sodium Level


 


 


 130 mmol/L


(136-145) 133 mmol/L


(136-145)


 


Potassium Level


 


 


 3.9 mmol/L


(3.5-5.1) 3.4 mmol/L


(3.5-5.1)


 


Chloride Level


 


 


 100 mmol/L


() 104 mmol/L


()


 


Carbon Dioxide Level


 


 


 19 mmol/L


(21-32) 16 mmol/L


(21-32)


 


Anion Gap   11 (6-14)  13 (6-14) 


 


Blood Urea Nitrogen


 


 


 45 mg/dL


(8-26) 37 mg/dL


(8-26)


 


Creatinine


 


 


 8.0 mg/dL


(0.7-1.3) 7.0 mg/dL


(0.7-1.3)


 


Estimated GFR


(Cockcroft-Gault) 


 


 8.6 


 10.0 





 


Glucose Level


 


 


 158 mg/dL


(70-99) 139 mg/dL


(70-99)


 


Calcium Level


 


 


 6.0 mg/dL


(8.5-10.1) 5.6 mg/dL


(8.5-10.1)


 


Magnesium Level


 


 


 1.7 mg/dL


(1.8-2.4) 1.8 mg/dL


(1.8-2.4)


 


White Blood Count


 


 


 


 17.3 x10^3/uL


(4.0-11.0)


 


Red Blood Count


 


 


 


 3.76 x10^6/uL


(4.30-5.70)


 


Hemoglobin


 


 


 


 10.2 g/dL


(13.0-17.5)


 


Hematocrit


 


 


 


 31.3 %


(39.0-53.0)


 


Mean Corpuscular Volume    83 fL () 


 


Mean Corpuscular Hemoglobin    27 pg (25-35) 


 


Mean Corpuscular Hemoglobin


Concent 


 


 


 33 g/dL


(31-37)


 


Red Cell Distribution Width


 


 


 


 15.1 %


(11.5-14.5)


 


Platelet Count


 


 


 


 577 x10^3/uL


(140-400)


 


Neutrophils (%) (Auto)    83 % (31-73) 


 


Lymphocytes (%) (Auto)    9 % (24-48) 


 


Monocytes (%) (Auto)    6 % (0-9) 


 


Eosinophils (%) (Auto)    2 % (0-3) 


 


Basophils (%) (Auto)    0 % (0-3) 


 


Neutrophils # (Auto)


 


 


 


 14.4 x10^3/uL


(1.8-7.7)


 


Lymphocytes # (Auto)


 


 


 


 1.5 x10^3/uL


(1.0-4.8)


 


Monocytes # (Auto)


 


 


 


 1.0 x10^3/uL


(0.0-1.1)


 


Eosinophils # (Auto)


 


 


 


 0.3 x10^3/uL


(0.0-0.7)


 


Basophils # (Auto)


 


 


 


 0.1 x10^3/uL


(0.0-0.2)


 


Troponin I Quantitative


 


 


 


 0.031 ng/mL


(0.000-0.055)








Microbiology


1/4/20 Blood Culture - Preliminary, Resulted


         NO GROWTH AFTER 3 DAYS


12/31/19 Anaerobic/Aerobic Culture - Final, Complete


           


12/31/19 Anaerobic Culture Result 1 (ANTHONY) - Final, Complete


           


12/31/19 Aerobic Culture - Final, Complete


           


12/31/19 Aerobic Culture Result 1 (ANTHONY) - Final, Complete


           


12/31/19 Gram Stain - Final, Complete


           


12/31/19 Gram Stain Result 1 (ANTHONY) - Final, Complete


           


12/31/19 Gram Stain Result 2 (ANTHONY) - Final, Complete


           


12/31/19 Gram Stain Result 3 (ANTHONY) - Final, Complete


           


12/31/19 Gram Stain Result 4 (ANTHONY) - Final, Complete


Medications





Current Medications


Clindamycin Phosphate 50 ml @  100 mls/hr 1X  ONCE IV  Last administered on 

12/30/19at 09:53;  Start 12/30/19 at 09:30;  Stop 12/30/19 at 09:59;  Status DC


Sodium Chloride 500 ml @  500 mls/hr 1X  ONCE IV  Last administered on 

12/30/19at 10:11;  Start 12/30/19 at 10:15;  Stop 12/30/19 at 11:14;  Status DC


Ondansetron HCl (Zofran) 4 mg 1X  ONCE IVP  Last administered on 12/30/19at 

10:34;  Start 12/30/19 at 10:30;  Stop 12/30/19 at 10:31;  Status DC


Famotidine (Pepcid Vial) 20 mg 1X  ONCE IVP  Last administered on 12/30/19at 

10:34;  Start 12/30/19 at 10:30;  Stop 12/30/19 at 10:31;  Status DC


Vancomycin HCl 2 gm/Sodium Chloride 500 ml @  250 mls/hr 1X  ONCE IV  Last 

administered on 12/30/19at 10:34;  Start 12/30/19 at 10:45;  Stop 12/30/19 at 

12:44;  Status DC


Ondansetron HCl (Zofran) 4 mg PRN Q8HRS  PRN IV NAUSEA/VOMITING Last 

administered on 12/31/19at 08:56;  Start 12/30/19 at 10:45;  Stop 12/31/19 at 

10:44;  Status DC


Fentanyl Citrate (Fentanyl 2ml Vial) 50 mcg PRN Q2HRS  PRN IV PAIN Last 

administered on 1/7/20at 12:15;  Start 12/30/19 at 10:45


Acetaminophen (Tylenol) 650 mg PRN Q4HRS  PRN PO FEVER;  Start 12/30/19 at 

10:45;  Stop 12/31/19 at 10:44;  Status DC


Insulin Human Lispro (HumaLOG) 0-5 UNITS TIDWMEALS SQ  Last administered on 

1/3/20at 18:01;  Start 12/30/19 at 12:00;  Stop 1/4/20 at 09:18;  Status DC


Dextrose (Dextrose 50%-Water Syringe) 12.5 gm PRN Q15MIN  PRN IV SEE COMMENTS 

Last administered on 1/1/20at 17:48;  Start 12/30/19 at 10:45;  Stop 1/4/20 at 

09:17;  Status DC


Dextrose (Iv Dextrose 5%) 250 ml PRN Q15MIN  PRN IV SEE COMMENTS;  Start 

12/30/19 at 10:45;  Stop 1/5/20 at 00:47;  Status DC


Sodium Chloride 500 ml @  500 mls/hr 1X  ONCE IV  Last administered on 

12/30/19at 12:13;  Start 12/30/19 at 12:15;  Stop 12/30/19 at 13:14;  Status DC


Magnesium Sulfate 50 ml @ 25 mls/hr 1X  ONCE IV  Last administered on 12/30/19at

14:49;  Start 12/30/19 at 14:00;  Stop 12/30/19 at 15:59;  Status DC


Aspirin (Children'S Aspirin) 81 mg DAILYWBKFT PO  Last administered on 1/8/20at 

08:50;  Start 12/31/19 at 08:00


Furosemide (Lasix) 80 mg DAILY PO  Last administered on 1/1/20at 08:38;  Start 

12/31/19 at 09:00;  Stop 1/3/20 at 08:09;  Status DC


Loperamide HCl (Imodium) 2 mg PRN Q15MIN  PRN PO DIARRHEA Last administered on 

1/3/20at 14:04;  Start 12/30/19 at 15:30


Pantoprazole Sodium (Protonix) 40 mg DAILYAC PO  Last administered on 1/8/20at 

08:49;  Start 12/31/19 at 07:30


Potassium Chloride (Klor-Con) 20 meq DAILY PO ;  Start 12/31/19 at 09:00;  Stop 

12/31/19 at 14:20;  Status DC


Promethazine HCl (Phenergan) 12.5 mg PRN Q6HRS  PRN PO NAUSEA/VOMITING, 1ST 

CHOICE Last administered on 1/5/20at 18:23;  Start 12/30/19 at 15:30


Sacubitril/ Valsartan (Entresto 24 Mg-26 Mg) 1 tab BID PO  Last administered on 

1/1/20at 08:37;  Start 12/30/19 at 21:00


Metolazone (Zaroxolyn) 5 mg DAILY PO  Last administered on 1/1/20at 08:36;  

Start 12/31/19 at 09:00;  Stop 1/3/20 at 08:09;  Status DC


Potassium Chloride (Klor-Con) 20 meq 1X  ONCE PO  Last administered on 

12/30/19at 16:57;  Start 12/30/19 at 16:15;  Stop 12/30/19 at 16:26;  Status DC


Potassium Chloride (Klor-Con) 20 meq DAILYWBKFT PO  Last administered on 

1/1/20at 08:38;  Start 12/31/19 at 08:00;  Stop 1/1/20 at 15:14;  Status DC


Sodium Chloride 500 ml @  500 mls/hr 1X  ONCE IV  Last administered on 

12/31/19at 01:11;  Start 12/31/19 at 01:00;  Stop 12/31/19 at 01:59;  Status DC


Insulin Human Lispro (HumaLOG) 10 units 1X  ONCE SQ ;  Start 12/31/19 at 11:15; 

Stop 12/31/19 at 11:16;  Status DC


Meropenem 500 mg/ Sodium Chloride 50 ml @  100 mls/hr Q12HR IV  Last 

administered on 1/7/20at 21:03;  Start 12/31/19 at 15:00


Linezolid/Dextrose 300 ml @  300 mls/hr Q12HR IV  Last administered on 1/1/20at 

08:57;  Start 12/31/19 at 21:00;  Stop 1/1/20 at 13:49;  Status DC


Micafungin Sodium 100 mg/Dextrose 100 ml @  100 mls/hr Q24H IV  Last 

administered on 1/6/20at 18:19;  Start 12/31/19 at 15:30;  Stop 1/7/20 at 07:52;

 Status DC


Non-Formulary Medication 2 ea TIDWMEALS PO  Last administered on 1/8/20at 08:50;

 Start 12/31/19 at 17:00


Non-Formulary Medication 30 ea DAILY SQ  Last administered on 1/7/20at 08:39;  

Start 1/1/20 at 09:00


Propofol 20 ml @ As Directed STK-MED ONCE IV ;  Start 12/31/19 at 17:45;  Stop 

12/31/19 at 17:45;  Status DC


Lidocaine HCl (Lidocaine Pf 2% Vial) 5 ml STK-MED ONCE .ROUTE ;  Start 12/31/19 

at 17:45;  Stop 12/31/19 at 17:45;  Status DC


Dexamethasone Sodium Phosphate (Decadron) 4 mg STK-MED ONCE .ROUTE ;  Start 

12/31/19 at 17:45;  Stop 12/31/19 at 17:45;  Status DC


Ondansetron HCl (Zofran) 4 mg STK-MED ONCE .ROUTE ;  Start 12/31/19 at 17:45;  

Stop 12/31/19 at 17:45;  Status DC


Fentanyl Citrate (Fentanyl 2ml Vial) 100 mcg STK-MED ONCE .ROUTE ;  Start 

12/31/19 at 17:46;  Stop 12/31/19 at 17:46;  Status DC


Famotidine (Pepcid Vial) 20 mg STK-MED ONCE .ROUTE ;  Start 12/31/19 at 18:44;  

Stop 12/31/19 at 18:45;  Status DC


Citric Acid/ Sodium Citrate (Bicitra) 30 ml 1X  STAT PO  Last administered on 

12/31/19at 18:44;  Start 12/31/19 at 18:44;  Stop 12/31/19 at 18:53;  Status DC


Famotidine (Pepcid Vial) 20 mg 1X  ONCE IVP  Last administered on 12/31/19at 

18:48;  Start 12/31/19 at 19:00;  Stop 12/31/19 at 19:01;  Status DC


Succinylcholine Chloride (Anectine) 200 mg STK-MED ONCE .ROUTE ;  Start 12/31/19

at 20:20;  Stop 12/31/19 at 20:20;  Status DC


Phenylephrine HCl (PHENYLEPHRINE in 0.9% NACL PF) 1 mg STK-MED ONCE IV ;  Start 

12/31/19 at 20:22;  Stop 12/31/19 at 20:22;  Status DC


Sevoflurane (Ultane) 30 ml STK-MED ONCE IH ;  Start 12/31/19 at 21:01;  Stop 

12/31/19 at 21:01;  Status DC


Magnesium Sulfate 50 ml @ 25 mls/hr 1X  ONCE IV  Last administered on 1/1/20at 

11:40;  Start 1/1/20 at 10:00;  Stop 1/1/20 at 11:59;  Status DC


Insulin Human Lispro (HumaLOG) 12 units 1X SQ ;  Start 1/1/20 at 09:45;  Stop 

1/1/20 at 10:32;  Status DC


Insulin Human Lispro (HumaLOG) 12 units 1X  ONCE SQ  Last administered on 

1/1/20at 10:33;  Start 1/1/20 at 10:45;  Stop 1/1/20 at 10:46;  Status DC


Daptomycin 500 mg/ Sodium Chloride 50 ml @  100 mls/hr Q48H IV ;  Start 1/1/20 

at 16:00;  Stop 1/1/20 at 17:18;  Status DC


Potassium Chloride (Klor-Con) 20 meq BIDWMEALS PO  Last administered on 1/8/20at

08:50;  Start 1/1/20 at 17:00


Daptomycin 500 mg/ Sodium Chloride 50 ml @  100 mls/hr Q48H IV  Last adm

inistered on 1/5/20at 20:53;  Start 1/1/20 at 21:00;  Stop 1/7/20 at 07:52;  

Status DC


Sodium Chloride 500 ml @  500 mls/hr 1X  ONCE IV  Last administered on 1/2/20at 

08:41;  Start 1/2/20 at 08:45;  Stop 1/2/20 at 09:44;  Status DC


Sodium Chloride 1,000 ml @  75 mls/hr [id number] IV  Last administered on 1/4/20at 

07:42;  Start 1/2/20 at 08:45;  Stop 1/4/20 at 12:54;  Status DC


Levothyroxine Sodium (Synthroid) 25 mcg DAILY06 PO  Last administered on 

1/8/20at 06:49;  Start 1/3/20 at 06:00


Atorvastatin Calcium (Lipitor) 10 mg QHS PO  Last administered on 1/7/20at 

21:01;  Start 1/2/20 at 21:00


Sodium Hypochlorite (Dakin'S 1/4 Strength) 1 carley PRN DAILY  PRN TP SEE COMMENTS;

 Start 1/2/20 at 17:00


Al Hydroxide/Mg Hydroxide (Mylanta Plus Xs) 30 ml PRN Q8HRS  PRN PO HEARTBURN / 

GAS Last administered on 1/4/20at 20:16;  Start 1/3/20 at 00:30


Sodium Chloride 500 ml @  500 mls/hr 1X  ONCE IV  Last administered on 1/3/20at 

11:27;  Start 1/3/20 at 11:30;  Stop 1/3/20 at 12:29;  Status DC


Linezolid (Zyvox) 600 mg BID PO  Last administered on 1/8/20at 08:50;  Start 

1/3/20 at 21:00


Lactobacillus Rhamnosus (Culturelle) 1 cap BID PO  Last administered on 1/8/20at

08:50;  Start 1/3/20 at 21:00


Ondansetron HCl (Zofran) 4 mg PRN Q6HRS  PRN IVP NAUSEA/VOMITING Last 

administered on 1/6/20at 00:33;  Start 1/3/20 at 20:30


Ondansetron HCl (Zofran) 4 mg PRN Q6HRS  PRN IV NAUSEA/VOMITING;  Start 1/4/20 

at 08:45;  Stop 1/4/20 at 12:54;  Status DC


Fentanyl Citrate (Fentanyl 2ml Vial) 25 mcg PRN Q5MIN  PRN IV MILD PAIN 1-3;  

Start 1/4/20 at 08:45;  Stop 1/4/20 at 12:54;  Status DC


Fentanyl Citrate (Fentanyl 2ml Vial) 50 mcg PRN Q5MIN  PRN IV MODERATE TO SEVERE

PAIN;  Start 1/4/20 at 08:45;  Stop 1/4/20 at 12:54;  Status DC


Morphine Sulfate (Morphine Sulfate) 1 mg PRN Q10MIN  PRN IV SEVERE PAIN 7-10;  

Start 1/4/20 at 08:45;  Stop 1/4/20 at 12:54;  Status DC


Ringer's Solution 1,000 ml @  30 mls/hr Q24H IV ;  Start 1/4/20 at 08:33;  Stop 

1/4/20 at 12:54;  Status DC


Hydromorphone HCl (Dilaudid) 0.5 mg PRN Q10MIN  PRN IV SEV PAIN, Second choice; 

Start 1/4/20 at 08:45;  Stop 1/4/20 at 12:54;  Status DC


Prochlorperazine Edisylate (Compazine) 5 mg PACU PRN  PRN IV NAUSEA, MRX1;  

Start 1/4/20 at 08:45;  Stop 1/4/20 at 12:54;  Status DC


Insulin Human Lispro (HumaLOG VIAL for OP,RR ONLY) 0-10 units PRN Q1HR  PRN SQ 

PER PROTOCOL;  Start 1/4/20 at 08:45;  Stop 1/4/20 at 09:13;  Status DC


Insulin Human Lispro (HumaLOG VIAL for OP,RR ONLY) 8 unit 1X  ONCE SQ ;  Start 

1/4/20 at 08:45;  Stop 1/4/20 at 08:46;  Status DC


Levothyroxine Sodium (Synthroid) 50 mcg DAILY06 PO ;  Start 1/5/20 at 06:00;  

Status Cancel


Bupivacaine HCl/ Epinephrine Bitart (Sensorcaine-Epi 0.25%-1:563328 Mpf) 30 ml 

1X  ONCE INJ ;  Start 1/4/20 at 09:30;  Stop 1/4/20 at 09:31;  Status DC


Insulin Human Lispro (HumaLOG) 0-7 UNITS TIDWMEALS SQ  Last administered on 

1/7/20at 12:17;  Start 1/4/20 at 12:00


Dextrose (Dextrose 50%-Water Syringe) 12.5 gm PRN Q15MIN  PRN IV SEE COMMENTS;  

Start 1/4/20 at 09:15


Dextrose (Iv Dextrose 5%) 250 ml PRN Q15MIN  PRN IV SEE COMMENTS Last 

administered on 1/5/20at 01:11;  Start 1/4/20 at 09:15


Sodium Chloride 1,000 ml @  150 mls/hr Q6H40M IV  Last administered on 1/8/20at 

06:50;  Start 1/4/20 at 12:45


Sodium Chloride 500 ml @  500 mls/hr 1X  ONCE IV  Last administered on 1/4/20at 

10:00;  Start 1/4/20 at 12:45;  Stop 1/4/20 at 13:44;  Status DC


Prochlorperazine (Compazine) 25 mg PRN Q12HR  PRN ME NAUSEA/VOMITING 3RD CHOICE;

 Start 1/5/20 at 00:15


Cefazolin Sodium/ Dextrose 50 ml @  100 mls/hr 1X PREOP  PRN IV SEE COMMENTS;  

Start 1/5/20 at 15:15;  Stop 1/6/20 at 09:34;  Status DC


Dexamethasone Sodium Phosphate (Decadron) 4 mg STK-MED ONCE .ROUTE ;  Start 

1/6/20 at 10:33;  Stop 1/6/20 at 10:33;  Status DC


Famotidine (Pepcid Vial) 20 mg STK-MED ONCE .ROUTE ;  Start 1/6/20 at 10:33;  

Stop 1/6/20 at 10:33;  Status DC


Lidocaine HCl (Lidocaine Pf 2% Vial) 5 ml STK-MED ONCE .ROUTE ;  Start 1/6/20 at

10:33;  Stop 1/6/20 at 10:33;  Status DC


Ondansetron HCl (Zofran) 4 mg STK-MED ONCE .ROUTE ;  Start 1/6/20 at 10:33;  

Stop 1/6/20 at 10:33;  Status DC


Etomidate (Amidate) 20 mg STK-MED ONCE IV ;  Start 1/6/20 at 10:33;  Stop 1/6/20

at 10:33;  Status DC


Bupivacaine HCl/ Epinephrine Bitart (Sensorcaine-Epi 0.25%-1:218749 Mpf) 30 ml 

1X  ONCE INJ  Last administered on 1/6/20at 12:51;  Start 1/6/20 at 12:00;  Stop

1/6/20 at 12:01;  Status DC


Rocuronium Bromide (Zemuron) 50 mg STK-MED ONCE .ROUTE ;  Start 1/6/20 at 10:38;

 Stop 1/6/20 at 10:38;  Status DC


Fentanyl Citrate (Fentanyl 2ml Vial) 100 mcg STK-MED ONCE .ROUTE ;  Start 1/6/20

at 10:38;  Stop 1/6/20 at 10:39;  Status DC


Cefazolin Sodium/ Dextrose 50 ml @  100 mls/hr 1X PREOP  PRN IV PRIOR TO 

PROCEDURE;  Start 1/6/20 at 11:00;  Stop 1/6/20 at 19:00;  Status DC


Ketamine HCl (Ketamine) 50 mg STK-MED ONCE .ROUTE ;  Start 1/6/20 at 12:15;  

Stop 1/6/20 at 12:16;  Status DC


Vasopressin (Vasostrict) 20 unit STK-MED ONCE .ROUTE ;  Start 1/6/20 at 13:06;  

Stop 1/6/20 at 13:06;  Status DC


Sevoflurane (Ultane) 90 ml STK-MED ONCE IH ;  Start 1/6/20 at 13:41;  Stop 

1/6/20 at 13:41;  Status DC


Glycopyrrolate (Robinul) 1 mg STK-MED ONCE .ROUTE ;  Start 1/6/20 at 13:44;  

Stop 1/6/20 at 13:44;  Status DC


Albumin Human 500 ml @  As Directed STK-MED ONCE IV ;  Start 1/6/20 at 13:44;  

Stop 1/6/20 at 13:44;  Status DC


Neostigmine Bromide (Neostigmine Methylsulfate) 5 mg STK-MED ONCE .ROUTE ;  

Start 1/6/20 at 13:44;  Stop 1/6/20 at 13:45;  Status DC


Phenylephrine HCl (PHENYLEPHRINE in 0.9% NACL PF) 1 mg STK-MED ONCE IV ;  Start 

1/6/20 at 13:44;  Stop 1/6/20 at 13:45;  Status DC


Prochlorperazine Edisylate (Compazine) 10 mg STK-MED ONCE .ROUTE ;  Start 1/6/20

at 15:00;  Stop 1/6/20 at 15:00;  Status DC


Prochlorperazine Edisylate (Compazine) 5 mg 1X  ONCE IV  Last administered on 

1/6/20at 15:15;  Start 1/6/20 at 15:15;  Stop 1/6/20 at 15:16;  Status DC


Insulin Human Lispro (HumaLOG VIAL for OP,RR ONLY) 0-10 units PRN Q1HR  PRN SQ 

PER PROTOCOL Last administered on 1/6/20at 17:15;  Start 1/6/20 at 15:30;  Stop 

1/6/20 at 19:00;  Status DC


Peritoneal Dialysis Solution (Continuous Cycling Peritoneal Dialysis Pt) 1 each 

PRN DAILY  PRN MC SEE COMMENTS;  Start 1/6/20 at 15:45


Albumin Human 500 ml @  125 mls/hr PRN DAILY  PRN IV hypotension Last 

administered on 1/7/20at 06:27;  Start 1/6/20 at 22:00


Norepinephrine Bitartrate 8 mg/ Dextrose 258 ml @  17.148 mls/ hr CONT  PRN IV 

PER PROTOCOL Last administered on 1/8/20at 00:36;  Start 1/7/20 at 10:00


Magnesium Sulfate 50 ml @ 25 mls/hr 1X  ONCE IV  Last administered on 1/8/20at 

00:51;  Start 1/8/20 at 01:00;  Stop 1/8/20 at 02:59;  Status DC


Magnesium Sulfate 50 ml @ 25 mls/hr 1X  ONCE IV  Last administered on 1/8/20at 

06:49;  Start 1/8/20 at 06:30;  Stop 1/8/20 at 08:29;  Status DC


Calcium Gluconate 1000 mg/Sodium Chloride 110 ml @  220 mls/hr 1X  ONCE IV  Last

administered on 1/8/20at 09:38;  Start 1/8/20 at 09:00;  Stop 1/8/20 at 09:29;  

Status DC


Potassium Chloride/Water 100 ml @  50 mls/hr 1X  ONCE IV  Last administered on 

1/8/20at 08:51;  Start 1/8/20 at 08:30;  Stop 1/8/20 at 10:29





Active Scripts


Active


Ssd (Silver Sulfadiazine) 25 Gm Cream..g. 1 Carley TP DAILY 10 Days


Mupirocin Ointment (Mupirocin) 22 Gm Oint...g. 1 Carley TP BID 10 Days


Humalog (Insulin Lispro) 100 Unit/1 Ml Insuln.pen 10 Units SQ TIDWMEALS 30 Days


Lantus Solostar (Insulin Glargine,Hum.rec.anlog) 100 Unit/1 Ml Insuln.pen 30 

Units SQ QHS 30 Days


Metoclopramide Hcl 5 Mg Tablet 5 Mg PO PRN BFRMEALHC PRN


Culturelle (Lactobacillus Rhamnosus Gg) 1 Each Cap.sprink 1 Cap PO BID 14 Days


Loperamide (Loperamide Hcl) 2 Mg Capsule 2 Mg PO PRN Q15MIN PRN 10 Days


Tramadol Hcl 50 Mg Tablet 50 Mg PO PRN Q6HRS PRN


Baclofen 10 Mg Tablet 10 Mg PO PRN TID PRN 14 Days


Nystatin 100,000 Unit/1 Ml Oral.susp 5 Ml SWSW [id number] 30 Days


Promethazine Hcl 12.5 Mg Tablet 12.5 Mg PO PRN Q6HRS PRN 30 Days


Reported


Entresto 24 mg-26 mg Tablet (Sacubitril/Valsartan) 1 Each Tablet 1 Each PO DAILY


Furosemide 80 Mg Tablet 1 Tab PO DAILY


Klor-Con M20 (Potassium Chloride) 20 Meq Tab.er.prt 1 Tab PO DAILY 30 Days


Pantoprazole Sodium  ** (Pantoprazole Sodium) 40 Mg Tablet.dr 40 Mg PO DAILYAC


Alison-Aime Tablet (Folic Acid/Vitamin B Comp W-C) 0.8 Mg Tablet 1 Tab PO DAILY 30

Days


Metolazone 5 Mg Tablet 5 Mg PO DAILY


Vitamin D3 (Cholecalciferol (Vitamin D3)) 5,000 Unit Tablet 1 Tab PO DAILY


Calcium Acetate 667 Mg Tablet 667 Mg PO TIDWMEALS


Renal Caps Softgel (Folic Acid/Vitamin B Comp W-C) 1 Mg Capsule 1 Cap PO DAILY


Tresiba Flextouch U-100 (Insulin Degludec) 100 Unit/1 Ml Insuln.pen 100 Unit SQ 


Furosemide 40 Mg Tablet 1 Tab PO DAILY


Aspirin 81 Mg Tab.chew 81 Mg PO DAILYWBKFT


Vitals/I & O





Vital Sign - Last 24 Hours








 1/7/20 1/7/20 1/7/20 1/7/20





 10:00 10:00 11:00 12:00


 


Pulse 90  86 


 


B/P (MAP) 72/46 (55) 70/52 (58) 72/52 (59) 


 


Pulse Ox 100  100 


 


O2 Delivery Nasal Cannula  Nasal Cannula Nasal Cannula


 


O2 Flow Rate 2.0  2.0 2.0





 1/7/20 1/7/20 1/7/20 1/7/20





 12:00 12:00 13:00 14:00


 


Temp  97.3  





  97.3  


 


Pulse 76 88 90 96


 


B/P (MAP) 74/60 (65) 77/54 (62) 72/52 (59) 78/64 (69)


 


Pulse Ox  100 100 100


 


O2 Delivery  Nasal Cannula Nasal Cannula Nasal Cannula


 


O2 Flow Rate  2.0 2.0 2.0


 


    





    





 1/7/20 1/7/20 1/7/20 1/7/20





 15:00 16:00 16:00 16:00


 


Temp    98.1





    98.1


 


Pulse 90 76  92


 


B/P (MAP) 78/72 (74) 74/60 (65)  68/52 (57)


 


Pulse Ox 100   100


 


O2 Delivery Nasal Cannula  Nasal Cannula Nasal Cannula


 


O2 Flow Rate 2.0  2.0 2.0


 


    





    





 1/7/20 1/7/20 1/7/20 1/7/20





 17:00 18:00 19:00 19:20


 


Temp   97.5 





   97.5 


 


Pulse 90 92 92 90


 


B/P (MAP) 62/52 (55) 60/54 (56) 52/42 (45) 99/57 (71)


 


Pulse Ox 100 100 100 99


 


O2 Delivery Nasal Cannula Nasal Cannula Nasal Cannula Nasal Cannula


 


O2 Flow Rate 2.0 2.0 2.0 2.0


 


    





    





 1/7/20 1/7/20 1/7/20 1/7/20





 20:00 20:00 20:00 21:00


 


Pulse 92 92  90


 


B/P (MAP) 58/44 (49) 58/44 (49)  78/52


 


Pulse Ox 100   


 


O2 Delivery Nasal Cannula  Nasal Cannula 


 


O2 Flow Rate 2.0  2.0 





 1/7/20 1/7/20 1/7/20 1/8/20





 21:00 22:00 23:00 00:00


 


Temp    97.8





    97.8


 


Pulse 90 90 92 90


 


B/P (MAP) 80/60 (67) 88/64 (72) 88/70 (76) 82/56 (65)


 


Pulse Ox 100 100 99 99


 


O2 Delivery Nasal Cannula Nasal Cannula Nasal Cannula Nasal Cannula


 


O2 Flow Rate 2.0 2.0 2.0 2.0


 


    





    





 1/8/20 1/8/20 1/8/20 1/8/20





 00:00 00:00 01:00 02:00


 


Pulse  90 96 96


 


Resp   16 16


 


B/P (MAP)  82/56 (65) 98/76 (83) 110/68 (82)


 


Pulse Ox   100 100


 


O2 Delivery Nasal Cannula  Nasal Cannula Nasal Cannula


 


O2 Flow Rate 2.0  2.0 2.0





 1/8/20 1/8/20 1/8/20 1/8/20





 03:00 04:00 04:00 04:00


 


Temp    97.7





    97.7


 


Pulse 94 102  102


 


B/P (MAP) 90/74 (79) 94/64 (74)  94/64 (74)


 


Pulse Ox 100   100


 


O2 Delivery Room Air  Nasal Cannula Room Air


 


O2 Flow Rate   2.0 


 


    





    





 1/8/20 1/8/20  





 05:00 06:00  


 


Pulse 100 98  


 


B/P (MAP) 92/56 (68) 98/44 (62)  


 


Pulse Ox 98 96  


 


O2 Delivery Room Air Room Air  














Intake and Output   


 


 1/7/20 1/7/20 1/8/20





 15:00 23:00 07:00


 


Intake Total  2657 ml 1128 ml


 


Output Total 0 ml 0 ml 0 ml


 


Balance 0 ml 2657 ml 1128 ml

















SCOTT RAYO MD           Jan 8, 2020 09:52

## 2020-01-09 VITALS — SYSTOLIC BLOOD PRESSURE: 80 MMHG | DIASTOLIC BLOOD PRESSURE: 68 MMHG

## 2020-01-09 VITALS — SYSTOLIC BLOOD PRESSURE: 76 MMHG | DIASTOLIC BLOOD PRESSURE: 54 MMHG

## 2020-01-09 VITALS — DIASTOLIC BLOOD PRESSURE: 51 MMHG | SYSTOLIC BLOOD PRESSURE: 81 MMHG

## 2020-01-09 VITALS — SYSTOLIC BLOOD PRESSURE: 98 MMHG | DIASTOLIC BLOOD PRESSURE: 66 MMHG

## 2020-01-09 VITALS — DIASTOLIC BLOOD PRESSURE: 58 MMHG | SYSTOLIC BLOOD PRESSURE: 93 MMHG

## 2020-01-09 VITALS — DIASTOLIC BLOOD PRESSURE: 64 MMHG | SYSTOLIC BLOOD PRESSURE: 78 MMHG

## 2020-01-09 VITALS — DIASTOLIC BLOOD PRESSURE: 56 MMHG | SYSTOLIC BLOOD PRESSURE: 98 MMHG

## 2020-01-09 VITALS — DIASTOLIC BLOOD PRESSURE: 80 MMHG | SYSTOLIC BLOOD PRESSURE: 112 MMHG

## 2020-01-09 VITALS — SYSTOLIC BLOOD PRESSURE: 93 MMHG | DIASTOLIC BLOOD PRESSURE: 51 MMHG

## 2020-01-09 VITALS — SYSTOLIC BLOOD PRESSURE: 56 MMHG | DIASTOLIC BLOOD PRESSURE: 38 MMHG

## 2020-01-09 VITALS — SYSTOLIC BLOOD PRESSURE: 91 MMHG | DIASTOLIC BLOOD PRESSURE: 59 MMHG

## 2020-01-09 VITALS — DIASTOLIC BLOOD PRESSURE: 72 MMHG | SYSTOLIC BLOOD PRESSURE: 110 MMHG

## 2020-01-09 VITALS — SYSTOLIC BLOOD PRESSURE: 88 MMHG | DIASTOLIC BLOOD PRESSURE: 64 MMHG

## 2020-01-09 VITALS — DIASTOLIC BLOOD PRESSURE: 70 MMHG | SYSTOLIC BLOOD PRESSURE: 108 MMHG

## 2020-01-09 VITALS — SYSTOLIC BLOOD PRESSURE: 89 MMHG | DIASTOLIC BLOOD PRESSURE: 61 MMHG

## 2020-01-09 VITALS — SYSTOLIC BLOOD PRESSURE: 106 MMHG | DIASTOLIC BLOOD PRESSURE: 66 MMHG

## 2020-01-09 LAB
ANION GAP SERPL CALC-SCNC: 11 MMOL/L (ref 6–14)
BUN SERPL-MCNC: 40 MG/DL (ref 8–26)
CALCIUM SERPL-MCNC: 6.1 MG/DL (ref 8.5–10.1)
CHLORIDE SERPL-SCNC: 100 MMOL/L (ref 98–107)
CO2 SERPL-SCNC: 20 MMOL/L (ref 21–32)
CREAT SERPL-MCNC: 7.7 MG/DL (ref 0.7–1.3)
GFR SERPLBLD BASED ON 1.73 SQ M-ARVRAT: 9 ML/MIN
GLUCOSE SERPL-MCNC: 319 MG/DL (ref 70–99)
MAGNESIUM SERPL-MCNC: 2.2 MG/DL (ref 1.8–2.4)
POTASSIUM SERPL-SCNC: 4.4 MMOL/L (ref 3.5–5.1)
SODIUM SERPL-SCNC: 131 MMOL/L (ref 136–145)

## 2020-01-09 RX ADMIN — Medication SCH CAP: at 09:04

## 2020-01-09 RX ADMIN — PANTOPRAZOLE SODIUM SCH MG: 40 TABLET, DELAYED RELEASE ORAL at 09:03

## 2020-01-09 RX ADMIN — INSULIN LISPRO SCH UNITS: 100 INJECTION, SOLUTION INTRAVENOUS; SUBCUTANEOUS at 17:00

## 2020-01-09 RX ADMIN — MIDODRINE HYDROCHLORIDE SCH MG: 2.5 TABLET ORAL at 12:47

## 2020-01-09 RX ADMIN — MIDODRINE HYDROCHLORIDE SCH MG: 2.5 TABLET ORAL at 09:03

## 2020-01-09 RX ADMIN — ASPIRIN 81 MG SCH MG: 81 TABLET ORAL at 09:04

## 2020-01-09 RX ADMIN — LEVOTHYROXINE SODIUM SCH MCG: 25 TABLET ORAL at 06:43

## 2020-01-09 RX ADMIN — Medication SCH EA: at 09:10

## 2020-01-09 RX ADMIN — Medication SCH CAP: at 20:51

## 2020-01-09 RX ADMIN — MIDODRINE HYDROCHLORIDE SCH MG: 2.5 TABLET ORAL at 17:37

## 2020-01-09 RX ADMIN — Medication SCH EA: at 12:00

## 2020-01-09 RX ADMIN — ATORVASTATIN CALCIUM SCH MG: 10 TABLET, FILM COATED ORAL at 20:51

## 2020-01-09 RX ADMIN — POTASSIUM CHLORIDE SCH MEQ: 1500 TABLET, EXTENDED RELEASE ORAL at 17:37

## 2020-01-09 RX ADMIN — Medication SCH EA: at 09:03

## 2020-01-09 RX ADMIN — INSULIN LISPRO SCH UNITS: 100 INJECTION, SOLUTION INTRAVENOUS; SUBCUTANEOUS at 09:09

## 2020-01-09 RX ADMIN — Medication SCH EA: at 17:00

## 2020-01-09 RX ADMIN — POTASSIUM CHLORIDE SCH MEQ: 1500 TABLET, EXTENDED RELEASE ORAL at 09:04

## 2020-01-09 RX ADMIN — INSULIN LISPRO SCH UNITS: 100 INJECTION, SOLUTION INTRAVENOUS; SUBCUTANEOUS at 12:00

## 2020-01-09 NOTE — NUR
Pt 's arterial line is very positional and reads very low Bp's 60's /40, pt is asymptomatic 
,alert and oriented . Manual BP's have been consistently >90 systolic with levophed off.  Pt 
had frequent PVC runs while on levophed which resolved after the drip was turned off.

## 2020-01-09 NOTE — PDOC
PROGRESS NOTES


Chief Complaint


Chief Complaint











Post-op day 9 of amputation of R great toe w/extensive debridement of lateral 

aspect of R foot


Right foot deep tissue infection/osteo with chronic nonhealing right diabetic 

foot ulcers. 


s/p amputation of right 5th and great toe, 12/31. GPC, GNR, GPR on gram stain. 

culture no growth so far,,, now BKA 


Improving sepsis


Necrotizing Fasciitis of RLE


CHF


Diabetes-Type II


High Cholesterol


Heart Disease


Renal Failure


HYPOTENSION


RUN OF V-TACH 1/7 PM


HYPOCALCEMIC





[facility]ed at [facility], but not [facility]  transfer 1/9, ca and mg replaced BP LABILE








35 MIN CC TIME





History of Present Illness


History of Present Illness


1-7-20


Pt seen and examined


SOUTH RN


PRN PRESSORS





1/5/20


Pt seen and examined


Pt states he feels well and is ready for surgery tomorrow


Pt's vitals have improved


DW RN


Reviewed pt's chart





1/4/20


Pt seen and examined


DW pt about upcoming R BKA


Pt states he feels fine 


Pt doesn't appear septic despite low BP and tachycardia


SOUTH RN


Reviewed pt's chart





[id number]


Patient seen and examined


He now has a wound VAC on his right foot


Discussed with RN chart reviewed





1/2/20


Pt seen and examined


Pt reported being lethargic today


DW pt about care


Reviewed pt's chart





1/1/20


Pt seen and examined


DW pt


SOUTH RN


Reviewed pt's chart





12/31/19


Pt seen and examined


Pt was sitting up in bed, eating breakfast in NAD


DW pt


SOUTH RN


Reviewed pt's chart





Vitals


Vitals





Vital Signs








  Date Time  Temp Pulse Resp B/P (MAP) Pulse Ox O2 Delivery O2 Flow Rate FiO2


 


1/9/20 08:00  90 19 89/61 (70) 100 Room Air  


 


1/9/20 07:00 97.5       





 97.5       


 


1/8/20 16:00       2.0 











Physical Exam


Physical Exam


GENERAL: Propped up in bed, alert, relaxed appearance, watching TV 


HEENT:  Oral mucosa moist. No thrush 


NECK:  Supple.  No JVD.


LUNGS:  Clear bilaterally.  No wheezing.


HEART:  S1, S2.


ABDOMEN:  Soft, nontender. PDC 


EXTREMITIES:  Right big toe amputation site approx w/ sutures. Rt BKA


Lateral aspect wound vac in place 


DERMATOLOGIC:  Warm, dry.  No generalized rash. gen edema ++


CENTRAL NERVOUS SYSTEM:  Alert and oriented x 3, grossly nonfocal.


PIV


General:  Alert, Oriented X3, Cooperative, mild distress


Heart:  Regular rate, Normal S1, Normal S2


Lungs:  Clear


Abdomen:  Normal bowel sounds


Extremities:  No clubbing, No edema, Other ( Right big toe amputation site 

approx w/ sutures. Wound vac  in place on lateral aspect of R foot)


Skin:  No rashes, Other (Wound vac in place on lateral aspect of R foot. There 

is a bad odor.)





Labs


LABS





Laboratory Tests








Test


 1/8/20


08:57 1/8/20


12:41 1/8/20


13:55 1/8/20


17:40


 


Glucose (Fingerstick)


 79 mg/dL


(70-99) 82 mg/dL


(70-99) 83 mg/dL


(70-99) 145 mg/dL


(70-99)


 


Test


 1/9/20


05:30 


 


 





 


Sodium Level


 131 mmol/L


(136-145) 


 


 





 


Potassium Level


 4.4 mmol/L


(3.5-5.1) 


 


 





 


Chloride Level


 100 mmol/L


() 


 


 





 


Carbon Dioxide Level


 20 mmol/L


(21-32) 


 


 





 


Anion Gap 11 (6-14)    


 


Blood Urea Nitrogen


 40 mg/dL


(8-26) 


 


 





 


Creatinine


 7.7 mg/dL


(0.7-1.3) 


 


 





 


Estimated GFR


(Cockcroft-Gault) 9.0 


 


 


 





 


Glucose Level


 319 mg/dL


(70-99) 


 


 





 


Calcium Level


 6.1 mg/dL


(8.5-10.1) 


 


 





 


Magnesium Level


 2.2 mg/dL


(1.8-2.4) 


 


 














Assessment and Plan


Assessmemt and Plan


Problems


Medical Problems:


(1) CHF (congestive heart failure)


Status: Acute  





(2) Chronic kidney disease


Status: Acute  





(3) Diabetes


Status: Acute  





(4) Sepsis


Status: Acute  





(5) Severe sepsis


Status: Acute  











Comment


Review of Relevant


I have reviewed the following items sean (where applicable) has been applied.


Labs





Laboratory Tests








Test


 1/7/20


12:15 1/7/20


17:45 1/7/20


23:09 1/8/20


05:05


 


Glucose (Fingerstick)


 192 mg/dL


(70-99) 139 mg/dL


(70-99) 


 





 


Sodium Level


 


 


 130 mmol/L


(136-145) 133 mmol/L


(136-145)


 


Potassium Level


 


 


 3.9 mmol/L


(3.5-5.1) 3.4 mmol/L


(3.5-5.1)


 


Chloride Level


 


 


 100 mmol/L


() 104 mmol/L


()


 


Carbon Dioxide Level


 


 


 19 mmol/L


(21-32) 16 mmol/L


(21-32)


 


Anion Gap   11 (6-14)  13 (6-14) 


 


Blood Urea Nitrogen


 


 


 45 mg/dL


(8-26) 37 mg/dL


(8-26)


 


Creatinine


 


 


 8.0 mg/dL


(0.7-1.3) 7.0 mg/dL


(0.7-1.3)


 


Estimated GFR


(Cockcroft-Gault) 


 


 8.6 


 10.0 





 


Glucose Level


 


 


 158 mg/dL


(70-99) 139 mg/dL


(70-99)


 


Calcium Level


 


 


 6.0 mg/dL


(8.5-10.1) 5.6 mg/dL


(8.5-10.1)


 


Magnesium Level


 


 


 1.7 mg/dL


(1.8-2.4) 1.8 mg/dL


(1.8-2.4)


 


White Blood Count


 


 


 


 17.3 x10^3/uL


(4.0-11.0)


 


Red Blood Count


 


 


 


 3.76 x10^6/uL


(4.30-5.70)


 


Hemoglobin


 


 


 


 10.2 g/dL


(13.0-17.5)


 


Hematocrit


 


 


 


 31.3 %


(39.0-53.0)


 


Mean Corpuscular Volume    83 fL () 


 


Mean Corpuscular Hemoglobin    27 pg (25-35) 


 


Mean Corpuscular Hemoglobin


Concent 


 


 


 33 g/dL


(31-37)


 


Red Cell Distribution Width


 


 


 


 15.1 %


(11.5-14.5)


 


Platelet Count


 


 


 


 577 x10^3/uL


(140-400)


 


Neutrophils (%) (Auto)    83 % (31-73) 


 


Lymphocytes (%) (Auto)    9 % (24-48) 


 


Monocytes (%) (Auto)    6 % (0-9) 


 


Eosinophils (%) (Auto)    2 % (0-3) 


 


Basophils (%) (Auto)    0 % (0-3) 


 


Neutrophils # (Auto)


 


 


 


 14.4 x10^3/uL


(1.8-7.7)


 


Lymphocytes # (Auto)


 


 


 


 1.5 x10^3/uL


(1.0-4.8)


 


Monocytes # (Auto)


 


 


 


 1.0 x10^3/uL


(0.0-1.1)


 


Eosinophils # (Auto)


 


 


 


 0.3 x10^3/uL


(0.0-0.7)


 


Basophils # (Auto)


 


 


 


 0.1 x10^3/uL


(0.0-0.2)


 


Troponin I Quantitative


 


 


 


 0.031 ng/mL


(0.000-0.055)


 


Triglycerides Level


 


 


 


 89 mg/dL


(0-150)


 


Cholesterol Level


 


 


 


 94 mg/dL


(0-200)


 


LDL Cholesterol, Calculated


 


 


 


 44 mg/dL


(0-100)


 


VLDL Cholesterol, Calculated


 


 


 


 18 mg/dL


(0-40)


 


Non-HDL Cholesterol Calculated


 


 


 


 62 mg/dL


(0-129)


 


HDL Cholesterol


 


 


 


 32 mg/dL


(40-60)


 


Cholesterol/HDL Ratio    2.9 


 


Test


 1/8/20


08:57 1/8/20


12:41 1/8/20


13:55 1/8/20


17:40


 


Glucose (Fingerstick)


 79 mg/dL


(70-99) 82 mg/dL


(70-99) 83 mg/dL


(70-99) 145 mg/dL


(70-99)


 


Test


 1/9/20


05:30 


 


 





 


Sodium Level


 131 mmol/L


(136-145) 


 


 





 


Potassium Level


 4.4 mmol/L


(3.5-5.1) 


 


 





 


Chloride Level


 100 mmol/L


() 


 


 





 


Carbon Dioxide Level


 20 mmol/L


(21-32) 


 


 





 


Anion Gap 11 (6-14)    


 


Blood Urea Nitrogen


 40 mg/dL


(8-26) 


 


 





 


Creatinine


 7.7 mg/dL


(0.7-1.3) 


 


 





 


Estimated GFR


(Cockcroft-Gault) 9.0 


 


 


 





 


Glucose Level


 319 mg/dL


(70-99) 


 


 





 


Calcium Level


 6.1 mg/dL


(8.5-10.1) 


 


 





 


Magnesium Level


 2.2 mg/dL


(1.8-2.4) 


 


 











Laboratory Tests








Test


 1/8/20


08:57 1/8/20


12:41 1/8/20


13:55 1/8/20


17:40


 


Glucose (Fingerstick)


 79 mg/dL


(70-99) 82 mg/dL


(70-99) 83 mg/dL


(70-99) 145 mg/dL


(70-99)


 


Test


 1/9/20


05:30 


 


 





 


Sodium Level


 131 mmol/L


(136-145) 


 


 





 


Potassium Level


 4.4 mmol/L


(3.5-5.1) 


 


 





 


Chloride Level


 100 mmol/L


() 


 


 





 


Carbon Dioxide Level


 20 mmol/L


(21-32) 


 


 





 


Anion Gap 11 (6-14)    


 


Blood Urea Nitrogen


 40 mg/dL


(8-26) 


 


 





 


Creatinine


 7.7 mg/dL


(0.7-1.3) 


 


 





 


Estimated GFR


(Cockcroft-Gault) 9.0 


 


 


 





 


Glucose Level


 319 mg/dL


(70-99) 


 


 





 


Calcium Level


 6.1 mg/dL


(8.5-10.1) 


 


 





 


Magnesium Level


 2.2 mg/dL


(1.8-2.4) 


 


 











Microbiology


1/4/20 Blood Culture - Preliminary, Resulted


         NO GROWTH AFTER 4 DAYS


12/31/19 Anaerobic/Aerobic Culture - Final, Complete


           


12/31/19 Anaerobic Culture Result 1 (ANTHONY) - Final, Complete


           


12/31/19 Aerobic Culture - Final, Complete


           


12/31/19 Aerobic Culture Result 1 (ANTHONY) - Final, Complete


           


12/31/19 Gram Stain - Final, Complete


           


12/31/19 Gram Stain Result 1 (ANTHONY) - Final, Complete


           


12/31/19 Gram Stain Result 2 (ANTHONY) - Final, Complete


           


12/31/19 Gram Stain Result 3 (ANTHONY) - Final, Complete


           


12/31/19 Gram Stain Result 4 (ANTHONY) - Final, Complete


Medications





Current Medications


Clindamycin Phosphate 50 ml @  100 mls/hr 1X  ONCE IV  Last administered on 

12/30/19at 09:53;  Start 12/30/19 at 09:30;  Stop 12/30/19 at 09:59;  Status DC


Sodium Chloride 500 ml @  500 mls/hr 1X  ONCE IV  Last administered on 

12/30/19at 10:11;  Start 12/30/19 at 10:15;  Stop 12/30/19 at 11:14;  Status DC


Ondansetron HCl (Zofran) 4 mg 1X  ONCE IVP  Last administered on 12/30/19at 

10:34;  Start 12/30/19 at 10:30;  Stop 12/30/19 at 10:31;  Status DC


Famotidine (Pepcid Vial) 20 mg 1X  ONCE IVP  Last administered on 12/30/19at 

10:34;  Start 12/30/19 at 10:30;  Stop 12/30/19 at 10:31;  Status DC


Vancomycin HCl 2 gm/Sodium Chloride 500 ml @  250 mls/hr 1X  ONCE IV  Last ad

ministered on 12/30/19at 10:34;  Start 12/30/19 at 10:45;  Stop 12/30/19 at 

12:44;  Status DC


Ondansetron HCl (Zofran) 4 mg PRN Q8HRS  PRN IV NAUSEA/VOMITING Last 

administered on 12/31/19at 08:56;  Start 12/30/19 at 10:45;  Stop 12/31/19 at 

10:44;  Status DC


Fentanyl Citrate (Fentanyl 2ml Vial) 50 mcg PRN Q2HRS  PRN IV PAIN Last 

administered on 1/7/20at 12:15;  Start 12/30/19 at 10:45


Acetaminophen (Tylenol) 650 mg PRN Q4HRS  PRN PO FEVER;  Start 12/30/19 at 

10:45;  Stop 12/31/19 at 10:44;  Status DC


Insulin Human Lispro (HumaLOG) 0-5 UNITS TIDWMEALS SQ  Last administered on 

1/3/20at 18:01;  Start 12/30/19 at 12:00;  Stop 1/4/20 at 09:18;  Status DC


Dextrose (Dextrose 50%-Water Syringe) 12.5 gm PRN Q15MIN  PRN IV SEE COMMENTS 

Last administered on 1/1/20at 17:48;  Start 12/30/19 at 10:45;  Stop 1/4/20 at 

09:17;  Status DC


Dextrose (Iv Dextrose 5%) 250 ml PRN Q15MIN  PRN IV SEE COMMENTS;  Start 

12/30/19 at 10:45;  Stop 1/5/20 at 00:47;  Status DC


Sodium Chloride 500 ml @  500 mls/hr 1X  ONCE IV  Last administered on 

12/30/19at 12:13;  Start 12/30/19 at 12:15;  Stop 12/30/19 at 13:14;  Status DC


Magnesium Sulfate 50 ml @ 25 mls/hr 1X  ONCE IV  Last administered on 12/30/19at

14:49;  Start 12/30/19 at 14:00;  Stop 12/30/19 at 15:59;  Status DC


Aspirin (Children'S Aspirin) 81 mg DAILYWBKFT PO  Last administered on 1/8/20at 

08:50;  Start 12/31/19 at 08:00


Furosemide (Lasix) 80 mg DAILY PO  Last administered on 1/1/20at 08:38;  Start 

12/31/19 at 09:00;  Stop 1/3/20 at 08:09;  Status DC


Loperamide HCl (Imodium) 2 mg PRN Q15MIN  PRN PO DIARRHEA Last administered on 

1/3/20at 14:04;  Start 12/30/19 at 15:30


Pantoprazole Sodium (Protonix) 40 mg DAILYAC PO  Last administered on 1/8/20at 

08:49;  Start 12/31/19 at 07:30


Potassium Chloride (Klor-Con) 20 meq DAILY PO ;  Start 12/31/19 at 09:00;  Stop 

12/31/19 at 14:20;  Status DC


Promethazine HCl (Phenergan) 12.5 mg PRN Q6HRS  PRN PO NAUSEA/VOMITING, 1ST 

CHOICE Last administered on 1/5/20at 18:23;  Start 12/30/19 at 15:30


Sacubitril/ Valsartan (Entresto 24 Mg-26 Mg) 1 tab BID PO  Last administered on 

1/1/20at 08:37;  Start 12/30/19 at 21:00;  Stop 1/8/20 at 14:46;  Status DC


Metolazone (Zaroxolyn) 5 mg DAILY PO  Last administered on 1/1/20at 08:36;  

Start 12/31/19 at 09:00;  Stop 1/3/20 at 08:09;  Status DC


Potassium Chloride (Klor-Con) 20 meq 1X  ONCE PO  Last administered on 

12/30/19at 16:57;  Start 12/30/19 at 16:15;  Stop 12/30/19 at 16:26;  Status DC


Potassium Chloride (Klor-Con) 20 meq DAILYWBKFT PO  Last administered on 

1/1/20at 08:38;  Start 12/31/19 at 08:00;  Stop 1/1/20 at 15:14;  Status DC


Sodium Chloride 500 ml @  500 mls/hr 1X  ONCE IV  Last administered on 

12/31/19at 01:11;  Start 12/31/19 at 01:00;  Stop 12/31/19 at 01:59;  Status DC


Insulin Human Lispro (HumaLOG) 10 units 1X  ONCE SQ ;  Start 12/31/19 at 11:15; 

Stop 12/31/19 at 11:16;  Status DC


Meropenem 500 mg/ Sodium Chloride 50 ml @  100 mls/hr Q12HR IV  Last 

administered on 1/8/20at 21:32;  Start 12/31/19 at 15:00;  Stop 1/9/20 at 07:35;

 Status DC


Linezolid/Dextrose 300 ml @  300 mls/hr Q12HR IV  Last administered on 1/1/20at 

08:57;  Start 12/31/19 at 21:00;  Stop 1/1/20 at 13:49;  Status DC


Micafungin Sodium 100 mg/Dextrose 100 ml @  100 mls/hr Q24H IV  Last 

administered on 1/6/20at 18:19;  Start 12/31/19 at 15:30;  Stop 1/7/20 at 07:52;

 Status DC


Non-Formulary Medication 2 ea TIDWMEALS PO  Last administered on 1/8/20at 17:44;

 Start 12/31/19 at 17:00


Non-Formulary Medication 30 ea DAILY SQ  Last administered on 1/7/20at 08:39;  

Start 1/1/20 at 09:00


Propofol 20 ml @ As Directed STK-MED ONCE IV ;  Start 12/31/19 at 17:45;  Stop 

12/31/19 at 17:45;  Status DC


Lidocaine HCl (Lidocaine Pf 2% Vial) 5 ml STK-MED ONCE .ROUTE ;  Start 12/31/19 

at 17:45;  Stop 12/31/19 at 17:45;  Status DC


Dexamethasone Sodium Phosphate (Decadron) 4 mg STK-MED ONCE .ROUTE ;  Start 

12/31/19 at 17:45;  Stop 12/31/19 at 17:45;  Status DC


Ondansetron HCl (Zofran) 4 mg STK-MED ONCE .ROUTE ;  Start 12/31/19 at 17:45;  

Stop 12/31/19 at 17:45;  Status DC


Fentanyl Citrate (Fentanyl 2ml Vial) 100 mcg STK-MED ONCE .ROUTE ;  Start 

12/31/19 at 17:46;  Stop 12/31/19 at 17:46;  Status DC


Famotidine (Pepcid Vial) 20 mg STK-MED ONCE .ROUTE ;  Start 12/31/19 at 18:44;  

Stop 12/31/19 at 18:45;  Status DC


Citric Acid/ Sodium Citrate (Bicitra) 30 ml 1X  STAT PO  Last administered on 

12/31/19at 18:44;  Start 12/31/19 at 18:44;  Stop 12/31/19 at 18:53;  Status DC


Famotidine (Pepcid Vial) 20 mg 1X  ONCE IVP  Last administered on 12/31/19at 

18:48;  Start 12/31/19 at 19:00;  Stop 12/31/19 at 19:01;  Status DC


Succinylcholine Chloride (Anectine) 200 mg STK-MED ONCE .ROUTE ;  Start 12/31/19

at 20:20;  Stop 12/31/19 at 20:20;  Status DC


Phenylephrine HCl (PHENYLEPHRINE in 0.9% NACL PF) 1 mg STK-MED ONCE IV ;  Start 

12/31/19 at 20:22;  Stop 12/31/19 at 20:22;  Status DC


Sevoflurane (Ultane) 30 ml STK-MED ONCE IH ;  Start 12/31/19 at 21:01;  Stop 

12/31/19 at 21:01;  Status DC


Magnesium Sulfate 50 ml @ 25 mls/hr 1X  ONCE IV  Last administered on 1/1/20at 

11:40;  Start 1/1/20 at 10:00;  Stop 1/1/20 at 11:59;  Status DC


Insulin Human Lispro (HumaLOG) 12 units 1X SQ ;  Start 1/1/20 at 09:45;  Stop 

1/1/20 at 10:32;  Status DC


Insulin Human Lispro (HumaLOG) 12 units 1X  ONCE SQ  Last administered on 1/1 /20at 10:33;  Start 1/1/20 at 10:45;  Stop 1/1/20 at 10:46;  Status DC


Daptomycin 500 mg/ Sodium Chloride 50 ml @  100 mls/hr Q48H IV ;  Start 1/1/20 

at 16:00;  Stop 1/1/20 at 17:18;  Status DC


Potassium Chloride (Klor-Con) 20 meq BIDWMEALS PO  Last administered on 1/8/20at

17:44;  Start 1/1/20 at 17:00


Daptomycin 500 mg/ Sodium Chloride 50 ml @  100 mls/hr Q48H IV  Last administer

ed on 1/5/20at 20:53;  Start 1/1/20 at 21:00;  Stop 1/7/20 at 07:52;  Status DC


Sodium Chloride 500 ml @  500 mls/hr 1X  ONCE IV  Last administered on 1/2/20at 

08:41;  Start 1/2/20 at 08:45;  Stop 1/2/20 at 09:44;  Status DC


Sodium Chloride 1,000 ml @  75 mls/hr [id number] IV  Last administered on 1/4/20at 

07:42;  Start 1/2/20 at 08:45;  Stop 1/4/20 at 12:54;  Status DC


Levothyroxine Sodium (Synthroid) 25 mcg DAILY06 PO  Last administered on 

1/9/20at 06:43;  Start 1/3/20 at 06:00


Atorvastatin Calcium (Lipitor) 10 mg QHS PO  Last administered on 1/8/20at 

21:33;  Start 1/2/20 at 21:00


Sodium Hypochlorite (Dakin'S 1/4 Strength) 1 carley PRN DAILY  PRN TP SEE COMMENTS;

 Start 1/2/20 at 17:00


Al Hydroxide/Mg Hydroxide (Mylanta Plus Xs) 30 ml PRN Q8HRS  PRN PO HEARTBURN / 

GAS Last administered on 1/4/20at 20:16;  Start 1/3/20 at 00:30


Sodium Chloride 500 ml @  500 mls/hr 1X  ONCE IV  Last administered on 1/3/20at 

11:27;  Start 1/3/20 at 11:30;  Stop 1/3/20 at 12:29;  Status DC


Linezolid (Zyvox) 600 mg BID PO  Last administered on 1/8/20at 21:33;  Start 

1/3/20 at 21:00;  Stop 1/9/20 at 07:35;  Status DC


Lactobacillus Rhamnosus (Culturelle) 1 cap BID PO  Last administered on 1/8/20at

21:33;  Start 1/3/20 at 21:00


Ondansetron HCl (Zofran) 4 mg PRN Q6HRS  PRN IVP NAUSEA/VOMITING Last 

administered on 1/6/20at 00:33;  Start 1/3/20 at 20:30


Ondansetron HCl (Zofran) 4 mg PRN Q6HRS  PRN IV NAUSEA/VOMITING;  Start 1/4/20 

at 08:45;  Stop 1/4/20 at 12:54;  Status DC


Fentanyl Citrate (Fentanyl 2ml Vial) 25 mcg PRN Q5MIN  PRN IV MILD PAIN 1-3;  

Start 1/4/20 at 08:45;  Stop 1/4/20 at 12:54;  Status DC


Fentanyl Citrate (Fentanyl 2ml Vial) 50 mcg PRN Q5MIN  PRN IV MODERATE TO SEVERE

PAIN;  Start 1/4/20 at 08:45;  Stop 1/4/20 at 12:54;  Status DC


Morphine Sulfate (Morphine Sulfate) 1 mg PRN Q10MIN  PRN IV SEVERE PAIN 7-10;  

Start 1/4/20 at 08:45;  Stop 1/4/20 at 12:54;  Status DC


Ringer's Solution 1,000 ml @  30 mls/hr Q24H IV ;  Start 1/4/20 at 08:33;  Stop 

1/4/20 at 12:54;  Status DC


Hydromorphone HCl (Dilaudid) 0.5 mg PRN Q10MIN  PRN IV SEV PAIN, Second choice; 

Start 1/4/20 at 08:45;  Stop 1/4/20 at 12:54;  Status DC


Prochlorperazine Edisylate (Compazine) 5 mg PACU PRN  PRN IV NAUSEA, MRX1;  

Start 1/4/20 at 08:45;  Stop 1/4/20 at 12:54;  Status DC


Insulin Human Lispro (HumaLOG VIAL for OP,RR ONLY) 0-10 units PRN Q1HR  PRN SQ 

PER PROTOCOL;  Start 1/4/20 at 08:45;  Stop 1/4/20 at 09:13;  Status DC


Insulin Human Lispro (HumaLOG VIAL for OP,RR ONLY) 8 unit 1X  ONCE SQ ;  Start 

1/4/20 at 08:45;  Stop 1/4/20 at 08:46;  Status DC


Levothyroxine Sodium (Synthroid) 50 mcg DAILY06 PO ;  Start 1/5/20 at 06:00;  

Status Cancel


Bupivacaine HCl/ Epinephrine Bitart (Sensorcaine-Epi 0.25%-1:158168 Mpf) 30 ml 

1X  ONCE INJ ;  Start 1/4/20 at 09:30;  Stop 1/4/20 at 09:31;  Status DC


Insulin Human Lispro (HumaLOG) 0-7 UNITS TIDWMEALS SQ  Last administered on 

1/7/20at 12:17;  Start 1/4/20 at 12:00


Dextrose (Dextrose 50%-Water Syringe) 12.5 gm PRN Q15MIN  PRN IV SEE COMMENTS;  

Start 1/4/20 at 09:15


Dextrose (Iv Dextrose 5%) 250 ml PRN Q15MIN  PRN IV SEE COMMENTS Last 

administered on 1/5/20at 01:11;  Start 1/4/20 at 09:15


Sodium Chloride 1,000 ml @  150 mls/hr Q6H40M IV  Last administered on 1/8/20at 

06:50;  Start 1/4/20 at 12:45;  Stop 1/8/20 at 19:01;  Status DC


Sodium Chloride 500 ml @  500 mls/hr 1X  ONCE IV  Last administered on 1/4/20at 

10:00;  Start 1/4/20 at 12:45;  Stop 1/4/20 at 13:44;  Status DC


Prochlorperazine (Compazine) 25 mg PRN Q12HR  PRN WA NAUSEA/VOMITING 3RD CHOICE;

 Start 1/5/20 at 00:15


Cefazolin Sodium/ Dextrose 50 ml @  100 mls/hr 1X PREOP  PRN IV SEE COMMENTS;  

Start 1/5/20 at 15:15;  Stop 1/6/20 at 09:34;  Status DC


Dexamethasone Sodium Phosphate (Decadron) 4 mg STK-MED ONCE .ROUTE ;  Start 

1/6/20 at 10:33;  Stop 1/6/20 at 10:33;  Status DC


Famotidine (Pepcid Vial) 20 mg STK-MED ONCE .ROUTE ;  Start 1/6/20 at 10:33;  

Stop 1/6/20 at 10:33;  Status DC


Lidocaine HCl (Lidocaine Pf 2% Vial) 5 ml STK-MED ONCE .ROUTE ;  Start 1/6/20 at

10:33;  Stop 1/6/20 at 10:33;  Status DC


Ondansetron HCl (Zofran) 4 mg STK-MED ONCE .ROUTE ;  Start 1/6/20 at 10:33;  

Stop 1/6/20 at 10:33;  Status DC


Etomidate (Amidate) 20 mg STK-MED ONCE IV ;  Start 1/6/20 at 10:33;  Stop 1/6/20

at 10:33;  Status DC


Bupivacaine HCl/ Epinephrine Bitart (Sensorcaine-Epi 0.25%-1:[id number] Mpf) 30 ml 

1X  ONCE INJ  Last administered on 1/6/20at 12:51;  Start 1/6/20 at 12:00;  Stop

1/6/20 at 12:01;  Status DC


Rocuronium Bromide (Zemuron) 50 mg STK-MED ONCE .ROUTE ;  Start 1/6/20 at 10:38;

 Stop 1/6/20 at 10:38;  Status DC


Fentanyl Citrate (Fentanyl 2ml Vial) 100 mcg STK-MED ONCE .ROUTE ;  Start 1/6/20

at 10:38;  Stop 1/6/20 at 10:39;  Status DC


Cefazolin Sodium/ Dextrose 50 ml @  100 mls/hr 1X PREOP  PRN IV PRIOR TO PROC

EDURE;  Start 1/6/20 at 11:00;  Stop 1/6/20 at 19:00;  Status DC


Ketamine HCl (Ketamine) 50 mg STK-MED ONCE .ROUTE ;  Start 1/6/20 at 12:15;  

Stop 1/6/20 at 12:16;  Status DC


Vasopressin (Vasostrict) 20 unit STK-MED ONCE .ROUTE ;  Start 1/6/20 at 13:06;  

Stop 1/6/20 at 13:06;  Status DC


Sevoflurane (Ultane) 90 ml STK-MED ONCE IH ;  Start 1/6/20 at 13:41;  Stop 

1/6/20 at 13:41;  Status DC


Glycopyrrolate (Robinul) 1 mg STK-MED ONCE .ROUTE ;  Start 1/6/20 at 13:44;  

Stop 1/6/20 at 13:44;  Status DC


Albumin Human 500 ml @  As Directed STK-MED ONCE IV ;  Start 1/6/20 at 13:44;  

Stop 1/6/20 at 13:44;  Status DC


Neostigmine Bromide (Neostigmine Methylsulfate) 5 mg STK-MED ONCE .ROUTE ;  

Start 1/6/20 at 13:44;  Stop 1/6/20 at 13:45;  Status DC


Phenylephrine HCl (PHENYLEPHRINE in 0.9% NACL PF) 1 mg STK-MED ONCE IV ;  Start 

1/6/20 at 13:44;  Stop 1/6/20 at 13:45;  Status DC


Prochlorperazine Edisylate (Compazine) 10 mg STK-MED ONCE .ROUTE ;  Start 1/6/20

at 15:00;  Stop 1/6/20 at 15:00;  Status DC


Prochlorperazine Edisylate (Compazine) 5 mg 1X  ONCE IV  Last administered on 

1/6/20at 15:15;  Start 1/6/20 at 15:15;  Stop 1/6/20 at 15:16;  Status DC


Insulin Human Lispro (HumaLOG VIAL for OP,RR ONLY) 0-10 units PRN Q1HR  PRN SQ 

PER PROTOCOL Last administered on 1/6/20at 17:15;  Start 1/6/20 at 15:30;  Stop 

1/6/20 at 19:00;  Status DC


Peritoneal Dialysis Solution (Continuous Cycling Peritoneal Dialysis Pt) 1 each 

PRN DAILY  PRN MC SEE COMMENTS;  Start 1/6/20 at 15:45


Albumin Human 500 ml @  125 mls/hr PRN DAILY  PRN IV hypotension Last 

administered on 1/7/20at 06:27;  Start 1/6/20 at 22:00


Norepinephrine Bitartrate 8 mg/ Dextrose 258 ml @  17.148 mls/ hr CONT  PRN IV 

PER PROTOCOL Last administered on 1/8/20at 20:23;  Start 1/7/20 at 10:00


Magnesium Sulfate 50 ml @ 25 mls/hr 1X  ONCE IV  Last administered on 1/8/20at 

00:51;  Start 1/8/20 at 01:00;  Stop 1/8/20 at 02:59;  Status DC


Magnesium Sulfate 50 ml @ 25 mls/hr 1X  ONCE IV  Last administered on 1/8/20at 

06:49;  Start 1/8/20 at 06:30;  Stop 1/8/20 at 08:29;  Status DC


Calcium Gluconate 1000 mg/Sodium Chloride 110 ml @  220 mls/hr 1X  ONCE IV  Last

administered on 1/8/20at 09:38;  Start 1/8/20 at 09:00;  Stop 1/8/20 at 09:29;  

Status DC


Potassium Chloride/Water 100 ml @  50 mls/hr 1X  ONCE IV  Last administered on 

1/8/20at 08:51;  Start 1/8/20 at 08:30;  Stop 1/8/20 at 10:29;  Status DC


Midodrine (Proamatine) 2.5 mg [id number] PO  Last administered on 1/8/20at 17:46;  

Start 1/8/20 at 13:00


Phenylephrine HCl 50 mg/Sodium Chloride 255 ml @  14.053 mls/ hr CONT  PRN IV 

SEE I/O RECORD Last administered on 1/9/20at 00:00;  Start 1/8/20 at 23:30





Active Scripts


Active


Ssd (Silver Sulfadiazine) 25 Gm Cream..g. 1 Carley TP DAILY 10 Days


Mupirocin Ointment (Mupirocin) 22 Gm Oint...g. 1 Carley TP BID 10 Days


Humalog (Insulin Lispro) 100 Unit/1 Ml Insuln.pen 10 Units SQ TIDWMEALS 30 Days


Lantus Solostar (Insulin Glargine,Hum.rec.anlog) 100 Unit/1 Ml Insuln.pen 30 

Units SQ QHS 30 Days


Metoclopramide Hcl 5 Mg Tablet 5 Mg PO PRN BFRMEALHC PRN


Culturelle (Lactobacillus Rhamnosus Gg) 1 Each Cap.sprink 1 Cap PO BID 14 Days


Loperamide (Loperamide Hcl) 2 Mg Capsule 2 Mg PO PRN Q15MIN PRN 10 Days


Tramadol Hcl 50 Mg Tablet 50 Mg PO PRN Q6HRS PRN


Baclofen 10 Mg Tablet 10 Mg PO PRN TID PRN 14 Days


Nystatin 100,000 Unit/1 Ml Oral.susp 5 Ml SWSW [id number] 30 Days


Promethazine Hcl 12.5 Mg Tablet 12.5 Mg PO PRN Q6HRS PRN 30 Days


Reported


Entresto 24 mg-26 mg Tablet (Sacubitril/Valsartan) 1 Each Tablet 1 Each PO DAILY


Furosemide 80 Mg Tablet 1 Tab PO DAILY


Klor-Con M20 (Potassium Chloride) 20 Meq Tab.er.prt 1 Tab PO DAILY 30 Days


Pantoprazole Sodium  ** (Pantoprazole Sodium) 40 Mg Tablet.dr 40 Mg PO DAILYAC


Alison-Aime Tablet (Folic Acid/Vitamin B Comp W-C) 0.8 Mg Tablet 1 Tab PO DAILY 30

Days


Metolazone 5 Mg Tablet 5 Mg PO DAILY


Vitamin D3 (Cholecalciferol (Vitamin D3)) 5,000 Unit Tablet 1 Tab PO DAILY


Calcium Acetate 667 Mg Tablet 667 Mg PO TIDWMEALS


Renal Caps Softgel (Folic Acid/Vitamin B Comp W-C) 1 Mg Capsule 1 Cap PO DAILY


Tresiba Flextouch U-100 (Insulin Degludec) 100 Unit/1 Ml Insuln.pen 100 Unit SQ 


Furosemide 40 Mg Tablet 1 Tab PO DAILY


Aspirin 81 Mg Tab.chew 81 Mg PO DAILYWBKFT


Vitals/I & O





Vital Sign - Last 24 Hours








 1/8/20 1/8/20 1/8/20 1/8/20





 09:00 10:00 11:00 12:00


 


Pulse 86 86 86 102


 


B/P (MAP) 86/2 (30) 60/46 (51) 98/58 (71) 78/69 (72)


 


Pulse Ox 100 100 100 


 


O2 Delivery Room Air Room Air Room Air 





 1/8/20 1/8/20 1/8/20 1/8/20





 12:00 12:00 12:00 13:00


 


Temp 97.9   





 97.9   


 


Pulse 88  86 89


 


B/P (MAP) 88/68 (75)  93/68 (76) 90/57 (68)


 


Pulse Ox 100  100 100


 


O2 Delivery Room Air Nasal Cannula Room Air Room Air


 


O2 Flow Rate  2.0  


 


    





    





 1/8/20 1/8/20 1/8/20 1/8/20





 14:00 15:00 16:00 16:00


 


Pulse 86 88 102 


 


B/P (MAP) 84/74 (77) 80/58 (65) 75/65 (68) 


 


Pulse Ox 100 100  


 


O2 Delivery Room Air Room Air  Nasal Cannula


 


O2 Flow Rate    2.0





 1/8/20 1/8/20 1/8/20 1/8/20





 16:00 17:00 17:46 18:00


 


Temp 97.1   





 97.1   


 


Pulse 89 86  90


 


Resp 21   


 


B/P (MAP) 85/43 (57) 94/64 (74) 71/53 83/45 (58)


 


Pulse Ox 100 98  99


 


O2 Delivery Room Air Room Air  Room Air


 


    





    





 1/8/20 1/8/20 1/8/20 1/8/20





 19:00 20:00 20:00 20:00


 


Pulse   88 88


 


B/P (MAP) 56/46 (49)  56/46 (49) 56/46 (49)


 


Pulse Ox   91 


 


O2 Delivery Room Air Room Air Room Air 





 1/8/20 1/9/20 1/9/20 1/9/20





 23:11 00:00 00:00 00:00


 


Temp   97.5 





   97.5 


 


Pulse    90


 


Resp   13 


 


B/P (MAP) 112/78 (89)  76/54 (61) 76/54 (61)


 


Pulse Ox 95  97 


 


O2 Delivery Room Air Room Air Room Air 


 


    





    





 1/9/20 1/9/20 1/9/20 1/9/20





 01:00 02:19 03:06 04:00


 


Pulse    89


 


Resp 16 18 18 


 


B/P (MAP) 110/72 (85) 98/56 (70) 112/80 (91) 


 


Pulse Ox 88 96 100 


 


O2 Delivery Room Air Room Air Room Air 





 1/9/20 1/9/20 1/9/20 1/9/20





 04:00 04:10 05:18 06:25


 


Temp  97.4  





  97.4  


 


Pulse    90


 


Resp  16  19


 


B/P (MAP)  108/70 (83) 98/66 (77) 106/66 (79)


 


Pulse Ox  100 98 100


 


O2 Delivery Room Air Room Air Room Air Room Air


 


    





    





 1/9/20 1/9/20  





 07:00 08:00  


 


Temp 97.5   





 97.5   


 


Pulse 90 90  


 


Resp 13 19  


 


B/P (MAP) 56/38 (44) 89/61 (70)  


 


Pulse Ox 97 100  


 


O2 Delivery Room Air Room Air  














Intake and Output   


 


 1/8/20 1/8/20 1/9/20





 15:00 23:00 07:00


 


Intake Total  50 ml 250 ml


 


Output Total  0 ml 0 ml


 


Balance  50 ml 250 ml

















SCOTT RAYO MD           Jan 9, 2020 08:52

## 2020-01-09 NOTE — PDOC
Renal-Progress Notes


Subjective Notes


Notes


STILL SWOLLEN BUT BETTER





History of Present Illness


Hx of present illness


STABLE





Vitals


Vitals





Vital Signs








  Date Time  Temp Pulse Resp B/P (MAP) Pulse Ox O2 Delivery O2 Flow Rate FiO2


 


1/9/20 12:47  105      


 


1/9/20 11:00 98.6  22 93/58 (70) 98 Room Air  





 98.6       


 


1/8/20 16:00       2.0 








Weight


Weight [ ]





I.O.


Intake and Output











Intake and Output 


 


 1/9/20





 07:00


 


Intake Total 300 ml


 


Output Total 0 ml


 


Balance 300 ml


 


 


 


Intake Oral 150 ml


 


Blood Product IV Normal Saline Flush 150 ml


 


Output Urine Total 0 ml











Labs


Labs





Laboratory Tests








Test


 1/8/20


17:40 1/9/20


05:30 1/9/20


09:08 1/9/20


12:39


 


Glucose (Fingerstick)


 145 mg/dL


(70-99) 


 205 mg/dL


(70-99) 136 mg/dL


(70-99)


 


Sodium Level


 


 131 mmol/L


(136-145) 


 





 


Potassium Level


 


 4.4 mmol/L


(3.5-5.1) 


 





 


Chloride Level


 


 100 mmol/L


() 


 





 


Carbon Dioxide Level


 


 20 mmol/L


(21-32) 


 





 


Anion Gap  11 (6-14)   


 


Blood Urea Nitrogen


 


 40 mg/dL


(8-26) 


 





 


Creatinine


 


 7.7 mg/dL


(0.7-1.3) 


 





 


Estimated GFR


(Cockcroft-Gault) 


 9.0 


 


 





 


Glucose Level


 


 319 mg/dL


(70-99) 


 





 


Calcium Level


 


 6.1 mg/dL


(8.5-10.1) 


 





 


Magnesium Level


 


 2.2 mg/dL


(1.8-2.4) 


 














Micro


Micro





Microbiology


1/4/20 Blood Culture - Final, Complete


         NO GROWTH AFTER 5 DAYS


12/31/19 Anaerobic/Aerobic Culture - Final, Complete


           


12/31/19 Anaerobic Culture Result 1 (ANTHONY) - Final, Complete


           


12/31/19 Aerobic Culture - Final, Complete


           


12/31/19 Aerobic Culture Result 1 (ANTHONY) - Final, Complete


           


12/31/19 Gram Stain - Final, Complete


           


12/31/19 Gram Stain Result 1 (ANTHONY) - Final, Complete


           


12/31/19 Gram Stain Result 2 (ANTHONY) - Final, Complete


           


12/31/19 Gram Stain Result 3 (ANTHONY) - Final, Complete


           


12/31/19 Gram Stain Result 4 (ANTHONY) - Final, Complete





Review of Systems


Constitutional:  yes: weakness, alert, oriented


Ears/Nose/Throat:  Yes: no symptom reported


Eyes:  Yes: no symptom reported


Pulmonary:  Yes no symptom reported


Cardiovascular:  Yes no symptom reported


Gastrointestional:  Yes: no symptom reported


Genitourinary:  Yes: no symptom reported


Musculoskeletal:  Yes: no symptom reported


Skin:  Yes no symptom reported


Psychiatric/Neurological:  Yes: no symptom reported


Endocrine:  Yes: no symptom reported





Physical Exam


General Appearance:  no apparent distress


Skin:  warm, dry


Respiratory:  bilateral CTA


Heart:  S1S2


Abdomen:  soft, bowel sounds present


Genitourinary:  bladder flat


Extremities:  pulses present, edema, other


Neurology:  alert, oriented, follow commands


Musculoskeletal:  Osteoarthritis





Assessment


Assessment


IMP


RIGHT FOOT WOUND INFECTION


S/P RIGHT BKA


LEUCOCYTOSIS


POORLY CONTROLLED DM II


ANEMIA


HYPOKALEMIA-BETTER


HYPONATREMIA-STABLE


LOW MAG


ESRD


HTN HX


HX OF CM AND CHF


NSVT


HYPONATREMIA


HYPOTHYROIDISM


HYPERVOLEMIA/EDEMA


HYPOTENSION


SIRS








PLAN





ANTIBIOTICS


WOUND CARE


CAPD WITH 4.25% 2 LITER 2 HOUR FILL TWICE TODAY-GOOD RESULT YESTERDAY


APD DAILY TONIGHT-2.5%


CONT SALINE


WILL CONT INSULIN TO PD BAGS 


CONT SYNTHROID


INCREASE MIDODDRINE


WILL FOLLOW











CONSTANTIN GOLDSMITH MD                  Jan 9, 2020 14:29

## 2020-01-09 NOTE — PDOC
JOSE PEREZ APRN 1/9/20 1042:


CARDIO Progress Notes


Date and Time


Date of Service


1/9/2020


Time of Evaluation


1020





Subjective


Subjective:  No Chest Pain, No shortness of breath, No Palpitations, Other 

(feels swollen)





Vitals


Vitals





Vital Signs








  Date Time  Temp Pulse Resp B/P (MAP) Pulse Ox O2 Delivery O2 Flow Rate FiO2


 


1/9/20 10:00  98 19 91/59 (70) 98 Room Air  


 


1/9/20 07:00 97.5       





 97.5       


 


1/8/20 16:00       2.0 








Weight


Weight [ ]





Input and Output


Intake and Output











Intake and Output 


 


 1/9/20





 07:00


 


Intake Total 300 ml


 


Output Total 0 ml


 


Balance 300 ml


 


 


 


Intake Oral 150 ml


 


Blood Product IV Normal Saline Flush 150 ml


 


Output Urine Total 0 ml











Laboratory


Labs





Laboratory Tests








Test


 1/8/20


12:41 1/8/20


13:55 1/8/20


17:40 1/9/20


05:30


 


Glucose (Fingerstick)


 82 mg/dL


(70-99) 83 mg/dL


(70-99) 145 mg/dL


(70-99) 





 


Sodium Level


 


 


 


 131 mmol/L


(136-145)


 


Potassium Level


 


 


 


 4.4 mmol/L


(3.5-5.1)


 


Chloride Level


 


 


 


 100 mmol/L


()


 


Carbon Dioxide Level


 


 


 


 20 mmol/L


(21-32)


 


Anion Gap    11 (6-14) 


 


Blood Urea Nitrogen


 


 


 


 40 mg/dL


(8-26)


 


Creatinine


 


 


 


 7.7 mg/dL


(0.7-1.3)


 


Estimated GFR


(Cockcroft-Gault) 


 


 


 9.0 





 


Glucose Level


 


 


 


 319 mg/dL


(70-99)


 


Calcium Level


 


 


 


 6.1 mg/dL


(8.5-10.1)


 


Magnesium Level


 


 


 


 2.2 mg/dL


(1.8-2.4)


 


Test


 1/9/20


09:08 


 


 





 


Glucose (Fingerstick)


 205 mg/dL


(70-99) 


 


 














Microbiology


Micro





Microbiology


1/4/20 Blood Culture - Preliminary, Resulted


         NO GROWTH AFTER 4 DAYS


12/31/19 Anaerobic/Aerobic Culture - Final, Complete


           


12/31/19 Anaerobic Culture Result 1 (ANTHONY) - Final, Complete


           


12/31/19 Aerobic Culture - Final, Complete


           


12/31/19 Aerobic Culture Result 1 (ANTHONY) - Final, Complete


           


12/31/19 Gram Stain - Final, Complete


           


12/31/19 Gram Stain Result 1 (ANTHONY) - Final, Complete


           


12/31/19 Gram Stain Result 2 (ANTHONY) - Final, Complete


           


12/31/19 Gram Stain Result 3 (ANTHONY) - Final, Complete


           


12/31/19 Gram Stain Result 4 (ANTHONY) - Final, Complete





Review of Systems


Constitutional:  yes: weakness, alert, oriented


Ears/Nose/Throat:  Yes: no symptom reported


Eyes:  Yes: no symptom reported


Pulmonary:  Yes no symptom reported


Cardiovascular:  Yes no symptom reported


Gastrointestional:  Yes: no symptom reported


Genitourinary:  Yes: no symptom reported


Musculoskeletal:  Yes: no symptom reported


Skin:  Yes no symptom reported


Psychiatric/Neurological:  Yes: no symptom reported


Endocrine:  Yes: no symptom reported





Physical Exam


HEENT:  Neck Supple W Full Motion


Chest:  Symmetric


LUNGS:  Other (diminished bases)


Heart:  S1S2, RRR (SR with PVCs)


Abdomen:  Other (anasarca)


Extremities:  Other (trace bilateral LE edema. Drsg intact to left foot )


Neurology:  alert, oriented, follow commands





Assessment


Assessment


1.  Right foot nonhealing wound with PAD/DM: S/P RBKA per ortho POD#3


2.  Arrhythmia; frequent bigeminy/brief NSVT in the setting of CM with metabolic

derangement. Better overnight after lyte correction


3.  Chronic systolic CHF: no SOA. 


4.  CMP s/p AICD ([facility]). LVEF 15% 


5.  ESRD with anasarca with severe hypoalbuminemia


6.  Hypotension: BP better


7.  Hyperlipidemia; not on statin, lipids well controlled


8.  Diabetes, II


9.  Chronic LBBB


10. Severe hypocalcemia: improved corrected Ca is 8.2. managed by nephrology


11. Severe protein malnutrition





Recommendations


1. PD per nephrology


2. Ca replacement. QTc 516, caution with QT prolonging agents. 


3. Continue with secondary prevention


4. Stop entresto for now with noted hypotension. Will need BB once BP is more 

adequate. 


5. Dietitian consult


6. Follow up as an outpt.





RENNY MURRAY MD 1/9/20 2045:


CARDIO Progress Notes


Assessment


Assessment


Patient seen and examined.  Agree with NP's assessment and plan.


No further arrhythmias noted on tele


Chr systolic HF compensated


Cont PD per nephrology team


Continue current medical regimen











JOSE PEREZ           Jan 9, 2020 10:42


RENNY MURRAY MD            Jan 9, 2020 20:45

## 2020-01-09 NOTE — PDOC
Infectious Disease Note


Subjective


Subjective


Comfortable, feeling  good





ROS


ROS


no n/v/d/sob





Vital Sign


Vital Signs





Vital Signs








  Date Time  Temp Pulse Resp B/P (MAP) Pulse Ox O2 Delivery O2 Flow Rate FiO2


 


1/9/20 06:25  90 19 106/66 (79) 100 Room Air  


 


1/9/20 04:10 97.4       





 97.4       


 


1/8/20 16:00       2.0 











Physical Exam


PHYSICAL EXAM


GENERAL: Propped up in bed, alert, relaxed appearance, watching TV 


HEENT:  Oral mucosa moist. No thrush 


NECK:  Supple.  No JVD.


LUNGS:  Clear bilaterally.  No wheezing.


HEART:  S1, S2.


ABDOMEN:  Soft, nontender. PDC 


EXTREMITIES:  Right big toe amputation site approx w/ sutures. Rt BKA


Lateral aspect wound vac in place 


DERMATOLOGIC:  Warm, dry.  No generalized rash. gen edema ++


CENTRAL NERVOUS SYSTEM:  Alert and oriented x 3, grossly nonfocal.


PIV





Labs


Lab





Laboratory Tests








Test


 1/8/20


08:57 1/8/20


12:41 1/8/20


13:55 1/8/20


17:40


 


Glucose (Fingerstick)


 79 mg/dL


(70-99) 82 mg/dL


(70-99) 83 mg/dL


(70-99) 145 mg/dL


(70-99)


 


Test


 1/9/20


05:30 


 


 





 


Sodium Level


 131 mmol/L


(136-145) 


 


 





 


Potassium Level


 4.4 mmol/L


(3.5-5.1) 


 


 





 


Chloride Level


 100 mmol/L


() 


 


 





 


Carbon Dioxide Level


 20 mmol/L


(21-32) 


 


 





 


Anion Gap 11 (6-14)    


 


Blood Urea Nitrogen


 40 mg/dL


(8-26) 


 


 





 


Creatinine


 7.7 mg/dL


(0.7-1.3) 


 


 





 


Estimated GFR


(Cockcroft-Gault) 9.0 


 


 


 





 


Glucose Level


 319 mg/dL


(70-99) 


 


 





 


Calcium Level


 6.1 mg/dL


(8.5-10.1) 


 


 





 


Magnesium Level


 2.2 mg/dL


(1.8-2.4) 


 


 














Objective


Assessment


Right foot deep tissue infection/osteo with chronic nonhealing right diabetic 

foot ulcers. 


   -s/p amputation of right 5th and great toe, 12/31. GPC, GNR, GPR on gram 

stain. culture no growth so far,,, now BKA 


GPC bacteremia POA source right foot infection 


Leucocytosis - trending down


Diabetes mellitus with neuropathy poorly controlled


End-stage renal disease, on peritoneal dialysis.


Hypertension.


Pacemaker in place.


Hypotension s/p fluid bolus





Plan


Plan of Care


d/c zyvox and wilfrid, 


Probiotics 


f/u cultures 


Repeat BC 1/2/2020, neg so far





d/c MACHO Triplett MD                Jan 9, 2020 07:34

## 2020-01-10 VITALS — DIASTOLIC BLOOD PRESSURE: 56 MMHG | SYSTOLIC BLOOD PRESSURE: 82 MMHG

## 2020-01-10 VITALS — SYSTOLIC BLOOD PRESSURE: 88 MMHG | DIASTOLIC BLOOD PRESSURE: 62 MMHG

## 2020-01-10 VITALS — DIASTOLIC BLOOD PRESSURE: 70 MMHG | SYSTOLIC BLOOD PRESSURE: 110 MMHG

## 2020-01-10 VITALS — SYSTOLIC BLOOD PRESSURE: 120 MMHG | DIASTOLIC BLOOD PRESSURE: 90 MMHG

## 2020-01-10 VITALS — SYSTOLIC BLOOD PRESSURE: 90 MMHG | DIASTOLIC BLOOD PRESSURE: 52 MMHG

## 2020-01-10 VITALS — SYSTOLIC BLOOD PRESSURE: 92 MMHG | DIASTOLIC BLOOD PRESSURE: 62 MMHG

## 2020-01-10 VITALS — SYSTOLIC BLOOD PRESSURE: 88 MMHG | DIASTOLIC BLOOD PRESSURE: 64 MMHG

## 2020-01-10 VITALS — SYSTOLIC BLOOD PRESSURE: 92 MMHG | DIASTOLIC BLOOD PRESSURE: 60 MMHG

## 2020-01-10 LAB
ANION GAP SERPL CALC-SCNC: 9 MMOL/L (ref 6–14)
BUN SERPL-MCNC: 38 MG/DL (ref 8–26)
CALCIUM SERPL-MCNC: 6.5 MG/DL (ref 8.5–10.1)
CHLORIDE SERPL-SCNC: 101 MMOL/L (ref 98–107)
CO2 SERPL-SCNC: 21 MMOL/L (ref 21–32)
CREAT SERPL-MCNC: 7.8 MG/DL (ref 0.7–1.3)
ERYTHROCYTE [DISTWIDTH] IN BLOOD BY AUTOMATED COUNT: 15.5 % (ref 11.5–14.5)
GFR SERPLBLD BASED ON 1.73 SQ M-ARVRAT: 8.8 ML/MIN
GLUCOSE SERPL-MCNC: 111 MG/DL (ref 70–99)
HCT VFR BLD CALC: 33.8 % (ref 39–53)
HGB BLD-MCNC: 10.8 G/DL (ref 13–17.5)
MAGNESIUM SERPL-MCNC: 2.3 MG/DL (ref 1.8–2.4)
MCH RBC QN AUTO: 27 PG (ref 25–35)
MCHC RBC AUTO-ENTMCNC: 32 G/DL (ref 31–37)
MCV RBC AUTO: 86 FL (ref 79–100)
PLATELET # BLD AUTO: 449 X10^3/UL (ref 140–400)
POTASSIUM SERPL-SCNC: 5.1 MMOL/L (ref 3.5–5.1)
RBC # BLD AUTO: 3.94 X10^6/UL (ref 4.3–5.7)
SODIUM SERPL-SCNC: 131 MMOL/L (ref 136–145)
WBC # BLD AUTO: 10.3 X10^3/UL (ref 4–11)

## 2020-01-10 RX ADMIN — POTASSIUM CHLORIDE SCH MEQ: 1500 TABLET, EXTENDED RELEASE ORAL at 08:47

## 2020-01-10 RX ADMIN — MIDODRINE HYDROCHLORIDE SCH MG: 2.5 TABLET ORAL at 12:17

## 2020-01-10 RX ADMIN — Medication SCH EA: at 08:47

## 2020-01-10 RX ADMIN — PANTOPRAZOLE SODIUM SCH MG: 40 TABLET, DELAYED RELEASE ORAL at 08:46

## 2020-01-10 RX ADMIN — INSULIN LISPRO SCH UNITS: 100 INJECTION, SOLUTION INTRAVENOUS; SUBCUTANEOUS at 08:00

## 2020-01-10 RX ADMIN — Medication SCH EA: at 08:00

## 2020-01-10 RX ADMIN — ONDANSETRON PRN MG: 2 INJECTION INTRAMUSCULAR; INTRAVENOUS at 11:27

## 2020-01-10 RX ADMIN — Medication SCH CAP: at 08:46

## 2020-01-10 RX ADMIN — Medication SCH EA: at 09:26

## 2020-01-10 RX ADMIN — Medication SCH EA: at 12:00

## 2020-01-10 RX ADMIN — LEVOTHYROXINE SODIUM SCH MCG: 25 TABLET ORAL at 06:15

## 2020-01-10 RX ADMIN — ASPIRIN 81 MG SCH MG: 81 TABLET ORAL at 08:47

## 2020-01-10 RX ADMIN — INSULIN LISPRO SCH UNITS: 100 INJECTION, SOLUTION INTRAVENOUS; SUBCUTANEOUS at 12:00

## 2020-01-10 RX ADMIN — MIDODRINE HYDROCHLORIDE SCH MG: 2.5 TABLET ORAL at 06:15

## 2020-01-10 NOTE — NUR
SS following up with discharge planning. SS had conference call with ICU RN, Aixa, and 
Patrica from [facility] to discuss medical status. Medical criteria now found for [facility]. Pt 
accepted and able to discharge. SS awaiting transport time and will proceed accordingly.

## 2020-01-10 NOTE — NUR
SS following up with discharge planning. Bed available at [facility]. Discharge orders received 
and phoned and faxed to [facility], [phone]; fax [phone]. Pt will 
discharge today and go to [facility] at 1600 via [facility] ambulance, 
[phone]. Pt, pt's RN, and pt's family notified.

## 2020-01-10 NOTE — SNU/HH DC
DISCHARGE ORDERS


DISCHARGE INFORMATION:


FINAL DIAGNOSIS


Problems


Medical Problems:


(1) CHF (congestive heart failure)


Status: Acute  





(2) Chronic kidney disease


Status: Acute  





(3) Diabetes


Status: Acute  





(4) Sepsis


Status: Acute  





(5) Severe sepsis


Status: Acute  








CONDITION ON DISCHARGE:  Stable





CODE STATUS:


Code Status:  Full





SKILLED NURSING:


SNF STAY <30 DAYS:  No





HOSPICE:


HOSPICE:  No


HOSPICE EVAL & TREAT:  No





LTAC:


ADMIT TO LTAC:  Yes





POST DISCHARGE ORDERS:


ACTIVITY ORDERS:  Activity as tolerated


WEIGHT BEARING STATUS:  As tolerated


DIET AFTER DISCHARGE:  Renal


WOUND/INCISION CARE:  Other, see below





CHECKS AFTER DISCHARGE:


CHECKS AFTER DISCHARGE:  Check blood press - daily, Check blood sugar, ac/hs





TREATMENT/EQUIPMENT ORDERS:


ADAPTIVE EQUIPMENT NEEDED:  None


Physical Therapy For:  Evalulation/Treatment


Occupational Therapy For:  Evaluation/Treatment





DISCHARGE MEDICATIONS:


Home Meds


Active Scripts


Silver Sulfadiazine (SSD) 25 Gm Cream..g., 1 LANNY TP DAILY for 10 Days, #10 EACH


   Prov:MARY DALY MD         10/25/18


Mupirocin (MUPIROCIN OINTMENT) 22 Gm Oint...g., 1 LANNY TP BID for 10 Days, MISC


   Prov:MARY DALY MD         10/25/18


Insulin Lispro (HUMALOG) 100 Unit/1 Ml Insuln.pen, 10 UNITS SQ TIDWMEALS for 30 

Days, EACH


   Prov:MARY DALY MD         10/25/18


Insulin Glargine,Hum.rec.anlog (LANTUS SOLOSTAR) 100 Unit/1 Ml Insuln.pen, 30 

UNITS SQ QHS for 30 Days, EACH


   Prov:MARY DALY MD         10/25/18


Metoclopramide Hcl (METOCLOPRAMIDE HCL) 5 Mg Tablet, 5 MG PO PRN BFRMEALHC PRN 

for NAUSEA/VOMITING, 2ND CHOICE, #30 TAB


   Prov:MARY DALY MD         10/25/18


Lactobacillus Rhamnosus Gg (CULTURELLE) 1 Each Cap.sprink, 1 CAP PO BID for 14 

Days, #28 CAP


   Prov:MARY DALY MD         10/25/18


Loperamide Hcl (LOPERAMIDE) 2 Mg Capsule, 2 MG PO PRN Q15MIN PRN for DIARRHEA 

for 10 Days, CAP


   Prov:MARY DALY MD         10/25/18


Tramadol Hcl (TRAMADOL HCL) 50 Mg Tablet, 50 MG PO PRN Q6HRS PRN for MILD TO 

MODERATE PAIN, #30 TAB


   Prov:MARY DALY MD         10/25/18


Baclofen (BACLOFEN) 10 Mg Tablet, 10 MG PO PRN TID PRN for for Hiccups for 14 

Days, TAB


   Prov:MARY DALY MD         10/25/18


Nystatin (NYSTATIN) 100,000 Unit/1 Ml Oral.susp, 5 ML SWSW [id number] for 30 Days, 

MISC


   Prov:MARY DALY MD         10/25/18


Promethazine Hcl (PROMETHAZINE HCL) 12.5 Mg Tablet, 12.5 MG PO PRN Q6HRS PRN for

NAUSEA/VOMITING, 1ST CHOICE for 30 Days, TAB


   Prov:MARY DALY MD         10/25/18


Reported Medications


Sacubitril/Valsartan (Entresto 24 mg-26 mg Tablet) 1 Each Tablet, 1 EACH PO 

DAILY for cardiac, TAB


   12/30/19


Furosemide (FUROSEMIDE) 80 Mg Tablet, 1 TAB PO DAILY for fluid overload, #30 TAB

5 Refills


   12/30/19


Potassium Chloride (KLOR-CON M20) 20 Meq Tab.er.prt, 1 TAB PO DAILY for 

replacement for 30 Days, #30 TAB 0 Refills


   12/30/19


Pantoprazole Sodium (PANTOPRAZOLE SODIUM  **) 40 Mg Tablet.dr, 40 MG PO DAILYAC 

for GERD, TAB


   12/30/19


Folic Acid/Vitamin B Comp W-C (ADRIANA-MOSHE TABLET) 0.8 Mg Tablet, 1 TAB PO DAILY 

for DIALYSIS for 30 Days, #30 TAB 0 Refills


   10/14/19


Metolazone (METOLAZONE) 5 Mg Tablet, 5 MG PO DAILY, #30 TAB 0 Refills


   10/15/18


Cholecalciferol (Vitamin D3) (VITAMIN D3) 5,000 Unit Tablet, 1 TAB PO DAILY, #30

TAB


   10/15/18


Calcium Acetate (CALCIUM ACETATE) 667 Mg Tablet, 667 MG PO TIDWMEALS for 

DIALYSIS PATIENTS, CAP


   10/15/18


Folic Acid/Vitamin B Comp W-C (RENAL CAPS SOFTGEL) 1 Mg Capsule, 1 CAP PO DAILY,

#30 CAP 5 Refills


   10/15/18


Insulin Degludec (Tresiba Flextouch U-100) 100 Unit/1 Ml Insuln.pen, 100 UNIT 

SQ, EACH


   10/15/18


Furosemide (FUROSEMIDE) 40 Mg Tablet, 1 TAB PO DAILY, #30 TAB 5 Refills


   6/1/15


Aspirin (ASPIRIN) 81 Mg Tab.chew, 81 MG PO DAILYWBKFT, TAB.CHEW


   1/26/15











SCOTT RAYO MD          Red 10, 2020 09:57

## 2020-01-10 NOTE — PDOC3
Discharge Summary


Date of Admission:  Dec 30, 2019


Date of Discharge:  Red 10, 2020


Follow-Up:  1-2 days


Admitting Diagnosis comment:





DISCHARGE DX =====================================








Post-op day 10 of amputation of R great toe w/extensive debridement of lateral 

aspect of R foot


Right foot deep tissue infection/osteo with chronic nonhealing right diabetic 

foot ulcers. 


s/p amputation of right 5th and great toe, 12/31. GPC, GNR, GPR on gram stain. 

culture no growth so far,,, now BKA 


Improving sepsis


Necrotizing Fasciitis of RLE


CHF


Diabetes-Type II


High Cholesterol


Heart Disease


Renal Failure


HYPOTENSION


RUN OF V-TACH 1/7 PM


HYPOCALCEMIA











off antibiotics 1/10 


Probiotics 





Accepted at [facility], but not AB[facility]  transfer 1/9, ca and mg replaced BP LABILE





1/10-20 D/C TO [facility]








                                                            


 Operative Note 


Date of Procedure: December 31, 2019


Pre-Op Diagnosis:   


1.   Chronic multifocal osteomyelitis, right ankle and foot M86.371


2.   Diabetes mellitus due to underlying condition with diabetic peripheral 

angiopathy with gangrene E08.52


Post-Op Diagnosis:   


same


Procedure:      


1.   Amputation metatarsal with toe single CPT 24781  (right little toe)


2.   Amputation toe, interphalangeal joint CPT 27838 (right great toe)


3.   Incision bone cortex for osteomyelitis, foot CPT 80375





Surgeon: Carlos Romero MD








35 MIN CC TIME AND D/C PLANNING





History of Present Illness


History of Present Illness


1-9-20


Pt seen and examined


SOUTH RN


PRN PRESSORS





1/5/20


Pt seen and examined


Pt states he feels well and is ready for surgery tomorrow


Pt's vitals have improved


DW RN


Reviewed pt's chart





1/4/20


Pt seen and examined


DW pt about upcoming R BKA


Pt states he feels fine 


Pt doesn't appear septic despite low BP and tachycardia


SOUTH RN


Reviewed pt's chart





[id number]


Patient seen and examined


He now has a wound VAC on his right foot


Discussed with RN chart reviewed





1/2/20


Pt seen and examined


Pt reported being lethargic today


DW pt about care


Reviewed pt's chart





1/1/20


Pt seen and examined


DW pt


SOUTH RN


Reviewed pt's chart





12/31/19


Pt seen and examined


Pt was sitting up in bed, eating breakfast in NAD


DW pt


DW RN


Reviewed pt's chart


FINAL DIAGNOSIS


Problems


Medical Problems:


(1) CHF (congestive heart failure)


Status: Acute  





(2) Chronic kidney disease


Status: Acute  





(3) Diabetes


Status: Acute  





(4) Sepsis


Status: Acute  





(5) Severe sepsis


Status: Acute  








Brief Hospital Course


Mr. Rebolledo  is a 55 old [sex] who presented with [ SEPSIS]


CONDITION AT DISCHARGE:  Improved


Discharge Medications





Current Medications


Clindamycin Phosphate 50 ml @  100 mls/hr 1X  ONCE IV  Last administered on 

12/30/19at 09:53;  Start 12/30/19 at 09:30;  Stop 12/30/19 at 09:59;  Status DC


Sodium Chloride 500 ml @  500 mls/hr 1X  ONCE IV  Last administered on 

12/30/19at 10:11;  Start 12/30/19 at 10:15;  Stop 12/30/19 at 11:14;  Status DC


Ondansetron HCl (Zofran) 4 mg 1X  ONCE IVP  Last administered on 12/30/19at 

10:34;  Start 12/30/19 at 10:30;  Stop 12/30/19 at 10:31;  Status DC


Famotidine (Pepcid Vial) 20 mg 1X  ONCE IVP  Last administered on 12/30/19at 1

0:34;  Start 12/30/19 at 10:30;  Stop 12/30/19 at 10:31;  Status DC


Vancomycin HCl 2 gm/Sodium Chloride 500 ml @  250 mls/hr 1X  ONCE IV  Last 

administered on 12/30/19at 10:34;  Start 12/30/19 at 10:45;  Stop 12/30/19 at 

12:44;  Status DC


Ondansetron HCl (Zofran) 4 mg PRN Q8HRS  PRN IV NAUSEA/VOMITING Last 

administered on 12/31/19at 08:56;  Start 12/30/19 at 10:45;  Stop 12/31/19 at 

10:44;  Status DC


Fentanyl Citrate (Fentanyl 2ml Vial) 50 mcg PRN Q2HRS  PRN IV PAIN Last 

administered on 1/7/20at 12:15;  Start 12/30/19 at 10:45


Acetaminophen (Tylenol) 650 mg PRN Q4HRS  PRN PO FEVER;  Start 12/30/19 at 

10:45;  Stop 12/31/19 at 10:44;  Status DC


Insulin Human Lispro (HumaLOG) 0-5 UNITS TIDWMEALS SQ  Last administered on 

1/3/20at 18:01;  Start 12/30/19 at 12:00;  Stop 1/4/20 at 09:18;  Status DC


Dextrose (Dextrose 50%-Water Syringe) 12.5 gm PRN Q15MIN  PRN IV SEE COMMENTS 

Last administered on 1/1/20at 17:48;  Start 12/30/19 at 10:45;  Stop 1/4/20 at 

09:17;  Status DC


Dextrose (Iv Dextrose 5%) 250 ml PRN Q15MIN  PRN IV SEE COMMENTS;  Start 

12/30/19 at 10:45;  Stop 1/5/20 at 00:47;  Status DC


Sodium Chloride 500 ml @  500 mls/hr 1X  ONCE IV  Last administered on 

12/30/19at 12:13;  Start 12/30/19 at 12:15;  Stop 12/30/19 at 13:14;  Status DC


Magnesium Sulfate 50 ml @ 25 mls/hr 1X  ONCE IV  Last administered on 12/30/19at

14:49;  Start 12/30/19 at 14:00;  Stop 12/30/19 at 15:59;  Status DC


Aspirin (Children'S Aspirin) 81 mg DAILYWBKFT PO  Last administered on 1/10/20at

08:47;  Start 12/31/19 at 08:00


Furosemide (Lasix) 80 mg DAILY PO  Last administered on 1/1/20at 08:38;  Start 

12/31/19 at 09:00;  Stop 1/3/20 at 08:09;  Status DC


Loperamide HCl (Imodium) 2 mg PRN Q15MIN  PRN PO DIARRHEA Last administered on 

1/3/20at 14:04;  Start 12/30/19 at 15:30


Pantoprazole Sodium (Protonix) 40 mg DAILYAC PO  Last administered on 1/10/20at 

08:46;  Start 12/31/19 at 07:30


Potassium Chloride (Klor-Con) 20 meq DAILY PO ;  Start 12/31/19 at 09:00;  Stop 

12/31/19 at 14:20;  Status DC


Promethazine HCl (Phenergan) 12.5 mg PRN Q6HRS  PRN PO NAUSEA/VOMITING, 1ST 

CHOICE Last administered on 1/5/20at 18:23;  Start 12/30/19 at 15:30


Sacubitril/ Valsartan (Entresto 24 Mg-26 Mg) 1 tab BID PO  Last administered on 

1/1/20at 08:37;  Start 12/30/19 at 21:00;  Stop 1/8/20 at 14:46;  Status DC


Metolazone (Zaroxolyn) 5 mg DAILY PO  Last administered on 1/1/20at 08:36;  

Start 12/31/19 at 09:00;  Stop 1/3/20 at 08:09;  Status DC


Potassium Chloride (Klor-Con) 20 meq 1X  ONCE PO  Last administered on 

12/30/19at 16:57;  Start 12/30/19 at 16:15;  Stop 12/30/19 at 16:26;  Status DC


Potassium Chloride (Klor-Con) 20 meq DAILYWBKFT PO  Last administered on 

1/1/20at 08:38;  Start 12/31/19 at 08:00;  Stop 1/1/20 at 15:14;  Status DC


Sodium Chloride 500 ml @  500 mls/hr 1X  ONCE IV  Last administered on 

12/31/19at 01:11;  Start 12/31/19 at 01:00;  Stop 12/31/19 at 01:59;  Status DC


Insulin Human Lispro (HumaLOG) 10 units 1X  ONCE SQ ;  Start 12/31/19 at 11:15; 

Stop 12/31/19 at 11:16;  Status DC


Meropenem 500 mg/ Sodium Chloride 50 ml @  100 mls/hr Q12HR IV  Last administ

ered on 1/8/20at 21:32;  Start 12/31/19 at 15:00;  Stop 1/9/20 at 07:35;  Status

DC


Linezolid/Dextrose 300 ml @  300 mls/hr Q12HR IV  Last administered on 1/1/20at 

08:57;  Start 12/31/19 at 21:00;  Stop 1/1/20 at 13:49;  Status DC


Micafungin Sodium 100 mg/Dextrose 100 ml @  100 mls/hr Q24H IV  Last 

administered on 1/6/20at 18:19;  Start 12/31/19 at 15:30;  Stop 1/7/20 at 07:52;

 Status DC


Non-Formulary Medication 2 ea TIDWMEALS PO  Last administered on 1/10/20at 

09:26;  Start 12/31/19 at 17:00


Non-Formulary Medication 30 ea DAILY SQ  Last administered on 1/9/20at 09:10;  

Start 1/1/20 at 09:00


Propofol 20 ml @ As Directed STK-MED ONCE IV ;  Start 12/31/19 at 17:45;  Stop 

12/31/19 at 17:45;  Status DC


Lidocaine HCl (Lidocaine Pf 2% Vial) 5 ml STK-MED ONCE .ROUTE ;  Start 12/31/19 

at 17:45;  Stop 12/31/19 at 17:45;  Status DC


Dexamethasone Sodium Phosphate (Decadron) 4 mg STK-MED ONCE .ROUTE ;  Start 

12/31/19 at 17:45;  Stop 12/31/19 at 17:45;  Status DC


Ondansetron HCl (Zofran) 4 mg STK-MED ONCE .ROUTE ;  Start 12/31/19 at 17:45;  

Stop 12/31/19 at 17:45;  Status DC


Fentanyl Citrate (Fentanyl 2ml Vial) 100 mcg STK-MED ONCE .ROUTE ;  Start 

12/31/19 at 17:46;  Stop 12/31/19 at 17:46;  Status DC


Famotidine (Pepcid Vial) 20 mg STK-MED ONCE .ROUTE ;  Start 12/31/19 at 18:44;  

Stop 12/31/19 at 18:45;  Status DC


Citric Acid/ Sodium Citrate (Bicitra) 30 ml 1X  STAT PO  Last administered on 

12/31/19at 18:44;  Start 12/31/19 at 18:44;  Stop 12/31/19 at 18:53;  Status DC


Famotidine (Pepcid Vial) 20 mg 1X  ONCE IVP  Last administered on 12/31/19at 

18:48;  Start 12/31/19 at 19:00;  Stop 12/31/19 at 19:01;  Status DC


Succinylcholine Chloride (Anectine) 200 mg STK-MED ONCE .ROUTE ;  Start 12/31/19

at 20:20;  Stop 12/31/19 at 20:20;  Status DC


Phenylephrine HCl (PHENYLEPHRINE in 0.9% NACL PF) 1 mg STK-MED ONCE IV ;  Start 

12/31/19 at 20:22;  Stop 12/31/19 at 20:22;  Status DC


Sevoflurane (Ultane) 30 ml STK-MED ONCE IH ;  Start 12/31/19 at 21:01;  Stop 

12/31/19 at 21:01;  Status DC


Magnesium Sulfate 50 ml @ 25 mls/hr 1X  ONCE IV  Last administered on 1/1/20at 

11:40;  Start 1/1/20 at 10:00;  Stop 1/1/20 at 11:59;  Status DC


Insulin Human Lispro (HumaLOG) 12 units 1X SQ ;  Start 1/1/20 at 09:45;  Stop 

1/1/20 at 10:32;  Status DC


Insulin Human Lispro (HumaLOG) 12 units 1X  ONCE SQ  Last administered on 

1/1/20at 10:33;  Start 1/1/20 at 10:45;  Stop 1/1/20 at 10:46;  Status DC


Daptomycin 500 mg/ Sodium Chloride 50 ml @  100 mls/hr Q48H IV ;  Start 1/1/20 

at 16:00;  Stop 1/1/20 at 17:18;  Status DC


Potassium Chloride (Klor-Con) 20 meq BIDWMEALS PO  Last administered on 

1/10/20at 08:47;  Start 1/1/20 at 17:00


Daptomycin 500 mg/ Sodium Chloride 50 ml @  100 mls/hr Q48H IV  Last 

administered on 1/5/20at 20:53;  Start 1/1/20 at 21:00;  Stop 1/7/20 at 07:52;  

Status DC


Sodium Chloride 500 ml @  500 mls/hr 1X  ONCE IV  Last administered on 1/2/20at 

08:41;  Start 1/2/20 at 08:45;  Stop 1/2/20 at 09:44;  Status DC


Sodium Chloride 1,000 ml @  75 mls/hr [id number] IV  Last administered on 1/4/20at 

07:42;  Start 1/2/20 at 08:45;  Stop 1/4/20 at 12:54;  Status DC


Levothyroxine Sodium (Synthroid) 25 mcg DAILY06 PO  Last administered on 

1/10/20at 06:15;  Start 1/3/20 at 06:00


Atorvastatin Calcium (Lipitor) 10 mg QHS PO  Last administered on 1/9/20at 

20:51;  Start 1/2/20 at 21:00


Sodium Hypochlorite (Dakin'S 1/4 Strength) 1 carley PRN DAILY  PRN TP SEE COMMENTS;

 Start 1/2/20 at 17:00


Al Hydroxide/Mg Hydroxide (Mylanta Plus Xs) 30 ml PRN Q8HRS  PRN PO HEARTBURN / 

GAS Last administered on 1/4/20at 20:16;  Start 1/3/20 at 00:30


Sodium Chloride 500 ml @  500 mls/hr 1X  ONCE IV  Last administered on 1/3/20at 

11:27;  Start 1/3/20 at 11:30;  Stop 1/3/20 at 12:29;  Status DC


Linezolid (Zyvox) 600 mg BID PO  Last administered on 1/8/20at 21:33;  Start 

1/3/20 at 21:00;  Stop 1/9/20 at 07:35;  Status DC


Lactobacillus Rhamnosus (Culturelle) 1 cap BID PO  Last administered on 

1/10/20at 08:46;  Start 1/3/20 at 21:00


Ondansetron HCl (Zofran) 4 mg PRN Q6HRS  PRN IVP NAUSEA/VOMITING Last 

administered on 1/6/20at 00:33;  Start 1/3/20 at 20:30


Ondansetron HCl (Zofran) 4 mg PRN Q6HRS  PRN IV NAUSEA/VOMITING;  Start 1/4/20 

at 08:45;  Stop 1/4/20 at 12:54;  Status DC


Fentanyl Citrate (Fentanyl 2ml Vial) 25 mcg PRN Q5MIN  PRN IV MILD PAIN 1-3;  

Start 1/4/20 at 08:45;  Stop 1/4/20 at 12:54;  Status DC


Fentanyl Citrate (Fentanyl 2ml Vial) 50 mcg PRN Q5MIN  PRN IV MODERATE TO SEVERE

PAIN;  Start 1/4/20 at 08:45;  Stop 1/4/20 at 12:54;  Status DC


Morphine Sulfate (Morphine Sulfate) 1 mg PRN Q10MIN  PRN IV SEVERE PAIN 7-10;  

Start 1/4/20 at 08:45;  Stop 1/4/20 at 12:54;  Status DC


Ringer's Solution 1,000 ml @  30 mls/hr Q24H IV ;  Start 1/4/20 at 08:33;  Stop 

1/4/20 at 12:54;  Status DC


Hydromorphone HCl (Dilaudid) 0.5 mg PRN Q10MIN  PRN IV SEV PAIN, Second choice; 

Start 1/4/20 at 08:45;  Stop 1/4/20 at 12:54;  Status DC


Prochlorperazine Edisylate (Compazine) 5 mg PACU PRN  PRN IV NAUSEA, MRX1;  

Start 1/4/20 at 08:45;  Stop 1/4/20 at 12:54;  Status DC


Insulin Human Lispro (HumaLOG VIAL for OP,RR ONLY) 0-10 units PRN Q1HR  PRN SQ 

PER PROTOCOL;  Start 1/4/20 at 08:45;  Stop 1/4/20 at 09:13;  Status DC


Insulin Human Lispro (HumaLOG VIAL for OP,RR ONLY) 8 unit 1X  ONCE SQ ;  Start 

1/4/20 at 08:45;  Stop 1/4/20 at 08:46;  Status DC


Levothyroxine Sodium (Synthroid) 50 mcg DAILY06 PO ;  Start 1/5/20 at 06:00;  

Status Cancel


Bupivacaine HCl/ Epinephrine Bitart (Sensorcaine-Epi 0.25%-1:676673 Mpf) 30 ml 

1X  ONCE INJ ;  Start 1/4/20 at 09:30;  Stop 1/4/20 at 09:31;  Status DC


Insulin Human Lispro (HumaLOG) 0-7 UNITS TIDWMEALS SQ  Last administered on 

1/9/20at 09:09;  Start 1/4/20 at 12:00


Dextrose (Dextrose 50%-Water Syringe) 12.5 gm PRN Q15MIN  PRN IV SEE COMMENTS;  

Start 1/4/20 at 09:15


Dextrose (Iv Dextrose 5%) 250 ml PRN Q15MIN  PRN IV SEE COMMENTS Last 

administered on 1/5/20at 01:11;  Start 1/4/20 at 09:15


Sodium Chloride 1,000 ml @  150 mls/hr Q6H40M IV  Last administered on 1/8/20at 

06:50;  Start 1/4/20 at 12:45;  Stop 1/8/20 at 19:01;  Status DC


Sodium Chloride 500 ml @  500 mls/hr 1X  ONCE IV  Last administered on 1/4/20at 

10:00;  Start 1/4/20 at 12:45;  Stop 1/4/20 at 13:44;  Status DC


Prochlorperazine (Compazine) 25 mg PRN Q12HR  PRN KS NAUSEA/VOMITING 3RD CHOICE;

 Start 1/5/20 at 00:15


Cefazolin Sodium/ Dextrose 50 ml @  100 mls/hr 1X PREOP  PRN IV SEE COMMENTS;  

Start 1/5/20 at 15:15;  Stop 1/6/20 at 09:34;  Status DC


Dexamethasone Sodium Phosphate (Decadron) 4 mg STK-MED ONCE .ROUTE ;  Start 

1/6/20 at 10:33;  Stop 1/6/20 at 10:33;  Status DC


Famotidine (Pepcid Vial) 20 mg STK-MED ONCE .ROUTE ;  Start 1/6/20 at 10:33;  

Stop 1/6/20 at 10:33;  Status DC


Lidocaine HCl (Lidocaine Pf 2% Vial) 5 ml STK-MED ONCE .ROUTE ;  Start 1/6/20 at

10:33;  Stop 1/6/20 at 10:33;  Status DC


Ondansetron HCl (Zofran) 4 mg STK-MED ONCE .ROUTE ;  Start 1/6/20 at 10:33;  

Stop 1/6/20 at 10:33;  Status DC


Etomidate (Amidate) 20 mg STK-MED ONCE IV ;  Start 1/6/20 at 10:33;  Stop 1/6/20

at 10:33;  Status DC


Bupivacaine HCl/ Epinephrine Bitart (Sensorcaine-Epi 0.25%-1:870277 Mpf) 30 ml 

1X  ONCE INJ  Last administered on 1/6/20at 12:51;  Start 1/6/20 at 12:00;  Stop

1/6/20 at 12:01;  Status DC


Rocuronium Bromide (Zemuron) 50 mg STK-MED ONCE .ROUTE ;  Start 1/6/20 at 10:38;

 Stop 1/6/20 at 10:38;  Status DC


Fentanyl Citrate (Fentanyl 2ml Vial) 100 mcg STK-MED ONCE .ROUTE ;  Start 1/6/20

at 10:38;  Stop 1/6/20 at 10:39;  Status DC


Cefazolin Sodium/ Dextrose 50 ml @  100 mls/hr 1X PREOP  PRN IV PRIOR TO 

PROCEDURE;  Start 1/6/20 at 11:00;  Stop 1/6/20 at 19:00;  Status DC


Ketamine HCl (Ketamine) 50 mg STK-MED ONCE .ROUTE ;  Start 1/6/20 at 12:15;  

Stop 1/6/20 at 12:16;  Status DC


Vasopressin (Vasostrict) 20 unit STK-MED ONCE .ROUTE ;  Start 1/6/20 at 13:06;  

Stop 1/6/20 at 13:06;  Status DC


Sevoflurane (Ultane) 90 ml STK-MED ONCE IH ;  Start 1/6/20 at 13:41;  Stop 

1/6/20 at 13:41;  Status DC


Glycopyrrolate (Robinul) 1 mg STK-MED ONCE .ROUTE ;  Start 1/6/20 at 13:44;  

Stop 1/6/20 at 13:44;  Status DC


Albumin Human 500 ml @  As Directed STK-MED ONCE IV ;  Start 1/6/20 at 13:44;  

Stop 1/6/20 at 13:44;  Status DC


Neostigmine Bromide (Neostigmine Methylsulfate) 5 mg STK-MED ONCE .ROUTE ;  

Start 1/6/20 at 13:44;  Stop 1/6/20 at 13:45;  Status DC


Phenylephrine HCl (PHENYLEPHRINE in 0.9% NACL PF) 1 mg STK-MED ONCE IV ;  Start 

1/6/20 at 13:44;  Stop 1/6/20 at 13:45;  Status DC


Prochlorperazine Edisylate (Compazine) 10 mg STK-MED ONCE .ROUTE ;  Start 1/6/20

at 15:00;  Stop 1/6/20 at 15:00;  Status DC


Prochlorperazine Edisylate (Compazine) 5 mg 1X  ONCE IV  Last administered on 

1/6/20at 15:15;  Start 1/6/20 at 15:15;  Stop 1/6/20 at 15:16;  Status DC


Insulin Human Lispro (HumaLOG VIAL for OP,RR ONLY) 0-10 units PRN Q1HR  PRN SQ 

PER PROTOCOL Last administered on 1/6/20at 17:15;  Start 1/6/20 at 15:30;  Stop 

1/6/20 at 19:00;  Status DC


Peritoneal Dialysis Solution (Continuous Cycling Peritoneal Dialysis Pt) 1 each 

PRN DAILY  PRN MC SEE COMMENTS;  Start 1/6/20 at 15:45


Albumin Human 500 ml @  125 mls/hr PRN DAILY  PRN IV hypotension Last 

administered on 1/7/20at 06:27;  Start 1/6/20 at 22:00


Norepinephrine Bitartrate 8 mg/ Dextrose 258 ml @  17.148 mls/ hr CONT  PRN IV 

PER PROTOCOL Last administered on 1/8/20at 20:23;  Start 1/7/20 at 10:00


Magnesium Sulfate 50 ml @ 25 mls/hr 1X  ONCE IV  Last administered on 1/8/20at 

00:51;  Start 1/8/20 at 01:00;  Stop 1/8/20 at 02:59;  Status DC


Magnesium Sulfate 50 ml @ 25 mls/hr 1X  ONCE IV  Last administered on 1/8/20at 

06:49;  Start 1/8/20 at 06:30;  Stop 1/8/20 at 08:29;  Status DC


Calcium Gluconate 1000 mg/Sodium Chloride 110 ml @  220 mls/hr 1X  ONCE IV  Last

administered on 1/8/20at 09:38;  Start 1/8/20 at 09:00;  Stop 1/8/20 at 09:29;  

Status DC


Potassium Chloride/Water 100 ml @  50 mls/hr 1X  ONCE IV  Last administered on 

1/8/20at 08:51;  Start 1/8/20 at 08:30;  Stop 1/8/20 at 10:29;  Status DC


Midodrine (Proamatine) 2.5 mg [id number] PO  Last administered on 1/9/20at 12:47;  

Start 1/8/20 at 13:00;  Stop 1/9/20 at 14:30;  Status DC


Phenylephrine HCl 50 mg/Sodium Chloride 255 ml @  14.053 mls/ hr CONT  PRN IV 

SEE I/O RECORD Last administered on 1/9/20at 00:00;  Start 1/8/20 at 23:30


Calcium Gluconate 1000 mg/Sodium Chloride 110 ml @  220 mls/hr 1X  ONCE IV  Last

administered on 1/9/20at 15:40;  Start 1/9/20 at 15:00;  Stop 1/9/20 at 15:29;  

Status DC


Midodrine (Proamatine) 5 mg [id number] PO  Last administered on 1/10/20at 06:15;  

Start 1/9/20 at 18:00





Active Scripts


Active


Ssd (Silver Sulfadiazine) 25 Gm Cream..g. 1 Carley TP DAILY 10 Days


Mupirocin Ointment (Mupirocin) 22 Gm Oint...g. 1 Carley TP BID 10 Days


Humalog (Insulin Lispro) 100 Unit/1 Ml Insuln.pen 10 Units SQ TIDWMEALS 30 Days


Lantus Solostar (Insulin Glargine,Hum.rec.anlog) 100 Unit/1 Ml Insuln.pen 30 

Units SQ QHS 30 Days


Metoclopramide Hcl 5 Mg Tablet 5 Mg PO PRN BFRMEALHC PRN


Culturelle (Lactobacillus Rhamnosus Gg) 1 Each Cap.sprink 1 Cap PO BID 14 Days


Loperamide (Loperamide Hcl) 2 Mg Capsule 2 Mg PO PRN Q15MIN PRN 10 Days


Tramadol Hcl 50 Mg Tablet 50 Mg PO PRN Q6HRS PRN


Baclofen 10 Mg Tablet 10 Mg PO PRN TID PRN 14 Days


Nystatin 100,000 Unit/1 Ml Oral.susp 5 Ml SWSW [id number] 30 Days


Promethazine Hcl 12.5 Mg Tablet 12.5 Mg PO PRN Q6HRS PRN 30 Days


Reported


Entresto 24 mg-26 mg Tablet (Sacubitril/Valsartan) 1 Each Tablet 1 Each PO DAILY


Furosemide 80 Mg Tablet 1 Tab PO DAILY


Klor-Con M20 (Potassium Chloride) 20 Meq Tab.er.prt 1 Tab PO DAILY 30 Days


Pantoprazole Sodium  ** (Pantoprazole Sodium) 40 Mg Tablet.dr 40 Mg PO DAILYAC


Alison-Aime Tablet (Folic Acid/Vitamin B Comp W-C) 0.8 Mg Tablet 1 Tab PO DAILY 30

Days


Metolazone 5 Mg Tablet 5 Mg PO DAILY


Vitamin D3 (Cholecalciferol (Vitamin D3)) 5,000 Unit Tablet 1 Tab PO DAILY


Calcium Acetate 667 Mg Tablet 667 Mg PO TIDWMEALS


Renal Caps Softgel (Folic Acid/Vitamin B Comp W-C) 1 Mg Capsule 1 Cap PO DAILY


Tresiba Flextouch U-100 (Insulin Degludec) 100 Unit/1 Ml Insuln.pen 100 Unit SQ 


Furosemide 40 Mg Tablet 1 Tab PO DAILY


Aspirin 81 Mg Tab.chew 81 Mg PO DAILYWBKFT


Vital Signs





Vital Signs








  Date Time  Temp Pulse Resp B/P (MAP) Pulse Ox O2 Delivery O2 Flow Rate FiO2


 


1/10/20 08:00 97.0 80 20 120/90 (100) 100 Room Air  





 97.0       








Labs





Laboratory Tests








Test


 1/8/20


12:41 1/8/20


13:55 1/8/20


17:40 1/9/20


05:30


 


Glucose (Fingerstick)


 82 mg/dL


(70-99) 83 mg/dL


(70-99) 145 mg/dL


(70-99) 





 


Sodium Level


 


 


 


 131 mmol/L


(136-145)


 


Potassium Level


 


 


 


 4.4 mmol/L


(3.5-5.1)


 


Chloride Level


 


 


 


 100 mmol/L


()


 


Carbon Dioxide Level


 


 


 


 20 mmol/L


(21-32)


 


Anion Gap    11 (6-14) 


 


Blood Urea Nitrogen


 


 


 


 40 mg/dL


(8-26)


 


Creatinine


 


 


 


 7.7 mg/dL


(0.7-1.3)


 


Estimated GFR


(Cockcroft-Gault) 


 


 


 9.0 





 


Glucose Level


 


 


 


 319 mg/dL


(70-99)


 


Calcium Level


 


 


 


 6.1 mg/dL


(8.5-10.1)


 


Magnesium Level


 


 


 


 2.2 mg/dL


(1.8-2.4)


 


Test


 1/9/20


09:08 1/9/20


12:39 1/9/20


17:49 1/10/20


08:44


 


Glucose (Fingerstick)


 205 mg/dL


(70-99) 136 mg/dL


(70-99) 198 mg/dL


(70-99) 73 mg/dL


(70-99)


 


Test


 1/10/20


09:25 


 


 





 


White Blood Count


 10.3 x10^3/uL


(4.0-11.0) 


 


 





 


Red Blood Count


 3.94 x10^6/uL


(4.30-5.70) 


 


 





 


Hemoglobin


 10.8 g/dL


(13.0-17.5) 


 


 





 


Hematocrit


 33.8 %


(39.0-53.0) 


 


 





 


Mean Corpuscular Volume 86 fL ()    


 


Mean Corpuscular Hemoglobin 27 pg (25-35)    


 


Mean Corpuscular Hemoglobin


Concent 32 g/dL


(31-37) 


 


 





 


Red Cell Distribution Width


 15.5 %


(11.5-14.5) 


 


 





 


Platelet Count


 449 x10^3/uL


(140-400) 


 


 





 


Sodium Level


 131 mmol/L


(136-145) 


 


 





 


Potassium Level


 5.1 mmol/L


(3.5-5.1) 


 


 





 


Chloride Level


 101 mmol/L


() 


 


 





 


Carbon Dioxide Level


 21 mmol/L


(21-32) 


 


 





 


Anion Gap 9 (6-14)    


 


Blood Urea Nitrogen


 38 mg/dL


(8-26) 


 


 





 


Creatinine


 7.8 mg/dL


(0.7-1.3) 


 


 





 


Estimated GFR


(Cockcroft-Gault) 8.8 


 


 


 





 


Glucose Level


 111 mg/dL


(70-99) 


 


 





 


Calcium Level


 6.5 mg/dL


(8.5-10.1) 


 


 





 


Magnesium Level


 2.3 mg/dL


(1.8-2.4) 


 


 











Laboratory Tests








Test


 1/9/20


12:39 1/9/20


17:49 1/10/20


08:44 1/10/20


09:25


 


Glucose (Fingerstick)


 136 mg/dL


(70-99) 198 mg/dL


(70-99) 73 mg/dL


(70-99) 





 


White Blood Count


 


 


 


 10.3 x10^3/uL


(4.0-11.0)


 


Red Blood Count


 


 


 


 3.94 x10^6/uL


(4.30-5.70)


 


Hemoglobin


 


 


 


 10.8 g/dL


(13.0-17.5)


 


Hematocrit


 


 


 


 33.8 %


(39.0-53.0)


 


Mean Corpuscular Volume    86 fL () 


 


Mean Corpuscular Hemoglobin    27 pg (25-35) 


 


Mean Corpuscular Hemoglobin


Concent 


 


 


 32 g/dL


(31-37)


 


Red Cell Distribution Width


 


 


 


 15.5 %


(11.5-14.5)


 


Platelet Count


 


 


 


 449 x10^3/uL


(140-400)


 


Sodium Level


 


 


 


 131 mmol/L


(136-145)


 


Potassium Level


 


 


 


 5.1 mmol/L


(3.5-5.1)


 


Chloride Level


 


 


 


 101 mmol/L


()


 


Carbon Dioxide Level


 


 


 


 21 mmol/L


(21-32)


 


Anion Gap    9 (6-14) 


 


Blood Urea Nitrogen


 


 


 


 38 mg/dL


(8-26)


 


Creatinine


 


 


 


 7.8 mg/dL


(0.7-1.3)


 


Estimated GFR


(Cockcroft-Gault) 


 


 


 8.8 





 


Glucose Level


 


 


 


 111 mg/dL


(70-99)


 


Calcium Level


 


 


 


 6.5 mg/dL


(8.5-10.1)


 


Magnesium Level


 


 


 


 2.3 mg/dL


(1.8-2.4)








Allergies





                                    Allergies








Coded Allergies Type Severity Reaction Last Updated Verified


 


  No Known Drug Allergies    6/22/15 No








Disposition/Orders:  Other (D/C TO [facility])











FULBRIGHT,SCOTT W MD          Red 10, 2020 09:54

## 2020-01-10 NOTE — PDOC
PROGRESS NOTES


Chief Complaint


Chief Complaint


DISCHARGE DX =====================================








Post-op day 10 of amputation of R great toe w/extensive debridement of lateral 

aspect of R foot


Right foot deep tissue infection/osteo with chronic nonhealing right diabetic 

foot ulcers. 


s/p amputation of right 5th and great toe, 12/31. GPC, GNR, GPR on gram stain. 

culture no growth so far,,, now BKA 


Improving sepsis


Necrotizing Fasciitis of RLE


CHF


Diabetes-Type II


High Cholesterol


Heart Disease


Renal Failure


HYPOTENSION


RUN OF V-TACH 1/7 PM


HYPOCALCEMIC











off antibiotics 1/10 


Probiotics 





Accepted at [facility], but not [facility]  transfer 1/9, ca and mg replaced BP LABILE





1/10-20 D/C TO [facility]








                                                            


 Operative Note 


Date of Procedure: December 31, 2019


Pre-Op Diagnosis:   


1.   Chronic multifocal osteomyelitis, right ankle and foot M86.371


2.   Diabetes mellitus due to underlying condition with diabetic peripheral 

angiopathy with gangrene E08.52


Post-Op Diagnosis:   


same


Procedure:      


1.   Amputation metatarsal with toe single CPT 20714  (right little toe)


2.   Amputation toe, interphalangeal joint CPT 66100 (right great toe)


3.   Incision bone cortex for osteomyelitis, foot CPT 96946





Surgeon: Carlos Romero MD








35 MIN CC TIME AND D/C PLANNING





History of Present Illness


History of Present Illness


1-7-20


Pt seen and examined


[facility] RN


PRN PRESSORS





1/5/20


Pt seen and examined


Pt states he feels well and is ready for surgery tomorrow


Pt's vitals have improved


DW RN


Reviewed pt's chart





1/4/20


Pt seen and examined


DW pt about upcoming R BKA


Pt states he feels fine 


Pt doesn't appear septic despite low BP and tachycardia


DW RN


Reviewed pt's chart





[id number]


Patient seen and examined


He now has a wound VAC on his right foot


Discussed with RN chart reviewed





1/2/20


Pt seen and examined


Pt reported being lethargic today


DW pt about care


Reviewed pt's chart





1/1/20


Pt seen and examined


DW pt


DW RN


Reviewed pt's chart





12/31/19


Pt seen and examined


Pt was sitting up in bed, eating breakfast in NAD


DW pt


DW RN


Reviewed pt's chart





Vitals


Vitals





Vital Signs








  Date Time  Temp Pulse Resp B/P (MAP) Pulse Ox O2 Delivery O2 Flow Rate FiO2


 


1/10/20 08:00 97.0 80 20 120/90 (100) 100 Room Air  





 97.0       











Physical Exam


Physical Exam


GENERAL: Propped up in bed, alert, relaxed appearance, 


HEENT:  Oral mucosa moist. No thrush 


NECK:  Supple.  No JVD.


LUNGS:  Clear bilaterally.  No wheezing.


HEART:  S1, S2.


ABDOMEN:  Soft, nontender. PDC 


EXTREMITIES:  Right big toe amputation site approx w/ sutures. Rt BKA


Lateral aspect wound vac in place 


DERMATOLOGIC:  Warm, dry.  No generalized rash. gen edema ++


CENTRAL NERVOUS SYSTEM:  Alert and oriented x 3, grossly nonfocal.


PIV


General:  Alert, Oriented X3, Cooperative, mild distress


Heart:  Regular rate, Normal S1, Normal S2


Lungs:  Clear


Abdomen:  Normal bowel sounds


Extremities:  No clubbing, No edema, Other ( Right big toe amputation site 

approx w/ sutures. Wound vac  in place on lateral aspect of R foot)


Skin:  No rashes, Other (Wound vac in place on lateral aspect of R foot. There 

is a bad odor.)





Labs


LABS





Laboratory Tests








Test


 1/9/20


12:39 1/9/20


17:49 1/10/20


08:44


 


Glucose (Fingerstick)


 136 mg/dL


(70-99) 198 mg/dL


(70-99) 73 mg/dL


(70-99)











Assessment and Plan


Assessmemt and Plan


Problems


Medical Problems:


(1) CHF (congestive heart failure)


Status: Acute  





(2) Chronic kidney disease


Status: Acute  





(3) Diabetes


Status: Acute  





(4) Sepsis


Status: Acute  





(5) Severe sepsis


Status: Acute  











Comment


Review of Relevant


I have reviewed the following items sean (where applicable) has been applied.


Labs





Laboratory Tests








Test


 1/8/20


12:41 1/8/20


13:55 1/8/20


17:40 1/9/20


05:30


 


Glucose (Fingerstick)


 82 mg/dL


(70-99) 83 mg/dL


(70-99) 145 mg/dL


(70-99) 





 


Sodium Level


 


 


 


 131 mmol/L


(136-145)


 


Potassium Level


 


 


 


 4.4 mmol/L


(3.5-5.1)


 


Chloride Level


 


 


 


 100 mmol/L


()


 


Carbon Dioxide Level


 


 


 


 20 mmol/L


(21-32)


 


Anion Gap    11 (6-14) 


 


Blood Urea Nitrogen


 


 


 


 40 mg/dL


(8-26)


 


Creatinine


 


 


 


 7.7 mg/dL


(0.7-1.3)


 


Estimated GFR


(Cockcroft-Gault) 


 


 


 9.0 





 


Glucose Level


 


 


 


 319 mg/dL


(70-99)


 


Calcium Level


 


 


 


 6.1 mg/dL


(8.5-10.1)


 


Magnesium Level


 


 


 


 2.2 mg/dL


(1.8-2.4)


 


Test


 1/9/20


09:08 1/9/20


12:39 1/9/20


17:49 1/10/20


08:44


 


Glucose (Fingerstick)


 205 mg/dL


(70-99) 136 mg/dL


(70-99) 198 mg/dL


(70-99) 73 mg/dL


(70-99)








Laboratory Tests








Test


 1/9/20


12:39 1/9/20


17:49 1/10/20


08:44


 


Glucose (Fingerstick)


 136 mg/dL


(70-99) 198 mg/dL


(70-99) 73 mg/dL


(70-99)








Microbiology


1/4/20 Blood Culture - Final, Complete


         NO GROWTH AFTER 5 DAYS


12/31/19 Anaerobic/Aerobic Culture - Final, Complete


           


12/31/19 Anaerobic Culture Result 1 (ANTHONY) - Final, Complete


           


12/31/19 Aerobic Culture - Final, Complete


           


12/31/19 Aerobic Culture Result 1 (ANTHONY) - Final, Complete


           


12/31/19 Gram Stain - Final, Complete


           


12/31/19 Gram Stain Result 1 (ANTHONY) - Final, Complete


           


12/31/19 Gram Stain Result 2 (ANTHONY) - Final, Complete


           


12/31/19 Gram Stain Result 3 (ANTHONY) - Final, Complete


           


12/31/19 Gram Stain Result 4 (ANTHONY) - Final, Complete


Medications





Current Medications


Clindamycin Phosphate 50 ml @  100 mls/hr 1X  ONCE IV  Last administered on 

12/30/19at 09:53;  Start 12/30/19 at 09:30;  Stop 12/30/19 at 09:59;  Status DC


Sodium Chloride 500 ml @  500 mls/hr 1X  ONCE IV  Last administered on 

12/30/19at 10:11;  Start 12/30/19 at 10:15;  Stop 12/30/19 at 11:14;  Status DC


Ondansetron HCl (Zofran) 4 mg 1X  ONCE IVP  Last administered on 12/30/19at 

10:34;  Start 12/30/19 at 10:30;  Stop 12/30/19 at 10:31;  Status DC


Famotidine (Pepcid Vial) 20 mg 1X  ONCE IVP  Last administered on 12/30/19at 

10:34;  Start 12/30/19 at 10:30;  Stop 12/30/19 at 10:31;  Status DC


Vancomycin HCl 2 gm/Sodium Chloride 500 ml @  250 mls/hr 1X  ONCE IV  Last 

administered on 12/30/19at 10:34;  Start 12/30/19 at 10:45;  Stop 12/30/19 at 

12:44;  Status DC


Ondansetron HCl (Zofran) 4 mg PRN Q8HRS  PRN IV NAUSEA/VOMITING Last 

administered on 12/31/19at 08:56;  Start 12/30/19 at 10:45;  Stop 12/31/19 at 

10:44;  Status DC


Fentanyl Citrate (Fentanyl 2ml Vial) 50 mcg PRN Q2HRS  PRN IV PAIN Last 

administered on 1/7/20at 12:15;  Start 12/30/19 at 10:45


Acetaminophen (Tylenol) 650 mg PRN Q4HRS  PRN PO FEVER;  Start 12/30/19 at 

10:45;  Stop 12/31/19 at 10:44;  Status DC


Insulin Human Lispro (HumaLOG) 0-5 UNITS TIDWMEALS SQ  Last administered on 

1/3/20at 18:01;  Start 12/30/19 at 12:00;  Stop 1/4/20 at 09:18;  Status DC


Dextrose (Dextrose 50%-Water Syringe) 12.5 gm PRN Q15MIN  PRN IV SEE COMMENTS 

Last administered on 1/1/20at 17:48;  Start 12/30/19 at 10:45;  Stop 1/4/20 at 

09:17;  Status DC


Dextrose (Iv Dextrose 5%) 250 ml PRN Q15MIN  PRN IV SEE COMMENTS;  Start 

12/30/19 at 10:45;  Stop 1/5/20 at 00:47;  Status DC


Sodium Chloride 500 ml @  500 mls/hr 1X  ONCE IV  Last administered on 

12/30/19at 12:13;  Start 12/30/19 at 12:15;  Stop 12/30/19 at 13:14;  Status DC


Magnesium Sulfate 50 ml @ 25 mls/hr 1X  ONCE IV  Last administered on 12/30/19at

14:49;  Start 12/30/19 at 14:00;  Stop 12/30/19 at 15:59;  Status DC


Aspirin (Children'S Aspirin) 81 mg DAILYWBKFT PO  Last administered on 1/10/20at

08:47;  Start 12/31/19 at 08:00


Furosemide (Lasix) 80 mg DAILY PO  Last administered on 1/1/20at 08:38;  Start 

12/31/19 at 09:00;  Stop 1/3/20 at 08:09;  Status DC


Loperamide HCl (Imodium) 2 mg PRN Q15MIN  PRN PO DIARRHEA Last administered on 

1/3/20at 14:04;  Start 12/30/19 at 15:30


Pantoprazole Sodium (Protonix) 40 mg DAILYAC PO  Last administered on 1/10/20at 

08:46;  Start 12/31/19 at 07:30


Potassium Chloride (Klor-Con) 20 meq DAILY PO ;  Start 12/31/19 at 09:00;  Stop 

12/31/19 at 14:20;  Status DC


Promethazine HCl (Phenergan) 12.5 mg PRN Q6HRS  PRN PO NAUSEA/VOMITING, 1ST 

CHOICE Last administered on 1/5/20at 18:23;  Start 12/30/19 at 15:30


Sacubitril/ Valsartan (Entresto 24 Mg-26 Mg) 1 tab BID PO  Last administered on 

1/1/20at 08:37;  Start 12/30/19 at 21:00;  Stop 1/8/20 at 14:46;  Status DC


Metolazone (Zaroxolyn) 5 mg DAILY PO  Last administered on 1/1/20at 08:36;  

Start 12/31/19 at 09:00;  Stop 1/3/20 at 08:09;  Status DC


Potassium Chloride (Klor-Con) 20 meq 1X  ONCE PO  Last administered on 

12/30/19at 16:57;  Start 12/30/19 at 16:15;  Stop 12/30/19 at 16:26;  Status DC


Potassium Chloride (Klor-Con) 20 meq DAILYWBKFT PO  Last administered on 

1/1/20at 08:38;  Start 12/31/19 at 08:00;  Stop 1/1/20 at 15:14;  Status DC


Sodium Chloride 500 ml @  500 mls/hr 1X  ONCE IV  Last administered on 

12/31/19at 01:11;  Start 12/31/19 at 01:00;  Stop 12/31/19 at 01:59;  Status DC


Insulin Human Lispro (HumaLOG) 10 units 1X  ONCE SQ ;  Start 12/31/19 at 11:15; 

Stop 12/31/19 at 11:16;  Status DC


Meropenem 500 mg/ Sodium Chloride 50 ml @  100 mls/hr Q12HR IV  Last 

administered on 1/8/20at 21:32;  Start 12/31/19 at 15:00;  Stop 1/9/20 at 07:35;

 Status DC


Linezolid/Dextrose 300 ml @  300 mls/hr Q12HR IV  Last administered on 1/1/20at 

08:57;  Start 12/31/19 at 21:00;  Stop 1/1/20 at 13:49;  Status DC


Micafungin Sodium 100 mg/Dextrose 100 ml @  100 mls/hr Q24H IV  Last 

administered on 1/6/20at 18:19;  Start 12/31/19 at 15:30;  Stop 1/7/20 at 07:52;

 Status DC


Non-Formulary Medication 2 ea TIDWMEALS PO  Last administered on 1/10/20at 

09:26;  Start 12/31/19 at 17:00


Non-Formulary Medication 30 ea DAILY SQ  Last administered on 1/9/20at 09:10;  

Start 1/1/20 at 09:00


Propofol 20 ml @ As Directed STK-MED ONCE IV ;  Start 12/31/19 at 17:45;  Stop 

12/31/19 at 17:45;  Status DC


Lidocaine HCl (Lidocaine Pf 2% Vial) 5 ml STK-MED ONCE .ROUTE ;  Start 12/31/19 

at 17:45;  Stop 12/31/19 at 17:45;  Status DC


Dexamethasone Sodium Phosphate (Decadron) 4 mg STK-MED ONCE .ROUTE ;  Start 

12/31/19 at 17:45;  Stop 12/31/19 at 17:45;  Status DC


Ondansetron HCl (Zofran) 4 mg STK-MED ONCE .ROUTE ;  Start 12/31/19 at 17:45;  

Stop 12/31/19 at 17:45;  Status DC


Fentanyl Citrate (Fentanyl 2ml Vial) 100 mcg STK-MED ONCE .ROUTE ;  Start 

12/31/19 at 17:46;  Stop 12/31/19 at 17:46;  Status DC


Famotidine (Pepcid Vial) 20 mg STK-MED ONCE .ROUTE ;  Start 12/31/19 at 18:44;  

Stop 12/31/19 at 18:45;  Status DC


Citric Acid/ Sodium Citrate (Bicitra) 30 ml 1X  STAT PO  Last administered on 

12/31/19at 18:44;  Start 12/31/19 at 18:44;  Stop 12/31/19 at 18:53;  Status DC


Famotidine (Pepcid Vial) 20 mg 1X  ONCE IVP  Last administered on 12/31/19at 

18:48;  Start 12/31/19 at 19:00;  Stop 12/31/19 at 19:01;  Status DC


Succinylcholine Chloride (Anectine) 200 mg STK-MED ONCE .ROUTE ;  Start 12/31/19

at 20:20;  Stop 12/31/19 at 20:20;  Status DC


Phenylephrine HCl (PHENYLEPHRINE in 0.9% NACL PF) 1 mg STK-MED ONCE IV ;  Start 

12/31/19 at 20:22;  Stop 12/31/19 at 20:22;  Status DC


Sevoflurane (Ultane) 30 ml STK-MED ONCE IH ;  Start 12/31/19 at 21:01;  Stop 

12/31/19 at 21:01;  Status DC


Magnesium Sulfate 50 ml @ 25 mls/hr 1X  ONCE IV  Last administered on 1/1/20at 

11:40;  Start 1/1/20 at 10:00;  Stop 1/1/20 at 11:59;  Status DC


Insulin Human Lispro (HumaLOG) 12 units 1X SQ ;  Start 1/1/20 at 09:45;  Stop 1 /1/20 at 10:32;  Status DC


Insulin Human Lispro (HumaLOG) 12 units 1X  ONCE SQ  Last administered on 

1/1/20at 10:33;  Start 1/1/20 at 10:45;  Stop 1/1/20 at 10:46;  Status DC


Daptomycin 500 mg/ Sodium Chloride 50 ml @  100 mls/hr Q48H IV ;  Start 1/1/20 

at 16:00;  Stop 1/1/20 at 17:18;  Status DC


Potassium Chloride (Klor-Con) 20 meq BIDWMEALS PO  Last administered on 

1/10/20at 08:47;  Start 1/1/20 at 17:00


Daptomycin 500 mg/ Sodium Chloride 50 ml @  100 mls/hr Q48H IV  Last 

administered on 1/5/20at 20:53;  Start 1/1/20 at 21:00;  Stop 1/7/20 at 07:52;  

Status DC


Sodium Chloride 500 ml @  500 mls/hr 1X  ONCE IV  Last administered on 1/2/20at 

08:41;  Start 1/2/20 at 08:45;  Stop 1/2/20 at 09:44;  Status DC


Sodium Chloride 1,000 ml @  75 mls/hr [id number] IV  Last administered on 1/4/20at 

07:42;  Start 1/2/20 at 08:45;  Stop 1/4/20 at 12:54;  Status DC


Levothyroxine Sodium (Synthroid) 25 mcg DAILY06 PO  Last administered on 

1/10/20at 06:15;  Start 1/3/20 at 06:00


Atorvastatin Calcium (Lipitor) 10 mg QHS PO  Last administered on 1/9/20at 

20:51;  Start 1/2/20 at 21:00


Sodium Hypochlorite (Dakin'S 1/4 Strength) 1 carley PRN DAILY  PRN TP SEE COMMENTS;

 Start 1/2/20 at 17:00


Al Hydroxide/Mg Hydroxide (Mylanta Plus Xs) 30 ml PRN Q8HRS  PRN PO HEARTBURN / 

GAS Last administered on 1/4/20at 20:16;  Start 1/3/20 at 00:30


Sodium Chloride 500 ml @  500 mls/hr 1X  ONCE IV  Last administered on 1/3/20at 

11:27;  Start 1/3/20 at 11:30;  Stop 1/3/20 at 12:29;  Status DC


Linezolid (Zyvox) 600 mg BID PO  Last administered on 1/8/20at 21:33;  Start 

1/3/20 at 21:00;  Stop 1/9/20 at 07:35;  Status DC


Lactobacillus Rhamnosus (Culturelle) 1 cap BID PO  Last administered on 

1/10/20at 08:46;  Start 1/3/20 at 21:00


Ondansetron HCl (Zofran) 4 mg PRN Q6HRS  PRN IVP NAUSEA/VOMITING Last 

administered on 1/6/20at 00:33;  Start 1/3/20 at 20:30


Ondansetron HCl (Zofran) 4 mg PRN Q6HRS  PRN IV NAUSEA/VOMITING;  Start 1/4/20 

at 08:45;  Stop 1/4/20 at 12:54;  Status DC


Fentanyl Citrate (Fentanyl 2ml Vial) 25 mcg PRN Q5MIN  PRN IV MILD PAIN 1-3;  

Start 1/4/20 at 08:45;  Stop 1/4/20 at 12:54;  Status DC


Fentanyl Citrate (Fentanyl 2ml Vial) 50 mcg PRN Q5MIN  PRN IV MODERATE TO SEVERE

PAIN;  Start 1/4/20 at 08:45;  Stop 1/4/20 at 12:54;  Status DC


Morphine Sulfate (Morphine Sulfate) 1 mg PRN Q10MIN  PRN IV SEVERE PAIN 7-10;  

Start 1/4/20 at 08:45;  Stop 1/4/20 at 12:54;  Status DC


Ringer's Solution 1,000 ml @  30 mls/hr Q24H IV ;  Start 1/4/20 at 08:33;  Stop 

1/4/20 at 12:54;  Status DC


Hydromorphone HCl (Dilaudid) 0.5 mg PRN Q10MIN  PRN IV SEV PAIN, Second choice; 

Start 1/4/20 at 08:45;  Stop 1/4/20 at 12:54;  Status DC


Prochlorperazine Edisylate (Compazine) 5 mg PACU PRN  PRN IV NAUSEA, MRX1;  

Start 1/4/20 at 08:45;  Stop 1/4/20 at 12:54;  Status DC


Insulin Human Lispro (HumaLOG VIAL for OP,RR ONLY) 0-10 units PRN Q1HR  PRN SQ 

PER PROTOCOL;  Start 1/4/20 at 08:45;  Stop 1/4/20 at 09:13;  Status DC


Insulin Human Lispro (HumaLOG VIAL for OP,RR ONLY) 8 unit 1X  ONCE SQ ;  Start 

1/4/20 at 08:45;  Stop 1/4/20 at 08:46;  Status DC


Levothyroxine Sodium (Synthroid) 50 mcg DAILY06 PO ;  Start 1/5/20 at 06:00;  

Status Cancel


Bupivacaine HCl/ Epinephrine Bitart (Sensorcaine-Epi 0.25%-1:414705 Mpf) 30 ml 

1X  ONCE INJ ;  Start 1/4/20 at 09:30;  Stop 1/4/20 at 09:31;  Status DC


Insulin Human Lispro (HumaLOG) 0-7 UNITS TIDWMEALS SQ  Last administered on 

1/9/20at 09:09;  Start 1/4/20 at 12:00


Dextrose (Dextrose 50%-Water Syringe) 12.5 gm PRN Q15MIN  PRN IV SEE COMMENTS;  

Start 1/4/20 at 09:15


Dextrose (Iv Dextrose 5%) 250 ml PRN Q15MIN  PRN IV SEE COMMENTS Last 

administered on 1/5/20at 01:11;  Start 1/4/20 at 09:15


Sodium Chloride 1,000 ml @  150 mls/hr Q6H40M IV  Last administered on 1/8/20at 

06:50;  Start 1/4/20 at 12:45;  Stop 1/8/20 at 19:01;  Status DC


Sodium Chloride 500 ml @  500 mls/hr 1X  ONCE IV  Last administered on 1/4/20at 

10:00;  Start 1/4/20 at 12:45;  Stop 1/4/20 at 13:44;  Status DC


Prochlorperazine (Compazine) 25 mg PRN Q12HR  PRN WA NAUSEA/VOMITING 3RD CHOICE;

 Start 1/5/20 at 00:15


Cefazolin Sodium/ Dextrose 50 ml @  100 mls/hr 1X PREOP  PRN IV SEE COMMENTS;  

Start 1/5/20 at 15:15;  Stop 1/6/20 at 09:34;  Status DC


Dexamethasone Sodium Phosphate (Decadron) 4 mg STK-MED ONCE .ROUTE ;  Start 

1/6/20 at 10:33;  Stop 1/6/20 at 10:33;  Status DC


Famotidine (Pepcid Vial) 20 mg STK-MED ONCE .ROUTE ;  Start 1/6/20 at 10:33;  

Stop 1/6/20 at 10:33;  Status DC


Lidocaine HCl (Lidocaine Pf 2% Vial) 5 ml STK-MED ONCE .ROUTE ;  Start 1/6/20 at

10:33;  Stop 1/6/20 at 10:33;  Status DC


Ondansetron HCl (Zofran) 4 mg STK-MED ONCE .ROUTE ;  Start 1/6/20 at 10:33;  

Stop 1/6/20 at 10:33;  Status DC


Etomidate (Amidate) 20 mg STK-MED ONCE IV ;  Start 1/6/20 at 10:33;  Stop 1/6/20

at 10:33;  Status DC


Bupivacaine HCl/ Epinephrine Bitart (Sensorcaine-Epi 0.25%-1:422143 Mpf) 30 ml 

1X  ONCE INJ  Last administered on 1/6/20at 12:51;  Start 1/6/20 at 12:00;  Stop

1/6/20 at 12:01;  Status DC


Rocuronium Bromide (Zemuron) 50 mg STK-MED ONCE .ROUTE ;  Start 1/6/20 at 10:38;

 Stop 1/6/20 at 10:38;  Status DC


Fentanyl Citrate (Fentanyl 2ml Vial) 100 mcg STK-MED ONCE .ROUTE ;  Start 1/6/20

at 10:38;  Stop 1/6/20 at 10:39;  Status DC


Cefazolin Sodium/ Dextrose 50 ml @  100 mls/hr 1X PREOP  PRN IV PRIOR TO 

PROCEDURE;  Start 1/6/20 at 11:00;  Stop 1/6/20 at 19:00;  Status DC


Ketamine HCl (Ketamine) 50 mg STK-MED ONCE .ROUTE ;  Start 1/6/20 at 12:15;  

Stop 1/6/20 at 12:16;  Status DC


Vasopressin (Vasostrict) 20 unit STK-MED ONCE .ROUTE ;  Start 1/6/20 at 13:06;  

Stop 1/6/20 at 13:06;  Status DC


Sevoflurane (Ultane) 90 ml STK-MED ONCE IH ;  Start 1/6/20 at 13:41;  Stop 

1/6/20 at 13:41;  Status DC


Glycopyrrolate (Robinul) 1 mg STK-MED ONCE .ROUTE ;  Start 1/6/20 at 13:44;  

Stop 1/6/20 at 13:44;  Status DC


Albumin Human 500 ml @  As Directed STK-MED ONCE IV ;  Start 1/6/20 at 13:44;  

Stop 1/6/20 at 13:44;  Status DC


Neostigmine Bromide (Neostigmine Methylsulfate) 5 mg STK-MED ONCE .ROUTE ;  

Start 1/6/20 at 13:44;  Stop 1/6/20 at 13:45;  Status DC


Phenylephrine HCl (PHENYLEPHRINE in 0.9% NACL PF) 1 mg STK-MED ONCE IV ;  Start 

1/6/20 at 13:44;  Stop 1/6/20 at 13:45;  Status DC


Prochlorperazine Edisylate (Compazine) 10 mg STK-MED ONCE .ROUTE ;  Start 1/6/20

at 15:00;  Stop 1/6/20 at 15:00;  Status DC


Prochlorperazine Edisylate (Compazine) 5 mg 1X  ONCE IV  Last administered on 

1/6/20at 15:15;  Start 1/6/20 at 15:15;  Stop 1/6/20 at 15:16;  Status DC


Insulin Human Lispro (HumaLOG VIAL for OP,RR ONLY) 0-10 units PRN Q1HR  PRN SQ 

PER PROTOCOL Last administered on 1/6/20at 17:15;  Start 1/6/20 at 15:30;  Stop 

1/6/20 at 19:00;  Status DC


Peritoneal Dialysis Solution (Continuous Cycling Peritoneal Dialysis Pt) 1 each 

PRN DAILY  PRN MC SEE COMMENTS;  Start 1/6/20 at 15:45


Albumin Human 500 ml @  125 mls/hr PRN DAILY  PRN IV hypotension Last 

administered on 1/7/20at 06:27;  Start 1/6/20 at 22:00


Norepinephrine Bitartrate 8 mg/ Dextrose 258 ml @  17.148 mls/ hr CONT  PRN IV 

PER PROTOCOL Last administered on 1/8/20at 20:23;  Start 1/7/20 at 10:00


Magnesium Sulfate 50 ml @ 25 mls/hr 1X  ONCE IV  Last administered on 1/8/20at 

00:51;  Start 1/8/20 at 01:00;  Stop 1/8/20 at 02:59;  Status DC


Magnesium Sulfate 50 ml @ 25 mls/hr 1X  ONCE IV  Last administered on 1/8/20at 

06:49;  Start 1/8/20 at 06:30;  Stop 1/8/20 at 08:29;  Status DC


Calcium Gluconate 1000 mg/Sodium Chloride 110 ml @  220 mls/hr 1X  ONCE IV  Last

administered on 1/8/20at 09:38;  Start 1/8/20 at 09:00;  Stop 1/8/20 at 09:29;  

Status DC


Potassium Chloride/Water 100 ml @  50 mls/hr 1X  ONCE IV  Last administered on 

1/8/20at 08:51;  Start 1/8/20 at 08:30;  Stop 1/8/20 at 10:29;  Status DC


Midodrine (Proamatine) 2.5 mg [id number] PO  Last administered on 1/9/20at 12:47;  

Start 1/8/20 at 13:00;  Stop 1/9/20 at 14:30;  Status DC


Phenylephrine HCl 50 mg/Sodium Chloride 255 ml @  14.053 mls/ hr CONT  PRN IV SE

E I/O RECORD Last administered on 1/9/20at 00:00;  Start 1/8/20 at 23:30


Calcium Gluconate 1000 mg/Sodium Chloride 110 ml @  220 mls/hr 1X  ONCE IV  Last

administered on 1/9/20at 15:40;  Start 1/9/20 at 15:00;  Stop 1/9/20 at 15:29;  

Status DC


Midodrine (Proamatine) 5 mg [id number] PO  Last administered on 1/10/20at 06:15;  

Start 1/9/20 at 18:00





Active Scripts


Active


Ssd (Silver Sulfadiazine) 25 Gm Cream..g. 1 Carley TP DAILY 10 Days


Mupirocin Ointment (Mupirocin) 22 Gm Oint...g. 1 Carley TP BID 10 Days


Humalog (Insulin Lispro) 100 Unit/1 Ml Insuln.pen 10 Units SQ TIDWMEALS 30 Days


Lantus Solostar (Insulin Glargine,Hum.rec.anlog) 100 Unit/1 Ml Insuln.pen 30 

Units SQ QHS 30 Days


Metoclopramide Hcl 5 Mg Tablet 5 Mg PO PRN BFRMEALHC PRN


Culturelle (Lactobacillus Rhamnosus Gg) 1 Each Cap.sprink 1 Cap PO BID 14 Days


Loperamide (Loperamide Hcl) 2 Mg Capsule 2 Mg PO PRN Q15MIN PRN 10 Days


Tramadol Hcl 50 Mg Tablet 50 Mg PO PRN Q6HRS PRN


Baclofen 10 Mg Tablet 10 Mg PO PRN TID PRN 14 Days


Nystatin 100,000 Unit/1 Ml Oral.susp 5 Ml SWSW [id number] 30 Days


Promethazine Hcl 12.5 Mg Tablet 12.5 Mg PO PRN Q6HRS PRN 30 Days


Reported


Entresto 24 mg-26 mg Tablet (Sacubitril/Valsartan) 1 Each Tablet 1 Each PO DAILY


Furosemide 80 Mg Tablet 1 Tab PO DAILY


Klor-Con M20 (Potassium Chloride) 20 Meq Tab.er.prt 1 Tab PO DAILY 30 Days


Pantoprazole Sodium  ** (Pantoprazole Sodium) 40 Mg Tablet.dr 40 Mg PO DAILYAC


Alison-Aime Tablet (Folic Acid/Vitamin B Comp W-C) 0.8 Mg Tablet 1 Tab PO DAILY 30

Days


Metolazone 5 Mg Tablet 5 Mg PO DAILY


Vitamin D3 (Cholecalciferol (Vitamin D3)) 5,000 Unit Tablet 1 Tab PO DAILY


Calcium Acetate 667 Mg Tablet 667 Mg PO TIDWMEALS


Renal Caps Softgel (Folic Acid/Vitamin B Comp W-C) 1 Mg Capsule 1 Cap PO DAILY


Tresiba Flextouch U-100 (Insulin Degludec) 100 Unit/1 Ml Insuln.pen 100 Unit SQ 


Furosemide 40 Mg Tablet 1 Tab PO DAILY


Aspirin 81 Mg Tab.chew 81 Mg PO [id number]


Vitals/I & O





Vital Sign - Last 24 Hours








 1/9/20 1/9/20 1/9/20 1/9/20





 10:00 11:00 12:47 15:00


 


Temp  98.6  98.2





  98.6  98.2


 


Pulse 98 96 105 94


 


Resp 19 22  16


 


B/P (MAP) 91/59 (70) 93/58 (70)  81/51 (61)


 


Pulse Ox 98 98  98


 


O2 Delivery Room Air Room Air  Room Air


 


    





    





 1/9/20 1/9/20 1/9/20 1/9/20





 17:37 20:00 20:00 21:00


 


Temp  97.6  





  97.6  


 


Pulse  92  92


 


Resp  23  21


 


B/P (MAP) 91/48 80/68 (72)  78/64 (69)


 


Pulse Ox  98  98


 


O2 Delivery  Room Air Room Air Room Air


 


    





    





 1/9/20 1/9/20 1/10/20 1/10/20





 22:00 23:00 00:00 01:00


 


Temp    97.8





    97.8


 


Pulse 90 91  92


 


Resp 20 23  22


 


B/P (MAP) 88/64 (72)   82/56 (65)


 


Pulse Ox 96 94  93


 


O2 Delivery Room Air Room Air Room Air Room Air


 


    





    





 1/10/20 1/10/20 1/10/20 1/10/20





 02:00 03:00 04:00 04:08


 


Pulse 93 88  86


 


Resp 22 20  20


 


B/P (MAP) 88/62 (71) 92/60 (71)  88/64 (72)


 


Pulse Ox 93 93  93


 


O2 Delivery Room Air Room Air Room Air Room Air





 1/10/20 1/10/20 1/10/20 





 06:00 06:15 08:00 


 


Temp   97.0 





   97.0 


 


Pulse 86 86 80 


 


Resp 20  20 


 


B/P (MAP) 92/62 (72) 88/64 120/90 (100) 


 


Pulse Ox 93  100 


 


O2 Delivery Room Air  Room Air 














Intake and Output   


 


 1/9/20 1/9/20 1/10/20





 15:00 23:00 07:00


 


Intake Total 200 ml 240 ml 0 ml


 


Balance 200 ml 240 ml 0 ml

















CSOTT RAYO MD          Red 10, 2020 09:28

## 2020-01-10 NOTE — PDOC
Renal-Progress Notes


Subjective Notes


Notes


LESS SWOLLEN





History of Present Illness


Hx of present illness


NO CHANGE





Vitals


Vitals





Vital Signs








  Date Time  Temp Pulse Resp B/P (MAP) Pulse Ox O2 Delivery O2 Flow Rate FiO2


 


1/10/20 08:00 97.0 80 20 120/90 (100) 100 Room Air  





 97.0       








Weight


Weight [ ]





I.O.


Intake and Output











Intake and Output 


 


 1/10/20





 07:00


 


Intake Total 440 ml


 


Balance 440 ml


 


 


 


Intake Oral 440 ml











Labs


Labs





Laboratory Tests








Test


 1/9/20


12:39 1/9/20


17:49 1/10/20


08:44 1/10/20


09:25


 


Glucose (Fingerstick)


 136 mg/dL


(70-99) 198 mg/dL


(70-99) 73 mg/dL


(70-99) 





 


White Blood Count


 


 


 


 10.3 x10^3/uL


(4.0-11.0)


 


Red Blood Count


 


 


 


 3.94 x10^6/uL


(4.30-5.70)


 


Hemoglobin


 


 


 


 10.8 g/dL


(13.0-17.5)


 


Hematocrit


 


 


 


 33.8 %


(39.0-53.0)


 


Mean Corpuscular Volume    86 fL () 


 


Mean Corpuscular Hemoglobin    27 pg (25-35) 


 


Mean Corpuscular Hemoglobin


Concent 


 


 


 32 g/dL


(31-37)


 


Red Cell Distribution Width


 


 


 


 15.5 %


(11.5-14.5)


 


Platelet Count


 


 


 


 449 x10^3/uL


(140-400)


 


Sodium Level


 


 


 


 131 mmol/L


(136-145)


 


Potassium Level


 


 


 


 5.1 mmol/L


(3.5-5.1)


 


Chloride Level


 


 


 


 101 mmol/L


()


 


Carbon Dioxide Level


 


 


 


 21 mmol/L


(21-32)


 


Anion Gap    9 (6-14) 


 


Blood Urea Nitrogen


 


 


 


 38 mg/dL


(8-26)


 


Creatinine


 


 


 


 7.8 mg/dL


(0.7-1.3)


 


Estimated GFR


(Cockcroft-Gault) 


 


 


 8.8 





 


Glucose Level


 


 


 


 111 mg/dL


(70-99)


 


Calcium Level


 


 


 


 6.5 mg/dL


(8.5-10.1)


 


Magnesium Level


 


 


 


 2.3 mg/dL


(1.8-2.4)











Micro


Micro





Microbiology


1/4/20 Blood Culture - Final, Complete


         NO GROWTH AFTER 5 DAYS


12/31/19 Anaerobic/Aerobic Culture - Final, Complete


           


12/31/19 Anaerobic Culture Result 1 (ANTHONY) - Final, Complete


           


12/31/19 Aerobic Culture - Final, Complete


           


12/31/19 Aerobic Culture Result 1 (ANTHONY) - Final, Complete


           


12/31/19 Gram Stain - Final, Complete


           


12/31/19 Gram Stain Result 1 (ANTHONY) - Final, Complete


           


12/31/19 Gram Stain Result 2 (ANTHONY) - Final, Complete


           


12/31/19 Gram Stain Result 3 (ANTHONY) - Final, Complete


           


12/31/19 Gram Stain Result 4 (ANTHONY) - Final, Complete





Review of Systems


Constitutional:  yes: weakness, alert, oriented


Ears/Nose/Throat:  Yes: no symptom reported


Eyes:  Yes: no symptom reported


Pulmonary:  Yes no symptom reported


Cardiovascular:  Yes no symptom reported


Gastrointestional:  Yes: no symptom reported


Genitourinary:  Yes: no symptom reported


Musculoskeletal:  Yes: no symptom reported


Skin:  Yes no symptom reported


Psychiatric/Neurological:  Yes: no symptom reported


Endocrine:  Yes: no symptom reported





Physical Exam


General Appearance:  no apparent distress


Skin:  warm, dry


Respiratory:  bilateral CTA


Heart:  S1S2


Abdomen:  soft, bowel sounds present


Genitourinary:  bladder flat


Extremities:  pulses present, edema, other


Neurology:  alert, oriented, follow commands


Musculoskeletal:  Osteoarthritis





Assessment


Assessment


IMP


RIGHT FOOT WOUND INFECTION


S/P RIGHT BKA


LEUCOCYTOSIS


POORLY CONTROLLED DM II


ANEMIA


HYPOKALEMIA-BETTER


HYPONATREMIA-STABLE


LOW MAG


ESRD


HTN HX


HX OF CM AND CHF


NSVT


HYPONATREMIA


HYPOTHYROIDISM


HYPERVOLEMIA/EDEMA


HYPOTENSION


SIRS








PLAN





ANTIBIOTICS


WOUND CARE


CAPD WITH 4.25% 2 LITER 2 HOUR FILL AGAIN TODAY-GOOD RESULT YESTERDAY


APD DAILY TONIGHT-2.5%


CONT SYNTHROID


CONT MIDODDRINE


WILL FOLLOW


PROB TRANSFER TO  TODAY











CONSTANTIN GOLDSMITH MD                 Red 10, 2020 11:02

## 2020-01-10 NOTE — NUR
SS following up with discharge planning. Discharge orders phoned and faxed to [facility], [phone]; fax [phone]. SS phoned and faxed discharge orders to 
[facility]. SS received phone contact from [facility] stating that since pt is no longer on IV's and 
does not have a wound vac he no longer meets medical criteria for [facility]. SS discussed acute 
rehabilitation referral with pt's RN due to Peritoneal Dialysis. PT/OT ordered. PT has not 
seen pt since 1/3/2020. SS will await updates PT/OT updates and will proceed accordingly 
with acute rehabilitation referral.

## 2020-01-10 NOTE — PDOC
Infectious Disease Note


Subjective


Subjective


Comfortable, feeling  good





ROS


ROS


no n/v/d/sob/fever





Vital Sign


Vital Signs





Vital Signs








  Date Time  Temp Pulse Resp B/P (MAP) Pulse Ox O2 Delivery O2 Flow Rate FiO2


 


1/10/20 06:15  86  88/64    


 


1/10/20 06:00   20  93 Room Air  


 


1/10/20 01:00 97.8       





 97.8       











Physical Exam


PHYSICAL EXAM


GENERAL: Propped up in bed, alert, relaxed appearance, 


HEENT:  Oral mucosa moist. No thrush 


NECK:  Supple.  No JVD.


LUNGS:  Clear bilaterally.  No wheezing.


HEART:  S1, S2.


ABDOMEN:  Soft, nontender. PDC 


EXTREMITIES:  Right big toe amputation site approx w/ sutures. Rt BKA


Lateral aspect wound vac in place 


DERMATOLOGIC:  Warm, dry.  No generalized rash. gen edema ++


CENTRAL NERVOUS SYSTEM:  Alert and oriented x 3, grossly nonfocal.


PIV





Labs


Lab





Laboratory Tests








Test


 1/9/20


09:08 1/9/20


12:39 1/9/20


17:49


 


Glucose (Fingerstick)


 205 mg/dL


(70-99) 136 mg/dL


(70-99) 198 mg/dL


(70-99)











Objective


Assessment


Right foot deep tissue infection/osteo with chronic nonhealing right diabetic 

foot ulcers. 


   -s/p amputation of right 5th and great toe, 12/31. GPC, GNR, GPR on gram 

stain. culture no growth so far,,, now BKA 


GPC bacteremia POA source right foot infection 


Leucocytosis - trending down


Diabetes mellitus with neuropathy poorly controlled


End-stage renal disease, on peritoneal dialysis.


Hypertension.


Pacemaker in place.


Hypotension s/p fluid bolus





Plan


Plan of Care


off antibiotics


Probiotics 


f/u cultures 


Repeat BC 1/2/2020, neg so far











MACHO HARDWICK MD               Red 10, 2020 08:00

## 2020-11-03 ENCOUNTER — HOSPITAL ENCOUNTER (OUTPATIENT)
Dept: HOSPITAL 61 - INTRAD | Age: 55
Discharge: NURSING, REHAB OR LONG-TERM CARE | End: 2020-11-03
Attending: INTERNAL MEDICINE
Payer: MEDICARE

## 2020-11-03 VITALS — SYSTOLIC BLOOD PRESSURE: 97 MMHG | DIASTOLIC BLOOD PRESSURE: 73 MMHG

## 2020-11-03 VITALS — SYSTOLIC BLOOD PRESSURE: 101 MMHG | DIASTOLIC BLOOD PRESSURE: 69 MMHG

## 2020-11-03 VITALS — SYSTOLIC BLOOD PRESSURE: 96 MMHG | DIASTOLIC BLOOD PRESSURE: 73 MMHG

## 2020-11-03 VITALS — DIASTOLIC BLOOD PRESSURE: 73 MMHG | SYSTOLIC BLOOD PRESSURE: 107 MMHG

## 2020-11-03 VITALS — DIASTOLIC BLOOD PRESSURE: 77 MMHG | SYSTOLIC BLOOD PRESSURE: 109 MMHG

## 2020-11-03 VITALS — HEIGHT: 74 IN | BODY MASS INDEX: 16.68 KG/M2 | WEIGHT: 130 LBS

## 2020-11-03 DIAGNOSIS — E03.9: ICD-10-CM

## 2020-11-03 DIAGNOSIS — Z79.4: ICD-10-CM

## 2020-11-03 DIAGNOSIS — Z99.2: ICD-10-CM

## 2020-11-03 DIAGNOSIS — I13.0: Primary | ICD-10-CM

## 2020-11-03 DIAGNOSIS — I25.10: ICD-10-CM

## 2020-11-03 DIAGNOSIS — I50.9: ICD-10-CM

## 2020-11-03 DIAGNOSIS — E11.22: ICD-10-CM

## 2020-11-03 DIAGNOSIS — Z79.82: ICD-10-CM

## 2020-11-03 DIAGNOSIS — Z87.891: ICD-10-CM

## 2020-11-03 DIAGNOSIS — Z98.890: ICD-10-CM

## 2020-11-03 DIAGNOSIS — Z82.49: ICD-10-CM

## 2020-11-03 DIAGNOSIS — N18.6: ICD-10-CM

## 2020-11-03 DIAGNOSIS — K21.9: ICD-10-CM

## 2020-11-03 DIAGNOSIS — E78.00: ICD-10-CM

## 2020-11-03 DIAGNOSIS — Z79.899: ICD-10-CM

## 2020-11-03 LAB
ANION GAP SERPL CALC-SCNC: 18 MMOL/L (ref 6–14)
APTT BLD: 34 SEC (ref 24–38)
BASOPHILS # BLD AUTO: 0 X10^3/UL (ref 0–0.2)
BASOPHILS NFR BLD: 1 % (ref 0–3)
BUN SERPL-MCNC: 53 MG/DL (ref 8–26)
CALCIUM SERPL-MCNC: 9.1 MG/DL (ref 8.5–10.1)
CHLORIDE SERPL-SCNC: 100 MMOL/L (ref 98–107)
CO2 SERPL-SCNC: 19 MMOL/L (ref 21–32)
CREAT SERPL-MCNC: 7.7 MG/DL (ref 0.7–1.3)
EOSINOPHIL NFR BLD: 0.2 X10^3/UL (ref 0–0.7)
EOSINOPHIL NFR BLD: 3 % (ref 0–3)
ERYTHROCYTE [DISTWIDTH] IN BLOOD BY AUTOMATED COUNT: 18.9 % (ref 11.5–14.5)
GFR SERPLBLD BASED ON 1.73 SQ M-ARVRAT: 9 ML/MIN
GLUCOSE SERPL-MCNC: 107 MG/DL (ref 70–99)
HCT VFR BLD CALC: 28.5 % (ref 39–53)
HGB BLD-MCNC: 9.1 G/DL (ref 13–17.5)
LYMPHOCYTES # BLD: 1.1 X10^3/UL (ref 1–4.8)
LYMPHOCYTES NFR BLD AUTO: 20 % (ref 24–48)
MCH RBC QN AUTO: 27 PG (ref 25–35)
MCHC RBC AUTO-ENTMCNC: 32 G/DL (ref 31–37)
MCV RBC AUTO: 85 FL (ref 79–100)
MONO #: 0.5 X10^3/UL (ref 0–1.1)
MONOCYTES NFR BLD: 10 % (ref 0–9)
NEUT #: 3.5 X10^3/UL (ref 1.8–7.7)
NEUTROPHILS NFR BLD AUTO: 66 % (ref 31–73)
PLATELET # BLD AUTO: 370 X10^3/UL (ref 140–400)
POTASSIUM SERPL-SCNC: 5.6 MMOL/L (ref 3.5–5.1)
PROTHROMBIN TIME: 14.5 SEC (ref 11.7–14)
RBC # BLD AUTO: 3.38 X10^6/UL (ref 4.3–5.7)
SODIUM SERPL-SCNC: 137 MMOL/L (ref 136–145)
WBC # BLD AUTO: 5.3 X10^3/UL (ref 4–11)

## 2020-11-03 PROCEDURE — C1769 GUIDE WIRE: HCPCS

## 2020-11-03 PROCEDURE — 85730 THROMBOPLASTIN TIME PARTIAL: CPT

## 2020-11-03 PROCEDURE — C1750 CATH, HEMODIALYSIS,LONG-TERM: HCPCS

## 2020-11-03 PROCEDURE — 85025 COMPLETE CBC W/AUTO DIFF WBC: CPT

## 2020-11-03 PROCEDURE — 80048 BASIC METABOLIC PNL TOTAL CA: CPT

## 2020-11-03 PROCEDURE — 77001 FLUOROGUIDE FOR VEIN DEVICE: CPT

## 2020-11-03 PROCEDURE — 36415 COLL VENOUS BLD VENIPUNCTURE: CPT

## 2020-11-03 PROCEDURE — 85610 PROTHROMBIN TIME: CPT

## 2020-11-03 PROCEDURE — 36581 REPLACE TUNNELED CV CATH: CPT

## 2020-11-03 NOTE — RAD
Procedure: Exchange of a malfunctioning tunneled hemodialysis catheter through

same venous access



Clinical Indication: Adult male requiring hemodialysis. Existing catheter does

not aspirated.



Sedation: Local anesthesia only. 



Antibiotics: All elements of maximal sterile barrier technique including the

use of a cap, mask, sterile gown, sterile gloves, large sterile sheet,

appropriate hand hygiene, and 2% chlorhexidine for cutaneous antisepsis (or

acceptable alternative antiseptic per current guidelines) were followed for

this procedure.  Antibiotic



Fluoro Time: 0.2 minutes, images: 1



Contrast: None



Sterility: All elements of maximal sterile barrier technique including the use

of a cap, mask, sterile gown, sterile gloves, large sterile sheet, appropriate

hand hygiene, and 2% chlorhexidine for cutaneous antisepsis (or acceptable

alternative antiseptic per current guidelines) were followed for this

procedure. 



Consent: The procedure was explained in its entirety to the patient or the

patients designated representative by a member of the treatment team,

including a discussion of the risks, benefits and commonly accepted

alternatives to the procedure, as well as the expected consequences of no

therapy whatsoever.   Discussion of the risks included, but was not limited

to, those that are most frequent and those that are rare but possibly severe

or life-threatening, as well as the possibility of unforeseen complications.



Technique and Findings: Following informed consent, the patient was prepped

and draped in usual sterile fashion. Fluoroscopy revealed an intact right IJ

tunneled hemodialysis catheter. This catheter failed aspirate manually. 1%

lidocaine was used to achieve local anesthesia around the exit site. The

catheter was then removed over wire and a new 23 cm palindrome hemodialysis

catheter was advanced over the wire and positioned under fluoroscopic guidance

with the distal tip in the mid right atrium. Manual flow rates were assessed

and found to be excellent. The new catheter was flushed, capped, and sutured

to the skin.



Complications: No



Impression:

1. Fluoroscopic guided exchange of a tunneled hemodialysis catheter as

described. The new catheter is suitable for use.

## 2020-11-03 NOTE — NUR
Discharge Note:



BLAISE DOWNING



Discharge instructions and discharge home medications reviewed with Patient and a copy 
given. All questions have been answered and understanding verbalized. 



The following instructions and handouts were given: dialysis cath care



right upper chest dialysis cath dressing clean, dry and intact.



Patient discharged to [facility] via [employer] [facility] [employer].
